# Patient Record
Sex: MALE | Race: WHITE | Employment: OTHER | URBAN - METROPOLITAN AREA
[De-identification: names, ages, dates, MRNs, and addresses within clinical notes are randomized per-mention and may not be internally consistent; named-entity substitution may affect disease eponyms.]

---

## 2020-07-10 RX ORDER — GABAPENTIN 100 MG/1
400 CAPSULE ORAL 3 TIMES DAILY
Status: ON HOLD | COMMUNITY
Start: 2020-09-10 | End: 2020-10-01

## 2020-07-10 RX ORDER — NITROGLYCERIN 0.4 MG/1
0.4 TABLET SUBLINGUAL EVERY 5 MIN PRN
COMMUNITY

## 2020-07-10 RX ORDER — LOSARTAN POTASSIUM 100 MG/1
100 TABLET ORAL DAILY
Status: ON HOLD | COMMUNITY
End: 2020-10-01 | Stop reason: HOSPADM

## 2020-07-10 RX ORDER — METOPROLOL SUCCINATE 100 MG/1
100 TABLET, EXTENDED RELEASE ORAL DAILY
Status: ON HOLD | COMMUNITY
End: 2020-10-01 | Stop reason: HOSPADM

## 2020-07-10 RX ORDER — ASPIRIN 81 MG/1
81 TABLET ORAL DAILY
Status: ON HOLD | COMMUNITY
End: 2020-09-14

## 2020-07-10 RX ORDER — ATORVASTATIN CALCIUM 40 MG/1
40 TABLET, FILM COATED ORAL DAILY
Status: ON HOLD | COMMUNITY
End: 2020-10-23 | Stop reason: SDUPTHER

## 2020-07-10 RX ORDER — AMIODARONE HYDROCHLORIDE 200 MG/1
200 TABLET ORAL DAILY
Status: ON HOLD | COMMUNITY
End: 2020-10-23 | Stop reason: SDUPTHER

## 2020-07-10 NOTE — PROGRESS NOTES
Preoperative Screening for Elective Surgery/Invasive Procedures While COVID-19 present in the community     Have you tested positive or have been told to self-isolate for COVID-19 like symptoms within the past 28 days? no   Do you currently have any of the following symptoms? no  o Fever >100.0 F or 99.9 F in immunocompromised patients? no  o New onset cough, shortness of breath or difficulty breathing? no  o New onset sore throat, myalgia (muscle aches and pains), headache, loss of taste/smell or diarrhea? no   Have you had a potential exposure to COVID-19 within the past 14 days by:  o Close contact with a confirmed case? no  o Close contact with a healthcare worker,  or essential infrastructure worker (grocery store, restaurant, gas station, public utilities or transportation)? no  o Do you reside in a congregate setting such as; skilled nursing facility, adult home, correctional facility, homeless shelter or other institutional setting? No  o Have you had recent travel to a known COVID-19 hotspot? no    Indicate if the patient has a positive screen by answering yes to one or more of the above questions. Patients who test positive or screen positive prior to surgery or on the day of surgery should be evaluated in conjunction with the surgeon/proceduralist/anesthesiologist to determine the urgency of the procedure.

## 2020-07-10 NOTE — PROGRESS NOTES
[address]  time___0820_________        Surgery time___0950_________    Take the following medications with a sip of water: Losartan, Metoprolol pt states will take it at night can't take on an empty stomach drops his BP    Do not eat or drink anything after 12:00 midnight prior to your surgery. This includes water chewing gum, mints and ice chips. You may brush your teeth and gargle the morning of your surgery, but do not swallow the water     Please see your family doctor/pediatrician for a history and physical and/or concerning medications. H&P 7/20/20 Dr. Eva Spence office Tahir Daigle NP)   Bring any test results/reports from your physicians office. If you are under the care of a heart doctor or specialist doctor, please be aware that you may be asked to them for clearance    You may be asked to stop blood thinners such as Coumadin, Plavix, Fragmin, Lovenox, etc., or any anti-inflammatories such as:  Aspirin, Ibuprofen, Advil, Naproxen prior to your surgery. We also ask that you stop any OTC medications such as fish oil, vitamin E, glucosamine, garlic, Multivitamins, COQ 10, etc.    We ask that you do not smoke 24 hours prior to surgery  We ask that you do not  drink any alcoholic beverages 24 hours prior to surgery     You must make arrangements for a responsible adult to take you home after your surgery. For your safety you will not be allowed to leave alone or drive yourself home. Your surgery will be cancelled if you do not have a ride home. Also for your safety, it is strongly suggested that someone stay with you the first 24 hours after your surgery. A parent or legal guardian must accompany a child scheduled for surgery and plan to stay at the hospital until the child is discharged. Please do not bring other children with you.     For your comfort, please wear simple loose fitting clothing to the hospital.  Please do not bring valuables. Wear short sleeve button down shirt or loose fitting shirt. Bring eye drops or ointment day of surgery. Do not wear any make-up or nail polish on your fingers or toes      For your safety, please do not wear any jewelry or body piercing's on the day of surgery. All jewelry must be removed. If you have dentures, they will be removed before going to operating room. For your convenience, we will provide you with a container. If you wear contact lenses or glasses, they will be removed, please bring a case for them. If you have a living will and a durable power of  for healthcare, please bring in a copy. As part of our patient safety program to minimize surgical site infections, we ask you to do the following:    · Please notify your surgeon if you develop any illness between         now and the  day of your surgery. · This includes a cough, cold, fever, sore throat, nausea,         or vomiting, and diarrhea, etc.  ·  Please notify your surgeon if you experience dizziness, shortness         of breath or blurred vision between now and the time of your surgery. Do not shave your operative site 96 hours prior to surgery. For face and neck surgery, men may use an electric razor 48 hours   prior to surgery. You may shower the night before surgery or the morning of   your surgery with an antibacterial soap. You will need to bring a photo ID and insurance card    [facility] has an onsite pharmacy, would you like to utilize our pharmacy     If you will be staying overnight and use a C-pap machine, please bring   your C-pap to hospital     Our goal is to provide you with excellent care, therefore, visitors will be limited to two(2) in the room at a time so that we may focus on providing this care for you. Please contact pre-admission testing if you have any further questions.                  [facility] phone number:  [phone]  Please note these are generalized instructions for all surgical cases, you may be provided with more specific instructions according to your surgery.

## 2020-07-22 ENCOUNTER — ANESTHESIA EVENT (OUTPATIENT)
Dept: SURGERY | Age: 75
End: 2020-07-22
Payer: MEDICARE

## 2020-07-22 NOTE — PROGRESS NOTES
[facility]  [phone]        Pre-Op Phone Call:     Patient Name: Ulisses Cutler     Telephone Information:   Mobile [phone]     Home phone:  [phone]    Surgery Time:    9:30 AM     Arrival Time:  0800      Left extended Message:  NA     Message left with:     Recent change in health status:  No     Advised of transportation/ policy:  Yes     NPO policy reviewed:  Yes pt     Advised to take morning heart/blood pressure medications with sips of water morning of surgery? Yes     Instructed to bring eye drops, photo identification, and insurance card day of surgery? Yes     Advised to wear short sleeved button down shirt (no T-shirt underneath):  Yes     Advised not to wear jewelry, hairpins, or pantyhose day of surgery? Yes     Advised not to wear make-up and to wash face day of surgery?   Yes    Remarks:        Electronically signed by:  Lu Levy RN at 7/22/2020 1:19 PM

## 2020-07-23 ENCOUNTER — HOSPITAL ENCOUNTER (OUTPATIENT)
Age: 75
Setting detail: OUTPATIENT SURGERY
Discharge: HOME | End: 2020-07-23
Attending: OPHTHALMOLOGY | Admitting: OPHTHALMOLOGY
Payer: MEDICARE

## 2020-07-23 ENCOUNTER — ANESTHESIA (OUTPATIENT)
Dept: SURGERY | Age: 75
End: 2020-07-23
Payer: MEDICARE

## 2020-07-23 VITALS
HEIGHT: 69 IN | HEART RATE: 54 BPM | OXYGEN SATURATION: 94 % | DIASTOLIC BLOOD PRESSURE: 87 MMHG | TEMPERATURE: 96.7 F | RESPIRATION RATE: 13 BRPM | BODY MASS INDEX: 25.18 KG/M2 | WEIGHT: 170 LBS | SYSTOLIC BLOOD PRESSURE: 179 MMHG

## 2020-07-23 VITALS — DIASTOLIC BLOOD PRESSURE: 86 MMHG | SYSTOLIC BLOOD PRESSURE: 189 MMHG | OXYGEN SATURATION: 99 %

## 2020-07-23 PROCEDURE — 2709999900 HC NON-CHARGEABLE SUPPLY: Performed by: OPHTHALMOLOGY

## 2020-07-23 PROCEDURE — 6360000002 HC RX W HCPCS: Performed by: NURSE ANESTHETIST, CERTIFIED REGISTERED

## 2020-07-23 PROCEDURE — 2500000003 HC RX 250 WO HCPCS: Performed by: OPHTHALMOLOGY

## 2020-07-23 PROCEDURE — 3700000000 HC ANESTHESIA ATTENDED CARE: Performed by: OPHTHALMOLOGY

## 2020-07-23 PROCEDURE — V2632 POST CHMBR INTRAOCULAR LENS: HCPCS | Performed by: OPHTHALMOLOGY

## 2020-07-23 PROCEDURE — 7100000010 HC PHASE II RECOVERY - FIRST 15 MIN: Performed by: OPHTHALMOLOGY

## 2020-07-23 PROCEDURE — 3700000001 HC ADD 15 MINUTES (ANESTHESIA): Performed by: OPHTHALMOLOGY

## 2020-07-23 PROCEDURE — 2500000003 HC RX 250 WO HCPCS

## 2020-07-23 PROCEDURE — 6370000000 HC RX 637 (ALT 250 FOR IP): Performed by: OPHTHALMOLOGY

## 2020-07-23 PROCEDURE — 3600000014 HC SURGERY LEVEL 4 ADDTL 15MIN: Performed by: OPHTHALMOLOGY

## 2020-07-23 PROCEDURE — 3600000004 HC SURGERY LEVEL 4 BASE: Performed by: OPHTHALMOLOGY

## 2020-07-23 PROCEDURE — 2580000003 HC RX 258: Performed by: ANESTHESIOLOGY

## 2020-07-23 DEVICE — LENS INTOCU +20.0 DIOPT L13MM DIA6MM 0DEG HAPTIC ANG A: Type: IMPLANTABLE DEVICE | Site: EYE | Status: FUNCTIONAL

## 2020-07-23 RX ORDER — TETRACAINE HYDROCHLORIDE 5 MG/ML
1 SOLUTION OPHTHALMIC ONCE
Status: COMPLETED | OUTPATIENT
Start: 2020-07-23 | End: 2020-07-23

## 2020-07-23 RX ORDER — ONDANSETRON 2 MG/ML
4 INJECTION INTRAMUSCULAR; INTRAVENOUS
Status: DISCONTINUED | OUTPATIENT
Start: 2020-07-23 | End: 2020-07-23 | Stop reason: HOSPADM

## 2020-07-23 RX ORDER — MIDAZOLAM HYDROCHLORIDE 1 MG/ML
INJECTION INTRAMUSCULAR; INTRAVENOUS PRN
Status: DISCONTINUED | OUTPATIENT
Start: 2020-07-23 | End: 2020-07-23 | Stop reason: SDUPTHER

## 2020-07-23 RX ORDER — SODIUM CHLORIDE 9 MG/ML
INJECTION, SOLUTION INTRAVENOUS CONTINUOUS
Status: DISCONTINUED | OUTPATIENT
Start: 2020-07-23 | End: 2020-07-23 | Stop reason: HOSPADM

## 2020-07-23 RX ORDER — CIPROFLOXACIN HYDROCHLORIDE 3.5 MG/ML
1 SOLUTION/ DROPS TOPICAL SEE ADMIN INSTRUCTIONS
Status: DISCONTINUED | OUTPATIENT
Start: 2020-07-23 | End: 2020-07-23 | Stop reason: HOSPADM

## 2020-07-23 RX ORDER — OFLOXACIN 3 MG/ML
SOLUTION/ DROPS OPHTHALMIC
Status: COMPLETED | OUTPATIENT
Start: 2020-07-23 | End: 2020-07-23

## 2020-07-23 RX ORDER — BALANCED SALT SOLUTION ENRICHED WITH BICARBONATE, DEXTROSE, AND GLUTATHIONE
KIT INTRAOCULAR
Status: COMPLETED | OUTPATIENT
Start: 2020-07-23 | End: 2020-07-23

## 2020-07-23 RX ORDER — FENTANYL CITRATE 50 UG/ML
INJECTION, SOLUTION INTRAMUSCULAR; INTRAVENOUS PRN
Status: DISCONTINUED | OUTPATIENT
Start: 2020-07-23 | End: 2020-07-23 | Stop reason: SDUPTHER

## 2020-07-23 RX ORDER — TETRACAINE HYDROCHLORIDE 5 MG/ML
SOLUTION OPHTHALMIC
Status: COMPLETED | OUTPATIENT
Start: 2020-07-23 | End: 2020-07-23

## 2020-07-23 RX ORDER — SODIUM CHLORIDE 0.9 % (FLUSH) 0.9 %
10 SYRINGE (ML) INJECTION EVERY 12 HOURS SCHEDULED
Status: DISCONTINUED | OUTPATIENT
Start: 2020-07-23 | End: 2020-07-23 | Stop reason: HOSPADM

## 2020-07-23 RX ORDER — HYDRALAZINE HYDROCHLORIDE 50 MG/1
75 TABLET, FILM COATED ORAL 2 TIMES DAILY
Status: ON HOLD | COMMUNITY
End: 2020-10-01 | Stop reason: HOSPADM

## 2020-07-23 RX ORDER — PREDNISOLONE ACETATE 10 MG/ML
SUSPENSION/ DROPS OPHTHALMIC
Status: COMPLETED | OUTPATIENT
Start: 2020-07-23 | End: 2020-07-23

## 2020-07-23 RX ORDER — PILOCARPINE HYDROCHLORIDE 20 MG/ML
SOLUTION/ DROPS OPHTHALMIC
Status: COMPLETED | OUTPATIENT
Start: 2020-07-23 | End: 2020-07-23

## 2020-07-23 RX ORDER — BALANCED SALT SOLUTION 6.4; .75; .48; .3; 3.9; 1.7 MG/ML; MG/ML; MG/ML; MG/ML; MG/ML; MG/ML
SOLUTION OPHTHALMIC
Status: COMPLETED | OUTPATIENT
Start: 2020-07-23 | End: 2020-07-23

## 2020-07-23 RX ORDER — SODIUM CHLORIDE 0.9 % (FLUSH) 0.9 %
10 SYRINGE (ML) INJECTION PRN
Status: DISCONTINUED | OUTPATIENT
Start: 2020-07-23 | End: 2020-07-23 | Stop reason: HOSPADM

## 2020-07-23 RX ADMIN — SODIUM CHLORIDE: 9 INJECTION, SOLUTION INTRAVENOUS at 08:18

## 2020-07-23 RX ADMIN — TETRACAINE HYDROCHLORIDE 1 DROP: 5 SOLUTION OPHTHALMIC at 08:10

## 2020-07-23 RX ADMIN — POVIDONE-IODINE: 5 SOLUTION OPHTHALMIC at 08:10

## 2020-07-23 RX ADMIN — Medication 0.5 ML: at 08:15

## 2020-07-23 RX ADMIN — LIDOCAINE HYDROCHLORIDE: 35 GEL OPHTHALMIC at 08:10

## 2020-07-23 RX ADMIN — Medication 0.5 ML: at 08:10

## 2020-07-23 RX ADMIN — FENTANYL CITRATE 50 MCG: 50 INJECTION INTRAMUSCULAR; INTRAVENOUS at 09:33

## 2020-07-23 RX ADMIN — CIPROFLOXACIN 1 DROP: 3 SOLUTION OPHTHALMIC at 08:15

## 2020-07-23 RX ADMIN — MIDAZOLAM 1 MG: 1 INJECTION INTRAMUSCULAR; INTRAVENOUS at 09:33

## 2020-07-23 RX ADMIN — CIPROFLOXACIN 1 DROP: 3 SOLUTION OPHTHALMIC at 08:10

## 2020-07-23 ASSESSMENT — PAIN - FUNCTIONAL ASSESSMENT: PAIN_FUNCTIONAL_ASSESSMENT: 0-10

## 2020-07-23 ASSESSMENT — PAIN SCALES - GENERAL
PAINLEVEL_OUTOF10: 0
PAINLEVEL_OUTOF10: 0

## 2020-07-23 ASSESSMENT — ENCOUNTER SYMPTOMS: SHORTNESS OF BREATH: 0

## 2020-07-23 NOTE — ANESTHESIA PRE PROCEDURE
Readings from Last 3 Encounters:   07/23/20 170 lb (77.1 kg)     Body mass index is 25.1 kg/m². CBC: No results found for: WBC, RBC, HGB, HCT, MCV, RDW, PLT    CMP: No results found for: NA, K, CL, CO2, BUN, CREATININE, GFRAA, AGRATIO, LABGLOM, GLUCOSE, PROT, CALCIUM, BILITOT, ALKPHOS, AST, ALT    POC Tests: No results for input(s): POCGLU, POCNA, POCK, POCCL, POCBUN, POCHEMO, POCHCT in the last 72 hours. Coags: No results found for: PROTIME, INR, APTT    HCG (If Applicable): No results found for: PREGTESTUR, PREGSERUM, HCG, HCGQUANT     ABGs: No results found for: PHART, PO2ART, JRM8TOV, RVE7YHJ, BEART, Z3CMAGBX     Type & Screen (If Applicable):  No results found for: LABABO, LABRH    Drug/Infectious Status (If Applicable):  No results found for: HIV, HEPCAB    COVID-19 Screening (If Applicable): No results found for: COVID19      Anesthesia Evaluation  Patient summary reviewed no history of anesthetic complications:   Airway: Mallampati: III  TM distance: >3 FB   Neck ROM: full  Mouth opening: > = 3 FB Dental:      Comment: No loose teeth    Pulmonary: breath sounds clear to auscultation      (-) COPD, asthma, shortness of breath and recent URI                           Cardiovascular:    (+) hypertension:, valvular problems/murmurs: AI, past MI:, CABG/stent (stent 2013):, dysrhythmias: atrial fibrillation, hyperlipidemia    (-) murmur      Rhythm: regular  Rate: normal                    Neuro/Psych:      (-) seizures, neuromuscular disease, TIA, CVA, headaches and psychiatric history           GI/Hepatic/Renal:   (+) renal disease: CRI,      (-) GERD, PUD, hepatitis, liver disease and bowel prep       Endo/Other:    (+) blood dyscrasia: anticoagulation therapy:., .    (-) diabetes mellitus, hypothyroidism, hyperthyroidism               Abdominal:           Vascular:   + PVD, aortic or cerebral, .                                  Anesthesia Plan      MAC     ASA 3       Induction:

## 2020-07-23 NOTE — OP NOTE
Operative Note      [place] [address]  [phone]      Name:  Jean Paul Mackenzie  :  1945    Age:   66 y.o. Medical Record Number:  [id number]  Date of Procedure:  2020     Preoperative diagnosis:  1. Visually significant cataract right eye   2. Primary open angle glaucoma right eye     Postoperative diagnosis:   Same    Procedure:   1. Phacoemulsification with intraocular lens right eye   2. Goniotomy with [place] dual blade right eye. Surgeon: Chato Salmeron M.D. Anesthesia: MAC with Topical   Complications:None  Implant: [id number] +20.0  Estimated Blood Loss: < 3cc  Specimens removed: none    Indications for procedure: The patient has a history of glaucoma which is currently uncontrolled. In order to achieve better control of the intraocular pressure, glaucoma surgery was advised to try and prevent further loss of vision. The patient has a visually significant cataract in the right eye that interferes with activities of daily living. Following discussion of all risks, benefits, and alternatives, the patient agreed to have the procedure done. Informed consent was signed. Operative Procedure:    After the risks and benefits of, and alternatives to, the planned procedure were explained to the patient,informed consent was obtained. The patient was ushered into the operating room and placed in the supineposition on the table. After induction of IV sedation, was administered, the operative eye and the surrounding  area were prepped and draped in the usual sterile fashion. Under the operating microscope, a temporal paracentesis was created. A small amount of preservative-free 1% lidocaine was instilled into the anterior chamber. The anterior chamber was then deepened with Viscoat / Healon 5. A temporal biplanar clear corneal incision was created with a 2.4mm keratome.  The patient's head and microscope were then tilted to

## 2020-07-23 NOTE — ANESTHESIA POSTPROCEDURE EVALUATION
Department of Anesthesiology  Postprocedure Note    Patient: Sydney Govea MRN: [id number]  YOB: 1945  Date of evaluation: 7/23/2020  Time:  10:15 AM     Procedure Summary     Date:  07/23/20 Room / Location:  [address]    Anesthesia Start:  0930 Anesthesia Stop:  4106    Procedures:       GONIOTOMY (Right )      Phacoemulsification with intraocular lens implant (Right ) Diagnosis:       Age-related nuclear cataract of right eye      Primary open angle glaucoma of right eye, severe stage      (Age-related nuclear cataract of right eye; Primary open angle glaucoma of right eye, severe stage)    Surgeon:  Janet Sawant MD Responsible Provider:  Lasha Yates MD    Anesthesia Type:  MAC ASA Status:  3          Anesthesia Type: MAC    Lesli Phase I: Lesli Score: 10    Lesli Phase II: Lesli Score: 10    Last vitals: Reviewed and per EMR flowsheets.        Anesthesia Post Evaluation    Patient location during evaluation: PACU  Patient participation: complete - patient participated  Level of consciousness: awake and alert  Airway patency: patent  Nausea & Vomiting: no nausea and no vomiting  Complications: no  Cardiovascular status: hemodynamically stable  Respiratory status: acceptable  Hydration status: stable

## 2020-09-13 ENCOUNTER — APPOINTMENT (OUTPATIENT)
Dept: GENERAL RADIOLOGY | Age: 75
DRG: 871 | End: 2020-09-13
Payer: MEDICARE

## 2020-09-13 ENCOUNTER — HOSPITAL ENCOUNTER (INPATIENT)
Age: 75
LOS: 5 days | Discharge: NURSING, REHAB OR LONG-TERM CARE | DRG: 871 | End: 2020-09-18
Attending: EMERGENCY MEDICINE | Admitting: HOSPITALIST
Payer: MEDICARE

## 2020-09-13 ENCOUNTER — APPOINTMENT (OUTPATIENT)
Dept: CT IMAGING | Age: 75
DRG: 871 | End: 2020-09-13
Payer: MEDICARE

## 2020-09-13 PROBLEM — A41.9 SEPSIS (HCC): Status: ACTIVE | Noted: 2020-09-13

## 2020-09-13 LAB
A/G RATIO: 1.4 (ref 1.1–2.2)
ALBUMIN SERPL-MCNC: 3.7 G/DL (ref 3.4–5)
ALP BLD-CCNC: 82 U/L (ref 40–129)
ALT SERPL-CCNC: 62 U/L (ref 10–40)
AMMONIA: 18 UMOL/L (ref 16–60)
ANION GAP SERPL CALCULATED.3IONS-SCNC: 15 MMOL/L (ref 3–16)
AST SERPL-CCNC: 73 U/L (ref 15–37)
BACTERIA: ABNORMAL /HPF
BASE EXCESS ARTERIAL: -6.8 MMOL/L (ref -3–3)
BASE EXCESS VENOUS: -7.2 MMOL/L
BASOPHILS ABSOLUTE: 0 K/UL (ref 0–0.2)
BASOPHILS RELATIVE PERCENT: 0.1 %
BILIRUB SERPL-MCNC: 2.7 MG/DL (ref 0–1)
BILIRUBIN URINE: ABNORMAL
BLOOD, URINE: ABNORMAL
BUN BLDV-MCNC: 30 MG/DL (ref 7–20)
C-REACTIVE PROTEIN: 74.2 MG/L (ref 0–5.1)
CALCIUM SERPL-MCNC: 8.7 MG/DL (ref 8.3–10.6)
CARBOXYHEMOGLOBIN ARTERIAL: 1.7 % (ref 0–1.5)
CARBOXYHEMOGLOBIN: 1.3 %
CHLORIDE BLD-SCNC: 103 MMOL/L (ref 99–110)
CLARITY: ABNORMAL
CO2: 18 MMOL/L (ref 21–32)
COLOR: ABNORMAL
CREAT SERPL-MCNC: 1.9 MG/DL (ref 0.8–1.3)
EOSINOPHILS ABSOLUTE: 0 K/UL (ref 0–0.6)
EOSINOPHILS RELATIVE PERCENT: 0 %
EPITHELIAL CELLS, UA: ABNORMAL /HPF (ref 0–5)
FERRITIN: 1143 NG/ML (ref 30–400)
GFR AFRICAN AMERICAN: 42
GFR NON-AFRICAN AMERICAN: 35
GLOBULIN: 2.6 G/DL
GLUCOSE BLD-MCNC: 102 MG/DL (ref 70–99)
GLUCOSE BLD-MCNC: 90 MG/DL (ref 70–99)
GLUCOSE URINE: ABNORMAL MG/DL
HCO3 ARTERIAL: 17.2 MMOL/L (ref 21–29)
HCO3 VENOUS: 18 MMOL/L (ref 23–29)
HCT VFR BLD CALC: 25 % (ref 40.5–52.5)
HCT VFR BLD CALC: 29.1 % (ref 40.5–52.5)
HEMOGLOBIN, ART, EXTENDED: 8.4 G/DL (ref 13.5–17.5)
HEMOGLOBIN: 7.9 G/DL (ref 13.5–17.5)
HEMOGLOBIN: 9.3 G/DL (ref 13.5–17.5)
KETONES, URINE: ABNORMAL MG/DL
LACTATE DEHYDROGENASE: 454 U/L (ref 100–190)
LACTIC ACID: 1.7 MMOL/L (ref 0.4–2)
LACTIC ACID: 2.2 MMOL/L (ref 0.4–2)
LACTIC ACID: 3.4 MMOL/L (ref 0.4–2)
LEUKOCYTE ESTERASE, URINE: ABNORMAL
LIPASE: 46 U/L (ref 13–60)
LYMPHOCYTES ABSOLUTE: 0.2 K/UL (ref 1–5.1)
LYMPHOCYTES RELATIVE PERCENT: 0.9 %
MCH RBC QN AUTO: 30.7 PG (ref 26–34)
MCHC RBC AUTO-ENTMCNC: 31.9 G/DL (ref 31–36)
MCV RBC AUTO: 96.2 FL (ref 80–100)
METHEMOGLOBIN ARTERIAL: 0.8 %
METHEMOGLOBIN VENOUS: 0.7 %
MICROSCOPIC EXAMINATION: YES
MONOCYTES ABSOLUTE: 0.2 K/UL (ref 0–1.3)
MONOCYTES RELATIVE PERCENT: 0.7 %
NEUTROPHILS ABSOLUTE: 27.2 K/UL (ref 1.7–7.7)
NEUTROPHILS RELATIVE PERCENT: 98.3 %
NITRITE, URINE: ABNORMAL
O2 CONTENT ARTERIAL: 11 ML/DL
O2 CONTENT, VEN: 8 ML/DL
O2 SAT, ARTERIAL: 98 %
O2 SAT, VEN: 71 %
O2 THERAPY: ABNORMAL
O2 THERAPY: ABNORMAL
PCO2 ARTERIAL: 28.3 MMHG (ref 35–45)
PCO2, VEN: 36.7 MMHG (ref 40–50)
PDW BLD-RTO: 17.3 % (ref 12.4–15.4)
PERFORMED ON: ABNORMAL
PH ARTERIAL: 7.39 (ref 7.35–7.45)
PH UA: ABNORMAL (ref 5–8)
PH VENOUS: 7.31 (ref 7.35–7.45)
PLATELET # BLD: 141 K/UL (ref 135–450)
PMV BLD AUTO: 9.5 FL (ref 5–10.5)
PO2 ARTERIAL: 86.1 MMHG (ref 75–108)
PO2, VEN: 39 MMHG
POTASSIUM REFLEX MAGNESIUM: 4 MMOL/L (ref 3.5–5.1)
PRO-BNP: 5868 PG/ML (ref 0–449)
PROTEIN UA: ABNORMAL MG/DL
RBC # BLD: 3.03 M/UL (ref 4.2–5.9)
RBC UA: >100 /HPF (ref 0–4)
SARS-COV-2, NAAT: NOT DETECTED
SODIUM BLD-SCNC: 136 MMOL/L (ref 136–145)
SPECIFIC GRAVITY UA: 1.02 (ref 1–1.03)
TCO2 ARTERIAL: 18.1 MMOL/L
TCO2 CALC VENOUS: 20 MMOL/L
TOTAL PROTEIN: 6.3 G/DL (ref 6.4–8.2)
TROPONIN: 0.02 NG/ML
TROPONIN: 0.04 NG/ML
TROPONIN: 0.06 NG/ML
URINE REFLEX TO CULTURE: YES
URINE TYPE: ABNORMAL
UROBILINOGEN, URINE: ABNORMAL E.U./DL
WBC # BLD: 27.6 K/UL (ref 4–11)
WBC UA: ABNORMAL /HPF (ref 0–5)

## 2020-09-13 PROCEDURE — 76937 US GUIDE VASCULAR ACCESS: CPT

## 2020-09-13 PROCEDURE — 0T9B80Z DRAINAGE OF BLADDER WITH DRAINAGE DEVICE, VIA NATURAL OR ARTIFICIAL OPENING ENDOSCOPIC: ICD-10-PCS | Performed by: UROLOGY

## 2020-09-13 PROCEDURE — 99291 CRITICAL CARE FIRST HOUR: CPT | Performed by: INTERNAL MEDICINE

## 2020-09-13 PROCEDURE — 82140 ASSAY OF AMMONIA: CPT

## 2020-09-13 PROCEDURE — C1751 CATH, INF, PER/CENT/MIDLINE: HCPCS

## 2020-09-13 PROCEDURE — 99285 EMERGENCY DEPT VISIT HI MDM: CPT

## 2020-09-13 PROCEDURE — 83690 ASSAY OF LIPASE: CPT

## 2020-09-13 PROCEDURE — 85018 HEMOGLOBIN: CPT

## 2020-09-13 PROCEDURE — 36592 COLLECT BLOOD FROM PICC: CPT

## 2020-09-13 PROCEDURE — 82728 ASSAY OF FERRITIN: CPT

## 2020-09-13 PROCEDURE — 94660 CPAP INITIATION&MGMT: CPT

## 2020-09-13 PROCEDURE — 84484 ASSAY OF TROPONIN QUANT: CPT

## 2020-09-13 PROCEDURE — 83880 ASSAY OF NATRIURETIC PEPTIDE: CPT

## 2020-09-13 PROCEDURE — 2580000003 HC RX 258: Performed by: EMERGENCY MEDICINE

## 2020-09-13 PROCEDURE — 6370000000 HC RX 637 (ALT 250 FOR IP): Performed by: EMERGENCY MEDICINE

## 2020-09-13 PROCEDURE — 85014 HEMATOCRIT: CPT

## 2020-09-13 PROCEDURE — 86140 C-REACTIVE PROTEIN: CPT

## 2020-09-13 PROCEDURE — 36415 COLL VENOUS BLD VENIPUNCTURE: CPT

## 2020-09-13 PROCEDURE — 36600 WITHDRAWAL OF ARTERIAL BLOOD: CPT

## 2020-09-13 PROCEDURE — 94761 N-INVAS EAR/PLS OXIMETRY MLT: CPT

## 2020-09-13 PROCEDURE — 83605 ASSAY OF LACTIC ACID: CPT

## 2020-09-13 PROCEDURE — 2580000003 HC RX 258: Performed by: HOSPITALIST

## 2020-09-13 PROCEDURE — 80053 COMPREHEN METABOLIC PANEL: CPT

## 2020-09-13 PROCEDURE — 2580000003 HC RX 258: Performed by: NURSE PRACTITIONER

## 2020-09-13 PROCEDURE — 96365 THER/PROPH/DIAG IV INF INIT: CPT

## 2020-09-13 PROCEDURE — 51798 US URINE CAPACITY MEASURE: CPT

## 2020-09-13 PROCEDURE — 2700000000 HC OXYGEN THERAPY PER DAY

## 2020-09-13 PROCEDURE — 83615 LACTATE (LD) (LDH) ENZYME: CPT

## 2020-09-13 PROCEDURE — U0002 COVID-19 LAB TEST NON-CDC: HCPCS

## 2020-09-13 PROCEDURE — 6360000002 HC RX W HCPCS: Performed by: EMERGENCY MEDICINE

## 2020-09-13 PROCEDURE — 6360000002 HC RX W HCPCS: Performed by: HOSPITALIST

## 2020-09-13 PROCEDURE — 96366 THER/PROPH/DIAG IV INF ADDON: CPT

## 2020-09-13 PROCEDURE — 85025 COMPLETE CBC W/AUTO DIFF WBC: CPT

## 2020-09-13 PROCEDURE — 71045 X-RAY EXAM CHEST 1 VIEW: CPT

## 2020-09-13 PROCEDURE — 87086 URINE CULTURE/COLONY COUNT: CPT

## 2020-09-13 PROCEDURE — 74176 CT ABD & PELVIS W/O CONTRAST: CPT

## 2020-09-13 PROCEDURE — U0003 INFECTIOUS AGENT DETECTION BY NUCLEIC ACID (DNA OR RNA); SEVERE ACUTE RESPIRATORY SYNDROME CORONAVIRUS 2 (SARS-COV-2) (CORONAVIRUS DISEASE [COVID-19]), AMPLIFIED PROBE TECHNIQUE, MAKING USE OF HIGH THROUGHPUT TECHNOLOGIES AS DESCRIBED BY CMS-2020-01-R: HCPCS

## 2020-09-13 PROCEDURE — 96361 HYDRATE IV INFUSION ADD-ON: CPT

## 2020-09-13 PROCEDURE — 81001 URINALYSIS AUTO W/SCOPE: CPT

## 2020-09-13 PROCEDURE — 93005 ELECTROCARDIOGRAM TRACING: CPT | Performed by: EMERGENCY MEDICINE

## 2020-09-13 PROCEDURE — 70450 CT HEAD/BRAIN W/O DYE: CPT

## 2020-09-13 PROCEDURE — 82803 BLOOD GASES ANY COMBINATION: CPT

## 2020-09-13 PROCEDURE — 2000000000 HC ICU R&B

## 2020-09-13 PROCEDURE — 96367 TX/PROPH/DG ADDL SEQ IV INF: CPT

## 2020-09-13 PROCEDURE — 87040 BLOOD CULTURE FOR BACTERIA: CPT

## 2020-09-13 PROCEDURE — 36569 INSJ PICC 5 YR+ W/O IMAGING: CPT

## 2020-09-13 RX ORDER — ACETAMINOPHEN 650 MG/1
650 SUPPOSITORY RECTAL EVERY 6 HOURS PRN
Status: DISCONTINUED | OUTPATIENT
Start: 2020-09-13 | End: 2020-09-18 | Stop reason: HOSPADM

## 2020-09-13 RX ORDER — ACETAMINOPHEN 650 MG/1
650 SUPPOSITORY RECTAL ONCE
Status: COMPLETED | OUTPATIENT
Start: 2020-09-13 | End: 2020-09-13

## 2020-09-13 RX ORDER — SODIUM CHLORIDE 0.9 % (FLUSH) 0.9 %
10 SYRINGE (ML) INJECTION EVERY 12 HOURS SCHEDULED
Status: DISCONTINUED | OUTPATIENT
Start: 2020-09-13 | End: 2020-09-14

## 2020-09-13 RX ORDER — 0.9 % SODIUM CHLORIDE 0.9 %
30 INTRAVENOUS SOLUTION INTRAVENOUS ONCE
Status: COMPLETED | OUTPATIENT
Start: 2020-09-13 | End: 2020-09-13

## 2020-09-13 RX ORDER — LACTOBACILLUS RHAMNOSUS GG 10B CELL
2 CAPSULE ORAL 2 TIMES DAILY WITH MEALS
Status: DISCONTINUED | OUTPATIENT
Start: 2020-09-13 | End: 2020-09-18 | Stop reason: HOSPADM

## 2020-09-13 RX ORDER — ACETAMINOPHEN 325 MG/1
650 TABLET ORAL EVERY 6 HOURS PRN
Status: DISCONTINUED | OUTPATIENT
Start: 2020-09-13 | End: 2020-09-18 | Stop reason: HOSPADM

## 2020-09-13 RX ORDER — AMIODARONE HYDROCHLORIDE 200 MG/1
200 TABLET ORAL DAILY
Status: DISCONTINUED | OUTPATIENT
Start: 2020-09-13 | End: 2020-09-18 | Stop reason: HOSPADM

## 2020-09-13 RX ORDER — ATORVASTATIN CALCIUM 40 MG/1
40 TABLET, FILM COATED ORAL NIGHTLY
Status: DISCONTINUED | OUTPATIENT
Start: 2020-09-13 | End: 2020-09-18 | Stop reason: HOSPADM

## 2020-09-13 RX ORDER — NITROGLYCERIN 0.4 MG/1
0.4 TABLET SUBLINGUAL EVERY 5 MIN PRN
Status: DISCONTINUED | OUTPATIENT
Start: 2020-09-13 | End: 2020-09-18 | Stop reason: HOSPADM

## 2020-09-13 RX ORDER — SODIUM CHLORIDE 9 MG/ML
INJECTION, SOLUTION INTRAVENOUS CONTINUOUS
Status: DISCONTINUED | OUTPATIENT
Start: 2020-09-13 | End: 2020-09-15

## 2020-09-13 RX ORDER — SODIUM CHLORIDE 0.9 % (FLUSH) 0.9 %
10 SYRINGE (ML) INJECTION PRN
Status: DISCONTINUED | OUTPATIENT
Start: 2020-09-13 | End: 2020-09-18 | Stop reason: HOSPADM

## 2020-09-13 RX ORDER — PROMETHAZINE HYDROCHLORIDE 25 MG/1
12.5 TABLET ORAL EVERY 6 HOURS PRN
Status: DISCONTINUED | OUTPATIENT
Start: 2020-09-13 | End: 2020-09-18 | Stop reason: HOSPADM

## 2020-09-13 RX ORDER — GABAPENTIN 100 MG/1
100 CAPSULE ORAL 3 TIMES DAILY
Status: DISCONTINUED | OUTPATIENT
Start: 2020-09-13 | End: 2020-09-18 | Stop reason: HOSPADM

## 2020-09-13 RX ORDER — LIDOCAINE HYDROCHLORIDE 10 MG/ML
5 INJECTION, SOLUTION EPIDURAL; INFILTRATION; INTRACAUDAL; PERINEURAL ONCE
Status: DISCONTINUED | OUTPATIENT
Start: 2020-09-13 | End: 2020-09-14

## 2020-09-13 RX ORDER — ONDANSETRON 2 MG/ML
4 INJECTION INTRAMUSCULAR; INTRAVENOUS EVERY 6 HOURS PRN
Status: DISCONTINUED | OUTPATIENT
Start: 2020-09-13 | End: 2020-09-18 | Stop reason: HOSPADM

## 2020-09-13 RX ORDER — POLYETHYLENE GLYCOL 3350 17 G/17G
17 POWDER, FOR SOLUTION ORAL DAILY PRN
Status: DISCONTINUED | OUTPATIENT
Start: 2020-09-13 | End: 2020-09-18 | Stop reason: HOSPADM

## 2020-09-13 RX ORDER — SODIUM CHLORIDE 0.9 % (FLUSH) 0.9 %
10 SYRINGE (ML) INJECTION EVERY 12 HOURS SCHEDULED
Status: DISCONTINUED | OUTPATIENT
Start: 2020-09-13 | End: 2020-09-18 | Stop reason: HOSPADM

## 2020-09-13 RX ORDER — 0.9 % SODIUM CHLORIDE 0.9 %
1000 INTRAVENOUS SOLUTION INTRAVENOUS ONCE
Status: COMPLETED | OUTPATIENT
Start: 2020-09-13 | End: 2020-09-13

## 2020-09-13 RX ADMIN — MEROPENEM 500 MG: 500 INJECTION, POWDER, FOR SOLUTION INTRAVENOUS at 18:18

## 2020-09-13 RX ADMIN — SODIUM CHLORIDE 1000 ML: 9 INJECTION, SOLUTION INTRAVENOUS at 09:50

## 2020-09-13 RX ADMIN — MEROPENEM 1 G: 1 INJECTION, POWDER, FOR SOLUTION INTRAVENOUS at 10:11

## 2020-09-13 RX ADMIN — SODIUM CHLORIDE, PRESERVATIVE FREE 10 ML: 5 INJECTION INTRAVENOUS at 20:58

## 2020-09-13 RX ADMIN — ACETAMINOPHEN 650 MG: 650 SUPPOSITORY RECTAL at 09:51

## 2020-09-13 RX ADMIN — SODIUM CHLORIDE, PRESERVATIVE FREE 10 ML: 5 INJECTION INTRAVENOUS at 20:57

## 2020-09-13 RX ADMIN — VANCOMYCIN HYDROCHLORIDE 1500 MG: 10 INJECTION, POWDER, LYOPHILIZED, FOR SOLUTION INTRAVENOUS at 11:24

## 2020-09-13 RX ADMIN — SODIUM CHLORIDE: 9 INJECTION, SOLUTION INTRAVENOUS at 14:53

## 2020-09-13 RX ADMIN — SODIUM CHLORIDE 1643 ML: 9 INJECTION, SOLUTION INTRAVENOUS at 10:02

## 2020-09-13 ASSESSMENT — PAIN SCALES - GENERAL
PAINLEVEL_OUTOF10: 0
PAINLEVEL_OUTOF10: 0

## 2020-09-13 NOTE — OP NOTE
[address] Indira Lacey                                 OPERATIVE REPORT    PATIENT NAME: Matthieu Evans                    :        1945  MED REC NO:   [id number]                          ROOM:       2123  ACCOUNT NO:   [de-identified]                           ADMIT DATE: 2020  PROVIDER:     Kenna Benito MD    DATE OF PROCEDURE:  2020    PREOPERATIVE DIAGNOSIS:  Urinary retention. POSTOPERATIVE DIAGNOSES:  Urinary retention, urethral false passage. OPERATION PERFORMED:  Flexible cystoscopy with Reeves catheter placement  complicated due to the patient's medical status including pending COVID  status and inability for nursing staff to place catheter. SURGEON:  Kenna Benito MD    BLOOD LOSS:  Minimal.    INDICATIONS:  A 20-year-old gentleman admitted from the ER with altered  mental status and high fevers. A CAT scan shows a distended bladder. Nursing staff was unable to place a catheter despite multiple attempts. I was called in to place the catheter. OPERATIVE PROCEDURE:  The patient had already been given IV antibiotics  due to concern for sepsis. He was awake, but disoriented. His  genitalia were prepped and draped; 10 mL of 2% lidocaine jelly were  instilled per urethra. I initially attempted to pass an 18-Vietnamese  Coude-tip catheter. Resistance was met near the prostatic urethra. Despite multiple attempts, I was unable to pass this. I then attempted  a 0.025 ZIPwire, which would not pass and then a 0.035 Sensor wire. This also would not pass. I then switched to a 0.035 ZIPwire and made  multiple attempts to pass the wire. These were unsuccessful. The wires  were removed. The flexible cystoscope was then obtained. The flexible cystoscope was  then advanced through the urethra. At the prostatic urethra, there was  quite a bit of blood clot.   There was a false passage posteriorly. The  normal passage was identified anteriorly. I passed the scope through  this easily into the bladder. Now, the Sensor wire was advanced through  the cystoscope into the bladder. The cystoscope was withdrawn. A 16-Cypriot Councill-tip catheter was advanced over the wire and then  the wire was withdrawn. The balloon was filled with 10 mL of saline and  there was return of over a 1 L of cloudy foul-smelling urine. The  patient tolerated the procedure well.         Brandt Dennis MD    D: 09/13/2020 13:23:06       T: 09/13/2020 13:42:20     [email or web address]  Job#: [id number]     Doc#: [id number]    CC:

## 2020-09-13 NOTE — PROGRESS NOTES
4 Eyes Skin Assessment     The patient is being assess for  Admission    I agree that 2 RN's have performed a thorough Head to Toe Skin Assessment on the patient. ALL assessment sites listed below have been assessed. Areas assessed by both nurses:   [x]   Head, Face, and Ears   [x]   Shoulders, Back, and Chest  [x]   Arms, Elbows, and Hands   [x]   Coccyx, Sacrum, and IschIum  [x]   Legs, Feet, and Heels        Does the Patient have Skin Breakdown?   No         Juan Prevention initiated:  Yes   Wound Care Orders initiated:  NA      [facility] nurse consulted for Pressure Injury (Stage 3,4, Unstageable, DTI, NWPT, and Complex wounds), New and Established Ostomies:  NA      Nurse 1 eSignature: Electronically signed by Luis Carlos Huerta RN on 9/13/20 at 6:55 PM EDT    **SHARE this note so that the co-signing nurse is able to place an eSignature**    Nurse 2 eSignature: Electronically signed by Boo Petit RN on 9/13/20 at 6:55 PM EDT

## 2020-09-13 NOTE — H&P
Hospital Medicine History & Physical      PCP: Rich Luong    Date of Admission: 9/13/2020    Date of Service: Pt seen/examined on 9/13/2020 and Admitted to Inpatient with expected LOS greater than two midnights due to medical therapy. Chief Complaint:  AMS      History Of Present Illness:       66 y.o. male  Admitted from SNF recovering from L4-5, L5-S1 anterior and posterior fusion on 8/31 with post op retroperitoneal hematoma. He was found to have AMS, tachypnea, fever and presents to the ER obtunded, T 105.1 F, tachycardic with . Patient is currently seen on bipap, awakens to name, answers no whe asked if in pain but readily falls back to sleep. Does not appear to be in distress, unable to contribute further to HPI. Past Medical History:          Diagnosis Date    Aneurysm, aortic (HCC)     Atrial fibrillation (HCC)     Glaucoma     Heart [facility] ([facility] [address]     Hyperlipidemia     Hypertension        Past Surgical History:          Procedure Laterality Date    COLONOSCOPY      CORONARY ANGIOPLASTY WITH STENT PLACEMENT      X 2    EYE SURGERY Right 7/23/2020    GONIOTOMY performed by Delfin Mendez MD at [place] [address] Right 7/23/2020    Phacoemulsification with intraocular lens implant performed by Delfin Mendez MD at [address] [place] [address]       Medications Prior to Admission:      Prior to Admission medications    Medication Sig Start Date End Date Taking?  Authorizing Provider   hydrALAZINE (APRESOLINE) 25 MG tablet Take 25 mg by mouth 2 times daily    Historical Provider, MD   aspirin 81 MG EC tablet Take 81 mg by mouth daily    Historical Provider, MD   metoprolol succinate (TOPROL XL) 50 MG extended release tablet Take 50 mg by mouth daily    Historical Provider, MD   losartan (COZAAR) 100 MG tablet Take 100 mg by mouth daily    Historical Provider, MD   atorvastatin (LIPITOR) 40 MG tablet Take 40 mg by mouth daily    Historical Provider, MD nitroGLYCERIN (NITROSTAT) 0.4 MG SL tablet Place 0.4 mg under the tongue every 5 minutes as needed for Chest pain up to max of 3 total doses. If no relief after 1 dose, call 911. Historical Provider, MD   gabapentin (NEURONTIN) 100 MG capsule Take 100 mg by mouth 3 times daily. Historical Provider, MD   amiodarone (CORDARONE) 200 MG tablet Take 200 mg by mouth daily Indications: takes in the evening     Historical Provider, MD   rivaroxaban (XARELTO) 2.5 MG TABS tablet Take 2.5 mg by mouth 2 times daily    Historical Provider, MD       Allergies:  Azithromycin; Brimonidine; Clindamycin/lincomycin; Penicillins; and Simvastatin    Social History:           TOBACCO:   reports that he has quit smoking. His smoking use included cigarettes. He has never used smokeless tobacco.  ETOH:   reports previous alcohol use. Family History:               Problem Relation Age of Onset    Heart Disease Mother        REVIEW OF SYSTEMS:   Pertinent positives as noted in the HPI. All other systems reviewed and negative. PHYSICAL EXAM PERFORMED:    /68   Pulse 78   Temp 99.8 °F (37.7 °C) (Axillary)   Resp 22   Wt 182 lb 8.7 oz (82.8 kg)   SpO2 98%   BMI 26.96 kg/m²     General appearance:  No apparent distress, appears stated age   HEENT:  Normal cephalic, atraumatic without obvious deformity. Extra ocular muscles intact. Conjunctivae/corneas clear. Neck: Supple, with full range of motion. No jugular venous distention. Trachea midline. Respiratory: equal breath sounds/chest excursion bilaterally Clear to auscultation, bilaterally without Rales/Wheezes/Rhonchi. Cardiovascular:  Regular rate and rhythm with normal S1/S2 without murmurs, rubs or gallops. Abdomen: Soft, hyporeactive BS, (+) distention, bruising, no guarding/grimace, well healed surgical site without induration/erythema/drainage  Musculoskeletal:  No clubbing, cyanosis or edema bilaterally.     Skin: Skin color, texture, turgor normal.  No rashes or lesions. Psychiatric:  obtunded  Capillary Refill: Brisk,< 3 seconds   Peripheral Pulses: +2 palpable, equal bilaterally       Labs:     Recent Labs     09/13/20  0935   WBC 27.6*   HGB 9.3*   HCT 29.1*        Recent Labs     09/13/20  0935      K 4.0      CO2 18*   BUN 30*   CREATININE 1.9*   CALCIUM 8.7     Recent Labs     09/13/20  0935   AST 73*   ALT 62*   BILITOT 2.7*   ALKPHOS 82     No results for input(s): INR in the last 72 hours. Recent Labs     09/13/20  0935   TROPONINI 0.02*       Urinalysis:    No results found for: Angeline Fatoumata, BACTERIA, RBCUA, BLOODU, SPECGRAV, GLUCOSEU    Radiology:          CT HEAD WO CONTRAST   Final Result   No acute intracranial abnormality. CT ABDOMEN PELVIS WO CONTRAST Additional Contrast? None   Final Result   Left pelvic hematoma, ventral to the iliac vessels. It is associated with a   small amount of gas, related to recent fusion of L4-5 and L5-S1. The hematoma communicates with additional components along the superficial   and deep aspects of the abdominal wall. XR CHEST PORTABLE   Final Result   1. Cardiomegaly with vascular congestion suggestive underlying CHF. No focal   consolidation is seen to suggest pneumonia. 2. Prominent right heart border/hilar shadow. Finding is nonspecific and   could represent aortic aneurysm, right atrial dilatation, mass or adenopathy. Complete evaluation with CT chest with IV contrast is recommended.          XR CHEST 1 VIEW    (Results Pending)       ASSESSMENT:    Active Hospital Problems    Diagnosis Date Noted    Sepsis ([facility] [address] [A41.9] 09/13/2020    Lactic acidosis [E87.2]     Acute metabolic encephalopathy [X94.43]     Pelvic hematoma in male [N50.1]     Severe sepsis (HCC) [A41.9, R65.20]     Acute renal failure superimposed on chronic kidney disease ([facility] [address] [N17.9, N18.9]     Elevated LFTs [R94.5]     Abnormal chest x-ray [R93.89]          PLAN:    1) Sepsis  - HCAP vs post op wound vs  - lactic acid 3.4, continue to trend  - follow up cultures  - abx for hcap  - r/o covid, check inflammatory markers  - bipap    2) Anemia  - hgb 8.8 on 9/10  - hematoma     3) TONO  - creatinine 1.37 on 9/10, no hydronephrosis on ct ab/pelv  - IVFs  - urinalysis    4) CAD  - elevated trop, continue to trend    5) Atrial fib  - holding eliquis, following h/h    6) COPD  - no wheeze, on bipap  - pCO2 28.3    DVT Prophylaxis: scd  Diet: Diet NPO Effective Now  Code Status: Full Code       Dispo - icu  Tot crit care time > 35 min       Ruslan Knox MD    Thank you Alex Power for the opportunity to be involved in this patient's care. If you have any questions or concerns please feel free to contact me at [phone].

## 2020-09-13 NOTE — ED NOTES
Unsuccessful attempts to place mendoza by this RN and Glennon Najjar, ED charge    Blood and clots noted from mendoza attempts    Dr. Destinee Carrillo made aware     Audrey May RN  09/13/20 3039

## 2020-09-13 NOTE — CONSULTS
Clinical Pharmacy Note  Vancomycin Consult    Jimmie Rebollar is a 66 y.o. male ordered Vancomycin; consult received from Dr. Mateo Orona to manage therapy. Also receiving Meropenem    Patient Active Problem List   Diagnosis    Sepsis ([facility] [address]       Allergies:  Azithromycin; Brimonidine; Clindamycin/lincomycin; Penicillins; and Simvastatin     Temp max:  Temp (24hrs), Av.4 °F (39.1 °C), Min:99.8 °F (37.7 °C), Max:105.1 °F (40.6 °C)      Recent Labs     20  0935   WBC 27.6*       Recent Labs     20  0935   BUN 30*   CREATININE 1.9*         Intake/Output Summary (Last 24 hours) at 2020 1241  Last data filed at 2020 1114  Gross per 24 hour   Intake 1100 ml   Output --   Net 1100 ml       Culture Results:      Ht Readings from Last 1 Encounters:   20 5' 9\" (1.753 m)        Wt Readings from Last 1 Encounters:   20 182 lb 8.7 oz (82.8 kg)         CrCl cannot be calculated (Unknown ideal weight. ). Assessment/Plan:  Patient received Vancomycin 1500mg (17mg/kg) in ED at 1100 9-13. Level ordered for 0900 9-14 due to renal status prior to re dosing. Thank you for the consult.    Anthony Reyes RPh 2020 12:42 PM

## 2020-09-13 NOTE — PROGRESS NOTES
4 Eyes Skin Assessment     The patient is being assess for  Shift Handoff    I agree that 2 RN's have performed a thorough Head to Toe Skin Assessment on the patient. ALL assessment sites listed below have been assessed. Areas assessed by both nurses:     [x]   Head, Face, and Ears   [x]   Shoulders, Back, and Chest  [x]   Arms, Elbows, and Hands   [x]   Coccyx, Sacrum, and IschIum  [x]   Legs, Feet, and Heels        Does the Patient have Skin Breakdown?   No         Juan Prevention initiated:  Yes   Wound Care Orders initiated:  NA      [facility] nurse consulted for Pressure Injury (Stage 3,4, Unstageable, DTI, NWPT, and Complex wounds), New and Established Ostomies:  NA      Nurse 1 eSignature: Electronically signed by Tasia Cochran RN on 9/13/20 at 7:38 PM EDT    **SHARE this note so that the co-signing nurse is able to place an eSignature**    Nurse 2 eSignature: Electronically signed by Nikki Rubio RN on 9/13/20 at 8:46 PM EDT

## 2020-09-13 NOTE — CONSULTS
Clinical Pharmacy Note  Vancomycin Consult    Pharmacy consult received for one-time dose of vancomycin in the Emergency Department per Dr. Destinee Carrillo. Ht Readings from Last 1 Encounters:   07/23/20 5' 9\" (1.753 m)        Wt Readings from Last 1 Encounters:   09/13/20 194 lb 3.6 oz (88.1 kg)         Assessment/Plan:   Vancomycin 1500mg (17mg/kg) x 1 in ED.  If Vancomycin is to continue on admission and pharmacy is to manage dosing, please re-consult with admission orders.         Rose Carnes RPh 9/13/2020 10:31 AM

## 2020-09-13 NOTE — CONSULTS
Consulting Physician: [facility]    Reason for Consult: mendoza placement    History of Present Illness: Arthur Lin is a 66 y.o. who has had a complicated post op course after spinal surgery which was about 2 weeks ago by report. He has a know pelvic hematoma. He has been in rehab but his daughter noted worsening mental status recently. He is unable to provide any history so it is obtained from the chart and the nursing staff. He arrived in the ED today with mental status change and high fever. He had sepsis clinically. Multiple attempts by nursing staff and ED physician to place mendoza were unsuccessful so I was asked to place mendoza    Past Medical History:   Past Medical History:   Diagnosis Date    Aneurysm, aortic ([address]     Atrial fibrillation ([address]     Glaucoma     Heart attack ([address]     Hyperlipidemia     Hypertension        Past Surgical History:  Past Surgical History:   Procedure Laterality Date    COLONOSCOPY      CORONARY ANGIOPLASTY WITH STENT PLACEMENT      X 2    EYE SURGERY Right 7/23/2020    GONIOTOMY performed by Hilario Avina MD at [place] [address] Right 7/23/2020    Phacoemulsification with intraocular lens implant performed by Hilario Avina MD at [address] [place] [address]       Social History:  Social History     Socioeconomic History    Marital status:       Spouse name: Not on file    Number of children: Not on file    Years of education: Not on file    Highest education level: Not on file   Occupational History    Not on file   Social Needs    Financial resource strain: Not on file    Food insecurity     Worry: Not on file     Inability: Not on file    Transportation needs     Medical: Not on file     Non-medical: Not on file   Tobacco Use    Smoking status: Former Smoker     Types: Cigarettes    Smokeless tobacco: Never Used    Tobacco comment: was only social smoker    Substance and Sexual Activity    Alcohol use: Not Currently    Drug use: Never    Sexual activity: Not on file   Lifestyle    Physical activity     Days per week: Not on file     Minutes per session: Not on file    Stress: Not on file   Relationships    Social connections     Talks on phone: Not on file     Gets together: Not on file     Attends Buddhist service: Not on file     Active member of club or organization: Not on file     Attends meetings of clubs or organizations: Not on file     Relationship status: Not on file    Intimate partner violence     Fear of current or ex partner: Not on file     Emotionally abused: Not on file     Physically abused: Not on file     Forced sexual activity: Not on file   Other Topics Concern    Not on file   Social History Narrative    Not on file       Family History:  Family History   Problem Relation Age of Onset    Heart Disease Mother        Meds:   Current Facility-Administered Medications: amiodarone (CORDARONE) tablet 200 mg, 200 mg, Oral, Daily  atorvastatin (LIPITOR) tablet 40 mg, 40 mg, Oral, Nightly  gabapentin (NEURONTIN) capsule 100 mg, 100 mg, Oral, TID  nitroGLYCERIN (NITROSTAT) SL tablet 0.4 mg, 0.4 mg, Sublingual, Q5 Min PRN  sodium chloride flush 0.9 % injection 10 mL, 10 mL, Intravenous, 2 times per day  sodium chloride flush 0.9 % injection 10 mL, 10 mL, Intravenous, PRN  acetaminophen (TYLENOL) tablet 650 mg, 650 mg, Oral, Q6H PRN **OR** acetaminophen (TYLENOL) suppository 650 mg, 650 mg, Rectal, Q6H PRN  polyethylene glycol (GLYCOLAX) packet 17 g, 17 g, Oral, Daily PRN  promethazine (PHENERGAN) tablet 12.5 mg, 12.5 mg, Oral, Q6H PRN **OR** ondansetron (ZOFRAN) injection 4 mg, 4 mg, Intravenous, Q6H PRN  0.9 % sodium chloride infusion, , Intravenous, Continuous  lactobacillus (CULTURELLE) capsule 2 capsule, 2 capsule, Oral, BID WC  lidocaine PF 1 % injection 5 mL, 5 mL, Intradermal, Once  sodium chloride flush 0.9 % injection 10 mL, 10 mL, Intravenous, 2 times per day  sodium chloride flush hematoma    Impression/Plan: retention, likely UTI; urethral false passage    Mendoza placed with aid of cystoscope/ wire. Large false passage noted in posterior urethra.   Return of > 1000 ml cloudy, malodorous urine    Plan:  Leave mendoza X 7 days then void trial if / when is he medically stable      Lillian Song MD 3/84/78197:58 PM

## 2020-09-13 NOTE — ED NOTES
Fall risk screening completed. Fall risk bracelet applied to patient. Non-skid socks provided and placed on patient. The fall risk is indicated using  dome light . Based on score, a bed alarm was indicated and applied. The call light is within the patient's reach, and instructions for use were provided. The bed is in the lowest position with wheels locked. The patient has been advised to notify staff, using the call light, if there is a need to get up or use restroom. The patient verbalized understanding of safety precautions and how to contact staff for assistance.       Audrey May RN  09/13/20 7800

## 2020-09-13 NOTE — ED NOTES
Bed: A-15  Expected date: 9/13/20  Expected time: 8:36 AM  Means of arrival: [facility]  Comments:     Nathaniel Kaur RN  09/13/20 8476

## 2020-09-13 NOTE — ED PROVIDER NOTES
implant performed by Na Weber MD at [address]       Previous Medications    AMIODARONE (CORDARONE) 200 MG TABLET    Take 200 mg by mouth daily Indications: takes in the evening     ASPIRIN 81 MG EC TABLET    Take 81 mg by mouth daily    ATORVASTATIN (LIPITOR) 40 MG TABLET    Take 40 mg by mouth daily    GABAPENTIN (NEURONTIN) 100 MG CAPSULE    Take 100 mg by mouth 3 times daily. HYDRALAZINE (APRESOLINE) 25 MG TABLET    Take 25 mg by mouth 2 times daily    LOSARTAN (COZAAR) 100 MG TABLET    Take 100 mg by mouth daily    METOPROLOL SUCCINATE (TOPROL XL) 50 MG EXTENDED RELEASE TABLET    Take 50 mg by mouth daily    NITROGLYCERIN (NITROSTAT) 0.4 MG SL TABLET    Place 0.4 mg under the tongue every 5 minutes as needed for Chest pain up to max of 3 total doses. If no relief after 1 dose, call 911. RIVAROXABAN (XARELTO) 2.5 MG TABS TABLET    Take 2.5 mg by mouth 2 times daily       ALLERGIES     Azithromycin; Brimonidine; Clindamycin/lincomycin; Penicillins; and Simvastatin    FAMILY HISTORY       Family History   Problem Relation Age of Onset    Heart Disease Mother           SOCIAL HISTORY       Social History     Socioeconomic History    Marital status:       Spouse name: None    Number of children: None    Years of education: None    Highest education level: None   Occupational History    None   Social Needs    Financial resource strain: None    Food insecurity     Worry: None     Inability: None    Transportation needs     Medical: None     Non-medical: None   Tobacco Use    Smoking status: Former Smoker     Types: Cigarettes    Smokeless tobacco: Never Used    Tobacco comment: was only social smoker    Substance and Sexual Activity    Alcohol use: Not Currently    Drug use: Never    Sexual activity: None   Lifestyle    Physical activity     Days per week: None     Minutes per session: None    Stress: None   Relationships    Social connections Talks on phone: None     Gets together: None     Attends Lutheran service: None     Active member of club or organization: None     Attends meetings of clubs or organizations: None     Relationship status: None    Intimate partner violence     Fear of current or ex partner: None     Emotionally abused: None     Physically abused: None     Forced sexual activity: None   Other Topics Concern    None   Social History Narrative    None       SCREENINGS             PHYSICAL EXAM    (up to 7 for level 4, 8 or more for level 5)     ED Triage Vitals   BP Temp Temp Source Pulse Resp SpO2 Height Weight   -- 09/13/20 0931 09/13/20 0931 09/13/20 0931 09/13/20 0854 09/13/20 0858 -- 09/13/20 0931    (!) 105.1 °F (40.6 °C) Rectal 111 (!) 32 97 %  194 lb 3.6 oz (88.1 kg)       Physical Exam    DIAGNOSTIC RESULTS     EKG: All EKG's are interpreted by the Emergency Department Physician who either signs or Co-signsthis chart in the absence of a cardiologist.    The Ekg interpreted by me shows  sinus tachycardia, qpry=517   Axis is   Left axis deviation  QTc is  451 msec  Intervals and Durations are unremarkable. Inferior Q waves are present  ST Segments: nonspecific changes  No prior EKGs available for comparison          RADIOLOGY:   Non-plain filmimages such as CT, Ultrasound and MRI are read by the radiologist. Plain radiographic images are visualized and preliminarily interpreted by the emergency physician with the below findings:        Interpretation per the Radiologist below, if available at the time ofthis note:    CT HEAD WO CONTRAST   Final Result   No acute intracranial abnormality. CT ABDOMEN PELVIS WO CONTRAST Additional Contrast? None   Final Result   Left pelvic hematoma, ventral to the iliac vessels. It is associated with a   small amount of gas, related to recent fusion of L4-5 and L5-S1.       The hematoma communicates with additional components along the superficial   and deep aspects of the abdominal wall.         XR CHEST PORTABLE   Final Result   1. Cardiomegaly with vascular congestion suggestive underlying CHF. No focal   consolidation is seen to suggest pneumonia. 2. Prominent right heart border/hilar shadow. Finding is nonspecific and   could represent aortic aneurysm, right atrial dilatation, mass or adenopathy. Complete evaluation with CT chest with IV contrast is recommended.                ED BEDSIDE ULTRASOUND:   Performed by ED Physician - none    LABS:  Labs Reviewed   CBC WITH AUTO DIFFERENTIAL - Abnormal; Notable for the following components:       Result Value    WBC 27.6 (*)     RBC 3.03 (*)     Hemoglobin 9.3 (*)     Hematocrit 29.1 (*)     RDW 17.3 (*)     Neutrophils Absolute 27.2 (*)     Lymphocytes Absolute 0.2 (*)     All other components within normal limits    Narrative:     Performed at:  [facility]  [facility] [employer] [address]   Phone [phone]   COMPREHENSIVE METABOLIC PANEL W/ REFLEX TO MG FOR LOW K - Abnormal; Notable for the following components:    CO2 18 (*)     BUN 30 (*)     CREATININE 1.9 (*)     GFR Non- 35 (*)     GFR  42 (*)     Total Protein 6.3 (*)     Total Bilirubin 2.7 (*)     ALT 62 (*)     AST 73 (*)     All other components within normal limits    Narrative:     Performed at:  [facility]  [address] [facility] [employer] [address]   Phone [phone]   TROPONIN - Abnormal; Notable for the following components:    Troponin 0.02 (*)     All other components within normal limits    Narrative:     Performed at:  [facility]  [address] [facility] [employer] [address]   Phone [phone]   BRAIN NATRIURETIC PEPTIDE - Abnormal; Notable for the following components:    Pro-BNP 5,868 (*)     All other components within normal limits    Narrative:     Performed at:  [facility]  [facility] [address]   Phone [phone]   LACTIC ACID, PLASMA - Abnormal; Notable for the following components:    Lactic Acid 3.4 (*)     All other components within normal limits    Narrative:     Performed at:  [facility]  [address] [place], [place] [address]   Phone [phone]   BLOOD GAS, ARTERIAL - Abnormal; Notable for the following components:    pCO2, Arterial 28.3 (*)     HCO3, Arterial 17.2 (*)     Base Excess, Arterial -6.8 (*)     Hemoglobin, Art, Extended 8.4 (*)     Carboxyhgb, Arterial 1.7 (*)     All other components within normal limits    Narrative:     Performed at:  [facility]  [address] [facility] [address]   Phone [phone]   CULTURE, BLOOD 2   CULTURE, BLOOD 1   LIPASE    Narrative:     Performed at:  [address] [address]   Phone [phone]   LACTIC ACID, PLASMA   URINE RT REFLEX TO CULTURE       All other labs were within normal range or not returned as of this dictation. EMERGENCY DEPARTMENT COURSE and DIFFERENTIAL DIAGNOSIS/MDM:   Vitals:    Vitals:    09/13/20 0941 09/13/20 1011 09/13/20 1026 09/13/20 1111   BP: 134/82 (!) 100/51 (!) 97/52 (!) 116/57   Pulse: 111 76 75 71   Resp: 26 19 17 18   Temp:       TempSrc:       SpO2: 97% 96% 97% 96%   Weight:               MDM  Number of Diagnoses or Management Options  Altered mental status, unspecified altered mental status type:   Lactic acidosis:   Leukocytosis, unspecified type:   Renal insufficiency:   Severe sepsis [facility]):   Diagnosis management comments: DDX: Sepsis from pneumonia, urine, the abscess in his abdomen, COPD, pneumonia, COVID, other    Work-up of the patient is significant for a troponin of 0.02, proBNP of 5008 and 68, BUN of 30, creatinine 1.9, lactic acid 3.4, white blood cell count of 27.6. H&H of 9.3 and 29.1.   His pH arterial is 7.393 with a PCO2 of 20.3. Bicarb is 17.2 with a base excess of -6.8. At this time it appears the patient is septic. It does not appear that he has pneumonia by chest x-ray. His head CT is unremarkable so I suspect his altered mental status may be from a urinary tract infection that we have yet to diagnose versus COVID versus an infection in his abdomen. His CT of his abdomen shows the hematoma but no other acute pathology. I spoke with Dr. Trevor lucero at 10:15 AM.  He is on-call for the patient surgeon at [facility]. I told him I feel the patient septic and we have started the patient on IV antibiotics. We plan to keep the patient in our institution despite his care previously being a trial with until the patient is more stable. I spoke to our critical care/intensivist team.  We are going to add vancomycin to the meropenem for more broad coverage. Pharmacy has been asked to dose the vancomycin. The nurses have been unable to place a Reeves catheter in the patient. I went to the room and tried a normal Reeves as well as a coudé catheter and was unsuccessful in placing a Reeves catheter. Urology has been consulted and they will see the patient in the ED or up in the ICU. I have updated the patient's family member and taken a history from her to better understand what is been going on with the patient of late. CRITICAL CARE TIME   Total Critical Care time was 30 minutes, excluding separately reportable procedures. There was a high probability of clinically significant/life threatening deterioration in the patient's condition which required my urgent intervention. CONSULTS:  IP CONSULT TO ORTHOPEDIC SURGERY  IP CONSULT TO PULMONOLOGY  PHARMACY TO DOSE VANCOMYCIN  IP CONSULT TO UROLOGY  IP CONSULT TO PHARMACY    PROCEDURES:  Unless otherwise noted below, none     Procedures    FINAL IMPRESSION      1. Severe sepsis (HCC)    2. Leukocytosis, unspecified type    3. Renal insufficiency    4.  Altered mental status, unspecified altered mental status type    5. Lactic acidosis    6.  Anemia, unspecified type          DISPOSITION/PLAN   DISPOSITION Admitted 09/13/2020 10:59:11 AM      PATIENT REFERRED TO:  Joann Sawyer  [address] [facility] [address]  [place] [address]  [phone]            DISCHARGE MEDICATIONS:  New Prescriptions    No medications on file          (Please note that portions of this note were completed with a voice recognition program.Efforts were made to edit the dictations but occasionally words are mis-transcribed.)    Stefano Storm MD (electronically signed)  Attending Emergency Physician          Stefano Storm MD  09/13/20 [phone]

## 2020-09-13 NOTE — CONSULTS
Patient is seen at the request of Dr. Jose Alvarado for sepsis    PCP: Jeny Sanz    Please note that history is obtained from chart. Patient is unable to provide a history due to altered mental status. HISTORY OF PRESENT ILLNESS: 66y.o. year old male who was admitted on 9/13/20 for sepsis. He was sent from his nursing home for fever, lethargy and tachypnea. On 8/31/20 he underwent spinal fusion at an outside hospital. This was complicated by a retroperitoneal hematoma. He was discharged to the nursing home on 9/10/20. In the ER, he was febrile to 105.1F, lethargic and with increased work of breathing. He was placed on BiPAP and given IV fluid and antibiotics.        PAST MEDICAL HISTORY:  Past Medical History:   Diagnosis Date    Aneurysm, aortic ([address]     Atrial fibrillation ([address]     Glaucoma     Heart attack ([address]     Hyperlipidemia     Hypertension        PAST SURGICAL HISTORY:  Past Surgical History:   Procedure Laterality Date    COLONOSCOPY      CORONARY ANGIOPLASTY WITH STENT PLACEMENT      X 2    EYE SURGERY Right 7/23/2020    GONIOTOMY performed by Charles Torre MD at [place] [address] Right 7/23/2020    Phacoemulsification with intraocular lens implant performed by Charles Torre MD at [address] [place] [address] Road:  Current Facility-Administered Medications: amiodarone (CORDARONE) tablet 200 mg, 200 mg, Oral, Daily  atorvastatin (LIPITOR) tablet 40 mg, 40 mg, Oral, Nightly  gabapentin (NEURONTIN) capsule 100 mg, 100 mg, Oral, TID  nitroGLYCERIN (NITROSTAT) SL tablet 0.4 mg, 0.4 mg, Sublingual, Q5 Min PRN  sodium chloride flush 0.9 % injection 10 mL, 10 mL, Intravenous, 2 times per day  sodium chloride flush 0.9 % injection 10 mL, 10 mL, Intravenous, PRN  acetaminophen (TYLENOL) tablet 650 mg, 650 mg, Oral, Q6H PRN **OR** acetaminophen (TYLENOL) suppository 650 mg, 650 mg, Rectal, Q6H PRN  polyethylene glycol (GLYCOLAX) packet 17 g, 17 g, Oral, Daily PRN  promethazine (PHENERGAN) tablet 12.5 mg, 12.5 mg, Oral, Q6H PRN **OR** ondansetron (ZOFRAN) injection 4 mg, 4 mg, Intravenous, Q6H PRN  0.9 % sodium chloride infusion, , Intravenous, Continuous  lactobacillus (CULTURELLE) capsule 2 capsule, 2 capsule, Oral, BID WC  lidocaine PF 1 % injection 5 mL, 5 mL, Intradermal, Once  sodium chloride flush 0.9 % injection 10 mL, 10 mL, Intravenous, 2 times per day  sodium chloride flush 0.9 % injection 10 mL, 10 mL, Intravenous, PRN  meropenem (MERREM) 500 mg IVPB (mini-bag), 500 mg, Intravenous, Q8H  vancomycin (VANCOCIN) intermittent dosing (placeholder), , Other, RX Placeholder      ALLERGIES:  Patient is allergic to azithromycin; brimonidine; clindamycin/lincomycin; penicillins; and simvastatin. FAMILY HISTORY:  family history includes Heart Disease in his mother. SOCIAL HISTORY:  Social History     Socioeconomic History    Marital status:       Spouse name: Not on file    Number of children: Not on file    Years of education: Not on file    Highest education level: Not on file   Occupational History    Not on file   Social Needs    Financial resource strain: Not on file    Food insecurity     Worry: Not on file     Inability: Not on file    Transportation needs     Medical: Not on file     Non-medical: Not on file   Tobacco Use    Smoking status: Former Smoker     Types: Cigarettes    Smokeless tobacco: Never Used    Tobacco comment: was only social smoker    Substance and Sexual Activity    Alcohol use: Not Currently    Drug use: Never    Sexual activity: Not on file   Lifestyle    Physical activity     Days per week: Not on file     Minutes per session: Not on file    Stress: Not on file   Relationships    Social connections     Talks on phone: Not on file     Gets together: Not on file     Attends Religion service: Not on file     Active member of club or organization: Not on file     Attends meetings of clubs or organizations: Not on file     Relationship status: Not on file    Intimate partner violence     Fear of current or ex partner: Not on file     Emotionally abused: Not on file     Physically abused: Not on file     Forced sexual activity: Not on file   Other Topics Concern    Not on file   Social History Narrative    Not on file      reports that he has quit smoking. His smoking use included cigarettes. He has never used smokeless tobacco.    REVIEW OF SYSTEMS:  Constitutional: + for fever    Otherwise unable to obtain due to altered mental status    Objective:   PHYSICAL EXAM:  Blood pressure 121/68, pulse 78, temperature 99.8 °F (37.7 °C), temperature source Axillary, resp. rate 22, weight 182 lb 8.7 oz (82.8 kg), SpO2 98 %. on BiPAP 12/4, 35%    Gen: Well developed; well nourished  Eyes: No scleral icterus. No conjunctival injection. ENT:  Oropharynx clear. External appearance of ears and nose normal.  Neck: Trachea midline. No obvious mass. No visible thyroid enlargement    Respiratory: Clear to auscultation bilaterally on anterior exam, no accessory muscle use  Cardiovascular: Regular rate and rhythm, no appreciable murmurs. No edema. Gastrointestinal: Soft, non-tender. No hernia  Skin: Warm and dry. No rashes or ulcers on visible areas. Normal texture and turgor. Bruising to bilateral flanks and lower abdomen   Lymphatic: No cervical LAD. No supraclavicular LAD. Musculoskeletal: No cyanosis, clubbing or joint deformity.     Psychiatric: Lethargic; opens eyes to voice, but does not otherwise interact with examiner; unable to adequate assess insight and judgement       Data Reviewed:   LABS:  CBC:   Recent Labs     09/13/20  0935   WBC 27.6*   HGB 9.3*   HCT 29.1*   MCV 96.2        BMP:   Recent Labs     09/13/20  0935      K 4.0      CO2 18*   BUN 30*   CREATININE 1.9*     LIVER PROFILE:   Recent Labs     09/13/20  0935   AST 73*   ALT 62*   LIPASE 46.0   BILITOT 2.7*   ALKPHOS 82 PT/INR: No results for input(s): PROTIME, INR in the last 72 hours. APTT: No results for input(s): APTT in the last 72 hours. Images and reports from chest imaging were reviewed by me. My interpretation is:  CXR (9/13/20): Low lung volumes; prominent right heart border; uncoiling of the aorta      ECHO (11/4/19- [facility] records)  Summary: Moderate aortic regurgitation. Severe aortic root dilatation. Overall left ventricular ejection fraction is estimated to be 50-55%. Inferoposterior wall is mildly hypokinetic. The left atrium is mildly dilated. Right ventricular systolic pressure is normal at <35 mmHg. The mitral inflow pattern is consistent with Diastolic Dysfunction. Assessment:     Severe sepsis  Lactic acidosis  Acute metabolic encephalopathy  Pelvic hematoma  Acute on chronic renal failure  Elevated LFTs  Abnormal chest x-ray    Plan:      Severe sepsis  - IV fluids  - Meropenem and vancomycin  - Follow culture data    Lactic acidosis  - IV fluids  - Cycle every 6 hours    Acute metabolic encephalopathy  - May be due to infection. Antibiotics as above  - Check VBG and ammonia level    Pelvic hematoma  - Hemoglobin is stable compared to outside records  - Check labs in a.m    Acute on chronic renal failure  - Likely pre-renal  - IV fluids  - Strict I/Os    Elevated LFTs  - Will rule out COVID-19  - Check labs in a.m. Abnormal chest x-ray  - He has prominence of the right heart border and haziness bilaterally of unclear etiology  - Rule out COVID-19  - Repeat CXR in a.m. May need chest CT in future       Prophylaxis  DVT- SCDs, no chemoprophylaxis given pelvic hematoma  C.difficile- lactobacillus     I spent 45 minutes of critical care time with this patient excluding procedures.     Marylene Blander, MD  [facility] Pulmonology, Critical Care and Sleep

## 2020-09-13 NOTE — ED NOTES
0840 bladder scan patient 600cc    Report from EMS patient was brought from nursing home with blood in his depends from multiple straight cath attempts. Patient admitted yesterday from nursing for rehab after spinal surgery. Anterior abdominal incision noted. Patient alert and orientated x3. Bipap placed at 0845 due to increase respiratory rate. Patient to CT with this RN at 1010.      Denis Kaur RN  09/13/20 1255

## 2020-09-13 NOTE — ED NOTES
Unsuccessful attempt x 2 by Dr. Denny Me    Urology to be consulted      Kiera Lynn, RN  09/13/20 9202

## 2020-09-13 NOTE — PROGRESS NOTES
Patient seen for bedside PICC placement. Screening and consent verified. Procedure explained to patient, unable to verify understanding d/t AMS. Timeout performed at bedside with DONATO Gonzalez. PICC placed per protocol successfully and uneventfully. Patient left with call light and safety measures in place. Copies of ultrasound and ECG waveform images printed and placed in chart. Handoff complete to night shift DONTAO Alva.

## 2020-09-14 ENCOUNTER — APPOINTMENT (OUTPATIENT)
Dept: GENERAL RADIOLOGY | Age: 75
DRG: 871 | End: 2020-09-14
Payer: MEDICARE

## 2020-09-14 LAB
ALBUMIN SERPL-MCNC: 3 G/DL (ref 3.4–5)
ALP BLD-CCNC: 64 U/L (ref 40–129)
ALT SERPL-CCNC: 54 U/L (ref 10–40)
ANION GAP SERPL CALCULATED.3IONS-SCNC: 11 MMOL/L (ref 3–16)
AST SERPL-CCNC: 64 U/L (ref 15–37)
BASOPHILS ABSOLUTE: 0 K/UL (ref 0–0.2)
BASOPHILS ABSOLUTE: 0 K/UL (ref 0–0.2)
BASOPHILS RELATIVE PERCENT: 0.1 %
BASOPHILS RELATIVE PERCENT: 0.1 %
BILIRUB SERPL-MCNC: 2.1 MG/DL (ref 0–1)
BILIRUBIN DIRECT: 1 MG/DL (ref 0–0.3)
BILIRUBIN, INDIRECT: 1.1 MG/DL (ref 0–1)
BUN BLDV-MCNC: 31 MG/DL (ref 7–20)
CALCIUM SERPL-MCNC: 7.6 MG/DL (ref 8.3–10.6)
CHLORIDE BLD-SCNC: 109 MMOL/L (ref 99–110)
CO2: 17 MMOL/L (ref 21–32)
CREAT SERPL-MCNC: 1.8 MG/DL (ref 0.8–1.3)
EKG ATRIAL RATE: 117 BPM
EKG DIAGNOSIS: NORMAL
EKG P-R INTERVAL: 188 MS
EKG Q-T INTERVAL: 324 MS
EKG QRS DURATION: 114 MS
EKG QTC CALCULATION (BAZETT): 451 MS
EKG R AXIS: -22 DEGREES
EKG T AXIS: -40 DEGREES
EKG VENTRICULAR RATE: 117 BPM
EOSINOPHILS ABSOLUTE: 0 K/UL (ref 0–0.6)
EOSINOPHILS ABSOLUTE: 0 K/UL (ref 0–0.6)
EOSINOPHILS RELATIVE PERCENT: 0 %
EOSINOPHILS RELATIVE PERCENT: 0 %
GFR AFRICAN AMERICAN: 44
GFR NON-AFRICAN AMERICAN: 37
GLUCOSE BLD-MCNC: 104 MG/DL (ref 70–99)
GLUCOSE BLD-MCNC: 97 MG/DL (ref 70–99)
HCT VFR BLD CALC: 23.2 % (ref 40.5–52.5)
HCT VFR BLD CALC: 23.8 % (ref 40.5–52.5)
HEMOGLOBIN: 7.2 G/DL (ref 13.5–17.5)
HEMOGLOBIN: 7.4 G/DL (ref 13.5–17.5)
LYMPHOCYTES ABSOLUTE: 0.5 K/UL (ref 1–5.1)
LYMPHOCYTES ABSOLUTE: 0.6 K/UL (ref 1–5.1)
LYMPHOCYTES RELATIVE PERCENT: 1.1 %
LYMPHOCYTES RELATIVE PERCENT: 1.3 %
MAGNESIUM: 1.6 MG/DL (ref 1.8–2.4)
MCH RBC QN AUTO: 29.7 PG (ref 26–34)
MCH RBC QN AUTO: 29.9 PG (ref 26–34)
MCHC RBC AUTO-ENTMCNC: 30.9 G/DL (ref 31–36)
MCHC RBC AUTO-ENTMCNC: 31.2 G/DL (ref 31–36)
MCV RBC AUTO: 95.8 FL (ref 80–100)
MCV RBC AUTO: 96.1 FL (ref 80–100)
MONOCYTES ABSOLUTE: 2.3 K/UL (ref 0–1.3)
MONOCYTES ABSOLUTE: 2.6 K/UL (ref 0–1.3)
MONOCYTES RELATIVE PERCENT: 5.2 %
MONOCYTES RELATIVE PERCENT: 6.2 %
NEUTROPHILS ABSOLUTE: 39.6 K/UL (ref 1.7–7.7)
NEUTROPHILS ABSOLUTE: 41.3 K/UL (ref 1.7–7.7)
NEUTROPHILS RELATIVE PERCENT: 92.6 %
NEUTROPHILS RELATIVE PERCENT: 93.4 %
ORGANISM: ABNORMAL
PDW BLD-RTO: 17.3 % (ref 12.4–15.4)
PDW BLD-RTO: 18.2 % (ref 12.4–15.4)
PERFORMED ON: ABNORMAL
PHOSPHORUS: 3.4 MG/DL (ref 2.5–4.9)
PLATELET # BLD: 112 K/UL (ref 135–450)
PLATELET # BLD: 113 K/UL (ref 135–450)
PMV BLD AUTO: 9.6 FL (ref 5–10.5)
PMV BLD AUTO: 9.7 FL (ref 5–10.5)
POTASSIUM SERPL-SCNC: 4 MMOL/L (ref 3.5–5.1)
RBC # BLD: 2.43 M/UL (ref 4.2–5.9)
RBC # BLD: 2.47 M/UL (ref 4.2–5.9)
SODIUM BLD-SCNC: 137 MMOL/L (ref 136–145)
TOTAL PROTEIN: 5.4 G/DL (ref 6.4–8.2)
TROPONIN: 0.07 NG/ML
URINE CULTURE, ROUTINE: ABNORMAL
VANCOMYCIN RANDOM: 10.1 UG/ML
WBC # BLD: 42.8 K/UL (ref 4–11)
WBC # BLD: 44.2 K/UL (ref 4–11)

## 2020-09-14 PROCEDURE — 6360000002 HC RX W HCPCS: Performed by: STUDENT IN AN ORGANIZED HEALTH CARE EDUCATION/TRAINING PROGRAM

## 2020-09-14 PROCEDURE — 36592 COLLECT BLOOD FROM PICC: CPT

## 2020-09-14 PROCEDURE — 2580000003 HC RX 258: Performed by: STUDENT IN AN ORGANIZED HEALTH CARE EDUCATION/TRAINING PROGRAM

## 2020-09-14 PROCEDURE — APPNB15 APP NON BILLABLE TIME 0-15 MINS: Performed by: NURSE PRACTITIONER

## 2020-09-14 PROCEDURE — 6370000000 HC RX 637 (ALT 250 FOR IP): Performed by: HOSPITALIST

## 2020-09-14 PROCEDURE — 6370000000 HC RX 637 (ALT 250 FOR IP): Performed by: NURSE PRACTITIONER

## 2020-09-14 PROCEDURE — 84100 ASSAY OF PHOSPHORUS: CPT

## 2020-09-14 PROCEDURE — 2580000003 HC RX 258: Performed by: NURSE PRACTITIONER

## 2020-09-14 PROCEDURE — 71045 X-RAY EXAM CHEST 1 VIEW: CPT

## 2020-09-14 PROCEDURE — 2580000003 HC RX 258: Performed by: HOSPITALIST

## 2020-09-14 PROCEDURE — 83735 ASSAY OF MAGNESIUM: CPT

## 2020-09-14 PROCEDURE — 80076 HEPATIC FUNCTION PANEL: CPT

## 2020-09-14 PROCEDURE — 85025 COMPLETE CBC W/AUTO DIFF WBC: CPT

## 2020-09-14 PROCEDURE — 99233 SBSQ HOSP IP/OBS HIGH 50: CPT | Performed by: INTERNAL MEDICINE

## 2020-09-14 PROCEDURE — 80048 BASIC METABOLIC PNL TOTAL CA: CPT

## 2020-09-14 PROCEDURE — 84484 ASSAY OF TROPONIN QUANT: CPT

## 2020-09-14 PROCEDURE — 36415 COLL VENOUS BLD VENIPUNCTURE: CPT

## 2020-09-14 PROCEDURE — 2060000000 HC ICU INTERMEDIATE R&B

## 2020-09-14 PROCEDURE — 93010 ELECTROCARDIOGRAM REPORT: CPT | Performed by: INTERNAL MEDICINE

## 2020-09-14 PROCEDURE — 2500000003 HC RX 250 WO HCPCS: Performed by: STUDENT IN AN ORGANIZED HEALTH CARE EDUCATION/TRAINING PROGRAM

## 2020-09-14 PROCEDURE — 6360000002 HC RX W HCPCS: Performed by: HOSPITALIST

## 2020-09-14 PROCEDURE — 80202 ASSAY OF VANCOMYCIN: CPT

## 2020-09-14 RX ORDER — PANTOPRAZOLE SODIUM 40 MG/1
40 TABLET, DELAYED RELEASE ORAL DAILY
Status: ON HOLD | COMMUNITY
End: 2020-10-23 | Stop reason: SDUPTHER

## 2020-09-14 RX ORDER — OXYCODONE HYDROCHLORIDE 5 MG/1
5 TABLET ORAL EVERY 6 HOURS PRN
Status: ON HOLD | COMMUNITY
End: 2020-10-13 | Stop reason: HOSPADM

## 2020-09-14 RX ORDER — METHOCARBAMOL 750 MG/1
750 TABLET, FILM COATED ORAL 4 TIMES DAILY
Status: ON HOLD | COMMUNITY
Start: 2020-09-10 | End: 2020-09-18 | Stop reason: HOSPADM

## 2020-09-14 RX ORDER — DULOXETIN HYDROCHLORIDE 30 MG/1
30 CAPSULE, DELAYED RELEASE ORAL 2 TIMES DAILY
Status: ON HOLD | COMMUNITY
Start: 2020-09-10 | End: 2020-10-13 | Stop reason: HOSPADM

## 2020-09-14 RX ORDER — DIAZEPAM 5 MG/1
5 TABLET ORAL EVERY 12 HOURS PRN
Status: ON HOLD | COMMUNITY
Start: 2020-09-10 | End: 2020-09-18 | Stop reason: HOSPADM

## 2020-09-14 RX ORDER — LATANOPROST 50 UG/ML
1 SOLUTION/ DROPS OPHTHALMIC NIGHTLY
COMMUNITY

## 2020-09-14 RX ORDER — VIT A/VIT C/VIT E/ZINC/COPPER 7160-113
2 TABLET, DELAYED RELEASE (ENTERIC COATED) ORAL DAILY
COMMUNITY
End: 2021-10-29 | Stop reason: ALTCHOICE

## 2020-09-14 RX ORDER — MAGNESIUM SULFATE IN WATER 40 MG/ML
4 INJECTION, SOLUTION INTRAVENOUS ONCE
Status: COMPLETED | OUTPATIENT
Start: 2020-09-14 | End: 2020-09-14

## 2020-09-14 RX ORDER — LEVOTHYROXINE SODIUM 0.05 MG/1
50 TABLET ORAL DAILY
Status: ON HOLD | COMMUNITY
End: 2020-10-23 | Stop reason: SDUPTHER

## 2020-09-14 RX ORDER — FERROUS SULFATE 325(65) MG
325 TABLET ORAL
Status: ON HOLD | COMMUNITY
End: 2020-10-01 | Stop reason: SDUPTHER

## 2020-09-14 RX ORDER — DORZOLAMIDE HYDROCHLORIDE AND TIMOLOL MALEATE 20; 5 MG/ML; MG/ML
1 SOLUTION/ DROPS OPHTHALMIC 2 TIMES DAILY
COMMUNITY

## 2020-09-14 RX ORDER — SENNA PLUS 8.6 MG/1
2 TABLET ORAL 2 TIMES DAILY
Status: ON HOLD | COMMUNITY
End: 2020-10-23 | Stop reason: HOSPADM

## 2020-09-14 RX ADMIN — Medication 2 CAPSULE: at 08:42

## 2020-09-14 RX ADMIN — SODIUM CHLORIDE, PRESERVATIVE FREE 10 ML: 5 INJECTION INTRAVENOUS at 21:12

## 2020-09-14 RX ADMIN — CALCIUM CHLORIDE 1 G: 100 INJECTION INTRAVENOUS; INTRAVENTRICULAR at 05:03

## 2020-09-14 RX ADMIN — VANCOMYCIN HYDROCHLORIDE 1250 MG: 10 INJECTION, POWDER, LYOPHILIZED, FOR SOLUTION INTRAVENOUS at 12:31

## 2020-09-14 RX ADMIN — AMIODARONE HYDROCHLORIDE 200 MG: 200 TABLET ORAL at 08:43

## 2020-09-14 RX ADMIN — Medication 2 CAPSULE: at 16:52

## 2020-09-14 RX ADMIN — SODIUM CHLORIDE, PRESERVATIVE FREE 10 ML: 5 INJECTION INTRAVENOUS at 08:43

## 2020-09-14 RX ADMIN — ATORVASTATIN CALCIUM 40 MG: 40 TABLET, FILM COATED ORAL at 21:12

## 2020-09-14 RX ADMIN — MEROPENEM 500 MG: 500 INJECTION, POWDER, FOR SOLUTION INTRAVENOUS at 01:24

## 2020-09-14 RX ADMIN — GABAPENTIN 100 MG: 100 CAPSULE ORAL at 21:12

## 2020-09-14 RX ADMIN — MEROPENEM 500 MG: 500 INJECTION, POWDER, FOR SOLUTION INTRAVENOUS at 18:58

## 2020-09-14 RX ADMIN — MAGNESIUM SULFATE HEPTAHYDRATE 4 G: 40 INJECTION, SOLUTION INTRAVENOUS at 04:15

## 2020-09-14 RX ADMIN — MEROPENEM 500 MG: 500 INJECTION, POWDER, FOR SOLUTION INTRAVENOUS at 11:43

## 2020-09-14 RX ADMIN — GABAPENTIN 100 MG: 100 CAPSULE ORAL at 16:52

## 2020-09-14 RX ADMIN — GABAPENTIN 100 MG: 100 CAPSULE ORAL at 08:43

## 2020-09-14 ASSESSMENT — PAIN SCALES - GENERAL
PAINLEVEL_OUTOF10: 0

## 2020-09-14 NOTE — PROGRESS NOTES
Medication Reconciliation    List of medications patient is currently taking is complete.     Source(s) of information:   [place] Report  EPIC records     Isauro Benavides, [employer]

## 2020-09-14 NOTE — CARE COORDINATION
Chart reviewed and patient admitted with AMS and in covid r/o. From [facility]. Call to Faiza([phone]) at [facility] who reports patient was there skilled and would be able to accept back if desired. Call to [facility] Magdalena Ode ([phone]). She does not want patient to return to [facility]. She would like him to go to [address] on [address]. Educated her on ARU vs SNF level of care but she continues to want him to go to [address] [place] [address]. She then stated she was going into a meeting and needed to end the conversation. Call to [facility] to inform that family does not want patient to return there. Will need PT/OT evals when appropriate.  Will need Mahesh andujar    Electronically signed by MERYL Vieira on 9/14/2020 at 2:43 PM

## 2020-09-14 NOTE — PROGRESS NOTES
Pulmonary Progress Note    CC:  Follow up sepsis    Subjective:  Afebrile  Some SOB and dry coughing  No pain  Feet feel numb like prior to surgery. Just does not feel good nor at baseline    ROS  Dry cough  Does not feel well     Intake/Output Summary (Last 24 hours) at 9/14/2020 0804  Last data filed at 9/14/2020 0503  Gross per 24 hour   Intake 5203 ml   Output 1095 ml   Net 4108 ml         PHYSICAL EXAM:  Blood pressure 137/71, pulse 70, temperature 98.4 °F (36.9 °C), temperature source Axillary, resp. rate 19, weight 182 lb 15.7 oz (83 kg), SpO2 96 %.'  Gen: Slightly confused   Eyes: PERRL. No sclera icterus. No conjunctival injection. ENT: No discharge. Pharynx clear. External appearance of ears and nose normal.  Neck: Trachea midline. No obvious mass. Resp: Clear bilaterally. Coughing during exam  CV: Regular rate. Regular rhythm. No murmur or rub. GI: Low incision intact without signs of infection. Slight distention laterally with likely hernia   Skin: Warm, dry, normal texture and turgor. No nodule on exposed extremities. Lymph: No cervical LAD. No supraclavicular LAD. M/S: No cyanosis. No clubbing. No joint deformity. Neuro: Moves all four extremities. CN 2-12 tested, no defect noted.   Ext:   trace edema    Medications:    Scheduled Meds:   magnesium sulfate  4 g Intravenous Once    amiodarone  200 mg Oral Daily    atorvastatin  40 mg Oral Nightly    gabapentin  100 mg Oral TID    sodium chloride flush  10 mL Intravenous 2 times per day    lactobacillus  2 capsule Oral BID WC    sodium chloride flush  10 mL Intravenous 2 times per day    meropenem  500 mg Intravenous Q8H    vancomycin (VANCOCIN) intermittent dosing (placeholder)   Other RX Placeholder       Continuous Infusions:   sodium chloride 75 mL/hr at 09/13/20 1453       PRN Meds:  nitroGLYCERIN, sodium chloride flush, acetaminophen **OR** acetaminophen, polyethylene glycol, promethazine **OR** ondansetron, sodium chloride flush    Labs:  CBC:   Recent Labs     20  0935 20  1433 20  0305 20  0438   WBC 27.6*  --  44.2* 42.8*   HGB 9.3* 7.9* 7.4* 7.2*   HCT 29.1* 25.0* 23.8* 23.2*   MCV 96.2  --  96.1 95.8     --  112* 113*     BMP:   Recent Labs     20  0935 20  0305    137   K 4.0 4.0    109   CO2 18* 17*   PHOS  --  3.4   BUN 30* 31*   CREATININE 1.9* 1.8*     LIVER PROFILE:   Recent Labs     20  0935 20  0305   AST 73* 64*   ALT 62* 54*   LIPASE 46.0  --    BILIDIR  --  1.0*   BILITOT 2.7* 2.1*   ALKPHOS 82 64     PT/INR: No results for input(s): PROTIME, INR in the last 72 hours. APTT: No results for input(s): APTT in the last 72 hours. UA:  Recent Labs     20  1415   COLORU RED*   PHUR Color Interfer*   WBCUA *   RBCUA >100*   BACTERIA 4+*   CLARITYU TURBID*   SPECGRAV 1.021   LEUKOCYTESUR Color Interfer*   UROBILINOGEN Color Interfer*   BILIRUBINUR Color Interfer*   BLOODU Color Interfer*   GLUCOSEU Color Interfer*     No results for input(s): PH, PCO2, PO2 in the last 72 hours. Films:  Chest imaging reports were reviewed and imaging was reviewed by me and showed low lung volumes.   PICC    AB    Cultures:  Pending    I reviewed the labs and images listed above    Assessment:   · Sepsis, without a clear source just yet  · Metabolic Encephalopathy  · Acute Cystitis with urinary retention  · Pelvic hematoma   · TONO      Plan:  · DC Bpap  · IV fluids at 75 ml/hr  · Merrem and Vanco  · Cultures in process  · Check procal today   · Add lactobacillus   · Start clears  · DVT prophylaxis with SCDS  · Glycemic control  · Replace abidel Siu DO  [facility]

## 2020-09-14 NOTE — PROGRESS NOTES
Hospitalist Progress Note  9/14/2020 9:50 AM    PCP: Festus Santiago    [id number]     Date of Admission: 9/13/2020                                                                                                                     HOSPITAL COURSE    Patient demographics:  The patient  Arthur Lin is a 66 y.o. male     Significant past medical history:   Patient Active Problem List   Diagnosis    [facility] [facility]    Lactic acidosis    Acute metabolic encephalopathy    Pelvic hematoma in male    Severe sepsis ([address]    Acute renal failure superimposed on chronic kidney disease ([address]    Elevated LFTs    Abnormal chest x-ray         Presenting symptoms:  AMS    Diagnostic workup:      CONSULTS DURING ADMISSION :   IP CONSULT TO ORTHOPEDIC SURGERY  IP CONSULT TO PULMONOLOGY  PHARMACY TO DOSE VANCOMYCIN  IP CONSULT TO UROLOGY  IP CONSULT TO PHARMACY      Patient was diagnosed with:  Sepsis  Urinary retention with urethral false passage    Treatment while inpatient:  66years old male with a recent history of L4-5 L5-S1 fusion on 8/31 with a postoperative retroperitoneal hematoma. Patient presented to this hospital with altered mental status from skilled nursing facility. Patient was noted to have temperature of 105 degree in the emergency room with tachycardia and tachypnea. Patient was started on BiPAP     Patient was diagnosed with sepsis likely due to urinary tract infection. .  Patient's altered mental status was considered to be acute metabolic encephalopathy. Patient was also diagnosed with acute cystitis with urinary retention.   Urology was consulted and patient underwent cystoscopy and was diagnosed with urinary retention with false urethral passage                                                             ----------------------------------------------------------      SUBJECTIVE COMPLAINTS-  Follow up for AMS    Diet: DIET CLEAR LIQUID;      OBJECTIVE:   Patient Active Problem List   Diagnosis    [facility] [address] [facility]    Lactic acidosis    Acute metabolic encephalopathy    Pelvic hematoma in male    Severe sepsis (HCC)    Acute renal failure superimposed on chronic kidney disease (HCC)    Elevated LFTs    Abnormal chest x-ray       Allergies  Azithromycin; Brimonidine; Clindamycin/lincomycin; Penicillins; and Simvastatin    Medications    Scheduled Meds:   amiodarone  200 mg Oral Daily    atorvastatin  40 mg Oral Nightly    gabapentin  100 mg Oral TID    lactobacillus  2 capsule Oral BID WC    sodium chloride flush  10 mL Intravenous 2 times per day    meropenem  500 mg Intravenous Q8H    vancomycin (VANCOCIN) intermittent dosing (placeholder)   Other RX Placeholder     Continuous Infusions:   sodium chloride 75 mL/hr at 09/13/20 1453     PRN Meds:  nitroGLYCERIN, sodium chloride flush, acetaminophen **OR** acetaminophen, polyethylene glycol, promethazine **OR** ondansetron, sodium chloride flush    Vitals   Vitals /wt   Patient Vitals for the past 8 hrs:   BP Temp Temp src Pulse Resp SpO2 Height Weight   09/14/20 0900 (!) 144/67 -- -- 94 20 95 % -- --   09/14/20 0830 -- -- -- -- -- -- 5' 9\" (1.753 m) --   09/14/20 0815 123/71 98.4 °F (36.9 °C) Oral 85 18 95 % -- --   09/14/20 0700 137/71 -- -- 70 19 96 % -- --   09/14/20 0600 134/70 -- -- 67 17 95 % -- --   09/14/20 0500 (!) 133/59 -- -- 69 18 96 % -- --   09/14/20 0405 -- -- -- -- 16 93 % -- --   09/14/20 0400 (!) 121/57 98.4 °F (36.9 °C) Axillary 69 18 95 % -- 182 lb 15.7 oz (83 kg)   09/14/20 0300 115/70 -- -- 73 21 94 % -- --   09/14/20 0200 116/76 -- -- 68 18 95 % -- --        72HR INTAKE/OUTPUT:      Intake/Output Summary (Last 24 hours) at 9/14/2020 0950  Last data filed at 9/14/2020 0931  Gross per 24 hour   Intake 5303 ml   Output 1220 ml   Net 4083 ml       Exam:    Gen:   Alert and oriented ×3   Eyes: PERRL. No sclera icterus. No conjunctival injection. ENT: No discharge. Pharynx clear.  External appearance of ears and nose normal.  Neck: Trachea midline. No obvious mass. Resp: No accessory muscle use. No crackles. No wheezes. No rhonchi. CV: Regular rate. Regular rhythm. No murmur or rub. No edema. GI: Non-tender. Non-distended. No hernia. Skin: Warm, dry, normal texture and turgor. Lymph: No cervical LAD. No supraclavicular LAD. M/S: / Ext. No cyanosis. No clubbing. No joint deformity. Neuro: CN 2-12 are intact,  no neurologic deficits noted. PT/INR: No results for input(s): PROTIME, INR in the last 72 hours. APTT: No results for input(s): APTT in the last 72 hours. CBC:   Recent Labs     09/13/20 0935 09/13/20 1433 09/14/20 0305 09/14/20  0438   WBC 27.6*  --  44.2* 42.8*   HGB 9.3* 7.9* 7.4* 7.2*   HCT 29.1* 25.0* 23.8* 23.2*   MCV 96.2  --  96.1 95.8     --  112* 113*       BMP:   Recent Labs     09/13/20 0935 09/14/20 0305    137   K 4.0 4.0    109   CO2 18* 17*   PHOS  --  3.4   BUN 30* 31*   CREATININE 1.9* 1.8*       LIVER PROFILE:   Recent Labs     09/13/20 0935 09/14/20 0305   ALKPHOS 82 64   AST 73* 64*   ALT 62* 54*   BILIDIR  --  1.0*   BILITOT 2.7* 2.1*     No results for input(s): AMYLASE in the last 72 hours. Recent Labs     09/13/20 0935   LIPASE 46.0       UA:  Recent Labs     09/13/20 1415   WBCUA *   RBCUA >100*       TROPONIN:   Recent Labs     09/13/20 1433 09/13/20 1955 09/14/20  0305   TROPONINI 0.04* 0.06* 0.07*       Lab Results   Component Value Date/Time    URRFLXCULT Yes 09/13/2020 02:15 PM       No results for input(s): TSHREFLEX in the last 72 hours. No components found for: [id number]  POC GLUCOSE:    Recent Labs     09/13/20 1956   POCGLU 102*     No results for input(s): LABA1C in the last 72 hours.  No results found for: LABA1C      ASSESSMENT AND PLAN   Sepsis  - lactic acid 3.4, continue to trend  Patient is afebrile  Cultures no growth to date  Continue with meropenem and vancomycin  - r/o covid, check inflammatory markers  Discontinued BiPAP      Anemia  - hgb 8.8 on 9/10  - hematoma      Acute on chronic renal failure  -Baseline creatinine 1.37 on 9/10,  No hydronephrosis on ct ab/pelv  - IVFs  - urinalysis      CAD  - elevated trop, continue to trend      Atrial fib  holding eliquis, following h/h      COPD  - no wheeze, on bipap  - pCO2 28.3                Code Status: Full Code        Dispo -patient can be downgraded to stepdown unit        The patient and / or the family were informed of the results of any tests, a time was given to answer questions, a plan was proposed and they agreed with plan. Junior Lyons MD    This note was transcribed using [id number]. Please disregard any translational errors.

## 2020-09-14 NOTE — PROGRESS NOTES
Physician Progress Note      PATIENT:               Addis Chávez  CSN #:                  [id number]  :                       1945  ADMIT DATE:       2020 8:39 AM  DISCH DATE:  RESPONDING  PROVIDER #:        Josh Fitzgerald MD          QUERY TEXT:    Patient admitted with sepsis, noted to have acute on chronic kidney disease If   possible, please document in progress notes and discharge summary if you are   evaluating and/or treating any of the following: The medical record reflects the following:  Risk Factors: Current admission for TONO on CKD  Clinical Indicators:  bun 30, crt 1.9, gfr 35  Treatment: IVF, strict I&O  Options provided:  -- CKD Stage 1 GFR>90  -- CKD Stage 2 GFR 60-90  -- CKD Stage 3 GFR 30-59  -- CKD Stage 4 GFR 15-29  -- CKD Stage 5 GFR<15  -- ESRD  -- Other - I will add my own diagnosis  -- Disagree - Not applicable / Not valid  -- Disagree - Clinically unable to determine / Unknown  -- Refer to Clinical Documentation Reviewer    PROVIDER RESPONSE TEXT:    This patient has CKD Stage 3.     Query created by: Ben Leyden on 2020 1:24 PM      Electronically signed by:  Josh Fitzgerald MD 2020 3:35 PM

## 2020-09-14 NOTE — PROGRESS NOTES
Clinical Pharmacy Note  Vancomycin Consult    David Negrete is a 66 y.o. male ordered Vancomycin for empiric coverage; consult received from Dr. Kamaljit Sigala to manage therapy. Also receiving meropenem. Patient Active Problem List   Diagnosis    [facility] ([facility] [address]    Lactic acidosis    Acute metabolic encephalopathy    Pelvic hematoma in male    Severe sepsis (HCC)    Acute renal failure superimposed on chronic kidney disease (HCC)    Elevated LFTs    Abnormal chest x-ray    Acute cystitis without hematuria       Allergies:  Azithromycin; Brimonidine; Clindamycin/lincomycin; Penicillins; and Simvastatin     Temp max:  Temp (24hrs), Av.3 °F (36.8 °C), Min:98 °F (36.7 °C), Max:98.6 °F (37 °C)      Recent Labs     20  0920  0305 20  0438   WBC 27.6* 44.2* 42.8*       Recent Labs     20  0305   BUN 30* 31*   CREATININE 1.9* 1.8*         Intake/Output Summary (Last 24 hours) at 2020 1305  Last data filed at 2020 1200  Gross per 24 hour   Intake 4203 ml   Output 1445 ml   Net 2758 ml       Culture Results:  pending    Ht Readings from Last 1 Encounters:   20 5' 9\" (1.753 m)        Wt Readings from Last 1 Encounters:   20 182 lb 15.7 oz (83 kg)         Estimated Creatinine Clearance: 34 mL/min (A) (based on SCr of 1.8 mg/dL (H)). Assessment:  Day # 2 of vancomycin. Receiving intermittent dosing based on levels due to TONO. Random level: 10.1 mg/L. Plan:  Vancomycin 1250 mg IV x 1 today  Vancomycin random level in AM    Thank you for the consult.      Gurpreet Min, PharmD, [employer] [facility]

## 2020-09-14 NOTE — PROGRESS NOTES
1915: Bedside report received from [facility]. Pt on bipap, alert and oriented, following commands. 2044: Troponin increased from 0.02 to 0.04 to 0.06, notified Dr. Gilda Higginbotham. Pt did not desaturate when bipap removed for oral care. Scans did not show pneumonia and co2 28, requested to take pt off bipap    2123: Call received from daughter, updates given and all questions answered at this time    2135: Last few BP readings low 90s/50s with maps of 60 and 61. Requested levo from Dr. Gilda Higginbotham    2140: Dr. Gilda Higginbotham at bedside to assess pt, wants to hold off on levo for now, but permission to start levo if maps dip around 55 consistently.  Levo will be titrated for a Map above 65    2200: Pt satting % on room air after bipap removed    0400: Lab results reviewed and reported to Dr. Gilda Higginbotham, see new orders    0700: Bedside report given to lorena Parker

## 2020-09-15 LAB
ANION GAP SERPL CALCULATED.3IONS-SCNC: 7 MMOL/L (ref 3–16)
BASOPHILS ABSOLUTE: 0 K/UL (ref 0–0.2)
BASOPHILS RELATIVE PERCENT: 0.2 %
BUN BLDV-MCNC: 31 MG/DL (ref 7–20)
CALCIUM SERPL-MCNC: 7.9 MG/DL (ref 8.3–10.6)
CHLORIDE BLD-SCNC: 117 MMOL/L (ref 99–110)
CO2: 17 MMOL/L (ref 21–32)
CREAT SERPL-MCNC: 1.2 MG/DL (ref 0.8–1.3)
EOSINOPHILS ABSOLUTE: 0.1 K/UL (ref 0–0.6)
EOSINOPHILS RELATIVE PERCENT: 0.2 %
GFR AFRICAN AMERICAN: >60
GFR NON-AFRICAN AMERICAN: 59
GLUCOSE BLD-MCNC: 78 MG/DL (ref 70–99)
HCT VFR BLD CALC: 22 % (ref 40.5–52.5)
HEMOGLOBIN: 7 G/DL (ref 13.5–17.5)
LYMPHOCYTES ABSOLUTE: 0.5 K/UL (ref 1–5.1)
LYMPHOCYTES RELATIVE PERCENT: 2 %
MAGNESIUM: 2.5 MG/DL (ref 1.8–2.4)
MCH RBC QN AUTO: 30.5 PG (ref 26–34)
MCHC RBC AUTO-ENTMCNC: 31.8 G/DL (ref 31–36)
MCV RBC AUTO: 95.9 FL (ref 80–100)
MONOCYTES ABSOLUTE: 1.1 K/UL (ref 0–1.3)
MONOCYTES RELATIVE PERCENT: 4.5 %
NEUTROPHILS ABSOLUTE: 22.9 K/UL (ref 1.7–7.7)
NEUTROPHILS RELATIVE PERCENT: 93.1 %
PDW BLD-RTO: 17.9 % (ref 12.4–15.4)
PHOSPHORUS: 2.2 MG/DL (ref 2.5–4.9)
PLATELET # BLD: 100 K/UL (ref 135–450)
PMV BLD AUTO: 9.5 FL (ref 5–10.5)
POTASSIUM SERPL-SCNC: 3.7 MMOL/L (ref 3.5–5.1)
PROCALCITONIN: 97.74 NG/ML (ref 0–0.15)
RBC # BLD: 2.29 M/UL (ref 4.2–5.9)
SARS-COV-2, NAA: NOT DETECTED
SODIUM BLD-SCNC: 141 MMOL/L (ref 136–145)
VANCOMYCIN RANDOM: 12 UG/ML
WBC # BLD: 24.6 K/UL (ref 4–11)

## 2020-09-15 PROCEDURE — 6370000000 HC RX 637 (ALT 250 FOR IP): Performed by: HOSPITALIST

## 2020-09-15 PROCEDURE — 6370000000 HC RX 637 (ALT 250 FOR IP): Performed by: NURSE PRACTITIONER

## 2020-09-15 PROCEDURE — 6360000002 HC RX W HCPCS: Performed by: HOSPITALIST

## 2020-09-15 PROCEDURE — 80048 BASIC METABOLIC PNL TOTAL CA: CPT

## 2020-09-15 PROCEDURE — 84145 PROCALCITONIN (PCT): CPT

## 2020-09-15 PROCEDURE — 99233 SBSQ HOSP IP/OBS HIGH 50: CPT | Performed by: INTERNAL MEDICINE

## 2020-09-15 PROCEDURE — 85025 COMPLETE CBC W/AUTO DIFF WBC: CPT

## 2020-09-15 PROCEDURE — 2580000003 HC RX 258: Performed by: HOSPITALIST

## 2020-09-15 PROCEDURE — 2580000003 HC RX 258: Performed by: NURSE PRACTITIONER

## 2020-09-15 PROCEDURE — 36415 COLL VENOUS BLD VENIPUNCTURE: CPT

## 2020-09-15 PROCEDURE — 80202 ASSAY OF VANCOMYCIN: CPT

## 2020-09-15 PROCEDURE — APPNB15 APP NON BILLABLE TIME 0-15 MINS: Performed by: NURSE PRACTITIONER

## 2020-09-15 PROCEDURE — 94760 N-INVAS EAR/PLS OXIMETRY 1: CPT

## 2020-09-15 PROCEDURE — 84100 ASSAY OF PHOSPHORUS: CPT

## 2020-09-15 PROCEDURE — 83735 ASSAY OF MAGNESIUM: CPT

## 2020-09-15 PROCEDURE — 94761 N-INVAS EAR/PLS OXIMETRY MLT: CPT

## 2020-09-15 PROCEDURE — 2500000003 HC RX 250 WO HCPCS: Performed by: NURSE PRACTITIONER

## 2020-09-15 PROCEDURE — 2060000000 HC ICU INTERMEDIATE R&B

## 2020-09-15 RX ORDER — LOSARTAN POTASSIUM 100 MG/1
100 TABLET ORAL DAILY
Status: DISCONTINUED | OUTPATIENT
Start: 2020-09-15 | End: 2020-09-18 | Stop reason: HOSPADM

## 2020-09-15 RX ORDER — LOPERAMIDE HYDROCHLORIDE 2 MG/1
2 CAPSULE ORAL ONCE
Status: COMPLETED | OUTPATIENT
Start: 2020-09-15 | End: 2020-09-15

## 2020-09-15 RX ORDER — METOPROLOL TARTRATE 50 MG/1
50 TABLET, FILM COATED ORAL 2 TIMES DAILY
Status: DISCONTINUED | OUTPATIENT
Start: 2020-09-15 | End: 2020-09-18 | Stop reason: HOSPADM

## 2020-09-15 RX ADMIN — POTASSIUM PHOSPHATE, MONOBASIC AND POTASSIUM PHOSPHATE, DIBASIC 20 MMOL: 224; 236 INJECTION, SOLUTION, CONCENTRATE INTRAVENOUS at 09:20

## 2020-09-15 RX ADMIN — SODIUM CHLORIDE, PRESERVATIVE FREE 10 ML: 5 INJECTION INTRAVENOUS at 20:25

## 2020-09-15 RX ADMIN — Medication 2 CAPSULE: at 18:55

## 2020-09-15 RX ADMIN — SODIUM CHLORIDE, PRESERVATIVE FREE 10 ML: 5 INJECTION INTRAVENOUS at 08:52

## 2020-09-15 RX ADMIN — MEROPENEM 500 MG: 500 INJECTION, POWDER, FOR SOLUTION INTRAVENOUS at 16:02

## 2020-09-15 RX ADMIN — METOPROLOL TARTRATE 50 MG: 50 TABLET, FILM COATED ORAL at 20:25

## 2020-09-15 RX ADMIN — MEROPENEM 500 MG: 500 INJECTION, POWDER, FOR SOLUTION INTRAVENOUS at 09:30

## 2020-09-15 RX ADMIN — Medication 2 CAPSULE: at 08:52

## 2020-09-15 RX ADMIN — ACETAMINOPHEN 650 MG: 325 TABLET ORAL at 08:51

## 2020-09-15 RX ADMIN — MEROPENEM 500 MG: 500 INJECTION, POWDER, FOR SOLUTION INTRAVENOUS at 20:24

## 2020-09-15 RX ADMIN — VANCOMYCIN HYDROCHLORIDE 1250 MG: 10 INJECTION, POWDER, LYOPHILIZED, FOR SOLUTION INTRAVENOUS at 08:00

## 2020-09-15 RX ADMIN — MEROPENEM 500 MG: 500 INJECTION, POWDER, FOR SOLUTION INTRAVENOUS at 02:00

## 2020-09-15 RX ADMIN — LOSARTAN POTASSIUM 100 MG: 100 TABLET, FILM COATED ORAL at 13:11

## 2020-09-15 RX ADMIN — ATORVASTATIN CALCIUM 40 MG: 40 TABLET, FILM COATED ORAL at 20:24

## 2020-09-15 RX ADMIN — GABAPENTIN 100 MG: 100 CAPSULE ORAL at 20:25

## 2020-09-15 RX ADMIN — LOPERAMIDE HYDROCHLORIDE 2 MG: 2 CAPSULE ORAL at 23:35

## 2020-09-15 RX ADMIN — AMIODARONE HYDROCHLORIDE 200 MG: 200 TABLET ORAL at 08:51

## 2020-09-15 RX ADMIN — GABAPENTIN 100 MG: 100 CAPSULE ORAL at 13:11

## 2020-09-15 RX ADMIN — METOPROLOL TARTRATE 50 MG: 50 TABLET, FILM COATED ORAL at 13:11

## 2020-09-15 RX ADMIN — GABAPENTIN 100 MG: 100 CAPSULE ORAL at 08:51

## 2020-09-15 ASSESSMENT — PAIN DESCRIPTION - PROGRESSION
CLINICAL_PROGRESSION: NOT CHANGED

## 2020-09-15 ASSESSMENT — PAIN DESCRIPTION - DESCRIPTORS
DESCRIPTORS: ACHING
DESCRIPTORS: ACHING

## 2020-09-15 ASSESSMENT — PAIN DESCRIPTION - ONSET
ONSET: ON-GOING
ONSET: ON-GOING

## 2020-09-15 ASSESSMENT — PAIN DESCRIPTION - LOCATION
LOCATION: LEG
LOCATION: LEG

## 2020-09-15 ASSESSMENT — PAIN SCALES - GENERAL
PAINLEVEL_OUTOF10: 6
PAINLEVEL_OUTOF10: 9
PAINLEVEL_OUTOF10: 3
PAINLEVEL_OUTOF10: 0

## 2020-09-15 ASSESSMENT — PAIN DESCRIPTION - FREQUENCY
FREQUENCY: INTERMITTENT
FREQUENCY: INTERMITTENT

## 2020-09-15 ASSESSMENT — PAIN - FUNCTIONAL ASSESSMENT
PAIN_FUNCTIONAL_ASSESSMENT: PREVENTS OR INTERFERES SOME ACTIVE ACTIVITIES AND ADLS
PAIN_FUNCTIONAL_ASSESSMENT: PREVENTS OR INTERFERES SOME ACTIVE ACTIVITIES AND ADLS

## 2020-09-15 NOTE — PROGRESS NOTES
Hospitalist Progress Note  9/15/2020 8:50 AM    PCP: Jordin Kramer    [id number]     Date of Admission: 9/13/2020                                                                                                                     HOSPITAL COURSE    Patient demographics:  The patient  Calderon Mccoy is a 66 y.o. male     Significant past medical history:   Patient Active Problem List   Diagnosis    [facility] [address] [facility]    Lactic acidosis    Acute metabolic encephalopathy    Pelvic hematoma in male    Severe sepsis ([facility] [address] [facility]    Acute renal failure superimposed on chronic kidney disease ([facility] [address] [facility]    Elevated LFTs    Abnormal chest x-ray    Acute cystitis without hematuria         Presenting symptoms:  AMS    Diagnostic workup:      CONSULTS DURING ADMISSION :   IP CONSULT TO PULMONOLOGY  PHARMACY TO DOSE VANCOMYCIN  IP CONSULT TO UROLOGY  IP CONSULT TO PHARMACY      Patient was diagnosed with:  Sepsis  Urinary retention with urethral false passage    Treatment while inpatient:  66years old male with a recent history of L4-5 L5-S1 fusion on 8/31 with a postoperative retroperitoneal hematoma. Patient presented to this hospital with altered mental status from skilled nursing facility. Patient was noted to have temperature of 105 degree in the emergency room with tachycardia and tachypnea. Patient was started on BiPAP     Patient was diagnosed with sepsis likely due to urinary tract infection. .  Patient's altered mental status was considered to be acute metabolic encephalopathy. Patient was also diagnosed with acute cystitis with urinary retention.   Urology was consulted and patient underwent cystoscopy and was diagnosed with urinary retention with false urethral passage                                                             ----------------------------------------------------------      SUBJECTIVE COMPLAINTS-  Follow up for AMS    Diet: DIET GENERAL;      OBJECTIVE:   Patient Active Problem Ext. No cyanosis. No clubbing. No joint deformity. Neuro: CN 2-12 are intact,  no neurologic deficits noted. PT/INR: No results for input(s): PROTIME, INR in the last 72 hours. APTT: No results for input(s): APTT in the last 72 hours. CBC:   Recent Labs     09/13/20  0935 09/13/20  1433 09/14/20 0305 09/14/20  0438 09/15/20  0615   WBC 27.6*  --  44.2* 42.8* 24.6*   HGB 9.3* 7.9* 7.4* 7.2* 7.0*   HCT 29.1* 25.0* 23.8* 23.2* 22.0*   MCV 96.2  --  96.1 95.8 95.9     --  112* 113* 100*       BMP:   Recent Labs     09/13/20  0935 09/14/20  0305 09/15/20  0615    137 141   K 4.0 4.0 3.7    109 117*   CO2 18* 17* 17*   PHOS  --  3.4 2.2*   BUN 30* 31* 31*   CREATININE 1.9* 1.8* 1.2       LIVER PROFILE:   Recent Labs     09/13/20  0935 09/14/20  0305   ALKPHOS 82 64   AST 73* 64*   ALT 62* 54*   BILIDIR  --  1.0*   BILITOT 2.7* 2.1*     No results for input(s): AMYLASE in the last 72 hours. Recent Labs     09/13/20  0935   LIPASE 46.0       UA:  Recent Labs     09/13/20  1415   WBCUA *   RBCUA >100*       TROPONIN:   Recent Labs     09/13/20  1433 09/13/20 1955 09/14/20  0305   TROPONINI 0.04* 0.06* 0.07*       Lab Results   Component Value Date/Time    URRFLXCULT Yes 09/13/2020 02:15 PM       No results for input(s): TSHREFLEX in the last 72 hours. No components found for: [id number]  POC GLUCOSE:    Recent Labs     09/13/20 1956 09/14/20  1233   POCGLU 102* 104*     No results for input(s): LABA1C in the last 72 hours.  No results found for: LABA1C      ASSESSMENT AND PLAN   Sepsis  - LA improved, afebrile  Cultures no growth to date  Continue with meropenem and vancomycin  Coumadin 19 is negative  Titrate oxygen for saturations greater than or equal to 90%  Consult to infectious disease  Source of sepsis is not clear      Anemia  - hgb 8.8 on 9/10  - hematoma      Acute on chronic renal failure  Improved      CAD  - elevated trop, continue to trend      Atrial fib  holding eliquis, following h/h      COPD  - no wheeze, on bipap  - pCO2 28.3    Urinary retention  Reeves's catheter in place  IV fluids and antibiotics  Likely source of sepsis      Code Status: Full Code        Dispo -patient can be downgraded to stepdown unit        The patient and / or the family were informed of the results of any tests, a time was given to answer questions, a plan was proposed and they agreed with plan. Naveed Villavicencio MD    This note was transcribed using [id number] [employer]. Please disregard any translational errors.

## 2020-09-15 NOTE — PROGRESS NOTES
1900: Daughter Kinza Mao calling for an update, will call back after report received from lorena Mccurdy    1934: Call placed to [facility] with updates. Daughter requesting pt transfer to [facility], but pt states \"I don't want to leave, I'm happy where I'm at. \" Daughter informed that pt cannot be transferred against his will. Daughter inquired about pending covid test, informed results can take up to 5 days. All other questions answered at this time, daughter provided with pt's room phone number    2000: While completing assessment, pt informed that his daughter would be calling. Pt states he does not want to talk to her because he does not have the energy.

## 2020-09-15 NOTE — PROGRESS NOTES
Pulmonary Progress Note    CC:  Follow up sepsis    Subjective:  Afebrile  Still feels not a baseline  No SOB    ROS  Less coughing   Legs feel numb      Intake/Output Summary (Last 24 hours) at 9/15/2020 0657  Last data filed at 9/15/2020 0552  Gross per 24 hour   Intake 2445 ml   Output 1700 ml   Net 745 ml         PHYSICAL EXAM:  Blood pressure (!) 169/94, pulse 70, temperature 98.4 °F (36.9 °C), temperature source Axillary, resp. rate 17, height 5' 9\" (1.753 m), weight 182 lb 1.6 oz (82.6 kg), SpO2 95 %.'  Gen: Less confusion   Eyes: PERRL. No sclera icterus. No conjunctival injection. ENT: No discharge. Pharynx clear. External appearance of ears and nose normal.  Neck: Trachea midline. No obvious mass. Resp: Clear bilaterally. Diminished   CV: Regular rate. Regular rhythm. No murmur or rub. GI: Low incision intact without signs of infection. Slight distention laterally with likely hernia   Skin: Warm, dry, normal texture and turgor. No nodule on exposed extremities. Lymph: No cervical LAD. No supraclavicular LAD. M/S: No cyanosis. No clubbing. No joint deformity. Neuro: Moves all four extremities. CN 2-12 tested, no defect noted.   Ext:   trace edema    Medications:    Scheduled Meds:   amiodarone  200 mg Oral Daily    atorvastatin  40 mg Oral Nightly    gabapentin  100 mg Oral TID    lactobacillus  2 capsule Oral BID WC    sodium chloride flush  10 mL Intravenous 2 times per day    meropenem  500 mg Intravenous Q8H    vancomycin (VANCOCIN) intermittent dosing (placeholder)   Other RX Placeholder       Continuous Infusions:   sodium chloride 75 mL/hr at 09/13/20 1453       PRN Meds:  nitroGLYCERIN, sodium chloride flush, acetaminophen **OR** acetaminophen, polyethylene glycol, promethazine **OR** ondansetron, sodium chloride flush    Labs:  CBC:   Recent Labs     09/14/20  0305 09/14/20  0438 09/15/20  0615   WBC 44.2* 42.8* 24.6*   HGB 7.4* 7.2* 7.0*   HCT 23.8* 23.2* 22.0*   MCV 96.1 95.8 95.9   * 113* 100*     BMP:   Recent Labs     20  0935 20  0305    137   K 4.0 4.0    109   CO2 18* 17*   PHOS  --  3.4   BUN 30* 31*   CREATININE 1.9* 1.8*     LIVER PROFILE:   Recent Labs     20  0935 20  0305   AST 73* 64*   ALT 62* 54*   LIPASE 46.0  --    BILIDIR  --  1.0*   BILITOT 2.7* 2.1*   ALKPHOS 82 64     PT/INR: No results for input(s): PROTIME, INR in the last 72 hours. APTT: No results for input(s): APTT in the last 72 hours. UA:  Recent Labs     20  1415   COLORU RED*   PHUR Color Interfer*   WBCUA *   RBCUA >100*   BACTERIA 4+*   CLARITYU TURBID*   SPECGRAV 1.021   LEUKOCYTESUR Color Interfer*   UROBILINOGEN Color Interfer*   BILIRUBINUR Color Interfer*   BLOODU Color Interfer*   GLUCOSEU Color Interfer*     No results for input(s): PH, PCO2, PO2 in the last 72 hours. Films:  Chest imaging reports were reviewed and imaging was reviewed by me and showed low lung volumes.   PICC    AB    Cultures:  Pending    I reviewed the labs and images listed above    Assessment:   · Sepsis, without a clear source just yet  · Metabolic Encephalopathy  · Acute Cystitis with urinary retention  · Pelvic hematoma   · TONO  · Hyperchloremic Metabolic Acidosis due to 0.9 NS      Plan:  · DC IV fluids due to tolerating diet and hyperchloremia  · Merrem and Vanco  · Cultures in process  · Check procal today (it was never sent yesterday) and then trend thereafter  · Lactobacillus   · Advance Diet   · DVT prophylaxis with SCDS  · Glycemic control          Mariel Villegas DO  [facility]

## 2020-09-16 LAB
ANION GAP SERPL CALCULATED.3IONS-SCNC: 9 MMOL/L (ref 3–16)
BASOPHILS ABSOLUTE: 0.1 K/UL (ref 0–0.2)
BASOPHILS RELATIVE PERCENT: 0.5 %
BUN BLDV-MCNC: 26 MG/DL (ref 7–20)
CALCIUM SERPL-MCNC: 8.3 MG/DL (ref 8.3–10.6)
CHLORIDE BLD-SCNC: 111 MMOL/L (ref 99–110)
CO2: 18 MMOL/L (ref 21–32)
CREAT SERPL-MCNC: 1 MG/DL (ref 0.8–1.3)
EOSINOPHILS ABSOLUTE: 0.1 K/UL (ref 0–0.6)
EOSINOPHILS RELATIVE PERCENT: 0.6 %
GFR AFRICAN AMERICAN: >60
GFR NON-AFRICAN AMERICAN: >60
GLUCOSE BLD-MCNC: 74 MG/DL (ref 70–99)
HCT VFR BLD CALC: 23.4 % (ref 40.5–52.5)
HEMOGLOBIN: 7.4 G/DL (ref 13.5–17.5)
LYMPHOCYTES ABSOLUTE: 0.6 K/UL (ref 1–5.1)
LYMPHOCYTES RELATIVE PERCENT: 4 %
MAGNESIUM: 2.3 MG/DL (ref 1.8–2.4)
MCH RBC QN AUTO: 30.4 PG (ref 26–34)
MCHC RBC AUTO-ENTMCNC: 31.7 G/DL (ref 31–36)
MCV RBC AUTO: 95.7 FL (ref 80–100)
MONOCYTES ABSOLUTE: 0.6 K/UL (ref 0–1.3)
MONOCYTES RELATIVE PERCENT: 4 %
NEUTROPHILS ABSOLUTE: 14.2 K/UL (ref 1.7–7.7)
NEUTROPHILS RELATIVE PERCENT: 90.9 %
PDW BLD-RTO: 17.7 % (ref 12.4–15.4)
PHOSPHORUS: 2.5 MG/DL (ref 2.5–4.9)
PLATELET # BLD: 116 K/UL (ref 135–450)
PMV BLD AUTO: 9.5 FL (ref 5–10.5)
POTASSIUM SERPL-SCNC: 4.1 MMOL/L (ref 3.5–5.1)
RBC # BLD: 2.45 M/UL (ref 4.2–5.9)
SODIUM BLD-SCNC: 138 MMOL/L (ref 136–145)
VANCOMYCIN RANDOM: 13.5 UG/ML
WBC # BLD: 15.6 K/UL (ref 4–11)

## 2020-09-16 PROCEDURE — 97535 SELF CARE MNGMENT TRAINING: CPT

## 2020-09-16 PROCEDURE — 6360000002 HC RX W HCPCS: Performed by: HOSPITALIST

## 2020-09-16 PROCEDURE — 97110 THERAPEUTIC EXERCISES: CPT

## 2020-09-16 PROCEDURE — 6370000000 HC RX 637 (ALT 250 FOR IP): Performed by: HOSPITALIST

## 2020-09-16 PROCEDURE — 97166 OT EVAL MOD COMPLEX 45 MIN: CPT

## 2020-09-16 PROCEDURE — 84100 ASSAY OF PHOSPHORUS: CPT

## 2020-09-16 PROCEDURE — 2580000003 HC RX 258: Performed by: HOSPITALIST

## 2020-09-16 PROCEDURE — 85025 COMPLETE CBC W/AUTO DIFF WBC: CPT

## 2020-09-16 PROCEDURE — 94761 N-INVAS EAR/PLS OXIMETRY MLT: CPT

## 2020-09-16 PROCEDURE — 2580000003 HC RX 258: Performed by: NURSE PRACTITIONER

## 2020-09-16 PROCEDURE — 2060000000 HC ICU INTERMEDIATE R&B

## 2020-09-16 PROCEDURE — 83735 ASSAY OF MAGNESIUM: CPT

## 2020-09-16 PROCEDURE — 6370000000 HC RX 637 (ALT 250 FOR IP): Performed by: NURSE PRACTITIONER

## 2020-09-16 PROCEDURE — 97162 PT EVAL MOD COMPLEX 30 MIN: CPT

## 2020-09-16 PROCEDURE — 80202 ASSAY OF VANCOMYCIN: CPT

## 2020-09-16 PROCEDURE — 99223 1ST HOSP IP/OBS HIGH 75: CPT | Performed by: INTERNAL MEDICINE

## 2020-09-16 PROCEDURE — 94150 VITAL CAPACITY TEST: CPT

## 2020-09-16 PROCEDURE — 2700000000 HC OXYGEN THERAPY PER DAY

## 2020-09-16 PROCEDURE — 94664 DEMO&/EVAL PT USE INHALER: CPT

## 2020-09-16 PROCEDURE — 80048 BASIC METABOLIC PNL TOTAL CA: CPT

## 2020-09-16 PROCEDURE — 97530 THERAPEUTIC ACTIVITIES: CPT

## 2020-09-16 RX ORDER — FERROUS SULFATE TAB EC 324 MG (65 MG FE EQUIVALENT) 324 (65 FE) MG
324 TABLET DELAYED RESPONSE ORAL
Status: DISCONTINUED | OUTPATIENT
Start: 2020-09-17 | End: 2020-09-18 | Stop reason: HOSPADM

## 2020-09-16 RX ORDER — OXYCODONE HYDROCHLORIDE AND ACETAMINOPHEN 5; 325 MG/1; MG/1
1 TABLET ORAL EVERY 4 HOURS PRN
Status: DISCONTINUED | OUTPATIENT
Start: 2020-09-16 | End: 2020-09-18 | Stop reason: HOSPADM

## 2020-09-16 RX ORDER — DULOXETIN HYDROCHLORIDE 30 MG/1
30 CAPSULE, DELAYED RELEASE ORAL 2 TIMES DAILY
Status: DISCONTINUED | OUTPATIENT
Start: 2020-09-16 | End: 2020-09-18 | Stop reason: HOSPADM

## 2020-09-16 RX ORDER — LEVOTHYROXINE SODIUM 0.05 MG/1
50 TABLET ORAL DAILY
Status: DISCONTINUED | OUTPATIENT
Start: 2020-09-16 | End: 2020-09-18 | Stop reason: HOSPADM

## 2020-09-16 RX ADMIN — AMIODARONE HYDROCHLORIDE 200 MG: 200 TABLET ORAL at 09:50

## 2020-09-16 RX ADMIN — Medication 1250 MG: at 11:22

## 2020-09-16 RX ADMIN — OXYCODONE HYDROCHLORIDE AND ACETAMINOPHEN 1 TABLET: 5; 325 TABLET ORAL at 13:09

## 2020-09-16 RX ADMIN — GABAPENTIN 100 MG: 100 CAPSULE ORAL at 21:44

## 2020-09-16 RX ADMIN — DULOXETINE HYDROCHLORIDE 30 MG: 30 CAPSULE, DELAYED RELEASE ORAL at 21:44

## 2020-09-16 RX ADMIN — MEROPENEM 500 MG: 500 INJECTION, POWDER, FOR SOLUTION INTRAVENOUS at 15:08

## 2020-09-16 RX ADMIN — OXYCODONE HYDROCHLORIDE AND ACETAMINOPHEN 1 TABLET: 5; 325 TABLET ORAL at 17:45

## 2020-09-16 RX ADMIN — MEROPENEM 500 MG: 500 INJECTION, POWDER, FOR SOLUTION INTRAVENOUS at 03:01

## 2020-09-16 RX ADMIN — LEVOTHYROXINE SODIUM 50 MCG: 0.05 TABLET ORAL at 13:08

## 2020-09-16 RX ADMIN — GABAPENTIN 100 MG: 100 CAPSULE ORAL at 13:08

## 2020-09-16 RX ADMIN — OXYCODONE HYDROCHLORIDE AND ACETAMINOPHEN 1 TABLET: 5; 325 TABLET ORAL at 21:44

## 2020-09-16 RX ADMIN — ATORVASTATIN CALCIUM 40 MG: 40 TABLET, FILM COATED ORAL at 21:44

## 2020-09-16 RX ADMIN — MEROPENEM 500 MG: 500 INJECTION, POWDER, FOR SOLUTION INTRAVENOUS at 09:50

## 2020-09-16 RX ADMIN — METOPROLOL TARTRATE 50 MG: 50 TABLET, FILM COATED ORAL at 09:50

## 2020-09-16 RX ADMIN — Medication 2 CAPSULE: at 17:41

## 2020-09-16 RX ADMIN — Medication 2 CAPSULE: at 09:50

## 2020-09-16 RX ADMIN — DULOXETINE HYDROCHLORIDE 30 MG: 30 CAPSULE, DELAYED RELEASE ORAL at 13:08

## 2020-09-16 RX ADMIN — MEROPENEM 500 MG: 500 INJECTION, POWDER, FOR SOLUTION INTRAVENOUS at 21:44

## 2020-09-16 RX ADMIN — GABAPENTIN 100 MG: 100 CAPSULE ORAL at 09:50

## 2020-09-16 RX ADMIN — LOSARTAN POTASSIUM 100 MG: 100 TABLET, FILM COATED ORAL at 09:50

## 2020-09-16 RX ADMIN — SODIUM CHLORIDE, PRESERVATIVE FREE 10 ML: 5 INJECTION INTRAVENOUS at 21:44

## 2020-09-16 RX ADMIN — METOPROLOL TARTRATE 50 MG: 50 TABLET, FILM COATED ORAL at 21:44

## 2020-09-16 RX ADMIN — ACETAMINOPHEN 650 MG: 325 TABLET ORAL at 09:56

## 2020-09-16 ASSESSMENT — PAIN DESCRIPTION - PAIN TYPE
TYPE: ACUTE PAIN

## 2020-09-16 ASSESSMENT — PAIN SCALES - GENERAL
PAINLEVEL_OUTOF10: 7
PAINLEVEL_OUTOF10: 4
PAINLEVEL_OUTOF10: 7
PAINLEVEL_OUTOF10: 8
PAINLEVEL_OUTOF10: 6

## 2020-09-16 ASSESSMENT — PAIN - FUNCTIONAL ASSESSMENT
PAIN_FUNCTIONAL_ASSESSMENT: PREVENTS OR INTERFERES SOME ACTIVE ACTIVITIES AND ADLS

## 2020-09-16 ASSESSMENT — PAIN DESCRIPTION - ONSET
ONSET: ON-GOING
ONSET: ON-GOING
ONSET: GRADUAL

## 2020-09-16 ASSESSMENT — PAIN DESCRIPTION - LOCATION
LOCATION: LEG
LOCATION: LEG;HIP
LOCATION: HIP;LEG

## 2020-09-16 ASSESSMENT — PAIN DESCRIPTION - FREQUENCY
FREQUENCY: INTERMITTENT
FREQUENCY: CONTINUOUS
FREQUENCY: CONTINUOUS

## 2020-09-16 ASSESSMENT — PAIN DESCRIPTION - ORIENTATION
ORIENTATION: RIGHT;LEFT
ORIENTATION: RIGHT;LEFT

## 2020-09-16 ASSESSMENT — PAIN DESCRIPTION - DESCRIPTORS
DESCRIPTORS: ACHING

## 2020-09-16 ASSESSMENT — PAIN DESCRIPTION - PROGRESSION
CLINICAL_PROGRESSION: GRADUALLY WORSENING
CLINICAL_PROGRESSION: GRADUALLY WORSENING
CLINICAL_PROGRESSION: NOT CHANGED

## 2020-09-16 NOTE — PROGRESS NOTES
Hospitalist Progress Note  9/16/2020 9:23 AM    PCP: Marta Smith    [id number]     Date of Admission: 9/13/2020                                                                                                                     HOSPITAL COURSE    Patient demographics:  The patient  Bhumi Camejo is a 66 y.o. male     Significant past medical history:   Patient Active Problem List   Diagnosis    [facility]    Lactic acidosis    Acute metabolic encephalopathy    Pelvic hematoma in male    Severe sepsis (HCC)    Acute renal failure superimposed on chronic kidney disease ([facility]    Elevated LFTs    Abnormal chest x-ray    Acute cystitis without hematuria         Presenting symptoms:  AMS    Diagnostic workup:      CONSULTS DURING ADMISSION :   IP CONSULT TO PULMONOLOGY  PHARMACY TO DOSE VANCOMYCIN  IP CONSULT TO UROLOGY  IP CONSULT TO PHARMACY  IP CONSULT TO INFECTIOUS DISEASES      Patient was diagnosed with:  Sepsis  Urinary retention with urethral false passage    Treatment while inpatient:  66years old male with a recent history of L4-5 L5-S1 fusion on 8/31 with a postoperative retroperitoneal hematoma. Patient presented to this hospital with altered mental status from skilled nursing facility. Patient was noted to have temperature of 105 degree in the emergency room with tachycardia and tachypnea. Patient was started on BiPAP     Patient was diagnosed with sepsis likely due to urinary tract infection. .  Patient's altered mental status was considered to be acute metabolic encephalopathy. Patient was also diagnosed with acute cystitis with urinary retention.   Urology was consulted and patient underwent cystoscopy and was diagnosed with urinary retention with false urethral passage                                                             ----------------------------------------------------------      SUBJECTIVE COMPLAINTS-  Follow up for AMS    Diet: DIET GENERAL;      OBJECTIVE:   Patient Active Problem List   Diagnosis    Sepsis ([facility]    Lactic acidosis    Acute metabolic encephalopathy    Pelvic hematoma in male    Severe sepsis (HCC)    Acute renal failure superimposed on chronic kidney disease (HCC)    Elevated LFTs    Abnormal chest x-ray    Acute cystitis without hematuria       Allergies  Azithromycin; Brimonidine; Clindamycin/lincomycin; Penicillins; and Simvastatin    Medications    Scheduled Meds:   meropenem  500 mg Intravenous Q6H    metoprolol tartrate  50 mg Oral BID    losartan  100 mg Oral Daily    amiodarone  200 mg Oral Daily    atorvastatin  40 mg Oral Nightly    gabapentin  100 mg Oral TID    lactobacillus  2 capsule Oral BID WC    sodium chloride flush  10 mL Intravenous 2 times per day    vancomycin (VANCOCIN) intermittent dosing (placeholder)   Other RX Placeholder     Continuous Infusions:    PRN Meds:  nitroGLYCERIN, sodium chloride flush, acetaminophen **OR** acetaminophen, polyethylene glycol, promethazine **OR** ondansetron, sodium chloride flush    Vitals   Vitals /wt   Patient Vitals for the past 8 hrs:   BP Temp Temp src Pulse Resp SpO2 Weight   09/16/20 0800 (!) 190/84 -- Oral 68 24 95 % --   09/16/20 0419 (!) 189/91 97.4 °F (36.3 °C) Oral 64 18 91 % --   09/16/20 0401 -- -- -- -- -- -- 185 lb 3 oz (84 kg)        72HR INTAKE/OUTPUT:      Intake/Output Summary (Last 24 hours) at 9/16/2020 0923  Last data filed at 9/16/2020 0604  Gross per 24 hour   Intake 1687 ml   Output 1750 ml   Net -63 ml       Exam:    Gen:   Alert and oriented ×3   Eyes: PERRL. No sclera icterus. No conjunctival injection. ENT: No discharge. Pharynx clear. External appearance of ears and nose normal.  Neck: Trachea midline. No obvious mass. Resp: No accessory muscle use. No crackles. No wheezes. No rhonchi. CV: Regular rate. Regular rhythm. No murmur or rub. No edema. GI: Non-tender. Non-distended. No hernia.    Skin: Warm, dry, normal texture and turgor. Lymph: No cervical LAD. No supraclavicular LAD. M/S: / Ext. No cyanosis. No clubbing. No joint deformity. Neuro: CN 2-12 are intact,  no neurologic deficits noted. PT/INR: No results for input(s): PROTIME, INR in the last 72 hours. APTT: No results for input(s): APTT in the last 72 hours. CBC:   Recent Labs     09/14/20  0305 09/14/20  0438 09/15/20  0615 09/16/20  0600   WBC 44.2* 42.8* 24.6* 15.6*   HGB 7.4* 7.2* 7.0* 7.4*   HCT 23.8* 23.2* 22.0* 23.4*   MCV 96.1 95.8 95.9 95.7   * 113* 100* 116*       BMP:   Recent Labs     09/13/20  0935 09/14/20  0305 09/15/20  0615 09/16/20  0600    137 141 138   K 4.0 4.0 3.7 4.1    109 117* 111*   CO2 18* 17* 17* 18*   PHOS  --  3.4 2.2* 2.5   BUN 30* 31* 31* 26*   CREATININE 1.9* 1.8* 1.2 1.0       LIVER PROFILE:   Recent Labs     09/13/20 0935 09/14/20  0305   ALKPHOS 82 64   AST 73* 64*   ALT 62* 54*   BILIDIR  --  1.0*   BILITOT 2.7* 2.1*     No results for input(s): AMYLASE in the last 72 hours. Recent Labs     09/13/20  0935   LIPASE 46.0       UA:  Recent Labs     09/13/20  1415   WBCUA *   RBCUA >100*       TROPONIN:   Recent Labs     09/13/20  1433 09/13/20 1955 09/14/20  0305   TROPONINI 0.04* 0.06* 0.07*       Lab Results   Component Value Date/Time    URRFLXCULT Yes 09/13/2020 02:15 PM       No results for input(s): TSHREFLEX in the last 72 hours. No components found for: [id number]  POC GLUCOSE:    Recent Labs     09/13/20 1956 09/14/20  1233   POCGLU 102* 104*     No results for input(s): LABA1C in the last 72 hours.  No results found for: LABA1C      ASSESSMENT AND PLAN   Sepsis  - LA improved, afebrile  Cultures no growth to date  Continue with meropenem and vancomycin  Titrate oxygen for saturations greater than or equal to 90%  Consult to infectious disease  Source of sepsis is not clear    Acute respiratory failure with hypoxia  Check chest x-ray  Titrate oxygen for saturations greater than or equal to 90%        Acute metabolic encephalopathy  Likely due to sepsis  Mental status is improving      Anemia  - hgb 8.8 on 9/10  - hematoma      Acute on chronic renal failure  Improved      CAD  - elevated trop, continue to trend      Atrial fib  holding eliquis, following h/h      COPD  - no wheeze, on bipap  - pCO2 28.3    Urinary retention  Reeves's catheter in place  IV fluids and antibiotics  Likely source of sepsis      Code Status: Full Code        Dispo -patient can be downgraded to stepdown unit        The patient and / or the family were informed of the results of any tests, a time was given to answer questions, a plan was proposed and they agreed with plan. Jamel Glaser MD    This note was transcribed using [id number] [employer]. Please disregard any translational errors.

## 2020-09-16 NOTE — CARE COORDINATION
Sw received VM from pt dtg [facility] ([phone]) wanting to discuss d/c planning and pt care. Sw call to dtg. Discussed possible referral to [address] will continue to follow for PT/ OT recs. Discussed concern for level of care at [facility], SW encouraged dtg to call [facility] to discuss concerns. Dtg reports [facility] may be family's second choice is unable to get in to ARU. Dtg requesting SNF list for review. Sw emailed SNF list to [email or web address] Electronically signed by MERYL Reagan LSW on 9/16/2020 at 9:59 AM      Sw reviewed PT notes recommending ARU. Pt requesting information on [address] [place] [address] referral.     Call to pt dtg to discuss ARU referrals and facility choice. Dtg reports she would like to discuss with family and pt at this time. Sw awaiting return call from family regarding discharge planning. Electronically signed by MERYL Reagan LSW on 9/16/2020 at 10:11 AM    Sw received call from pt [facility] who reports family and pt would like referral made to [address] [place] [address].    Call to Gee Fay with ARU to inform of referral.     Electronically signed by MERYL Reagan LSW on 9/16/2020 at 11:43 AM

## 2020-09-16 NOTE — PROGRESS NOTES
Clinical Pharmacy Note  Vancomycin Consult    Katie Solis is a 66 y.o. male ordered Vancomycin for empiric coverage; consult received from Dr. Agapito Lopez to manage therapy. Also receiving meropenem. Patient Active Problem List   Diagnosis    Sepsis ([address]    Lactic acidosis    Acute metabolic encephalopathy    Pelvic hematoma in male    Severe sepsis (HCC)    Acute renal failure superimposed on chronic kidney disease (HCC)    Elevated LFTs    Abnormal chest x-ray    Acute cystitis without hematuria       Allergies:  Azithromycin; Brimonidine; Clindamycin/lincomycin; Penicillins; and Simvastatin     Temp max:  Temp (24hrs), Av °F (36.7 °C), Min:97.4 °F (36.3 °C), Max:98.6 °F (37 °C)      Recent Labs     20  0438 09/15/20  0615 20  0600   WBC 42.8* 24.6* 15.6*       Recent Labs     20  0305 09/15/20  0615 20  0600   BUN 31* 31* 26*   CREATININE 1.8* 1.2 1.0         Intake/Output Summary (Last 24 hours) at 2020 0953  Last data filed at 2020 0604  Gross per 24 hour   Intake 1687 ml   Output 1750 ml   Net -63 ml       Culture Results:  20 Blood: no growth to date  20 Urine: < 10K GNR    Ht Readings from Last 1 Encounters:   20 5' 9\" (1.753 m)        Wt Readings from Last 1 Encounters:   20 185 lb 3 oz (84 kg)         Estimated Creatinine Clearance: 62 mL/min (based on SCr of 1 mg/dL). Assessment:  Day # 4 of vancomycin. Receiving intermittent dosing based on levels due to TONO. Random level: 13.5 mg/L. Plan:  Vancomycin 1250 mg IV q24h started  Will hold off levels  Thank you for the consult.      Vineet Win, PharmD, BCCCP

## 2020-09-16 NOTE — PROGRESS NOTES
Occupational Therapy   Occupational Therapy Initial Assessment  Should patient be discharged prior to another treatment session, this note shall serve as the discharge summary. Date: 2020   Patient Name: Hailey Amador MRN: [id number]     : 1945    Date of Service: 2020    Discharge Recommendations:  Continue to assess pending progress, 5-7 sessions per week, Patient would benefit from continued therapy after discharge  OT Equipment Recommendations  Other: TBD    Assessment   Performance deficits / Impairments: Decreased functional mobility ; Decreased endurance;Decreased ADL status; Decreased balance;Decreased strength;Decreased safe awareness;Decreased high-level IADLs  Assessment: Pt presents from SNF setting with high fever, found with sepsis from UTI, met. encephalopathy. Pt was home alone prior to spine surgery. Pt aware of and recalls back prec without prompts. Pt requiring CGA with functional transfers and moblity, anticipate mod A with bathing and dressing to maintain precautions. Pt would benefit from high intensity therapy to return to level of independence for eventual d/c home alone. Prognosis: Good  Decision Making: Medium Complexity  History: see above  Exam: mobility, ROM/MMT  Assistance / Modification: assist of 1  OT Education: OT Role;Plan of Care;Equipment;Precautions; ADL Adaptive Strategies;Transfer Training  Barriers to Learning: none  REQUIRES OT FOLLOW UP: Yes  Activity Tolerance  Activity Tolerance: Patient limited by pain  Safety Devices  Safety Devices in place: Yes  Type of devices: Left in chair;Call light within reach; Chair alarm in place;Nurse notified           Patient Diagnosis(es): The primary encounter diagnosis was Severe sepsis ([address] Diagnoses of Leukocytosis, unspecified type, Renal insufficiency, Altered mental status, unspecified altered mental status type, Lactic acidosis, and Anemia, unspecified type were also pertinent to this visit.      has a past medical history of Aneurysm, aortic ([address] Atrial fibrillation ([address] Glaucoma, Heart attack ([address] Hyperlipidemia, and Hypertension. has a past surgical history that includes Coronary angioplasty with stent; Colonoscopy; Eye surgery (Right, 7/23/2020); and Intracapsular cataract extraction (Right, 7/23/2020). Restrictions  Restrictions/Precautions  Restrictions/Precautions: Fall Risk  Position Activity Restriction  Spinal Precautions: No Bending, No Lifting, No Twisting(pt states 10# lift restriction)  Other position/activity restrictions: O2 2L via NC. 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 male  Admitted from SNF recovering from L4-5, L5-S1 anterior and posterior fusion on 8/31 with post op retroperitoneal hematoma. He was found to have AMS, tachypnea, fever and presents to the ER obtunded, T 105.1 F, tachycardic with . Patient is currently seen on bipap, awakens to name, answers no whe asked if in pain but readily falls back to sleep. Does not appear to be in distress, unable to contribute further to HPI. \"  Family / Caregiver Present: No  Referring Practitioner: Dr. Tony Pineda  Diagnosis: sepsis, met. encephalopathy, UTI  Subjective  Subjective: Agreed to OT. Complained of pain in back \"8/10\"    Social/Functional History  Social/Functional History  Lives With: Alone  Type of Home: House  Home Layout: Multi-level, Bed/Bath upstairs, Able to Live on Main level with bedroom/bathroom, Laundry in basement(Family have obtained Hospital Bed for main level.  Full bath on main level.)  Home Access: Stairs to enter with rails(Pt reports 10-11 steps to enter.)  Bathroom Shower/Tub: Tub/Shower unit, Walk-in shower(Walk-In Shower Main level.)  Bathroom Toilet: Standard  Bathroom Equipment: (No DME.)  Bathroom Accessibility: Accessible  Home Equipment: Sock aid, [address] bed(Dtr had hosp bed from previous episode and placed in pt's family room on first floor)  ADL Assistance: Independent  Ambulation Assistance: Independent(Without assist device prior recent back surg.)  Transfer Assistance: Independent  Active : Yes  Occupation: Retired  Type of occupation: Retired : . Leisure & Hobbies: out to eat with lady friend, travelled to [place], [address]  Additional Comments: Information pertains to prior recent back surg (8/31/2020), following d/c to SNF setting [facility]). Objective   Vision: Within Functional Limits  Hearing: Within functional limits    Orientation  Overall Orientation Status: Within Functional Limits  Observation/Palpation  Observation: Healing  Surg Incisions : Abdominal Midline (x1); L/S (x2). Hematoma B Lat Abdom/Flank region. Functional Mobility  Functional - Mobility Device: Rolling Walker  Activity: Other(short distance with CGA, no LOB but pt with pain so limited amb.)  Assist Level: Contact guard assistance  ADL  LE Dressing: Maximum assistance(mod/max A assumed due to inability to bring LEs up to knees for back precautions)  Additional Comments: Pt with back surgery recent history, pt aware of precautions for no bending, lifting, twisting. Anticipate increased assist and LB dressing equipment           Transfers  Sit to stand: Contact guard assistance  Stand to sit: Contact guard assistance     Cognition  Overall Cognitive Status: WFL        Sensation  Overall Sensation Status: Impaired(Diminished B Feet (L>R). )        LUE AROM (degrees)  LUE AROM : WFL  RUE AROM (degrees)  RUE AROM : WFL  LUE Strength  Gross LUE Strength: WFL  RUE Strength  Gross RUE Strength: WFL                   Plan   Plan  Times per week: 3-5  Times per day: Daily  Plan weeks: 1  Current Treatment Recommendations: Strengthening, Patient/Caregiver Education & Training, Home Management Training, Equipment Evaluation, Education, & procurement, Balance Training, Functional Mobility Training, Endurance Training, Safety Education & Training, Self-Care / ADL         OutComes Score    Bhumi Xavier scored a 17/24 on the [facility] ADL Inpatient form. Current research shows that an AM-PAC score of 17 or less is typically not associated with a discharge to the patient's home setting. Based on the patient's AM-PAC score and their current ADL deficits, it is recommended that the patient have 5-7 sessions per week of Occupational Therapy at d/c to increase the patient's independence. At this time, this patient demonstrates the endurance, and/or tolerance for 3 hours of therapy each day, with a treatment frequency of 5-7x/wk. Please see assessment section for further patient specific details. If patient discharges prior to next session this note will serve as a discharge summary. Please see below for the latest assessment towards goals. AM-PAC Score        [facility] Inpatient Daily Activity Raw Score: 17 (09/16/20 1209)  AM-PAC Inpatient ADL T-Scale Score : 37.26 (09/16/20 1209)  ADL Inpatient CMS 0-100% Score: 50.11 (09/16/20 1209)  ADL Inpatient CMS G-Code Modifier : CK (09/16/20 1209)    Goals  Short term goals  Time Frame for Short term goals: 1 week  Short term goal 1: Pt will be SBA with functional transfers  Short term goal 2: pt will be SBA wtih functional mobility  Short term goal 3: Pt will be SBA with toileting  Short term goal 4: Pt will be SBA wtih bed mobility  Short term goal 5: Pt will be min A with LB bathing and dressing  Long term goals  Time Frame for Long term goals : TBD at next level of care  Patient Goals   Patient goals : pt wants to be able to go home after high intensity OT/PT.        Therapy Time   Individual Concurrent Group Co-treatment   Time In 1120         Time Out 1200         Minutes 40         Timed Code Treatment Minutes: [address], OT

## 2020-09-16 NOTE — CONSULTS
Infectious Diseases Inpatient Consult Note      Reason for Consult:  Fevers and change in Mentation      Requesting Physician:  Tone Delgado     Primary Care Physician:  Rich Luong    History Obtained From:  Epic and patient      CHIEF COMPLAINT:     Chief Complaint   Patient presents with    Shortness of Breath    Urinary Retention       High fevers and change in mentation     HISTORY OF PRESENT ILLNESS:  66 y.o. Man with recent surgery of Lumbar spine anterior and post approach by Wong Negrete and  on 8/31 at [facility] and post op noted to have drop in Hb and noted to have large Retroperitoneal Hematoma and pelvic hematoma he was given 1 unit of PRBC and he was d/c to SNF on 9/10 and now admitted with acute fevers T max 105 WITH change in Mentation and severe sepsis with WBC elevation and lactic acidosis, at  3.4 and he was unable to void and ED attempted mendoza placement was unsuccessful and Urology place mendoza with 1000 cc of malodorous urine drained per report and UA very abnormal on admit but urine cx low colony of GNR and CT abd/pelvis with on going Retroperitoneal and pelvic hematoma noted. He was placed on IV abx and Blood cx in process and currently mendoza working well and we are consulted for IV abx recommendations. He has on going abd pain and back pain no radiation better with pain meds.        Past Medical History:    Past Medical History:   Diagnosis Date    Aneurysm, aortic (HCC)     Atrial fibrillation (HCC)     Glaucoma     Heart attack ([facility] [address]     Hyperlipidemia     Hypertension        Past Surgical History:    Past Surgical History:   Procedure Laterality Date    COLONOSCOPY      CORONARY ANGIOPLASTY WITH STENT PLACEMENT      X 2    EYE SURGERY Right 7/23/2020    GONIOTOMY performed by Delfin Mendez MD at [place] [id number] Right 7/23/2020    Phacoemulsification with intraocular lens implant performed by Delfin Mendez MD at [facility]       Current Medications:    Outpatient Medications Marked as Taking for the 9/13/20 encounter [facility] Encounter)   Medication Sig Dispense Refill    dorzolamide-timolol (COSOPT) 22.3-6.8 MG/ML ophthalmic solution Place 1 drop into both eyes 2 times daily      DULoxetine (CYMBALTA) 30 MG extended release capsule Take 30 mg by mouth 2 times daily      ferrous sulfate (IRON 325) 325 (65 Fe) MG tablet Take 325 mg by mouth daily (with breakfast)      Multiple Vitamins-Minerals (ICAPS) TABS Take 2 tablets by mouth daily      latanoprost (XALATAN) 0.005 % ophthalmic solution Place 1 drop into the left eye nightly      levothyroxine (SYNTHROID) 50 MCG tablet Take 50 mcg by mouth Daily      pantoprazole (PROTONIX) 40 MG tablet Take 40 mg by mouth daily      hydrALAZINE (APRESOLINE) 50 MG tablet Take 75 mg by mouth 2 times daily       metoprolol succinate (TOPROL XL) 100 MG extended release tablet Take 100 mg by mouth daily       losartan (COZAAR) 100 MG tablet Take 100 mg by mouth daily      atorvastatin (LIPITOR) 40 MG tablet Take 40 mg by mouth daily      gabapentin (NEURONTIN) 100 MG capsule Take 400 mg by mouth 3 times daily.  amiodarone (CORDARONE) 200 MG tablet Take 200 mg by mouth daily Indications: takes in the evening       rivaroxaban (XARELTO) 20 MG TABS tablet Take 20 mg by mouth daily (with breakfast)          Allergies:  Azithromycin; Brimonidine; Clindamycin/lincomycin; Penicillins; and Simvastatin    Immunizations : There is no immunization history on file for this patient.       Social History:    Social History     Tobacco Use    Smoking status: Former Smoker     Types: Cigarettes    Smokeless tobacco: Never Used    Tobacco comment: was only social smoker    Substance Use Topics    Alcohol use: Not Currently    Drug use: Never     Social History     Tobacco Use   Smoking Status Former Smoker    Types: Cigarettes   Smokeless Tobacco Never Used   Tobacco Comment was only social smoker       Family History   Problem Relation Age of Onset    Heart Disease Mother         REVIEW OF SYSTEMS:    Constitutional:  +  fevers, +chills, night sweats  Eyes:  negative for blurred vision, eye discharge, visual disturbance   HEENT:  negative for hearing loss, ear drainage,nasal congestion  Respiratory:  negative for cough, shortness of breath or hemoptysis   Cardiovascular:  negative for chest pain, palpitations, syncope  Gastrointestinal:  negative for nausea, vomiting, diarrhea, constipation, +abdominal pain  Genitourinary:  negative for frequency, + dysuria, urinary incontinence, hematuria  Hematologic/Lymphatic:  negative for easy bruising, bleeding and lymphadenopathy  Allergic/Immunologic:  negative for recurrent infections, angioedema, anaphylaxis   Endocrine:  negative for weight changes, polyuria, polydipsia and polyphagia  Musculoskeletal:  negative for joint  pain, swelling, decreased range of motion + back pain   Neurological:  negative for headaches, slurred speech, unilateral weakness  Psychiatric: negative for hallucinations,confusion,agitation.      PHYSICAL EXAM:      Vitals:  T max  105.1  On admit   BP (!) 167/76   Pulse 59   Temp 97.5 °F (36.4 °C) (Oral)   Resp 16   Ht 5' 9\" (1.753 m)   Wt 185 lb 3 oz (84 kg)   SpO2 100%   BMI 27.35 kg/m²     General Appearance: alert,in some acute distress,+  pallor, no icterus using nasal cannula   Skin: warm and dry, no rash or erythema  Head: normocephalic and atraumatic  Eyes: pupils equal, round, and reactive to light, conjunctivae normal  ENT: tympanic membrane, external ear and ear canal normal bilaterally, nose without deformity, nasal mucosa and turbinates normal without polyps  Neck: supple and non-tender without mass, no thyromegaly  no cervical lymphadenopathy  Pulmonary/Chest: clear to auscultation bilaterally- no wheezes, rales or rhonchi, normal air movement, no respiratory distress  Cardiovascular:  S1 and S2, no murmurs, rubs, clicks, or gallops, no carotid bruits  Abdomen: soft, +-tender, non-distended, normal bowel sounds, no masses or organomegaly  Bruising+ Flank area and pelvic area and surgical incision healing   Extremities: no cyanosis, clubbing or edema  Musculoskeletal: normal range of motion, no joint swelling, deformity or tenderness  Neurologic: reflexes normal and symmetric, no cranial nerve deficit  Psych:  Orientation, sensorium, mood normal  Lines:   Reeves+         DATA:    Lab Results   Component Value Date    WBC 15.6 (H) 09/16/2020    HGB 7.4 (L) 09/16/2020    HCT 23.4 (L) 09/16/2020    MCV 95.7 09/16/2020     (L) 09/16/2020     Lab Results   Component Value Date    CREATININE 1.0 09/16/2020    BUN 26 (H) 09/16/2020     09/16/2020    K 4.1 09/16/2020     (H) 09/16/2020    CO2 18 (L) 09/16/2020       Hepatic Function Panel:   Lab Results   Component Value Date    ALKPHOS 64 09/14/2020    ALT 54 09/14/2020    AST 64 09/14/2020    PROT 5.4 09/14/2020    BILITOT 2.1 09/14/2020    BILIDIR 1.0 09/14/2020    IBILI 1.1 09/14/2020    LABALBU 3.0 09/14/2020     UA:  Lab Results   Component Value Date    COLORU RED 09/13/2020    CLARITYU TURBID 09/13/2020    GLUCOSEU Color Interfer 09/13/2020    BILIRUBINUR Color Interfer 09/13/2020    KETUA Color Interfer 09/13/2020    SPECGRAV 1.021 09/13/2020    BLOODU Color Interfer 09/13/2020    PHUR Color Interfer 09/13/2020    PROTEINU Color Interfer 09/13/2020    UROBILINOGEN Color Interfer 09/13/2020    NITRU Color Interfer 09/13/2020    LEUKOCYTESUR Color Interfer 09/13/2020    LABMICR YES 09/13/2020    URINETYPE NotGiven 09/13/2020      Urine Microscopic:   Lab Results   Component Value Date    BACTERIA 4+ 09/13/2020    WBCUA  09/13/2020    RBCUA >100 09/13/2020    EPIU 6-10 09/13/2020         Scheduled Meds:   vancomycin  1,250 mg Intravenous Q24H    DULoxetine  30 mg Oral BID    [START ON 9/17/2020] ferrous sulfate  324 mg Oral Daily with breakfast    levothyroxine  50 mcg Oral Daily    meropenem  500 mg Intravenous Q6H    metoprolol tartrate  50 mg Oral BID    losartan  100 mg Oral Daily    amiodarone  200 mg Oral Daily    atorvastatin  40 mg Oral Nightly    gabapentin  100 mg Oral TID    lactobacillus  2 capsule Oral BID WC    sodium chloride flush  10 mL Intravenous 2 times per day    vancomycin (VANCOCIN) intermittent dosing (placeholder)   Other RX Placeholder       Continuous Infusions:      PRN Meds:  oxyCODONE-acetaminophen, nitroGLYCERIN, sodium chloride flush, acetaminophen **OR** acetaminophen, polyethylene glycol, promethazine **OR** ondansetron, sodium chloride flush    Creat  1.9  Down to 1     Lactic acid 2.2  Down 1.7    Procal  97.74     CRP 74.2     Wbc  42.8     Vanco  13.5       MICRO: cultures reviewed and updated by me          Clostridium Difficile Toxin/Antigen [[id number]]      Order Status: No result  Specimen: Stool     MRSA DNA Probe, Nasal [[id number]]      Order Status: Canceled  Specimen: Nares     Culture, Blood 2 [[id number]]   Collected: 09/13/20 1004    Order Status: Completed  Specimen: Blood  Updated: 09/14/20 1216     Culture, Blood 2  No Growth to date.  Any change in status will be called. Narrative:      ORDER#: [id number]                          ORDERED BY: Sunni Pang   SOURCE: Blood                              COLLECTED:  09/13/20 10:04   ANTIBIOTICS AT [facility]. :                      RECEIVED :  09/13/20 10:17   If child <=2 yrs old please draw pediatric bottle. ~Blood Culture #2   Performed at:   [address] [address]   Phone [phone]    Culture, Blood 1 [[id number]]   Collected: 09/13/20 1004    Order Status: Completed  Specimen: Blood  Updated: 09/14/20 1216     Blood Culture, Routine  No Growth to date.  Any change in status will be called.     Narrative:      ORDER#: [id number]                          ORDERED BY: Zainab Richter   SOURCE: Blood                              COLLECTED:  09/13/20 10:04   ANTIBIOTICS AT [facility]. :                      RECEIVED :  09/13/20 10:18   If child <=2 yrs old please draw pediatric bottle. ~Blood Culture #1   Performed at:   [facility]   [address] [place] [employer] [address]   Phone [phone]    Culture, Urine [[id number]]  (Abnormal)  Collected: 09/13/20 1415    Order Status: Completed  Specimen: Urine, clean catch  Updated: 09/14/20 1106     Organism  Gram negative rodAbnormal       Urine Culture, Routine  --     <10,000 CFU/ml   No further workup    Narrative:      ORDER#: [id number]                          ORDERED BY: Zainab Richter   SOURCE: Urine Clean Catch                  COLLECTED:  09/13/20 14:15   ANTIBIOTICS AT [facility]. :                      RECEIVED :  09/13/20 15:08   Performed at:   [facility]   [address] [address]   Phone [phone]          Urine Culture  No results for input(s): Nicklas Senate in the last 72 hours. Imaging:   XR CHEST 1 VIEW   Final Result   Shallow inspiratory effort. No new focal airspace disease or pneumothorax. Appropriate right PICC positioning. CT HEAD WO CONTRAST   Final Result   No acute intracranial abnormality. CT ABDOMEN PELVIS WO CONTRAST Additional Contrast? None   Final Result   Left pelvic hematoma, ventral to the iliac vessels. It is associated with a   small amount of gas, related to recent fusion of L4-5 and L5-S1. The hematoma communicates with additional components along the superficial   and deep aspects of the abdominal wall. XR CHEST PORTABLE   Final Result   1. Cardiomegaly with vascular congestion suggestive underlying CHF. No focal   consolidation is seen to suggest pneumonia. 2. Prominent right heart border/hilar shadow.   Finding is nonspecific and   could represent aortic aneurysm, right abnormal UA and urinary retention  S/P Lumbar surgery anterior and post spinal fusion course complicated by large Retroperitoneal and Pelvic hematoma  Urinary Retention likely from above  Lactic acidosis  CT Head -ve  CT abd/pelvis with large hematoma ventral to the iliac vessel with communication to the deep and ventral aspect of the abd wall         Labs, Microbiology, Radiology and pertinent results from current hospitalization and care every where were reviewed by me as a part of the consultation. PLAN :  1. Cont IV Meropenem x 500 mg x q 6 hrs  2. Cont IV Vancomycin x for now will be able to d/c this soon  3. Suspect  tract as the primary source given the urinary retention and malodorous urine  4. Given the hematoma concern for infection exist but at this time its not clear if the surgical site is involved or not   5. Trend wbc AND procal   6. Leave the mendoza per Urology     Discussed with patient/Family and Nursing   Risk of Complications/Morbidity: High      · Illness(es)/ Infection present that pose threat to bodily function. · There is potential for severe exacerbation of infection/side effects of treatment. · Therapy requires intensive monitoring for antimicrobial agent toxicity. Thanks for allowing me to participate in your patient's care please call me with any questions or concerns.     Dr. Vidhya Aguilar MD  [facility] Physician  Phone: [phone]   Fax : [phone]

## 2020-09-16 NOTE — PROGRESS NOTES
Physical Therapy    Facility/Department: [address] PROGRESSIVE CARE  Initial Assessment    NAME: Maynor Ruiz Sr.  : 1945  MRN: [id number]    Date of Service: 2020    Discharge Recommendations:  Continue to assess pending progress   PT Equipment Recommendations  Other: Defer to next level of care. Assessment   Body structures, Functions, Activity limitations: Decreased functional mobility ; Decreased strength;Decreased endurance;Decreased balance  Assessment: 65 y/o male admit from SNF setting 2020 with Altered Mental Status, Severe Sepsis, Acute Renal Failure, and Pelvic Hematoma. CT Head negative. CT Abd/Pelvis + L Pelvic Hematoma. Urology consult for Reeves Placement. PMH as noted including Recent Back Surg (L4/L5 and L5/S1 Ant/Post Fusion, 2020), CAD, Angio with Stent, A-Fib, Aortic Aneurysm. Pt admit from SNF setting where admit following recent back surg. Prior pt living alone in own multi level home. Family has obtained hospital bed and has full bath on main level. Pt/family apparently do not want pt to return to [facility] SNF setting, desire In-Pt Rehab setting. Pt very cooperative and would benefit/rand High Intensity setting with cont PT upon d/c. Prognosis: Good  Decision Making: Medium Complexity  History: 65 y/o male admit from SNF setting 2020 with Altered Mental Status, Severe Sepsis, Acute Renal Failure, and Pelvic Hematoma. CT Head negative. CT Abd/Pelvis + L Pelvic Hematoma. Urology consult for Reeves Placement. PMH as noted including Recent Back Surg (L4/L5 and L5/S1 Ant/Post Fusion, 2020), CAD, Angio with Stent, A-Fib, Aortic Aneurysm. Exam: See above. Clinical Presentation: See above. Patient Education: Role of PT, POC, Need to call for assist, EOB via Sidelying, OOB via Steady, LE Exs. Barriers to Learning: None.   REQUIRES PT FOLLOW UP: Yes  Activity Tolerance  Activity Tolerance: Patient limited by endurance       Patient Diagnosis(es): The primary encounter diagnosis was Severe sepsis ([address] Diagnoses of Leukocytosis, unspecified type, Renal insufficiency, Altered mental status, unspecified altered mental status type, Lactic acidosis, and Anemia, unspecified type were also pertinent to this visit. has a past medical history of Aneurysm, aortic ([address] Atrial fibrillation ([address] Glaucoma, Heart attack ([address] Hyperlipidemia, and Hypertension. has a past surgical history that includes Coronary angioplasty with stent; Colonoscopy; Eye surgery (Right, 7/23/2020); and Intracapsular cataract extraction (Right, 7/23/2020). Restrictions  Restrictions/Precautions  Restrictions/Precautions: Fall Risk  Position Activity Restriction  Other position/activity restrictions: O2 2L via NC. Vision/Hearing  Vision: Within Functional Limits  Hearing: Within functional limits     Subjective  General  Chart Reviewed: Yes  Patient assessed for rehabilitation services?: Yes  Additional Pertinent Hx: 65 y/o male admit from SNF setting 9/13/2020 with Altered Mental Status, Severe Sepsis, Acute Renal Failure, and Pelvic Hematoma. CT Head negative. CT Abd/Pelvis + L Pelvic Hematoma. Urology consult for Reeves Placement. PMH as noted including Recent Back Surg (L4/L5 and L5/S1 Ant/Post Fusion, 8/31/2020), CAD, Angio with Stent, A-Fib, Aortic Aneurysm. Family / Caregiver Present: No  Referring Practitioner: Dr. Franco Diaz  Diagnosis: Altered Mental Status, Severe Sepsis, Acute Renal Failure and Pelvic Hematoma; Recent L/S Fusion. Follows Commands: Within Functional Limits  Subjective  Subjective: Pt agreeable to PT Eval/Rx.   Pain Screening  Patient Currently in Pain: Denies          Orientation  Orientation  Overall Orientation Status: Within Functional Limits  Social/Functional History  Social/Functional History  Lives With: Alone  Type of Home: House  Home Layout: Multi-level, Bed/Bath upstairs, Able to Live on Main level with bedroom/bathroom, Laundry in Transfers with Rolling Walker Mod assist, cues for safe hand placement. Amb : Pt able to Amb 4 steps forward/back with Walker Mod assist.  Diminshed step length/clearance. No buckling/giving way although pt report LEs aching/somewhat painful. Pt did acknowledge some lighthead following upright. /84 (118), resolving. Balance  Comments: EOB CGA. Static Sitting via [employer] CGA. Static Stand via [employer] assist.  Exercises  Quad Sets: 10x  Gluteal Sets: 10x  Hip Flexion: 5x B LEs. Knee Long Arc Quad: 5x B LEs. Ankle Pumps: 10x     Plan   Plan  Times per week: 3-5x week while in acute care setting. Current Treatment Recommendations: Strengthening, Functional Mobility Training, Transfer Training, Gait Training, Safety Education & Training, Patient/Caregiver Education & Training  Safety Devices  Type of devices: Call light within reach, Chair alarm in place, Left in chair, Nurse notified      AM-PAC Score  AM-PAC Inpatient Mobility Raw Score : 10 (09/16/20 0916)  AM-PAC Inpatient T-Scale Score : 32.29 (09/16/20 0916)  Mobility Inpatient CMS 0-100% Score: 76.75 (09/16/20 0916)  Mobility Inpatient CMS G-Code Modifier : CL (09/16/20 7901)          Goals  Short term goals  Time Frame for Short term goals: Upon d/c acute care setting. Short term goal 1: Bed Mob SBA  Short term goal 2: Transfers with assist device CGA. Short term goal 3: Amb with assist device 48' CGA. Patient Goals   Patient goals : Get better and be able to return to own home.        Therapy Time   Individual Concurrent Group Co-treatment   Time In 0740         Time Out 0840         Minutes [address] [place] [address], PT

## 2020-09-17 ENCOUNTER — APPOINTMENT (OUTPATIENT)
Dept: GENERAL RADIOLOGY | Age: 75
DRG: 871 | End: 2020-09-17
Payer: MEDICARE

## 2020-09-17 LAB
ANION GAP SERPL CALCULATED.3IONS-SCNC: 7 MMOL/L (ref 3–16)
BASOPHILS ABSOLUTE: 0.1 K/UL (ref 0–0.2)
BASOPHILS RELATIVE PERCENT: 1.1 %
BLOOD CULTURE, ROUTINE: NORMAL
BUN BLDV-MCNC: 29 MG/DL (ref 7–20)
CALCIUM SERPL-MCNC: 8.3 MG/DL (ref 8.3–10.6)
CHLORIDE BLD-SCNC: 111 MMOL/L (ref 99–110)
CO2: 20 MMOL/L (ref 21–32)
CREAT SERPL-MCNC: 1.2 MG/DL (ref 0.8–1.3)
CULTURE, BLOOD 2: NORMAL
EOSINOPHILS ABSOLUTE: 0.1 K/UL (ref 0–0.6)
EOSINOPHILS RELATIVE PERCENT: 2.5 %
GFR AFRICAN AMERICAN: >60
GFR NON-AFRICAN AMERICAN: 59
GLUCOSE BLD-MCNC: 79 MG/DL (ref 70–99)
HCT VFR BLD CALC: 24 % (ref 40.5–52.5)
HEMOGLOBIN: 7.8 G/DL (ref 13.5–17.5)
LYMPHOCYTES ABSOLUTE: 0.7 K/UL (ref 1–5.1)
LYMPHOCYTES RELATIVE PERCENT: 11 %
MAGNESIUM: 2.3 MG/DL (ref 1.8–2.4)
MCH RBC QN AUTO: 31 PG (ref 26–34)
MCHC RBC AUTO-ENTMCNC: 32.3 G/DL (ref 31–36)
MCV RBC AUTO: 95.8 FL (ref 80–100)
MONOCYTES ABSOLUTE: 0.5 K/UL (ref 0–1.3)
MONOCYTES RELATIVE PERCENT: 8.2 %
NEUTROPHILS ABSOLUTE: 4.6 K/UL (ref 1.7–7.7)
NEUTROPHILS RELATIVE PERCENT: 77.2 %
PDW BLD-RTO: 17.3 % (ref 12.4–15.4)
PHOSPHORUS: 2.7 MG/DL (ref 2.5–4.9)
PLATELET # BLD: 117 K/UL (ref 135–450)
PMV BLD AUTO: 9.4 FL (ref 5–10.5)
POTASSIUM SERPL-SCNC: 3.9 MMOL/L (ref 3.5–5.1)
PROCALCITONIN: 21.01 NG/ML (ref 0–0.15)
RBC # BLD: 2.5 M/UL (ref 4.2–5.9)
SODIUM BLD-SCNC: 138 MMOL/L (ref 136–145)
WBC # BLD: 6 K/UL (ref 4–11)

## 2020-09-17 PROCEDURE — 83735 ASSAY OF MAGNESIUM: CPT

## 2020-09-17 PROCEDURE — 6360000002 HC RX W HCPCS: Performed by: HOSPITALIST

## 2020-09-17 PROCEDURE — 6370000000 HC RX 637 (ALT 250 FOR IP): Performed by: HOSPITALIST

## 2020-09-17 PROCEDURE — 84145 PROCALCITONIN (PCT): CPT

## 2020-09-17 PROCEDURE — 97116 GAIT TRAINING THERAPY: CPT

## 2020-09-17 PROCEDURE — 84100 ASSAY OF PHOSPHORUS: CPT

## 2020-09-17 PROCEDURE — 99233 SBSQ HOSP IP/OBS HIGH 50: CPT | Performed by: INTERNAL MEDICINE

## 2020-09-17 PROCEDURE — 97530 THERAPEUTIC ACTIVITIES: CPT

## 2020-09-17 PROCEDURE — 2580000003 HC RX 258: Performed by: NURSE PRACTITIONER

## 2020-09-17 PROCEDURE — 2700000000 HC OXYGEN THERAPY PER DAY

## 2020-09-17 PROCEDURE — 94761 N-INVAS EAR/PLS OXIMETRY MLT: CPT

## 2020-09-17 PROCEDURE — 6370000000 HC RX 637 (ALT 250 FOR IP): Performed by: NURSE PRACTITIONER

## 2020-09-17 PROCEDURE — 71045 X-RAY EXAM CHEST 1 VIEW: CPT

## 2020-09-17 PROCEDURE — 85025 COMPLETE CBC W/AUTO DIFF WBC: CPT

## 2020-09-17 PROCEDURE — 97110 THERAPEUTIC EXERCISES: CPT

## 2020-09-17 PROCEDURE — 2580000003 HC RX 258: Performed by: HOSPITALIST

## 2020-09-17 PROCEDURE — 80048 BASIC METABOLIC PNL TOTAL CA: CPT

## 2020-09-17 PROCEDURE — 2060000000 HC ICU INTERMEDIATE R&B

## 2020-09-17 RX ORDER — AMLODIPINE BESYLATE 10 MG/1
10 TABLET ORAL DAILY
Status: DISCONTINUED | OUTPATIENT
Start: 2020-09-17 | End: 2020-09-18 | Stop reason: HOSPADM

## 2020-09-17 RX ORDER — GUAIFENESIN 100 MG/5ML
200 SOLUTION ORAL EVERY 4 HOURS PRN
Status: DISCONTINUED | OUTPATIENT
Start: 2020-09-17 | End: 2020-09-18 | Stop reason: HOSPADM

## 2020-09-17 RX ORDER — FUROSEMIDE 10 MG/ML
40 INJECTION INTRAMUSCULAR; INTRAVENOUS ONCE
Status: COMPLETED | OUTPATIENT
Start: 2020-09-17 | End: 2020-09-17

## 2020-09-17 RX ORDER — HYDRALAZINE HYDROCHLORIDE 50 MG/1
50 TABLET, FILM COATED ORAL EVERY 8 HOURS SCHEDULED
Status: DISCONTINUED | OUTPATIENT
Start: 2020-09-17 | End: 2020-09-18 | Stop reason: HOSPADM

## 2020-09-17 RX ADMIN — GABAPENTIN 100 MG: 100 CAPSULE ORAL at 08:44

## 2020-09-17 RX ADMIN — MEROPENEM 500 MG: 500 INJECTION, POWDER, FOR SOLUTION INTRAVENOUS at 02:52

## 2020-09-17 RX ADMIN — FUROSEMIDE 40 MG: 10 INJECTION, SOLUTION INTRAMUSCULAR; INTRAVENOUS at 12:29

## 2020-09-17 RX ADMIN — DULOXETINE HYDROCHLORIDE 30 MG: 30 CAPSULE, DELAYED RELEASE ORAL at 08:44

## 2020-09-17 RX ADMIN — HYDRALAZINE HYDROCHLORIDE 50 MG: 50 TABLET, FILM COATED ORAL at 20:53

## 2020-09-17 RX ADMIN — LEVOTHYROXINE SODIUM 50 MCG: 0.05 TABLET ORAL at 06:30

## 2020-09-17 RX ADMIN — MEROPENEM 500 MG: 500 INJECTION, POWDER, FOR SOLUTION INTRAVENOUS at 13:54

## 2020-09-17 RX ADMIN — ATORVASTATIN CALCIUM 40 MG: 40 TABLET, FILM COATED ORAL at 20:53

## 2020-09-17 RX ADMIN — GABAPENTIN 100 MG: 100 CAPSULE ORAL at 13:54

## 2020-09-17 RX ADMIN — FERROUS SULFATE TAB EC 324 MG (65 MG FE EQUIVALENT) 324 MG: 324 (65 FE) TABLET DELAYED RESPONSE at 08:44

## 2020-09-17 RX ADMIN — HYDRALAZINE HYDROCHLORIDE 50 MG: 50 TABLET, FILM COATED ORAL at 13:54

## 2020-09-17 RX ADMIN — Medication 2 CAPSULE: at 16:49

## 2020-09-17 RX ADMIN — AMIODARONE HYDROCHLORIDE 200 MG: 200 TABLET ORAL at 08:44

## 2020-09-17 RX ADMIN — MEROPENEM 500 MG: 500 INJECTION, POWDER, FOR SOLUTION INTRAVENOUS at 08:44

## 2020-09-17 RX ADMIN — Medication 2 CAPSULE: at 08:44

## 2020-09-17 RX ADMIN — OXYCODONE HYDROCHLORIDE AND ACETAMINOPHEN 1 TABLET: 5; 325 TABLET ORAL at 08:44

## 2020-09-17 RX ADMIN — GABAPENTIN 100 MG: 100 CAPSULE ORAL at 20:53

## 2020-09-17 RX ADMIN — LOSARTAN POTASSIUM 100 MG: 100 TABLET, FILM COATED ORAL at 08:44

## 2020-09-17 RX ADMIN — AMLODIPINE BESYLATE 10 MG: 10 TABLET ORAL at 16:49

## 2020-09-17 RX ADMIN — SODIUM CHLORIDE, PRESERVATIVE FREE 10 ML: 5 INJECTION INTRAVENOUS at 20:56

## 2020-09-17 RX ADMIN — SODIUM CHLORIDE, PRESERVATIVE FREE 10 ML: 5 INJECTION INTRAVENOUS at 08:44

## 2020-09-17 RX ADMIN — OXYCODONE HYDROCHLORIDE AND ACETAMINOPHEN 1 TABLET: 5; 325 TABLET ORAL at 20:56

## 2020-09-17 RX ADMIN — MEROPENEM 500 MG: 500 INJECTION, POWDER, FOR SOLUTION INTRAVENOUS at 20:54

## 2020-09-17 RX ADMIN — METOPROLOL TARTRATE 50 MG: 50 TABLET, FILM COATED ORAL at 20:53

## 2020-09-17 RX ADMIN — DULOXETINE HYDROCHLORIDE 30 MG: 30 CAPSULE, DELAYED RELEASE ORAL at 20:53

## 2020-09-17 RX ADMIN — METOPROLOL TARTRATE 50 MG: 50 TABLET, FILM COATED ORAL at 08:44

## 2020-09-17 ASSESSMENT — PAIN DESCRIPTION - DESCRIPTORS
DESCRIPTORS: ACHING
DESCRIPTORS: ACHING

## 2020-09-17 ASSESSMENT — PAIN SCALES - WONG BAKER
WONGBAKER_NUMERICALRESPONSE: 0

## 2020-09-17 ASSESSMENT — PAIN DESCRIPTION - PROGRESSION
CLINICAL_PROGRESSION: GRADUALLY WORSENING
CLINICAL_PROGRESSION: GRADUALLY WORSENING

## 2020-09-17 ASSESSMENT — PAIN SCALES - GENERAL
PAINLEVEL_OUTOF10: 9
PAINLEVEL_OUTOF10: 8
PAINLEVEL_OUTOF10: 0
PAINLEVEL_OUTOF10: 0

## 2020-09-17 ASSESSMENT — PAIN DESCRIPTION - ONSET: ONSET: GRADUAL

## 2020-09-17 ASSESSMENT — PAIN DESCRIPTION - ORIENTATION
ORIENTATION: LEFT
ORIENTATION: LEFT

## 2020-09-17 ASSESSMENT — PAIN DESCRIPTION - PAIN TYPE
TYPE: ACUTE PAIN
TYPE: ACUTE PAIN

## 2020-09-17 ASSESSMENT — PAIN DESCRIPTION - FREQUENCY
FREQUENCY: INTERMITTENT
FREQUENCY: INTERMITTENT

## 2020-09-17 ASSESSMENT — PAIN - FUNCTIONAL ASSESSMENT
PAIN_FUNCTIONAL_ASSESSMENT: PREVENTS OR INTERFERES SOME ACTIVE ACTIVITIES AND ADLS
PAIN_FUNCTIONAL_ASSESSMENT: ACTIVITIES ARE NOT PREVENTED

## 2020-09-17 ASSESSMENT — PAIN DESCRIPTION - LOCATION
LOCATION: LEG
LOCATION: LEG

## 2020-09-17 ASSESSMENT — PAIN DESCRIPTION - DIRECTION: RADIATING_TOWARDS: HIP

## 2020-09-17 NOTE — PROGRESS NOTES
Hospitalist Progress Note  9/17/2020 8:52 AM    PCP: Ahsan Ghosh    [id number]     Date of Admission: 9/13/2020                                                                                                                     HOSPITAL COURSE    Patient demographics:  The patient  Alyce Hopkins is a 66 y.o. male     Significant past medical history:   Patient Active Problem List   Diagnosis    [facility] [address]    Lactic acidosis    Acute metabolic encephalopathy    Pelvic hematoma in male    Severe sepsis (HCC)    Acute renal failure superimposed on chronic kidney disease ([facility] [address]    Elevated LFTs    Abnormal chest x-ray    Acute cystitis without hematuria         Presenting symptoms:  AMS    Diagnostic workup:      CONSULTS DURING ADMISSION :   IP CONSULT TO PULMONOLOGY  PHARMACY TO DOSE VANCOMYCIN  IP CONSULT TO UROLOGY  IP CONSULT TO PHARMACY  IP CONSULT TO INFECTIOUS DISEASES      Patient was diagnosed with:  Sepsis  Urinary retention with urethral false passage    Treatment while inpatient:  66years old male with a recent history of L4-5 L5-S1 fusion on 8/31 with a postoperative retroperitoneal hematoma. Patient presented to this hospital with altered mental status from skilled nursing facility. Patient was noted to have temperature of 105 degree in the emergency room with tachycardia and tachypnea. Patient was started on BiPAP     Patient was diagnosed with sepsis likely due to urinary tract infection. .  Patient's altered mental status was considered to be acute metabolic encephalopathy. Patient was also diagnosed with acute cystitis with urinary retention.   Urology was consulted and patient underwent cystoscopy and was diagnosed with urinary retention with false urethral passage                                                             ----------------------------------------------------------      SUBJECTIVE COMPLAINTS-  Follow up for AMS    Diet: DIET GENERAL;      OBJECTIVE:   Patient Active Problem List   Diagnosis    Sepsis ([facility] [address]    Lactic acidosis    Acute metabolic encephalopathy    Pelvic hematoma in male    Severe sepsis (HCC)    Acute renal failure superimposed on chronic kidney disease (HCC)    Elevated LFTs    Abnormal chest x-ray    Acute cystitis without hematuria       Allergies  Azithromycin; Brimonidine; Clindamycin/lincomycin; Penicillins; and Simvastatin    Medications    Scheduled Meds:   vancomycin  1,250 mg Intravenous Q24H    DULoxetine  30 mg Oral BID    ferrous sulfate  324 mg Oral Daily with breakfast    levothyroxine  50 mcg Oral Daily    meropenem  500 mg Intravenous Q6H    metoprolol tartrate  50 mg Oral BID    losartan  100 mg Oral Daily    amiodarone  200 mg Oral Daily    atorvastatin  40 mg Oral Nightly    gabapentin  100 mg Oral TID    lactobacillus  2 capsule Oral BID WC    sodium chloride flush  10 mL Intravenous 2 times per day    vancomycin (VANCOCIN) intermittent dosing (placeholder)   Other RX Placeholder     Continuous Infusions:    PRN Meds:  oxyCODONE-acetaminophen, nitroGLYCERIN, sodium chloride flush, acetaminophen **OR** acetaminophen, polyethylene glycol, promethazine **OR** ondansetron, sodium chloride flush    Vitals   Vitals /wt   Patient Vitals for the past 8 hrs:   BP Temp Temp src Pulse Resp SpO2 Weight   09/17/20 0847 (!) 202/85 -- -- -- -- -- --   09/17/20 0837 (!) 209/95 97.6 °F (36.4 °C) -- 67 -- 93 % --   09/17/20 0820 -- -- -- -- -- 98 % --   09/17/20 0630 -- -- -- -- -- -- 192 lb 14.4 oz (87.5 kg)   09/17/20 0430 (!) 179/91 97.6 °F (36.4 °C) Oral 54 16 96 % --        72HR INTAKE/OUTPUT:      Intake/Output Summary (Last 24 hours) at 9/17/2020 0852  Last data filed at 9/17/2020 0630  Gross per 24 hour   Intake 1580 ml   Output 650 ml   Net 930 ml       Exam:    Gen:   Alert and oriented ×3   Eyes: PERRL. No sclera icterus. No conjunctival injection. ENT: No discharge. Pharynx clear. following      Acute respiratory failure with hypoxia  Chest x-ray shows no acute cardiopulmonary abnormality  Titrate oxygen for saturations greater than or equal to 90%  Try 1 dose of Lasix    L4-5 and L5-S1 fusion  Consult neurosurgery  Family request        Acute metabolic encephalopathy  Likely due to sepsis  Mental status is improving      Anemia  - hgb 8.8 on 9/10  - hematoma      Acute on chronic renal failure  Improved      CAD  - elevated trop, continue to trend      Atrial fib  holding eliquis, following h/h      COPD  - no wheeze, on bipap  - pCO2 28.3    Urinary retention  Reeves's catheter in place  IV fluids and antibiotics  Likely source of sepsis      Code Status: Full Code        Dispo -patient can be downgraded to stepdown unit        The patient and / or the family were informed of the results of any tests, a time was given to answer questions, a plan was proposed and they agreed with plan. Cuate Mckeon MD    This note was transcribed using [id number] [employer]. Please disregard any translational errors.

## 2020-09-17 NOTE — PROGRESS NOTES
Physical Therapy  Facility/Department: [address] PROGRESSIVE CARE  Daily Treatment Note  NAME: Maynor Ruiz Sr.  : 1945  MRN: [id number]    Date of Service: 2020    Discharge Recommendations:  Continue to assess pending progress   PT Equipment Recommendations  Other: Defer to next level of care. Dayanna Riggs scored a  on the AM-PAC short mobility form. Current research shows that an AM-PAC score of 17 or less is typically not associated with a discharge to the patient's home setting. Based on the patient's AM-PAC score and their current functional mobility deficits, it is recommended that the patient have 5-7 sessions per week of Physical Therapy at d/c to increase the patient's independence. At this time, this patient demonstrates the endurance, and/or tolerance for 3 hours of therapy each day, with a treatment frequency of 5-7x/wk. Please see assessment section for further patient specific details. If patient discharges prior to next session this note will serve as a discharge summary. Please see below for the latest assessment towards goals. Assessment   Body structures, Functions, Activity limitations: Decreased functional mobility ; Decreased strength;Decreased endurance;Decreased balance  Assessment: 65 y/o male admit from SNF setting 2020 with Altered Mental Status, Severe Sepsis, Acute Renal Failure, and Pelvic Hematoma. CT Head negative. CT Abd/Pelvis + L Pelvic Hematoma. Urology consult for Reeves Placement. PMH as noted including Recent Back Surg (L4/L5 and L5/S1 Ant/Post Fusion, 2020), CAD, Angio with Stent, A-Fib, Aortic Aneurysm. Pt admit from SNF setting where admit following recent back surg. Prior pt living alone in own multi level home. Family has obtained hospital bed and has full bath on main level. Pt/family apparently do not want pt to return to [facility] SNF setting, desire In-Pt Rehab setting.   Prognosis: Good  Decision Making: Medium Complexity  History: 67 y/o male admit from SNF setting 9/13/2020 with Altered Mental Status, Severe Sepsis, Acute Renal Failure, and Pelvic Hematoma. CT Head negative. CT Abd/Pelvis + L Pelvic Hematoma. Urology consult for Reeves Placement. PMH as noted including Recent Back Surg (L4/L5 and L5/S1 Ant/Post Fusion, 8/31/2020), CAD, Angio with Stent, A-Fib, Aortic Aneurysm. Exam: See above. Clinical Presentation: See above. Patient Education: Role of PT, POC, Need to call for assist, EOB via Sidelying, Safe use of Walker, LE Exs. Barriers to Learning: None. REQUIRES PT FOLLOW UP: Yes  Activity Tolerance  Activity Tolerance: Patient limited by endurance     Patient Diagnosis(es): The primary encounter diagnosis was Severe sepsis ([address] Diagnoses of Leukocytosis, unspecified type, Renal insufficiency, Altered mental status, unspecified altered mental status type, Lactic acidosis, and Anemia, unspecified type were also pertinent to this visit. has a past medical history of Aneurysm, aortic ([address] Atrial fibrillation ([address] Glaucoma, Heart attack ([address] Hyperlipidemia, and Hypertension. has a past surgical history that includes Coronary angioplasty with stent; Colonoscopy; Eye surgery (Right, 7/23/2020); and Intracapsular cataract extraction (Right, 7/23/2020). Restrictions  Restrictions/Precautions  Restrictions/Precautions: Fall Risk  Position Activity Restriction  Spinal Precautions: No Bending, No Lifting, No Twisting(10# lift restriction per pt report.)  Other position/activity restrictions: O2 2L via NC. Subjective   General  Chart Reviewed: Yes  Additional Pertinent Hx: 67 y/o male admit from SNF setting 9/13/2020 with Altered Mental Status, Severe Sepsis, Acute Renal Failure, and Pelvic Hematoma. CT Head negative. CT Abd/Pelvis + L Pelvic Hematoma. Urology consult for Reeves Placement.    PMH as noted including Recent Back Surg (L4/L5 and L5/S1 Ant/Post Fusion, 8/31/2020), CAD, Angio with

## 2020-09-17 NOTE — CONSULTS
Consult Note  Physical Medicine and Rehabilitation    Patient: Otilio Sweet  [id number]  Date: 9/17/2020      Chief Complaint: weakness and fatigue    History of Present Illness/Hospital Course:  Patient is a 67 yo M with pmh Afib, CAD, HTN, HLD, and recent lumbar spine surgery who initially presented from SNF on 9/13/2020 with fever 105 and altered mental status. Found to have sepsis due to UTI, urinary retention, metabolic encephalopathy, and acute hypoxic respiratory failure. Reeves placed by Urology due to false urethral passage. Treating with IV antibiotics per ID. Course further complicated by TONO, anemia, and uncontrolled HTN. Of note, patient recently underwent L4-5 and L5-S1 fusion with anterior and posterior approach (8/31/2020 with Dr. Chase Presley and Dr. Cristobal Strange). Post-op course during that admission complicated by left retroperitoneal/pelvic hematoma dn acute blood loss anemia. He was discharged to [facility] 9/10. He reports chronic low back pain with radiation into left thigh. Also with relative weakness of the left leg. He denies tingling/numbness. He states these symptoms have been overall stable since surgery. Also reports severe fatigue and generalized weakness since readmission. He would like to come to ARU to improve his function prior to returning home.         Prior Level of Function:  Independent for mobility, ADLs, and IADLs    Current Level of Function:  Min-max assist    Pertinent Social History:  Support: Lives alone  Home set-up: Multi-level home with FF available, 10 steps to enter    Past Medical History:   Diagnosis Date    Aneurysm, aortic ([address]     Atrial fibrillation ([address]     Glaucoma     [facility]     Hyperlipidemia     Hypertension        Past Surgical History:   Procedure Laterality Date    COLONOSCOPY      CORONARY ANGIOPLASTY WITH STENT PLACEMENT      X 2    EYE SURGERY Right 7/23/2020    GONIOTOMY performed by Sophie Powers MD at [place] [address] INTRACAPSULAR CATARACT EXTRACTION Right 7/23/2020    Phacoemulsification with intraocular lens implant performed by Mathew Rueda MD at [address] [place] [address] [place]       Family History   Problem Relation Age of Onset    Heart Disease Mother        Social History     Socioeconomic History    Marital status:       Spouse name: None    Number of children: None    Years of education: None    Highest education level: None   Occupational History    None   Social Needs    Financial resource strain: None    Food insecurity     Worry: None     Inability: None    Transportation needs     Medical: None     Non-medical: None   Tobacco Use    Smoking status: Former Smoker     Types: Cigarettes    Smokeless tobacco: Never Used    Tobacco comment: was only social smoker    Substance and Sexual Activity    Alcohol use: Not Currently    Drug use: Never    Sexual activity: None   Lifestyle    Physical activity     Days per week: None     Minutes per session: None    Stress: None   Relationships    Social connections     Talks on phone: None     Gets together: None     Attends Shinto service: None     Active member of club or organization: None     Attends meetings of clubs or organizations: None     Relationship status: None    Intimate partner violence     Fear of current or ex partner: None     Emotionally abused: None     Physically abused: None     Forced sexual activity: None   Other Topics Concern    None   Social History Narrative    None           REVIEW OF SYSTEMS:   CONSTITUTIONAL: negative for fevers, chills, diaphoresis, appetite change, night sweats, unexpected weight change, +fatigue  EYES: negative for blurred vision, eye discharge, visual disturbance and icterus  HEENT: negative for hearing loss, tinnitus, ear drainage, sinus pressure, nasal congestion, epistaxis and snoring  RESPIRATORY: Negative for hemoptysis, cough, sputum production; + TAPIA  CARDIOVASCULAR: negative for chest pain, palpitations, exertional chest pressure/discomfort, syncope, edema  GASTROINTESTINAL: negative for nausea, vomiting, blood in stool, abdominal pain, constipation; diarrhea  GENITOURINARY: negative for frequency, dysuria, urinary incontinence, decreased urine volume, and hematuria; + retention  HEMATOLOGIC/LYMPHATIC: negative for easy bruising, bleeding and lymphadenopathy  ALLERGIC/IMMUNOLOGIC: negative for recurrent infections, angioedema, anaphylaxis and drug reactions  ENDOCRINE: negative for weight changes and diabetic symptoms including polyuria, polydipsia and polyphagia  MUSCULOSKELETAL: refer to HPI  NEUROLOGICAL: negative for headaches, slurred speech, unilateral weakness  PSYCHIATRIC/BEHAVIORAL: negative for hallucinations, behavioral problems, confusion and agitation. Physical Examination:  Vitals:   Patient Vitals for the past 24 hrs:   BP Temp Temp src Pulse Resp SpO2 Weight   09/17/20 1117 (!) 196/79 -- -- 69 -- 96 % --   09/17/20 1017 (!) 195/94 -- -- 61 -- -- --   09/17/20 0847 (!) 202/85 -- -- -- -- -- --   09/17/20 0837 (!) 209/95 97.6 °F (36.4 °C) -- 67 -- 93 % --   09/17/20 0820 -- -- -- -- -- 98 % --   09/17/20 0630 -- -- -- -- -- -- 192 lb 14.4 oz (87.5 kg)   09/17/20 0430 (!) 179/91 97.6 °F (36.4 °C) Oral 54 16 96 % --   09/17/20 0034 (!) 194/90 97.5 °F (36.4 °C) Oral 54 16 96 % --   09/17/20 0014 -- -- -- -- 18 100 % --   09/16/20 1910 (!) 172/81 97.8 °F (36.6 °C) Oral 60 16 98 % --   09/16/20 1651 -- -- -- -- 16 96 % --   09/16/20 1524 (!) 165/84 97.8 °F (36.6 °C) Oral 58 16 99 % --     Const: Alert. WDWN. No distress  Eyes: Conjunctiva noninjected, no icterus noted; pupils equal, round, and reactive to light. HENT: Atraumatic, normocephalic; Oral mucosa moist  Neck: Trachea midline, neck supple. No thyromegaly noted. CV: Regular rate and rhythm, no murmur rub or gallop noted  Resp: Lung sounds distant/diminished, no rales wheezes or rhonchi, no retractions. Respirations unlabored. GI: Soft, nontender, nondistended. Normal bowel sounds. No palpable masses. Skin: Normal temperature and turgor. No rashes or breakdown noted. Ext: trace edema. No varicosities. MSK: decreased spine ROM. No joint tenderness, erythema, warmth noted. Neuro: Alert, oriented, appropriate. No cranial nerve deficits appreciated. Sensation intact to light touch. Motor examination reveals strength 5-/5 in BUE and BLE except 4/5 right hip flexion, 3/5 left hip flexion, 4/5 left knee extension, 4/5 left ankle DF/PF. No abnormalities with finger/nose noted. Reflexes diminished, symmetric. Psych: Stable mood, normal judgement, normal affect     Lab Results   Component Value Date    WBC 6.0 09/17/2020    HGB 7.8 (L) 09/17/2020    HCT 24.0 (L) 09/17/2020    MCV 95.8 09/17/2020     (L) 09/17/2020     No results found for: INR, PROTIME  Lab Results   Component Value Date    CREATININE 1.2 09/17/2020    BUN 29 (H) 09/17/2020     09/17/2020    K 3.9 09/17/2020     (H) 09/17/2020    CO2 20 (L) 09/17/2020     Lab Results   Component Value Date    ALT 54 (H) 09/14/2020    AST 64 (H) 09/14/2020    ALKPHOS 64 09/14/2020    BILITOT 2.1 (H) 09/14/2020       Most recent EKG revealed:  Sinus tachycardiaNon-specific intra-ventricular conduction delayInferolateral infarct , age undeterminedCannot rule out Anterior infarct , age undeterminedNonspecific ST and T wave abnormalityAbnormal ECGNo previous ECGs availableConfirmed by Catherine ORTIZ MD ([id number]) on 9/14/2020 10:36:11 AM     Most recent imaging studies revealed:    CT abdomen/pelvis  Left pelvic hematoma, ventral to the iliac vessels.  It is associated with a    small amount of gas, related to recent fusion of L4-5 and L5-S1.         The hematoma communicates with additional components along the superficial    and deep aspects of the abdominal wall. CT head  BRAIN/VENTRICLES: There is no acute intracranial hemorrhage, mass effect or    midline shift.  No abnormal extra-axial fluid collection.  The gray-white    differentiation is maintained without evidence of an acute infarct.  There is    no evidence of hydrocephalus.         There is a mild-to-moderate degree of generalized atrophy.  Scattered    low-attenuation is in the periventricular and subcortical white matter,    nonspecific, consistent with small vessel disease.         ORBITS: The visualized portion of the orbits demonstrate no acute abnormality.         SINUSES: The visualized paranasal sinuses and mastoid air cells demonstrate    no acute abnormality.         SOFT TISSUES/SKULL:  No acute abnormality of the visualized skull or soft    tissues.               On my review, CXR displays mild cardiomegaly, no consolidations or effusions. Assessment:  Sepsis  UTI  Urinary retention  Acute hypoxic respiratory failure  TONO on CKD  Metabolic encephalopathy. Left pelvic hematoma  Acute blood loss anemia  Afib  CAD  HTN, uncontrolled  COPD  Diarrhea  Lumbar spinal stenosis s/p L4-S1 ALIF/PSF (8/31 with Dr. Christine Webster and Dr. Bayron Wisdom)    Impairments: generalized weakness + relative LLE weakness, decreased endurance, balance, cognition    Recommendations:    Patient with new functional deficits and ongoing medical complexity. Demonstrates ability to tolerate 3 hours therapy/day. He is a good candidate for acute inpatient rehab when medically appropriate. Insurance pre-cert obtained. Thank you for this consult. Please contact me with any questions or concerns. Krystal Draper.  Romoe Esparza MD 9/17/2020, 12:19 PM

## 2020-09-17 NOTE — PROGRESS NOTES
with intraocular lens implant performed by Ila Her MD at [address] [place] [address]       Current Medications:    Outpatient Medications Marked as Taking for the 9/13/20 encounter [facility] Encounter)   Medication Sig Dispense Refill    dorzolamide-timolol (COSOPT) 22.3-6.8 MG/ML ophthalmic solution Place 1 drop into both eyes 2 times daily      DULoxetine (CYMBALTA) 30 MG extended release capsule Take 30 mg by mouth 2 times daily      ferrous sulfate (IRON 325) 325 (65 Fe) MG tablet Take 325 mg by mouth daily (with breakfast)      Multiple Vitamins-Minerals (ICAPS) TABS Take 2 tablets by mouth daily      latanoprost (XALATAN) 0.005 % ophthalmic solution Place 1 drop into the left eye nightly      levothyroxine (SYNTHROID) 50 MCG tablet Take 50 mcg by mouth Daily      pantoprazole (PROTONIX) 40 MG tablet Take 40 mg by mouth daily      hydrALAZINE (APRESOLINE) 50 MG tablet Take 75 mg by mouth 2 times daily       metoprolol succinate (TOPROL XL) 100 MG extended release tablet Take 100 mg by mouth daily       losartan (COZAAR) 100 MG tablet Take 100 mg by mouth daily      atorvastatin (LIPITOR) 40 MG tablet Take 40 mg by mouth daily      gabapentin (NEURONTIN) 100 MG capsule Take 400 mg by mouth 3 times daily.  amiodarone (CORDARONE) 200 MG tablet Take 200 mg by mouth daily Indications: takes in the evening       rivaroxaban (XARELTO) 20 MG TABS tablet Take 20 mg by mouth daily (with breakfast)          Allergies:  Azithromycin; Brimonidine; Clindamycin/lincomycin; Penicillins; and Simvastatin    Immunizations : There is no immunization history on file for this patient.     Social History:    Social History     Tobacco Use    Smoking status: Former Smoker     Types: Cigarettes    Smokeless tobacco: Never Used    Tobacco comment: was only social smoker    Substance Use Topics    Alcohol use: Not Currently    Drug use: Never     Social History     Tobacco Use   Smoking Status Former Smoker    Procal  97.74  Down  21        CRP 74.2      Wbc  42.8  Down to  6      Vanco  13.5      MICRO: cultures reviewed and updated by me     Procedure  Component  Value  Units  Date/Time    Clostridium Difficile Toxin/Antigen [[id number]]      Order Status: No result  Specimen: Stool     MRSA DNA Probe, Nasal [[id number]]      Order Status: Canceled  Specimen: Nares     Culture, Blood 2 [[id number]]   Collected: 09/13/20 1004    Order Status: Completed  Specimen: Blood  Updated: 09/14/20 1216     Culture, Blood 2  No Growth to date.  Any change in status will be called. Narrative:      ORDER#: [id number]                          ORDERED BY: Jaya Sharma   SOURCE: Blood                              COLLECTED:  09/13/20 10:04   ANTIBIOTICS AT [facility]. :                      RECEIVED :  09/13/20 10:17   If child <=2 yrs old please draw pediatric bottle. ~Blood Culture #2   Performed at:   [facility]   [address]   Phone [phone]    Culture, Blood 1 [[id number]]   Collected: 09/13/20 1004    Order Status: Completed  Specimen: Blood  Updated: 09/14/20 1216     Blood Culture, Routine  No Growth to date.  Any change in status will be called. Narrative:      ORDER#: [id number]                          ORDERED BY: Jaya Sharma   SOURCE: Blood                              COLLECTED:  09/13/20 10:04   ANTIBIOTICS AT [facility]. :                      RECEIVED :  09/13/20 10:18   If child <=2 yrs old please draw pediatric bottle. ~Blood Culture #1   Performed at:   [facility]   [address]   Phone [phone]    Culture, Urine [[id number]]  (Abnormal)  Collected: 09/13/20 1415    Order Status: Completed  Specimen: Urine, clean catch  Updated: 09/14/20 1106     Organism  Gram negative rodAbnormal       Urine Culture, Routine  --     <10,000 CFU/ml   No further workup    Narrative:      ORDER#: 100 mg Oral TID    lactobacillus  2 capsule Oral BID WC    sodium chloride flush  10 mL Intravenous 2 times per day    vancomycin (VANCOCIN) intermittent dosing (placeholder)   Other RX Placeholder       Continuous Infusions:      PRN Meds:  oxyCODONE-acetaminophen, nitroGLYCERIN, sodium chloride flush, acetaminophen **OR** acetaminophen, polyethylene glycol, promethazine **OR** ondansetron, sodium chloride flush      Assessment:     Patient Active Problem List   Diagnosis    Sepsis ([facility]    Lactic acidosis    Acute metabolic encephalopathy    Pelvic hematoma in male    Severe sepsis (HCC)    Acute renal failure superimposed on chronic kidney disease (HCC)    Elevated LFTs    Abnormal chest x-ray    Acute cystitis without hematuria     Severe sepsis on admit resolving   High fevers T max 105  Encephalopathy on admit likely from sepsis now improving    WBC elevation now improved   TONO on admit  UTI with abnormal UA and urinary retention  S/P Lumbar surgery anterior and post spinal fusion course complicated by large Retroperitoneal and Pelvic hematoma  Urinary Retention likely from above  Lactic acidosis  CT Head -ve  CT abd/pelvis with large hematoma ventral to the iliac vessel with communication to the deep and ventral aspect of the abd wall      Given the presentation suspect  as the primary source with UTI and urinary retention secondary to large pelvic hematoma and UA very abnormal with  4+ bacteremia and urine cx GNR low colony count wonder it was not collected properly as he had false passage and Urology has to place Reeves over the cystoscope/ wire      Reeves urine clearing and Blood cx negative Procal still elevated but trend down        Labs, Microbiology, Radiology and all the pertinent results from current hospitalization and  care every where were reviewed  by me as a part of the evaluation   Plan:   1. Cont IV Meropenem x 500 mg x q 6 hrs  2. D/C  IV Vancomycin   3.  Suspect  tract as the primary source given the urinary retention and malodorous urine  4. Given the hematoma concern for infection exist but at this time its not clear if the surgical site is involved or not   5. Trend wbc AND procal   6. Leave the mendoza per Urology   7. [id number] [address] for ARU placement      Discussed with patient/Family and Nursing   Risk of Complications/Morbidity: High      · Illness(es)/ Infection present that pose threat to bodily function. · There is potential for severe exacerbation of infection/side effects of treatment. · Therapy requires intensive monitoring for antimicrobial agent toxicity      Discussed with patient/Family and Nursing staff     Thanks for allowing me to participate in your patient's care and please call me with any questions or concerns.     Juan R Epstein MD  Infectious Disease  [facility] ([facility]) Physician  Phone: [phone]   Fax : [phone]

## 2020-09-17 NOTE — CARE COORDINATION
Patient was APPROVED by insurance for rehab admission. Patient can be accepted when medically discharged.

## 2020-09-18 ENCOUNTER — HOSPITAL ENCOUNTER (INPATIENT)
Age: 75
LOS: 13 days | Discharge: PSYCHIATRIC OR SUBSTANCE USE FACILITY | DRG: 947 | End: 2020-10-01
Attending: PHYSICAL MEDICINE & REHABILITATION | Admitting: PHYSICAL MEDICINE & REHABILITATION
Payer: MEDICARE

## 2020-09-18 VITALS
BODY MASS INDEX: 28.57 KG/M2 | WEIGHT: 192.9 LBS | TEMPERATURE: 97.4 F | SYSTOLIC BLOOD PRESSURE: 170 MMHG | DIASTOLIC BLOOD PRESSURE: 81 MMHG | HEIGHT: 69 IN | HEART RATE: 55 BPM | OXYGEN SATURATION: 97 % | RESPIRATION RATE: 18 BRPM

## 2020-09-18 PROBLEM — R53.81 DEBILITY: Status: ACTIVE | Noted: 2020-09-18

## 2020-09-18 PROBLEM — A41.9 SEPSIS (HCC): Status: RESOLVED | Noted: 2020-09-13 | Resolved: 2020-09-18

## 2020-09-18 LAB
ANION GAP SERPL CALCULATED.3IONS-SCNC: 7 MMOL/L (ref 3–16)
BASOPHILS ABSOLUTE: 0 K/UL (ref 0–0.2)
BASOPHILS RELATIVE PERCENT: 0.8 %
BUN BLDV-MCNC: 26 MG/DL (ref 7–20)
CALCIUM SERPL-MCNC: 8.4 MG/DL (ref 8.3–10.6)
CHLORIDE BLD-SCNC: 111 MMOL/L (ref 99–110)
CO2: 23 MMOL/L (ref 21–32)
CREAT SERPL-MCNC: 1.3 MG/DL (ref 0.8–1.3)
EOSINOPHILS ABSOLUTE: 0.2 K/UL (ref 0–0.6)
EOSINOPHILS RELATIVE PERCENT: 2.6 %
GFR AFRICAN AMERICAN: >60
GFR NON-AFRICAN AMERICAN: 54
GLUCOSE BLD-MCNC: 83 MG/DL (ref 70–99)
HCT VFR BLD CALC: 23.8 % (ref 40.5–52.5)
HEMOGLOBIN: 7.8 G/DL (ref 13.5–17.5)
LYMPHOCYTES ABSOLUTE: 0.7 K/UL (ref 1–5.1)
LYMPHOCYTES RELATIVE PERCENT: 12.3 %
MAGNESIUM: 1.9 MG/DL (ref 1.8–2.4)
MCH RBC QN AUTO: 30.9 PG (ref 26–34)
MCHC RBC AUTO-ENTMCNC: 32.8 G/DL (ref 31–36)
MCV RBC AUTO: 94 FL (ref 80–100)
MONOCYTES ABSOLUTE: 0.7 K/UL (ref 0–1.3)
MONOCYTES RELATIVE PERCENT: 10.9 %
NEUTROPHILS ABSOLUTE: 4.4 K/UL (ref 1.7–7.7)
NEUTROPHILS RELATIVE PERCENT: 73.4 %
PDW BLD-RTO: 16.9 % (ref 12.4–15.4)
PHOSPHORUS: 2.8 MG/DL (ref 2.5–4.9)
PLATELET # BLD: 121 K/UL (ref 135–450)
PMV BLD AUTO: 9 FL (ref 5–10.5)
POTASSIUM SERPL-SCNC: 3.6 MMOL/L (ref 3.5–5.1)
RBC # BLD: 2.54 M/UL (ref 4.2–5.9)
SODIUM BLD-SCNC: 141 MMOL/L (ref 136–145)
WBC # BLD: 6 K/UL (ref 4–11)

## 2020-09-18 PROCEDURE — 1280000000 HC REHAB R&B

## 2020-09-18 PROCEDURE — 6370000000 HC RX 637 (ALT 250 FOR IP): Performed by: NURSE PRACTITIONER

## 2020-09-18 PROCEDURE — 97110 THERAPEUTIC EXERCISES: CPT

## 2020-09-18 PROCEDURE — 6370000000 HC RX 637 (ALT 250 FOR IP): Performed by: HOSPITALIST

## 2020-09-18 PROCEDURE — 83735 ASSAY OF MAGNESIUM: CPT

## 2020-09-18 PROCEDURE — 84100 ASSAY OF PHOSPHORUS: CPT

## 2020-09-18 PROCEDURE — 6360000002 HC RX W HCPCS: Performed by: PHYSICAL MEDICINE & REHABILITATION

## 2020-09-18 PROCEDURE — 2580000003 HC RX 258: Performed by: PHYSICAL MEDICINE & REHABILITATION

## 2020-09-18 PROCEDURE — 97530 THERAPEUTIC ACTIVITIES: CPT

## 2020-09-18 PROCEDURE — 97116 GAIT TRAINING THERAPY: CPT

## 2020-09-18 PROCEDURE — 80048 BASIC METABOLIC PNL TOTAL CA: CPT

## 2020-09-18 PROCEDURE — 6370000000 HC RX 637 (ALT 250 FOR IP): Performed by: PHYSICAL MEDICINE & REHABILITATION

## 2020-09-18 PROCEDURE — 6360000002 HC RX W HCPCS: Performed by: HOSPITALIST

## 2020-09-18 PROCEDURE — 2580000003 HC RX 258: Performed by: NURSE PRACTITIONER

## 2020-09-18 PROCEDURE — 85025 COMPLETE CBC W/AUTO DIFF WBC: CPT

## 2020-09-18 PROCEDURE — 2580000003 HC RX 258: Performed by: HOSPITALIST

## 2020-09-18 RX ORDER — LACTOBACILLUS RHAMNOSUS GG 10B CELL
2 CAPSULE ORAL 2 TIMES DAILY WITH MEALS
Status: DISCONTINUED | OUTPATIENT
Start: 2020-09-18 | End: 2020-10-01 | Stop reason: HOSPADM

## 2020-09-18 RX ORDER — BISACODYL 10 MG
10 SUPPOSITORY, RECTAL RECTAL DAILY PRN
Status: DISCONTINUED | OUTPATIENT
Start: 2020-09-18 | End: 2020-10-01 | Stop reason: HOSPADM

## 2020-09-18 RX ORDER — LEVOTHYROXINE SODIUM 0.05 MG/1
50 TABLET ORAL DAILY
Status: CANCELLED | OUTPATIENT
Start: 2020-09-19

## 2020-09-18 RX ORDER — SODIUM CHLORIDE 0.9 % (FLUSH) 0.9 %
10 SYRINGE (ML) INJECTION PRN
Status: CANCELLED | OUTPATIENT
Start: 2020-09-18

## 2020-09-18 RX ORDER — HYDRALAZINE HYDROCHLORIDE 50 MG/1
50 TABLET, FILM COATED ORAL EVERY 8 HOURS SCHEDULED
Status: DISCONTINUED | OUTPATIENT
Start: 2020-09-18 | End: 2020-10-01 | Stop reason: HOSPADM

## 2020-09-18 RX ORDER — NITROGLYCERIN 0.4 MG/1
0.4 TABLET SUBLINGUAL EVERY 5 MIN PRN
Status: CANCELLED | OUTPATIENT
Start: 2020-09-18

## 2020-09-18 RX ORDER — LANOLIN ALCOHOL/MO/W.PET/CERES
3 CREAM (GRAM) TOPICAL NIGHTLY PRN
Status: DISCONTINUED | OUTPATIENT
Start: 2020-09-18 | End: 2020-10-01 | Stop reason: HOSPADM

## 2020-09-18 RX ORDER — GABAPENTIN 100 MG/1
100 CAPSULE ORAL 3 TIMES DAILY
Status: DISCONTINUED | OUTPATIENT
Start: 2020-09-18 | End: 2020-10-01 | Stop reason: HOSPADM

## 2020-09-18 RX ORDER — ONDANSETRON 2 MG/ML
4 INJECTION INTRAMUSCULAR; INTRAVENOUS EVERY 6 HOURS PRN
Status: DISCONTINUED | OUTPATIENT
Start: 2020-09-18 | End: 2020-10-01 | Stop reason: HOSPADM

## 2020-09-18 RX ORDER — GUAIFENESIN 100 MG/5ML
200 SOLUTION ORAL EVERY 4 HOURS PRN
Status: CANCELLED | OUTPATIENT
Start: 2020-09-18

## 2020-09-18 RX ORDER — PROMETHAZINE HYDROCHLORIDE 25 MG/1
12.5 TABLET ORAL EVERY 6 HOURS PRN
Status: CANCELLED | OUTPATIENT
Start: 2020-09-18

## 2020-09-18 RX ORDER — HYDRALAZINE HYDROCHLORIDE 25 MG/1
25 TABLET, FILM COATED ORAL EVERY 6 HOURS PRN
Status: CANCELLED | OUTPATIENT
Start: 2020-09-18

## 2020-09-18 RX ORDER — METOPROLOL TARTRATE 50 MG/1
50 TABLET, FILM COATED ORAL 2 TIMES DAILY
Status: DISCONTINUED | OUTPATIENT
Start: 2020-09-18 | End: 2020-10-01 | Stop reason: HOSPADM

## 2020-09-18 RX ORDER — FERROUS SULFATE TAB EC 324 MG (65 MG FE EQUIVALENT) 324 (65 FE) MG
324 TABLET DELAYED RESPONSE ORAL
Status: DISCONTINUED | OUTPATIENT
Start: 2020-09-19 | End: 2020-09-30

## 2020-09-18 RX ORDER — LACTOBACILLUS RHAMNOSUS GG 10B CELL
2 CAPSULE ORAL 2 TIMES DAILY WITH MEALS
Status: CANCELLED | OUTPATIENT
Start: 2020-09-18

## 2020-09-18 RX ORDER — LEVOTHYROXINE SODIUM 0.05 MG/1
50 TABLET ORAL DAILY
Status: DISCONTINUED | OUTPATIENT
Start: 2020-09-19 | End: 2020-10-01 | Stop reason: HOSPADM

## 2020-09-18 RX ORDER — FERROUS SULFATE TAB EC 324 MG (65 MG FE EQUIVALENT) 324 (65 FE) MG
324 TABLET DELAYED RESPONSE ORAL
Status: CANCELLED | OUTPATIENT
Start: 2020-09-19

## 2020-09-18 RX ORDER — PROMETHAZINE HYDROCHLORIDE 25 MG/1
12.5 TABLET ORAL EVERY 6 HOURS PRN
Status: DISCONTINUED | OUTPATIENT
Start: 2020-09-18 | End: 2020-10-01 | Stop reason: HOSPADM

## 2020-09-18 RX ORDER — HYDRALAZINE HYDROCHLORIDE 25 MG/1
25 TABLET, FILM COATED ORAL EVERY 6 HOURS PRN
Status: DISCONTINUED | OUTPATIENT
Start: 2020-09-18 | End: 2020-10-01 | Stop reason: HOSPADM

## 2020-09-18 RX ORDER — DORZOLAMIDE HYDROCHLORIDE AND TIMOLOL MALEATE 20; 5 MG/ML; MG/ML
1 SOLUTION/ DROPS OPHTHALMIC 2 TIMES DAILY
Status: DISCONTINUED | OUTPATIENT
Start: 2020-09-18 | End: 2020-10-01 | Stop reason: HOSPADM

## 2020-09-18 RX ORDER — POLYETHYLENE GLYCOL 3350 17 G/17G
17 POWDER, FOR SOLUTION ORAL DAILY PRN
Status: DISCONTINUED | OUTPATIENT
Start: 2020-09-18 | End: 2020-10-01 | Stop reason: HOSPADM

## 2020-09-18 RX ORDER — AMLODIPINE BESYLATE 10 MG/1
10 TABLET ORAL DAILY
Status: CANCELLED | OUTPATIENT
Start: 2020-09-19

## 2020-09-18 RX ORDER — AMLODIPINE BESYLATE 10 MG/1
10 TABLET ORAL DAILY
Status: DISCONTINUED | OUTPATIENT
Start: 2020-09-19 | End: 2020-09-28

## 2020-09-18 RX ORDER — OXYCODONE HYDROCHLORIDE AND ACETAMINOPHEN 5; 325 MG/1; MG/1
1 TABLET ORAL EVERY 4 HOURS PRN
Status: CANCELLED | OUTPATIENT
Start: 2020-09-18

## 2020-09-18 RX ORDER — DULOXETIN HYDROCHLORIDE 30 MG/1
30 CAPSULE, DELAYED RELEASE ORAL 2 TIMES DAILY
Status: CANCELLED | OUTPATIENT
Start: 2020-09-18

## 2020-09-18 RX ORDER — DORZOLAMIDE HYDROCHLORIDE AND TIMOLOL MALEATE 20; 5 MG/ML; MG/ML
1 SOLUTION/ DROPS OPHTHALMIC 2 TIMES DAILY
Status: CANCELLED | OUTPATIENT
Start: 2020-09-18

## 2020-09-18 RX ORDER — NITROGLYCERIN 0.4 MG/1
0.4 TABLET SUBLINGUAL EVERY 5 MIN PRN
Status: DISCONTINUED | OUTPATIENT
Start: 2020-09-18 | End: 2020-10-01 | Stop reason: HOSPADM

## 2020-09-18 RX ORDER — LATANOPROST 50 UG/ML
1 SOLUTION/ DROPS OPHTHALMIC NIGHTLY
Status: CANCELLED | OUTPATIENT
Start: 2020-09-18

## 2020-09-18 RX ORDER — BISACODYL 10 MG
10 SUPPOSITORY, RECTAL RECTAL DAILY PRN
Status: CANCELLED | OUTPATIENT
Start: 2020-09-18

## 2020-09-18 RX ORDER — ATORVASTATIN CALCIUM 40 MG/1
40 TABLET, FILM COATED ORAL NIGHTLY
Status: CANCELLED | OUTPATIENT
Start: 2020-09-18

## 2020-09-18 RX ORDER — GABAPENTIN 100 MG/1
100 CAPSULE ORAL 3 TIMES DAILY
Status: CANCELLED | OUTPATIENT
Start: 2020-09-18

## 2020-09-18 RX ORDER — OXYCODONE HYDROCHLORIDE AND ACETAMINOPHEN 5; 325 MG/1; MG/1
1 TABLET ORAL EVERY 4 HOURS PRN
Status: DISCONTINUED | OUTPATIENT
Start: 2020-09-18 | End: 2020-09-30

## 2020-09-18 RX ORDER — SODIUM CHLORIDE 0.9 % (FLUSH) 0.9 %
10 SYRINGE (ML) INJECTION PRN
Status: DISCONTINUED | OUTPATIENT
Start: 2020-09-18 | End: 2020-10-01 | Stop reason: HOSPADM

## 2020-09-18 RX ORDER — PANTOPRAZOLE SODIUM 40 MG/1
40 TABLET, DELAYED RELEASE ORAL
Status: CANCELLED | OUTPATIENT
Start: 2020-09-19

## 2020-09-18 RX ORDER — ATORVASTATIN CALCIUM 40 MG/1
40 TABLET, FILM COATED ORAL NIGHTLY
Status: DISCONTINUED | OUTPATIENT
Start: 2020-09-18 | End: 2020-10-01 | Stop reason: HOSPADM

## 2020-09-18 RX ORDER — LANOLIN ALCOHOL/MO/W.PET/CERES
3 CREAM (GRAM) TOPICAL NIGHTLY PRN
Status: CANCELLED | OUTPATIENT
Start: 2020-09-18

## 2020-09-18 RX ORDER — LOSARTAN POTASSIUM 100 MG/1
100 TABLET ORAL DAILY
Status: DISCONTINUED | OUTPATIENT
Start: 2020-09-19 | End: 2020-10-01 | Stop reason: HOSPADM

## 2020-09-18 RX ORDER — DULOXETIN HYDROCHLORIDE 30 MG/1
30 CAPSULE, DELAYED RELEASE ORAL 2 TIMES DAILY
Status: DISCONTINUED | OUTPATIENT
Start: 2020-09-18 | End: 2020-10-01 | Stop reason: HOSPADM

## 2020-09-18 RX ORDER — AMIODARONE HYDROCHLORIDE 200 MG/1
200 TABLET ORAL DAILY
Status: DISCONTINUED | OUTPATIENT
Start: 2020-09-19 | End: 2020-10-01 | Stop reason: HOSPADM

## 2020-09-18 RX ORDER — HYDRALAZINE HYDROCHLORIDE 50 MG/1
50 TABLET, FILM COATED ORAL EVERY 8 HOURS SCHEDULED
Status: CANCELLED | OUTPATIENT
Start: 2020-09-18

## 2020-09-18 RX ORDER — ONDANSETRON 2 MG/ML
4 INJECTION INTRAMUSCULAR; INTRAVENOUS EVERY 6 HOURS PRN
Status: CANCELLED | OUTPATIENT
Start: 2020-09-18

## 2020-09-18 RX ORDER — LOSARTAN POTASSIUM 100 MG/1
100 TABLET ORAL DAILY
Status: CANCELLED | OUTPATIENT
Start: 2020-09-19

## 2020-09-18 RX ORDER — PANTOPRAZOLE SODIUM 40 MG/1
40 TABLET, DELAYED RELEASE ORAL
Status: DISCONTINUED | OUTPATIENT
Start: 2020-09-19 | End: 2020-10-01 | Stop reason: HOSPADM

## 2020-09-18 RX ORDER — ACETAMINOPHEN 325 MG/1
650 TABLET ORAL EVERY 6 HOURS PRN
Status: CANCELLED | OUTPATIENT
Start: 2020-09-18

## 2020-09-18 RX ORDER — AMIODARONE HYDROCHLORIDE 200 MG/1
200 TABLET ORAL DAILY
Status: CANCELLED | OUTPATIENT
Start: 2020-09-19

## 2020-09-18 RX ORDER — SODIUM CHLORIDE 0.9 % (FLUSH) 0.9 %
10 SYRINGE (ML) INJECTION EVERY 12 HOURS SCHEDULED
Status: CANCELLED | OUTPATIENT
Start: 2020-09-18

## 2020-09-18 RX ORDER — SODIUM CHLORIDE 0.9 % (FLUSH) 0.9 %
10 SYRINGE (ML) INJECTION EVERY 12 HOURS SCHEDULED
Status: DISCONTINUED | OUTPATIENT
Start: 2020-09-18 | End: 2020-10-01 | Stop reason: HOSPADM

## 2020-09-18 RX ORDER — METOPROLOL TARTRATE 50 MG/1
50 TABLET, FILM COATED ORAL 2 TIMES DAILY
Status: CANCELLED | OUTPATIENT
Start: 2020-09-18

## 2020-09-18 RX ORDER — ACETAMINOPHEN 325 MG/1
650 TABLET ORAL EVERY 6 HOURS PRN
Status: DISCONTINUED | OUTPATIENT
Start: 2020-09-18 | End: 2020-10-01 | Stop reason: HOSPADM

## 2020-09-18 RX ORDER — LATANOPROST 50 UG/ML
1 SOLUTION/ DROPS OPHTHALMIC NIGHTLY
Status: DISCONTINUED | OUTPATIENT
Start: 2020-09-18 | End: 2020-10-01 | Stop reason: HOSPADM

## 2020-09-18 RX ORDER — POLYETHYLENE GLYCOL 3350 17 G/17G
17 POWDER, FOR SOLUTION ORAL DAILY PRN
Status: CANCELLED | OUTPATIENT
Start: 2020-09-18

## 2020-09-18 RX ORDER — GUAIFENESIN 100 MG/5ML
200 SOLUTION ORAL EVERY 4 HOURS PRN
Status: DISCONTINUED | OUTPATIENT
Start: 2020-09-18 | End: 2020-10-01 | Stop reason: HOSPADM

## 2020-09-18 RX ADMIN — GABAPENTIN 100 MG: 100 CAPSULE ORAL at 13:30

## 2020-09-18 RX ADMIN — METOPROLOL TARTRATE 50 MG: 50 TABLET, FILM COATED ORAL at 08:45

## 2020-09-18 RX ADMIN — Medication 2 CAPSULE: at 18:15

## 2020-09-18 RX ADMIN — ATORVASTATIN CALCIUM 40 MG: 40 TABLET, FILM COATED ORAL at 20:20

## 2020-09-18 RX ADMIN — MEROPENEM 500 MG: 500 INJECTION, POWDER, FOR SOLUTION INTRAVENOUS at 13:30

## 2020-09-18 RX ADMIN — AMIODARONE HYDROCHLORIDE 200 MG: 200 TABLET ORAL at 08:45

## 2020-09-18 RX ADMIN — AMLODIPINE BESYLATE 10 MG: 10 TABLET ORAL at 08:45

## 2020-09-18 RX ADMIN — DORZOLAMIDE HYDROCHLORIDE AND TIMOLOL MALEATE 1 DROP: 20; 5 SOLUTION/ DROPS OPHTHALMIC at 20:22

## 2020-09-18 RX ADMIN — MEROPENEM 500 MG: 500 INJECTION, POWDER, FOR SOLUTION INTRAVENOUS at 20:19

## 2020-09-18 RX ADMIN — GABAPENTIN 100 MG: 100 CAPSULE ORAL at 20:19

## 2020-09-18 RX ADMIN — LOSARTAN POTASSIUM 100 MG: 100 TABLET, FILM COATED ORAL at 08:45

## 2020-09-18 RX ADMIN — OXYCODONE HYDROCHLORIDE AND ACETAMINOPHEN 1 TABLET: 5; 325 TABLET ORAL at 06:23

## 2020-09-18 RX ADMIN — LATANOPROST 1 DROP: 50 SOLUTION OPHTHALMIC at 20:22

## 2020-09-18 RX ADMIN — SODIUM CHLORIDE, PRESERVATIVE FREE 10 ML: 5 INJECTION INTRAVENOUS at 08:46

## 2020-09-18 RX ADMIN — Medication 3 MG: at 20:19

## 2020-09-18 RX ADMIN — FERROUS SULFATE TAB EC 324 MG (65 MG FE EQUIVALENT) 324 MG: 324 (65 FE) TABLET DELAYED RESPONSE at 08:45

## 2020-09-18 RX ADMIN — MEROPENEM 500 MG: 500 INJECTION, POWDER, FOR SOLUTION INTRAVENOUS at 08:45

## 2020-09-18 RX ADMIN — HYDRALAZINE HYDROCHLORIDE 50 MG: 50 TABLET, FILM COATED ORAL at 06:23

## 2020-09-18 RX ADMIN — OXYCODONE HYDROCHLORIDE AND ACETAMINOPHEN 1 TABLET: 5; 325 TABLET ORAL at 19:58

## 2020-09-18 RX ADMIN — DULOXETINE HYDROCHLORIDE 30 MG: 30 CAPSULE, DELAYED RELEASE ORAL at 20:19

## 2020-09-18 RX ADMIN — SODIUM CHLORIDE, PRESERVATIVE FREE 10 ML: 5 INJECTION INTRAVENOUS at 20:18

## 2020-09-18 RX ADMIN — MEROPENEM 500 MG: 500 INJECTION, POWDER, FOR SOLUTION INTRAVENOUS at 03:00

## 2020-09-18 RX ADMIN — HYDRALAZINE HYDROCHLORIDE 50 MG: 50 TABLET, FILM COATED ORAL at 13:30

## 2020-09-18 RX ADMIN — METOPROLOL TARTRATE 50 MG: 50 TABLET, FILM COATED ORAL at 20:19

## 2020-09-18 RX ADMIN — Medication 2 CAPSULE: at 08:45

## 2020-09-18 RX ADMIN — GABAPENTIN 100 MG: 100 CAPSULE ORAL at 08:45

## 2020-09-18 RX ADMIN — LEVOTHYROXINE SODIUM 50 MCG: 0.05 TABLET ORAL at 06:23

## 2020-09-18 RX ADMIN — DULOXETINE HYDROCHLORIDE 30 MG: 30 CAPSULE, DELAYED RELEASE ORAL at 08:45

## 2020-09-18 ASSESSMENT — PAIN DESCRIPTION - DIRECTION
RADIATING_TOWARDS: HIP
RADIATING_TOWARDS: HIP

## 2020-09-18 ASSESSMENT — PAIN DESCRIPTION - FREQUENCY
FREQUENCY: CONTINUOUS
FREQUENCY: INTERMITTENT

## 2020-09-18 ASSESSMENT — PAIN SCALES - GENERAL
PAINLEVEL_OUTOF10: 0
PAINLEVEL_OUTOF10: 8
PAINLEVEL_OUTOF10: 8
PAINLEVEL_OUTOF10: 4

## 2020-09-18 ASSESSMENT — PAIN SCALES - WONG BAKER
WONGBAKER_NUMERICALRESPONSE: 0

## 2020-09-18 ASSESSMENT — PAIN - FUNCTIONAL ASSESSMENT
PAIN_FUNCTIONAL_ASSESSMENT: ACTIVITIES ARE NOT PREVENTED
PAIN_FUNCTIONAL_ASSESSMENT: ACTIVITIES ARE NOT PREVENTED

## 2020-09-18 ASSESSMENT — PAIN DESCRIPTION - PROGRESSION
CLINICAL_PROGRESSION: GRADUALLY IMPROVING
CLINICAL_PROGRESSION: GRADUALLY WORSENING

## 2020-09-18 ASSESSMENT — PAIN DESCRIPTION - DESCRIPTORS
DESCRIPTORS: ACHING

## 2020-09-18 ASSESSMENT — PAIN DESCRIPTION - PAIN TYPE
TYPE: ACUTE PAIN

## 2020-09-18 ASSESSMENT — PAIN DESCRIPTION - ORIENTATION
ORIENTATION: LEFT
ORIENTATION: RIGHT;LEFT
ORIENTATION: LEFT

## 2020-09-18 ASSESSMENT — PAIN DESCRIPTION - LOCATION
LOCATION: LEG

## 2020-09-18 ASSESSMENT — PAIN DESCRIPTION - ONSET: ONSET: GRADUAL

## 2020-09-18 NOTE — CONSULTS
Comprehensive Nutrition Assessment    Type and Reason for Visit:  Initial, Consult(per rehab protocol)    Nutrition Recommendations/Plan:   Add Ensure Enlive bid to start  Attempt to f/u another time for interview    Nutrition Assessment:  Pt adm to ARU for debility r/t recent spinal fusion with complications. Diet adv to general. Pt not available for interview, so information taken from medical record. Will add Ensure Enlive bid to start. Malnutrition Assessment:  Malnutrition Status:  Insufficient data    Context:  Acute Illness       Estimated Daily Nutrient Needs:  Energy (kcal):  3791-2908 (22-28 x ABW (adj for healing); Weight Used for Energy Requirements:        Protein (g):   (1.2-2 x IBW 73 kg); Weight Used for Protein Requirements:           Fluid (ml/day):  1 ml per kcal; Weight Used for Fluid Requirements:         Nutrition Related Findings:  watery BM noted on 9/16. Noted trace edema to BLE. Wounds:  Surgical Wound(SI to abd & back (anterior & posterior approach during surgery). No isues noted)       Current Nutrition Therapies:    DIET GENERAL; Anthropometric Measures:  · Height: 5' 9\" (175.3 cm)  · Current Body Weight: 193 lb (87.5 kg)   · Ideal Body Weight: 160 lbs; % Ideal Body Weight 120.6 %   · BMI: 28.5  · Adjusted Body Weight:  ; No Adjustment   · BMI Categories: Overweight (BMI 25.0-29. 9)       Nutrition Diagnosis:   · Increased nutrient needs related to increase demand for energy/nutrients as evidenced by wounds      Nutrition Interventions:   Food and/or Nutrient Delivery:  Continue Current Diet, Start Oral Nutrition Supplement  Nutrition Education/Counseling:  No recommendation at this time   Coordination of Nutrition Care:  Continued Inpatient Monitoring    Goals:  consume >/= to 50 %       Nutrition Monitoring and Evaluation:   Food/Nutrient Intake Outcomes:  Food and Nutrient Intake, Supplement Intake  Physical Signs/Symptoms Outcomes:  Biochemical Data, Constipation, Diarrhea, Fluid Status or Edema, Weight, Skin, Nutrition Focused Physical Findings     Discharge Planning:     Too soon to determine     Electronically signed by Daly Pelaez RD, LD on 9/18/20 at 4:52 PM EDT    Contact: [phone]

## 2020-09-18 NOTE — PROGRESS NOTES
Called Daughter Donato with update, wants Dr. Yvonne Patterson to call her, left him a perfect serve. [phone].

## 2020-09-18 NOTE — PROGRESS NOTES
[address] RECORD NUMBER:  [id number]  AGE: 66 y.o. GENDER: male  : 1945  TODAYS DATE:  2020      Room Orientation     Patient admitted to room 3273 per [x] Wheel Chair  [] Bed  [] Recliner  [] Stretcher  [] Other:. Transferred to:  [] Bed  [x] Recliner  [] Other: .      Patient Required moderate assistance with stedy    Patient was oriented to:  [x] Call Light  [x] Room Phone  [x] TV  [x] Thermostat  [x] Bathroom  [x] Bed and Chair Alarms per Rehab Policy  [x] Closet  [x] White Board and it's Use on Rehab  [] Other:    Patient Verbalized Understanding: Yes  Family Present: Yes      Rehab Orientation     [x] 3 Hours of Therapy  through   [x] Focus and Specialty of Physical, Occupational, and Speech and Language Pathology  [x] Weekly Team Conference and Discharge Planning  [x] Roles of the Rehab Doctor, Consulting Doctors, Nursing, Dietary, and Social Work  [x] Everyday Clothing, including proper footwear, for Therapy  [x] Visiting Hours, Visitor Ages, and Visitors Sleeping Overnight in the Hospital  [x] Visitor Participation in Therapy  [x]  and Sundays on Rehab  [x] Introduction of Welcome Folder, including Rehab Expectations, Nurse Manager's Welcome Letter, Visitor's Guide, and Menu Service  [x] Planning Ahead and Ordering Meals  [x] Falls Contract [x] Signed, [x] Copied, and [x] Taped to [employer]  [] Other:    Patient Verbalized Understanding: Yes  Family Present: Yes    Electronically signed by Griselda Gamez RN on 2020 at 6:34 PM

## 2020-09-18 NOTE — PLAN OF CARE
Nutrition Problem #1: Increased nutrient needs  Intervention: Food and/or Nutrient Delivery: Continue Current Diet, Start Oral Nutrition Supplement  Nutritional Goals: consume >/= to 50 %

## 2020-09-18 NOTE — DISCHARGE INSTR - COC
Continuity of Care Form    Patient Name: Hailey Amador   :  1945  MRN:  [id number]    Admit date:  2020  Discharge date:  ***    Code Status Order: Full Code   Advance Directives:   Advance Care Flowsheet Documentation       Date/Time Healthcare Directive Type of Healthcare Directive Copy in [address] Agent's Name Healthcare Agent's Phone Number    20 1300  Yes, patient has an advance directive for healthcare treatment  Durable power of  for health care  No, copy requested from family  Adult Naomie Coffey.   --            Admitting Physician:  Amanda Joiner MD  PCP: Giuliano Richardson    Discharging Nurse: [facility] Unit/Room#: Z8X-2899/5568-83  Discharging Unit Phone Number: ***    Emergency Contact:   Extended Emergency Contact Information  Primary Emergency Contact: Gordon Merino Phone: [phone]  Mobile Phone: [phone]  Relation: Child  Secondary Emergency Contact: Minoo Valladares  Mobile Phone: [phone]  Relation: Child    Past Surgical History:  Past Surgical History:   Procedure Laterality Date    COLONOSCOPY      CORONARY ANGIOPLASTY WITH STENT PLACEMENT      X 2    EYE SURGERY Right 2020    GONIOTOMY performed by Na Weber MD at [address] Right 2020    Phacoemulsification with intraocular lens implant performed by Na Weber MD at [address] [place]       Immunization History: There is no immunization history on file for this patient.     Active Problems:  Patient Active Problem List   Diagnosis Code    Sepsis ([facility] [address] A41.9    Lactic acidosis N79.0    Acute metabolic encephalopathy D42.19    Pelvic hematoma in male N50.1    Severe sepsis (HCC) A41.9, R65.20    Acute renal failure superimposed on chronic kidney disease (HCC) N17.9, N18.9    Elevated LFTs R94.5    Abnormal chest x-ray R93.89    Acute cystitis without hematuria N30.00 Isolation/Infection:   Isolation            No Isolation          Patient Infection Status       Infection Onset Added Last Indicated Last Indicated By Review Planned Expiration Resolved Resolved By    None active    Resolved    C-diff Rule Out 09/15/20 09/15/20 09/15/20 Clostridium Difficile Toxin/Antigen (Ordered)   09/18/20 Watson Cabrera RN    COVID-19 Rule Out 09/13/20 09/13/20 09/13/20 COVID-19 (Ordered)   09/15/20 Rule-Out Test Resulted    COVID-19 Rule Out 09/13/20 09/13/20 09/13/20 COVID-19 (Ordered)   09/13/20 Rule-Out Test Resulted            Nurse Assessment:  Last Vital Signs: BP (!) 170/81   Pulse 55   Temp 97.4 °F (36.3 °C)   Resp 18   Ht 5' 9\" (1.753 m)   Wt 192 lb 14.4 oz (87.5 kg)   SpO2 97%   BMI 28.49 kg/m²     Last documented pain score (0-10 scale): Pain Level: 4  Last Weight:   Wt Readings from Last 1 Encounters:   09/18/20 192 lb 14.4 oz (87.5 kg)     Mental Status:  {IP PT MENTAL STATUS:20030:::0}    IV Access:  { BAYLEE IV ACCESS:624206663:::0}    Nursing Mobility/ADLs:  Walking   {CHP DME ADLs:266007195:::0}  Transfer  {CHP DME ADLs:385965508:::0}  Bathing  {CHP DME ADLs:927428501:::0}  Dressing  {CHP DME ADLs:562374270:::0}  Toileting  {CHP DME ADLs:597648059:::0}  Feeding  {CHP DME ADLs:396487351:::0}  Med Admin  {P DME ADLs:957724582:::0}  Med Delivery   { BAYLEE MED Delivery:997515550:::0}    Wound Care Documentation and Therapy:  Wound 09/13/20 Back Mid;Lower Surgical Incision X3 (Active)   Number of days: 4        Elimination:  Continence: Bowel: {YES / MD:65106}  Bladder: {YES / CP:08539}  Urinary Catheter: {Urinary Catheter:559959339:::0}   Colostomy/Ileostomy/Ileal Conduit: {YES / ZS:96502}       Date of Last BM: ***    Intake/Output Summary (Last 24 hours) at 9/18/2020 1158  Last data filed at 9/18/2020 0600  Gross per 24 hour   Intake 420 ml   Output 3525 ml   Net -3105 ml     I/O last 3 completed shifts:   In: 26 [P.O.:120; IV Piggyback:400]  Out: 6164 {Admit to Appropriate Level of Care:13480:::0} for {GREATER/LESS:496232562} 30 days.      Update Admission H&P: {CHP DME Changes in HandP:937061323:::0}    PHYSICIAN SIGNATURE:  {Esignature:090884289:::0}

## 2020-09-18 NOTE — PROGRESS NOTES
A complete drug regimen review was completed for this patient.      [x]  No clinically significant medication issue was identified    []   Yes, a clinically significant medication issue was identified     []  Adverse Drug Event:      []  Allergy:      []  Side Effect:      []  Ineffective Therapy:      []  Drug Interaction:     []  Duplicated Therapy:     []  Untreated Indication:      []  Non-adherence:     []  Other:      Nursing/Pharmacy contacted the physician:   Date:    Time:      Actions recommended by physician were completed:  Date :  Time:     Electronically signed by Wilmar Gibson RN on 9/18/20 at 6:36 PM EDT

## 2020-09-18 NOTE — H&P
Department of Physical Medicine & Rehabilitation  History & Physical      Patient Identification:  Sherrod Dandy  : 1945  Admit date: 2020   Attending provider: Trinidad Iraheta MD        Primary care provider: Vanessa Isidro     Chief Complaint: weakness and fatigue     History of Present Illness/Hospital Course:  Patient is a 65 yo M with pmh Afib, CAD, HTN, HLD, and recent lumbar spine surgery who initially presented from SNF on 2020 with fever 105 and altered mental status. Found to have sepsis due to UTI, urinary retention, metabolic encephalopathy, and acute hypoxic respiratory failure. Reeves placed by Urology due to false urethral passage. Treating with IV antibiotics per ID. Course further complicated by TONO, anemia, and uncontrolled HTN.      Of note, patient recently underwent L4-5 and L5-S1 fusion with anterior and posterior approach (2020 with Dr. Armando Melgar and Dr. Daniella Hart). Post-op course during that admission complicated by left retroperitoneal/pelvic hematoma and acute blood loss anemia. He was discharged to [facility] 9/10. He reports chronic low back pain with radiation into left thigh. Also with relative weakness of the left leg. He denies tingling/numbness. He states these symptoms have been overall stable since surgery. Also reports severe fatigue and generalized weakness since readmission.  He is motivated to start inpatient rehab program.          Prior Level of Function:  Independent for mobility, ADLs, and IADLs     Current Level of Function:  Min-max assist     Pertinent Social History:  Support: Lives alone  Home set-up: Multi-level home with [facility] available, 10 steps to enter    Past Medical History:   Diagnosis Date    Aneurysm, aortic ([address]     Atrial fibrillation ([facility] [address]     Glaucoma     Heart attack ([facility] [address]     Hyperlipidemia     Hypertension        Past Surgical History:   Procedure Laterality Date    COLONOSCOPY      CORONARY ANGIOPLASTY WITH STENT PLACEMENT      X 2    EYE SURGERY Right 7/23/2020    GONIOTOMY performed by Julio C Lange MD at [place] [address] Right 7/23/2020    Phacoemulsification with intraocular lens implant performed by Julio C Lange MD at [address] [place] [address]       Family History   Problem Relation Age of Onset    Heart Disease Mother        Social History     Socioeconomic History    Marital status:       Spouse name: Not on file    Number of children: Not on file    Years of education: Not on file    Highest education level: Not on file   Occupational History    Not on file   Social Needs    Financial resource strain: Not on file    Food insecurity     Worry: Not on file     Inability: Not on file    Transportation needs     Medical: Not on file     Non-medical: Not on file   Tobacco Use    Smoking status: Former Smoker     Types: Cigarettes    Smokeless tobacco: Never Used    Tobacco comment: was only social smoker    Substance and Sexual Activity    Alcohol use: Not Currently    Drug use: Never    Sexual activity: Not on file   Lifestyle    Physical activity     Days per week: Not on file     Minutes per session: Not on file    Stress: Not on file   Relationships    Social connections     Talks on phone: Not on file     Gets together: Not on file     Attends Advent service: Not on file     Active member of club or organization: Not on file     Attends meetings of clubs or organizations: Not on file     Relationship status: Not on file    Intimate partner violence     Fear of current or ex partner: Not on file     Emotionally abused: Not on file     Physically abused: Not on file     Forced sexual activity: Not on file   Other Topics Concern    Not on file   Social History Narrative    Not on file       Allergies   Allergen Reactions    Azithromycin     Brimonidine Itching    Clindamycin/Lincomycin Diarrhea    Penicillins Hives    Simvastatin          Current Results   Component Value Date    CREATININE 1.3 09/18/2020    BUN 26 (H) 09/18/2020     09/18/2020    K 3.6 09/18/2020     (H) 09/18/2020    CO2 23 09/18/2020     Lab Results   Component Value Date    ALT 54 (H) 09/14/2020    AST 64 (H) 09/14/2020    ALKPHOS 64 09/14/2020    BILITOT 2.1 (H) 09/14/2020       Most recent EKG revealed:  Sinus tachycardiaNon-specific intra-ventricular conduction delayInferolateral infarct , age undeterminedCannot rule out Anterior infarct , age undeterminedNonspecific ST and T wave abnormalityAbnormal ECGNo previous ECGs availableConfirmed by Ramsey ORTIZ MD ([id number]) on 9/14/2020 10:36:11 AM      Most recent imaging studies revealed:     CT abdomen/pelvis  Left pelvic hematoma, ventral to the iliac vessels.  It is associated with a    small amount of gas, related to recent fusion of L4-5 and L5-S1.         The hematoma communicates with additional components along the superficial    and deep aspects of the abdominal wall.          CT head  BRAIN/VENTRICLES: There is no acute intracranial hemorrhage, mass effect or    midline shift.  No abnormal extra-axial fluid collection.  The gray-white    differentiation is maintained without evidence of an acute infarct.  There is    no evidence of hydrocephalus.         There is a mild-to-moderate degree of generalized atrophy.  Scattered    low-attenuation is in the periventricular and subcortical white matter,    nonspecific, consistent with small vessel disease.         ORBITS: The visualized portion of the orbits demonstrate no acute abnormality.         SINUSES: The visualized paranasal sinuses and mastoid air cells demonstrate    no acute abnormality.         SOFT TISSUES/SKULL:  No acute abnormality of the visualized skull or soft    tissues.                  On my review, CXR displays mild cardiomegaly, no consolidations or effusions.      The above laboratory data have been reviewed.    The above imaging data have been reviewed. The above medical testing have been reviewed. There is no height or weight on file to calculate BMI. POST ADMISSION PHYSICIAN EVALUATION  The patient has agreed to being admitted to our comprehensive inpatient rehabilitation facility and can tolerate the intensity of service consisting of at least:  --180 minutes of therapy a day, 5 out of 7 days a week. OR  --15 hours of intensive therapy within a 7 consecutive day period. The patient/family has a good understanding of our discharge process and will benefit from an interdisciplinary inpatient rehabilitation program. The patient has potential to make improvement and is in need of at least two of the following multidisciplinary therapies including but not limited to physical, occupational, respiratory, and speech, nutritional services, wound care, and prosthetics and orthotics. Given the patients complex condition and risk of further medical complications, rehabilitation services cannot be safely provided at a lower level of care such as a skilled nursing facility. All of the goals listed below were reviewed with the patient and he/she is in agreement. I have compared the patients medical and functional status at the time of the preadmission screening and the same on this date, and there are no significant changes. By signing this document, I acknowledge that I have personally performed a full physical examination on this patient within 24 hours of admission to this inpatient rehabilitation facility and have determined the patient to be able to tolerate the above course of treatment at an intensive level for a reasonable period of time. I will be completing a detailed individualized  Plan of Care for this patient by day four of the patients stay based upon the Preadmission Screen, this Post-Admission Evaluation, and the therapy evaluations.      Barriers: generalized weakness + relative LLE weakness, decreased endurance, balance, cognition, medical comorbidities  Services Required: PT, OT  Goals: Bri for mobility, ADLs, self-care  Prognosis: Good  Anticipated Dispo: home alone  ELOS: 2-3 weeks    Rehabilitation Diagnosis:   16.0, Debility (non-cardiac, non-pulmonary      Assessment and Plan:    Impairments: generalized weakness + relative LLE weakness, decreased endurance, balance, cognition    Debility  -PT/OT    Sepsis presumed due to UTI  -continue merrem per ID    Urinary retention   -s/p cystoscopy for Reeves placement, +false urethral passage  -maintain Reeves x 7 days per Urology    Acute hypoxic respiratory failure in setting of baseline COPD  -Supplemental O2, wean as tolerated    TONO on CKD  -Avoid nephrotoxins, renally dose meds  -Monitor    Metabolic encephalopathy  -Improving with treatment of sepsis  -Regulate sleep/wake, emphasis on routine  -Consider SLP consult    Lumbar stenosis s/p recent L4-S1 ALIF/PSF (8/31 with Dr. Cristiane Rodriguez and Dr. Katerine Gregory)  -Wound care  -PT/OT    Acute blood loss anemia  -Due to left pelvic hematoma  -Xarelto on hold  -Monitor Hgb, transfuse prn <7  -Iron supplement    Afib  -Xarelto on hold, amiodarone    CAD  -statin, bb, ARB    HTN, uncontrolled  -amlodipine, hydralazine, losartan, metoprolol    Bladder   -Urinary retention as above  -Reeves, consider void trial next week    Bowel   -Loose stools  -PRN miralax, MoM, and bisacodyl supp. Pain control  -Duloxetine, gabapentin, prn Percocet    PPx  -DVT: Holding due to pelvic hematoma  -GI: pantoprazole, probiotics    FULL CODE    Diego Vyas MD 9/18/2020, 4:18 PM

## 2020-09-18 NOTE — PROGRESS NOTES
Physical Therapy    Facility/Department: [address] PROGRESSIVE CARE  Treatment Note. NAME: Mirta Steel Sr.  : 1945  MRN: [id number]    Date of Service: 2020    Discharge Recommendations:  Continue to assess pending progress   PT Equipment Recommendations  Other: Defer to next level of care. Fany Corral scored a 16/24 on the AM-PAC short mobility form. Current research shows that an AM-PAC score of 17 or less is typically not associated with a discharge to the patient's home setting. Based on the patient's AM-PAC score and their current functional mobility deficits, it is recommended that the patient have 3-5 sessions per week of Physical Therapy at d/c to increase the patient's independence. Please see assessment section for further patient specific details. If patient discharges prior to next session this note will serve as a discharge summary. Please see below for the latest assessment towards goals. Assessment   Body structures, Functions, Activity limitations: Decreased functional mobility ; Decreased strength;Decreased endurance;Decreased balance  Assessment: 67 y/o male admit from SNF setting 2020 with Altered Mental Status, Severe Sepsis, Acute Renal Failure, and Pelvic Hematoma. CT Head negative. CT Abd/Pelvis + L Pelvic Hematoma. Urology consult for Reeves Placement. PMH as noted including Recent Back Surg (L4/L5 and L5/S1 Ant/Post Fusion, 2020), CAD, Angio with Stent, A-Fib, Aortic Aneurysm. Pt admit from SNF setting where admit following recent back surg. Prior pt living alone in own multi level home. Family has obtained hospital bed and has full bath on main level. Pt/family apparently do not want pt to return to [facility] SNF setting, desire In-Pt Rehab setting. Prognosis: Good  Decision Making: Medium Complexity  History: 67 y/o male admit from SNF setting 2020 with Altered Mental Status, Severe Sepsis, Acute Renal Failure, and Pelvic Hematoma. CT Head negative. CT Abd/Pelvis + L Pelvic Hematoma. Urology consult for Reeves Placement. PMH as noted including Recent Back Surg (L4/L5 and L5/S1 Ant/Post Fusion, 8/31/2020), CAD, Angio with Stent, A-Fib, Aortic Aneurysm. Exam: See above. Clinical Presentation: See above. Patient Education: Role of PT, POC, Need to call for assist, EOB via Sidelying, Safe use of Walker, LE Exs. Barriers to Learning: None. REQUIRES PT FOLLOW UP: Yes  Activity Tolerance  Activity Tolerance: Patient Tolerated treatment well;Patient limited by endurance       Patient Diagnosis(es): The primary encounter diagnosis was Severe sepsis ([address] Diagnoses of Leukocytosis, unspecified type, Renal insufficiency, Altered mental status, unspecified altered mental status type, Lactic acidosis, and Anemia, unspecified type were also pertinent to this visit. has a past medical history of Aneurysm, aortic ([address] Atrial fibrillation ([address] Glaucoma, Heart attack ([address] Hyperlipidemia, and Hypertension. has a past surgical history that includes Coronary angioplasty with stent; Colonoscopy; Eye surgery (Right, 7/23/2020); and Intracapsular cataract extraction (Right, 7/23/2020). Restrictions  Restrictions/Precautions  Restrictions/Precautions: Fall Risk  Position Activity Restriction  Spinal Precautions: No Bending, No Lifting, No Twisting(10 lb lift limit per pt.)  Other position/activity restrictions: O2 2L via NC. Vision/Hearing  Vision: Within Functional Limits  Hearing: Within functional limits     Subjective  General  Response To Previous Treatment: Patient with no complaints from previous session. Family / Caregiver Present: No  Referring Practitioner: Dr. Lisha Mulligan  Diagnosis: Altered Mental Status, Severe Sepsis, Acute Renal Failure and Pelvic Hematoma; Recent L/S Fusion. Follows Commands: Within Functional Limits  Subjective  Subjective: Pt reports that he is tired although agreeable to PT Rx.   Pain Screening  Patient Currently in Pain: No          Orientation  Orientation  Overall Orientation Status: Within Functional Limits  Social/Functional History  Social/Functional History  Lives With: Alone  Type of Home: House  Home Layout: Multi-level, Bed/Bath upstairs, Able to Live on Main level with bedroom/bathroom, Laundry in basement(Family have obtained Hospital Bed for main level. Full bath on main level.)  Home Access: Stairs to enter with rails(Pt reports 10-11 steps to enter.)  Bathroom Shower/Tub: Tub/Shower unit, Walk-in shower(Walk-In Shower Main level.)  Bathroom Toilet: Standard  Bathroom Equipment: (No DME.)  Bathroom Accessibility: Accessible  Home Equipment: Sock aid, [address] bed(Dtr had hosp bed from previous episode and placed in pt's family room on first floor)  ADL Assistance: Independent  Ambulation Assistance: Independent(Without assist device prior recent back surg.)  Transfer Assistance: Independent  Active : Yes  Occupation: Retired  Type of occupation: Retired : . Leisure & Hobbies: out to eat with lady friend, travelled to [place], [address] [place] [address]  Additional Comments: Information pertains to prior recent back surg (8/31/2020), following d/c to SNF setting [facility]). Pt reports that he was only at SNF setting few days following surg, did have a fall while there (reports call light to go to bathroom was not answered and he tried to get OOB by self). Cognition   Cognition  Overall Cognitive Status: WFL    Objective     Observation/Palpation  Observation: Healing  Surg Incisions : Abdominal Midline (x1); L/S (x2). Hematoma B Lat Abdom/Flank region. Strength RLE  Comment: Hip Flex  4/5; Knee Ext 4/5, Knee Flex  4/5; Ankle 4/5. Strength LLE  Comment: Hip 4-4/5; Knee Ext 4/5, Knee Flex  4- 4/5; Ankle 3+/5. Mild weak hip/knee, more noticeable weak ankle. Sensation  Overall Sensation Status: Impaired(Impaired B Feet.   Pt does report recent onset discomfort/burning sensation L LE (following surg). )  Bed mobility  Supine to Sit: Minimal assistance(Bed flat via sidelying. Use of bedrail.)  Sit to Supine: Moderate assistance(Bed flat. Assist LEs. Use of Bedrail.)  Transfers  Sit to Stand: Contact guard assistance(Very Close CGA. Cues for safe hand placement.)  Stand to sit: Contact guard assistance(Very Close CGA. Cues for safe hand placement.)  Ambulation  Ambulation?: Yes  Ambulation 1  Surface: level tile  Device: Rolling Walker  Assistance: Minimal assistance  Distance: 5-6 steps chair to bed (pt in chair upon PT arrival), additional 8' and 10' following LE Exs supine. Min assist although very close guard. Very diminished step length/clearance and narrow martinez. LE cont mild shaking/quivering with onset fatigue. Cues given for slow/control turn as transition to chair. Exercises  Quad Sets: 20x  Heelslides: 10x B LEs with mild resist given. Gluteal Sets: 20x  Hip Flexion: 10x B LEs. Knee Long Arc Quad: 10x B LEs. Ankle Pumps: 20x     Plan   Plan  Times per week: 3-5x week while in acute care setting. Current Treatment Recommendations: Strengthening, Functional Mobility Training, Transfer Training, Gait Training, Safety Education & Training, Patient/Caregiver Education & Training  Safety Devices  Type of devices: Call light within reach, Chair alarm in place, Left in chair, Nurse notified         AM-PAC Score  AM-PAC Inpatient Mobility Raw Score : 16 (09/18/20 1233)  AM-PAC Inpatient T-Scale Score : 40.78 (09/18/20 1233)  Mobility Inpatient CMS 0-100% Score: 54.16 (09/18/20 1233)  Mobility Inpatient CMS G-Code Modifier : CK (09/18/20 1233)          Goals  Short term goals  Time Frame for Short term goals: Upon d/c acute care setting. Short term goal 1: Bed Mob SBA  Short term goal 2: Transfers with assist device CGA. Short term goal 3: Amb with assist device 48' CGA.   Patient Goals   Patient goals : Get better and be able to return to own home.       Therapy Time   Individual Concurrent Group Co-treatment   Time In 1100         Time Out 1145         Minutes [facility] [place]

## 2020-09-19 LAB
ANION GAP SERPL CALCULATED.3IONS-SCNC: 6 MMOL/L (ref 3–16)
BASOPHILS ABSOLUTE: 0 K/UL (ref 0–0.2)
BASOPHILS RELATIVE PERCENT: 0.3 %
BUN BLDV-MCNC: 27 MG/DL (ref 7–20)
CALCIUM SERPL-MCNC: 8.5 MG/DL (ref 8.3–10.6)
CHLORIDE BLD-SCNC: 109 MMOL/L (ref 99–110)
CO2: 24 MMOL/L (ref 21–32)
CREAT SERPL-MCNC: 1.2 MG/DL (ref 0.8–1.3)
EOSINOPHILS ABSOLUTE: 0.2 K/UL (ref 0–0.6)
EOSINOPHILS RELATIVE PERCENT: 4.2 %
GFR AFRICAN AMERICAN: >60
GFR NON-AFRICAN AMERICAN: 59
GLUCOSE BLD-MCNC: 86 MG/DL (ref 70–99)
HCT VFR BLD CALC: 23.1 % (ref 40.5–52.5)
HEMOGLOBIN: 7.6 G/DL (ref 13.5–17.5)
LV EF: 50 %
LVEF MODALITY: NORMAL
LYMPHOCYTES ABSOLUTE: 0.7 K/UL (ref 1–5.1)
LYMPHOCYTES RELATIVE PERCENT: 14.3 %
MCH RBC QN AUTO: 31.1 PG (ref 26–34)
MCHC RBC AUTO-ENTMCNC: 32.8 G/DL (ref 31–36)
MCV RBC AUTO: 94.8 FL (ref 80–100)
MONOCYTES ABSOLUTE: 0.6 K/UL (ref 0–1.3)
MONOCYTES RELATIVE PERCENT: 13.1 %
NEUTROPHILS ABSOLUTE: 3.3 K/UL (ref 1.7–7.7)
NEUTROPHILS RELATIVE PERCENT: 68.1 %
PDW BLD-RTO: 17.1 % (ref 12.4–15.4)
PLATELET # BLD: 121 K/UL (ref 135–450)
PMV BLD AUTO: 9 FL (ref 5–10.5)
POTASSIUM REFLEX MAGNESIUM: 3.7 MMOL/L (ref 3.5–5.1)
PREALBUMIN: 12 MG/DL (ref 20–40)
RBC # BLD: 2.44 M/UL (ref 4.2–5.9)
SODIUM BLD-SCNC: 139 MMOL/L (ref 136–145)
WBC # BLD: 4.9 K/UL (ref 4–11)

## 2020-09-19 PROCEDURE — 97530 THERAPEUTIC ACTIVITIES: CPT

## 2020-09-19 PROCEDURE — 36592 COLLECT BLOOD FROM PICC: CPT

## 2020-09-19 PROCEDURE — 36415 COLL VENOUS BLD VENIPUNCTURE: CPT

## 2020-09-19 PROCEDURE — 97162 PT EVAL MOD COMPLEX 30 MIN: CPT

## 2020-09-19 PROCEDURE — 97535 SELF CARE MNGMENT TRAINING: CPT

## 2020-09-19 PROCEDURE — 84134 ASSAY OF PREALBUMIN: CPT

## 2020-09-19 PROCEDURE — 1280000000 HC REHAB R&B

## 2020-09-19 PROCEDURE — 6360000002 HC RX W HCPCS: Performed by: PHYSICAL MEDICINE & REHABILITATION

## 2020-09-19 PROCEDURE — 6370000000 HC RX 637 (ALT 250 FOR IP): Performed by: PHYSICAL MEDICINE & REHABILITATION

## 2020-09-19 PROCEDURE — 97116 GAIT TRAINING THERAPY: CPT

## 2020-09-19 PROCEDURE — 93306 TTE W/DOPPLER COMPLETE: CPT

## 2020-09-19 PROCEDURE — 97110 THERAPEUTIC EXERCISES: CPT

## 2020-09-19 PROCEDURE — 2580000003 HC RX 258: Performed by: PHYSICAL MEDICINE & REHABILITATION

## 2020-09-19 PROCEDURE — 97166 OT EVAL MOD COMPLEX 45 MIN: CPT

## 2020-09-19 PROCEDURE — 85025 COMPLETE CBC W/AUTO DIFF WBC: CPT

## 2020-09-19 PROCEDURE — 80048 BASIC METABOLIC PNL TOTAL CA: CPT

## 2020-09-19 RX ADMIN — FERROUS SULFATE TAB EC 324 MG (65 MG FE EQUIVALENT) 324 MG: 324 (65 FE) TABLET DELAYED RESPONSE at 08:29

## 2020-09-19 RX ADMIN — DULOXETINE HYDROCHLORIDE 30 MG: 30 CAPSULE, DELAYED RELEASE ORAL at 08:29

## 2020-09-19 RX ADMIN — AMIODARONE HYDROCHLORIDE 200 MG: 200 TABLET ORAL at 08:29

## 2020-09-19 RX ADMIN — HYDRALAZINE HYDROCHLORIDE 50 MG: 50 TABLET, FILM COATED ORAL at 00:28

## 2020-09-19 RX ADMIN — AMLODIPINE BESYLATE 10 MG: 10 TABLET ORAL at 08:29

## 2020-09-19 RX ADMIN — GABAPENTIN 100 MG: 100 CAPSULE ORAL at 20:41

## 2020-09-19 RX ADMIN — HYDRALAZINE HYDROCHLORIDE 50 MG: 50 TABLET, FILM COATED ORAL at 20:41

## 2020-09-19 RX ADMIN — PANTOPRAZOLE SODIUM 40 MG: 40 TABLET, DELAYED RELEASE ORAL at 05:46

## 2020-09-19 RX ADMIN — MEROPENEM 500 MG: 500 INJECTION, POWDER, FOR SOLUTION INTRAVENOUS at 02:15

## 2020-09-19 RX ADMIN — MEROPENEM 500 MG: 500 INJECTION, POWDER, FOR SOLUTION INTRAVENOUS at 20:41

## 2020-09-19 RX ADMIN — LOSARTAN POTASSIUM 100 MG: 100 TABLET, FILM COATED ORAL at 08:29

## 2020-09-19 RX ADMIN — HYDRALAZINE HYDROCHLORIDE 50 MG: 50 TABLET, FILM COATED ORAL at 14:23

## 2020-09-19 RX ADMIN — DULOXETINE HYDROCHLORIDE 30 MG: 30 CAPSULE, DELAYED RELEASE ORAL at 20:41

## 2020-09-19 RX ADMIN — SODIUM CHLORIDE, PRESERVATIVE FREE 10 ML: 5 INJECTION INTRAVENOUS at 20:44

## 2020-09-19 RX ADMIN — Medication 2 CAPSULE: at 17:16

## 2020-09-19 RX ADMIN — MEROPENEM 500 MG: 500 INJECTION, POWDER, FOR SOLUTION INTRAVENOUS at 08:43

## 2020-09-19 RX ADMIN — OXYCODONE HYDROCHLORIDE AND ACETAMINOPHEN 1 TABLET: 5; 325 TABLET ORAL at 05:45

## 2020-09-19 RX ADMIN — OXYCODONE HYDROCHLORIDE AND ACETAMINOPHEN 1 TABLET: 5; 325 TABLET ORAL at 21:03

## 2020-09-19 RX ADMIN — GABAPENTIN 100 MG: 100 CAPSULE ORAL at 08:28

## 2020-09-19 RX ADMIN — ATORVASTATIN CALCIUM 40 MG: 40 TABLET, FILM COATED ORAL at 20:41

## 2020-09-19 RX ADMIN — METOPROLOL TARTRATE 50 MG: 50 TABLET, FILM COATED ORAL at 08:28

## 2020-09-19 RX ADMIN — MEROPENEM 500 MG: 500 INJECTION, POWDER, FOR SOLUTION INTRAVENOUS at 14:37

## 2020-09-19 RX ADMIN — GABAPENTIN 100 MG: 100 CAPSULE ORAL at 14:23

## 2020-09-19 RX ADMIN — Medication 2 CAPSULE: at 08:29

## 2020-09-19 RX ADMIN — HYDRALAZINE HYDROCHLORIDE 25 MG: 25 TABLET, FILM COATED ORAL at 17:36

## 2020-09-19 RX ADMIN — LATANOPROST 1 DROP: 50 SOLUTION OPHTHALMIC at 20:42

## 2020-09-19 RX ADMIN — HYDRALAZINE HYDROCHLORIDE 50 MG: 50 TABLET, FILM COATED ORAL at 05:45

## 2020-09-19 RX ADMIN — SODIUM CHLORIDE, PRESERVATIVE FREE 10 ML: 5 INJECTION INTRAVENOUS at 02:26

## 2020-09-19 RX ADMIN — METOPROLOL TARTRATE 50 MG: 50 TABLET, FILM COATED ORAL at 20:41

## 2020-09-19 RX ADMIN — LEVOTHYROXINE SODIUM 50 MCG: 0.05 TABLET ORAL at 05:46

## 2020-09-19 RX ADMIN — OXYCODONE HYDROCHLORIDE AND ACETAMINOPHEN 1 TABLET: 5; 325 TABLET ORAL at 00:30

## 2020-09-19 RX ADMIN — DORZOLAMIDE HYDROCHLORIDE AND TIMOLOL MALEATE 1 DROP: 20; 5 SOLUTION/ DROPS OPHTHALMIC at 09:39

## 2020-09-19 RX ADMIN — DORZOLAMIDE HYDROCHLORIDE AND TIMOLOL MALEATE 1 DROP: 20; 5 SOLUTION/ DROPS OPHTHALMIC at 20:42

## 2020-09-19 RX ADMIN — SODIUM CHLORIDE, PRESERVATIVE FREE 10 ML: 5 INJECTION INTRAVENOUS at 09:39

## 2020-09-19 RX ADMIN — OXYCODONE HYDROCHLORIDE AND ACETAMINOPHEN 1 TABLET: 5; 325 TABLET ORAL at 10:08

## 2020-09-19 ASSESSMENT — PAIN DESCRIPTION - ORIENTATION
ORIENTATION: RIGHT;LEFT

## 2020-09-19 ASSESSMENT — PAIN DESCRIPTION - PAIN TYPE
TYPE: ACUTE PAIN

## 2020-09-19 ASSESSMENT — PAIN DESCRIPTION - LOCATION
LOCATION: LEG

## 2020-09-19 ASSESSMENT — PAIN SCALES - GENERAL
PAINLEVEL_OUTOF10: 7
PAINLEVEL_OUTOF10: 0
PAINLEVEL_OUTOF10: 0
PAINLEVEL_OUTOF10: 9
PAINLEVEL_OUTOF10: 8
PAINLEVEL_OUTOF10: 0
PAINLEVEL_OUTOF10: 7
PAINLEVEL_OUTOF10: 8
PAINLEVEL_OUTOF10: 0

## 2020-09-19 ASSESSMENT — PAIN DESCRIPTION - FREQUENCY
FREQUENCY: INTERMITTENT
FREQUENCY: CONTINUOUS

## 2020-09-19 ASSESSMENT — PAIN DESCRIPTION - DESCRIPTORS
DESCRIPTORS: ACHING
DESCRIPTORS: ACHING;DISCOMFORT
DESCRIPTORS: ACHING
DESCRIPTORS: ACHING;DISCOMFORT

## 2020-09-19 ASSESSMENT — PAIN DESCRIPTION - PROGRESSION: CLINICAL_PROGRESSION: NOT CHANGED

## 2020-09-19 ASSESSMENT — PAIN - FUNCTIONAL ASSESSMENT: PAIN_FUNCTIONAL_ASSESSMENT: ACTIVITIES ARE NOT PREVENTED

## 2020-09-19 NOTE — PROGRESS NOTES
Occupational Therapy   Occupational Therapy Initial Assessment  Date: 2020   Patient Name: Rocael العراقي. MRN: [id number]     : 1945    Date of Service: 2020    Discharge Recommendations:  Continue to assess pending progress, S Level 1, Home with Home health OT, Home with assist PRN  OT Equipment Recommendations  Equipment Needed: Yes  Mobility Devices: ADL Assistive Devices  Other: Assess for shower chair, long handle sponge, reacher, shoe horn, sock aide, leg  grab bars, TSF    Assessment   Performance deficits / Impairments: Decreased functional mobility ; Decreased endurance;Decreased ADL status; Decreased balance;Decreased strength;Decreased safe awareness;Decreased high-level IADLs  Assessment: Patient is a 67 yo M with pmh Afib, CAD, HTN, HLD, and recent lumbar spine surgery who initially presented from SNF on 2020 with fever 105 and altered mental status. Today pt performed sponge bathe ADL session @ sink, requiring max A for LB bathing & dep for LB dressing D/T spinal precautions. Pt was SBA for UB bathing & dressing. Pt completed toilet transfer using grab bars, RW, CGA. Pt dep for toileting D/T spinal precautions, had LOB while SOT performed hygeine. Pt functional mobility significantly slow. Overall weakness, fatigue, & LLE pain limited pt to complete ADL tasks. Pt would benefit from inpatient OT rehabilitation services in order to return to prior independent function, resotre quality of life, & have a safe discharge home. Treatment Diagnosis: Impaired: ADL/IADL function, balance, functional mobility, endurance/strength, safety awareness. Prognosis: Good  Decision Making: Medium Complexity  History: \"Patient is a 67 yo M with pmh Afib, CAD, HTN, HLD, and recent lumbar spine surgery who initially presented from SNF on 2020 with fever 105 and altered mental status.  Found to have sepsis due to UTI, urinary retention, metabolic encephalopathy, and acute hypoxic respiratory failure. \" Copied per Dr. Charissa Rosado 9/18/2020 note. Pt lives alone in multilevel house, w/ 10-11 stairs to enter (railing on R side). Pt was independent w/ ADL/IADL tasks prior to admission. Exam: ADL/IADL function, balance, functional mobility, strength, endurance, cognition, UE assessment: ROM/strength/coord  Assistance / Modification: Pt sponge bathed @ sink vs shower. Pt dep for LB dressing & max A bathing. Pt SBA for UB dressing/bathing & grooming. Pt using RW, CGA for functional mob/transfers with cues. Much increased time required for tasks. OT Education: OT Role;Plan of Care;Equipment;Precautions; ADL Adaptive Strategies;Transfer Training;Energy Conservation  REQUIRES OT FOLLOW UP: Yes  Activity Tolerance  Activity Tolerance: Patient limited by pain; Patient limited by fatigue  Activity Tolerance: Pt rated pain 7/10 in LLE at beginning of session. Pt too fatigued to take shower, therefore pt sponge bathed. Pt tolerated ~3-4 mins of standing for toilet hygiene & to thread catheter through pants. Safety Devices  Safety Devices in place: Yes  Type of devices: Nurse notified;Call light within reach; Bed alarm in place; Left in bed;Patient at risk for falls;Gait belt           Patient Diagnosis(es): There were no encounter diagnoses. has a past medical history of Aneurysm, aortic ([address] Atrial fibrillation ([address] Glaucoma, Heart attack ([address] Hyperlipidemia, and Hypertension. has a past surgical history that includes Coronary angioplasty with stent; Colonoscopy; Eye surgery (Right, 7/23/2020); and Intracapsular cataract extraction (Right, 7/23/2020). Treatment Diagnosis: Impaired: ADL/IADL function, balance, functional mobility, endurance/strength, safety awareness.       Restrictions  Restrictions/Precautions  Restrictions/Precautions: Fall Risk  Position Activity Restriction  Spinal Precautions: No Bending, No Lifting, No Twisting(10 lb lifting limit per pt.)  Other position/activity restrictions: O2 2L via NC. Subjective   General  Chart Reviewed: Yes, Orders, History and Physical, Progress Notes  Patient assessed for rehabilitation services?: Yes  Additional Pertinent Hx: \"Patient is a 67 yo M with pmh Afib, CAD, HTN, HLD, and recent lumbar spine surgery who initially presented from SNF on 9/13/2020 with fever 105 and altered mental status. Found to have sepsis due to UTI, urinary retention, metabolic encephalopathy, and acute hypoxic respiratory failure. \" Per Dr. Richmond Postin 9/19/2020 note. Family / Caregiver Present: No  Referring Practitioner: Dr. Nadeem Ronquillo  Diagnosis: sepsis, metabolic encephalopathy, UTI  Subjective  Subjective: Pt met in room, seated in recliner. Pt rated pain 7/10 in LLE. Pt agreeable to do sponge bathe @ sink. Declined wanting to shower. Stated he felt fatigued.     Social/Functional History  Social/Functional History  Lives With: Alone  Type of Home: House  Home Layout: Multi-level, Bed/Bath upstairs, Able to Live on Main level with bedroom/bathroom, Laundry in basement  Home Access: Stairs to enter with rails  Entrance Stairs - Number of Steps: 10-11  Entrance Stairs - Rails: Right  Bathroom Shower/Tub: Tub/Shower unit, Walk-in shower(walk in shower on the main level)  Bathroom Toilet: Standard(sink counter by toilet, comfort ht toilet)  Bathroom Equipment: (No DME)  Bathroom Accessibility: [place] accessible(Pt says possibly a w/c or walker could fit through doorway)  Home Equipment: Sock aid, [address] bed(Hospital in family room on 1st floor)  ADL Assistance: Independent  Homemaking Assistance: Independent  Homemaking Responsibilities: Yes  Meal Prep Responsibility: Primary  Laundry Responsibility: Primary  Cleaning Responsibility: Primary  Bill Paying/Finance Responsibility: Primary  Health Care Management: Primary  Ambulation Assistance: Independent  Transfer Assistance: Independent  Active : Yes  Mode of Transportation: Car  Occupation: of LEs D/T spinal precautions. Pt stood w/ RW while SOT washed buttocks. S/OT used green wipes to clean mendoza catheter per protocol.)  UE Dressing: Stand by assistance;Setup(Pt donned tshirt seated in w/c)  LE Dressing: Dependent assistance(SOT donned/doffed pants, socks, & shoes D/T spinal precautions.)  Toileting: Other (Comment)(Pt has mendoza catheter, attempted BM, no results. SOT assist for don/doff pants & hygiene.)  Additional Comments: Pt reminded of spinal precautions, recommend AE for bathing & dressing. Pt complained of own pants too tight around the waist, therefore OT assisted to change into hospital pants. Pt left in bed per request D/T fatigue. Tone RUE  RUE Tone: Normotonic  Tone LUE  LUE Tone: Normotonic  Coordination  Movements Are Fluid And Coordinated: Yes     Bed mobility  Sit to Supine:  Moderate assistance(assist for BLEs cues for log roll technique, & did not fully adhere to this)  Scooting: Stand by assistance        Cognition  Overall Cognitive Status: WFL  Perception  Overall Perceptual Status: WFL     Sensation  Overall Sensation Status: WFL(UE WFL, pins & needles in LLE)        LUE AROM (degrees)  LUE AROM : WNL  Left Hand AROM (degrees)  Left Hand AROM: WNL  RUE AROM (degrees)  RUE AROM : WNL  Right Hand AROM (degrees)  Right Hand AROM: WNL  LUE Strength  Gross LUE Strength: WFL  L Hand General: 4+/5  LUE Strength Comment: Tested up 3+/5 D/T spinal precautions  RUE Strength  Gross RUE Strength: WFL  R Hand General: 4+/5  RUE Strength Comment: Tested up 3+/5 D/T spinal precautions     Hand Dominance  Hand Dominance: Right  Left Hand Strength -  (lbs)  Handle Setting 2: 55lbs (average), norm for males is 55lbs  Right Hand Strength -  (lbs)  Handle Setting 2: 65lbs (average), norm is 65.4lbs             Plan   Plan  Times per week: 5-6x  Times per day: Twice a day  Plan weeks: 2.5 weeks from 9/19/20  Current Treatment Recommendations: Strengthening, Patient/Caregiver Education & Training, Home Management Training, Equipment Evaluation, Education, & procurement, Balance Training, Functional Mobility Training, Endurance Training, Safety Education & Training, Self-Care / ADL, Wheelchair Mobility Training, Neuromuscular Re-education  Plan Comment: Wednesday Conference               Goals  Short term goals  Time Frame for Short term goals: 1-1.5 weeks from eval 9/19/2020  Short term goal 1: Pt will bathe min A to CGA using AE & shower chair. Short term goal 2: Pt will dress upper body set-up, lower body min A using AE(except melody hose if ordered). Short term goal 3: Pt will toilet CGA to void vs min-mod A for BM w/toilet aid if needed. Short term goal 4: Pt will complete functional transfers/mob using RW/DME with CGA to SBA. Short term goal 5: Pt will perform UE ther exercises/HEP to increase activity tolerance/strength/endurance to tolerate standing for 6-7 minutes for ADL/simple IADL task. Short term goal 6: Pt will recall/follow spinal precautions with only occassional reminders during ADL/mob tasks. .  Long term goals  Time Frame for Long term goals : 1.5-2.5 weeks from 9/19/20  Long term goal 1: Pt will bathe modified independent using AE & shower chair. Long term goal 2: Pt will dress modified independent using AE.(except assist for melody hose if ordered)  Long term goal 3: Pt will toilet modified independent w/ AE. Long term goal 4: Pt will complete functional transfers/mob using RW & grab bars, modified independent. Long term goal 5: Pt will perform UE ther exercises/HEP to increase activity tolerance/strength/safety to complete IADL tasks, such as cleaning & simple meal prep. Patient Goals   Patient goals : \"I want to get out of here and get walking. \" Above goals will work toward this goal.       Therapy Time   Individual Concurrent Group Co-treatment   Time In 1400         Time Out 1600         Minutes 120         Timed Code Treatment Minutes: 105 Minutes+ 15 aneta Lewis Saturnino Rodgers S/OT  Therapist was present, directed patients care, made skilled judgement, and was responsible for assessment and treatment of the patient.       Ed Pals, OTR/L #[id number]

## 2020-09-19 NOTE — PROGRESS NOTES
Physical Therapy    Facility/Department: [address] IP REHAB  Initial Assessment    NAME: Elizabeth Gonzalez Sr.  : 1945  MRN: [id number]    Date of Service: 2020    Discharge Recommendations:  Continue to assess pending progress, Patient would benefit from continued therapy after discharge, 24 hour supervision or assist   PT Equipment Recommendations  Equipment Needed: Yes  Mobility Devices: Julia Italia: Rolling  Other: will continue to assess for further equipment needs    Assessment   Body structures, Functions, Activity limitations: Decreased functional mobility ; Decreased strength;Decreased ROM; Decreased endurance;Decreased balance  Assessment: 67 y/o male admit from SNF setting 2020 with Altered Mental Status, Severe Sepsis, Acute Renal Failure, and Pelvic Hematoma. CT Head negative. CT Abd/Pelvis + L Pelvic Hematoma. Urology consult for Reeves Placement. PMH as noted including Recent Back Surg (L4/L5 and L5/S1 Ant/Post Fusion, 2020), CAD, Angio with Stent, A-Fib, Aortic Aneurysm. Pt admit from SNF setting where admit following recent back surg. Prior pt living alone in own multi level home. Family has obtained hospital bed and has full bath on main level. Pt/family apparently do not want pt to return to [facility] [place] SNF setting, desire In-Pt Rehab setting. 2020 Status:  Pt demonstrates poor activity tolerance and reduced strength. Pt is oriented x4, but dozes in and out of sleep during therapy and is easily awoken. Pt is currently on 2L of O2, but nurse gave permission to take off during therapy and SpO2 was frequently checked after activies. He is Winter for ambulation with RW, SBA-ModA requiring increased assistance secondary to fatigue, and is CGA for transfers. Pt would benefit from skilled therapy to increase safety awareness for precautions and transfers, improve endurance, and build LE strength for success with functional mobility.   Treatment Diagnosis: LE weakness, adherence to safety precautions, poor activity tolerance, poor balance  Prognosis: Good  Decision Making: Medium Complexity  History: 65 y/o male admit from SNF setting 9/13/2020 with Altered Mental Status, Severe Sepsis, Acute Renal Failure, and Pelvic Hematoma. CT Head negative. CT Abd/Pelvis + L Pelvic Hematoma. Urology consult for Reeves Placement. PMH as noted including Recent Back Surg (L4/L5 and L5/S1 Ant/Post Fusion, 8/31/2020), CAD, Angio with Stent, A-Fib, Aortic Aneurysm. Exam: See above. Clinical Presentation: evolving  PT Education: Goals;PT Role;Orientation;General Safety;Gait Training;Plan of Care;Equipment;Transfer Training;Weight-bearing Education; Adaptive Device Training  Barriers to Learning: Medications slightly cloud cognition  REQUIRES PT FOLLOW UP: Yes  Activity Tolerance  Activity Tolerance: Patient Tolerated treatment well;Patient limited by pain; Patient limited by endurance       Patient Diagnosis(es): There were no encounter diagnoses. has a past medical history of Aneurysm, aortic ([address] Atrial fibrillation ([address] Glaucoma, Heart attack ([address] Hyperlipidemia, and Hypertension. has a past surgical history that includes Coronary angioplasty with stent; Colonoscopy; Eye surgery (Right, 7/23/2020); and Intracapsular cataract extraction (Right, 7/23/2020). Restrictions  Restrictions/Precautions  Restrictions/Precautions: Fall Risk  Position Activity Restriction  Spinal Precautions: No Bending, No Lifting, No Twisting(10 lb lifting limit per pt.)  Other position/activity restrictions: O2 2L via NC. Vision/Hearing  Vision: Within Functional Limits(wears reading glasses for reading)  Hearing: Within functional limits       Subjective  General  Chart Reviewed: Yes  Patient assessed for rehabilitation services?: Yes  Additional Pertinent Hx: 65 y/o male admit from SNF setting 9/13/2020 with Altered Mental Status, Severe Sepsis, Acute Renal Failure, and Pelvic Hematoma.   CT Head Assistance: Independent  Homemaking Assistance: Independent  Ambulation Assistance: Independent(Without assist device prior recent back surg.)  Transfer Assistance: Independent  Active : Yes  Mode of Transportation: Car  Occupation: Retired  Type of occupation: Retired : . (has been retired 15 years)  Leisure & Hobbies: out to eat with lady friend, travelled to [address], was walking about a mile every 2 days before he got sick  Additional Comments: Information pertains to prior recent back surg (8/31/2020), following d/c to SNF setting [facility]). Pt reports that he was only at SNF setting few days following surg, did have a fall while there (reports call light to go to bathroom was not answered and he tried to get OOB by self). Pt reports that he has not been home since surgery at [facility].    Objective     Observation/Palpation  Observation: Healing  Surg Incisions : Abdominal Midline (x1); L/S (x2). Hematoma B Lat Abdom/Flank region.   Pt has mendoza catheter placed    AROM RLE (degrees)  RLE AROM: WFL  RLE General AROM: reduced ankle DF AROM  AROM LLE (degrees)  LLE AROM : WFL  AROM RUE (degrees)  RUE AROM : WFL  AROM LUE (degrees)  LUE AROM : WFL     Strength RLE  R Hip Flexion: 4/5  R Knee Flexion: 4+/5  R Knee Extension: 4+/5  R Ankle Dorsiflexion: 4/5  Strength LLE  L Hip Flexion: 4-/5  L Knee Flexion: 4/5  L Knee Extension: 4+/5  L Ankle Dorsiflexion: 3+/5  Strength RUE  Strength RUE: WFL  R Shoulder Flexion: 4+/5  R Elbow Flexion: 5/5  R Elbow Extension: 5/5  Strength LUE  Strength LUE: WFL  L Shoulder Flexion: 4+/5  L Elbow Flexion: 5/5  L Elbow Extension: 5/5        Sensation  Overall Sensation Status: (complains of pins and needles sensation that shoots down his L LE into feet)    Bed mobility  Rolling to Left: Stand by assistance(Instructed to use log roll technique so that legs and trunk move more synchronously without putting increased strain on back)  Rolling to Right: Stand by assistance  Supine to Sit: Stand by assistance;Contact guard assistance(Taught to allow momentum of legs hanging off EoB to assist in sitting upright)  Sit to Supine: Moderate assistance(Pt was fatigued after assessing ROM and MMTs, required assistance to lay flat in bed.)  Scooting: Stand by assistance(taught to bend legs and grab railings to scoot up in bed)  Comment: Instructed on log roll technique to put less strain on back when rolling on side. Pt with complaints of diziness when sitting up and in standing which was persistent throughout therapy session. Vitals assessed which were stable    Transfers  Sit to Stand: Contact guard assistance(cues for safe hand placement for push off)  Stand to sit: Contact guard assistance(Cuing for backing up to chair and safe hand placement)  Comment: Pt with complaints of diziness when sitting up and in standing which was persistent throughout therapy session. Vitals assessed which were stable    Ambulation  Ambulation?: Yes  Ambulation 1  Surface: level tile  Device: Rolling Walker  Assistance: Minimal assistance(steadying assistance)  Quality of Gait: Pt ambulates with forward flexed posture and cuing to stand up straight. Takes small shuffling steps with decreased kay, step height and step length. Ambulates with more of a step-to pattern.   Gait Deviations: Slow Kay;Decreased step length;Decreased step height;Shuffles  Distance: 12' in room from EoB to recliner  Comments: vitals assessed after ambulation: BP - 136/63, HR - 70 BPM, SpO2 - 91%          Exercises  Heelslides: 1x10 on BLEs  Hip Abduction: 1x10 hip abduction slides on BLEs, 1x10 hip adduction sets  Ankle Pumps: 1x20 on BLEs  Comments: Exercises performed in recliner with LEs up       Plan   Plan  Times per week: 5-6x/week  Times per day: Twice a day  Plan weeks: 2-3 weeks  Current Treatment Recommendations: Strengthening, Home Exercise Program, Safety Education & Training, Balance Training, Endurance Training, Patient/Caregiver Education & Training, Functional Mobility Training, Transfer Training, Gait Training, Stair training  Safety Devices  Type of devices: All fall risk precautions in place, Call light within reach, Chair alarm in place, Gait belt, Patient at risk for falls, Left in chair  Restraints  Initially in place: No    Goals  Short term goals  Time Frame for Short term goals: 1 week  Short term goal 1: Pt will perform bed mobility with supervision  Short term goal 2: Pt will be CGA-Winter for transfers with LRAD  Short term goal 3: Pt will ambulate 48' using LRAD with CGA  Short term goal 4: Pt will ascend/descend 4 stairs with CGA using railing  Short term goal 5: Pt will ascend/descend curb step using LRAD with CGA  Long term goals  Time Frame for Long term goals : 2-3 weeks  Long term goal 1: Pt will be Modif I for bed mobility  Long term goal 2: Pt will perform transfers with Modif I using LRAD  Long term goal 3: Pt will ambulate 150' using LRAD with Modif I  Long term goal 4: Pt will ascend/descend 12 stairs with Modif I using railing  Long term goal 5: Pt will ascend/descend curb using LRAD with Modif I  Patient Goals   Patient goals : Just want to get back to living on my own at home       Therapy Time   Individual Concurrent Group Co-treatment   Time In 0810         Time Out 0940         Minutes 90               Electronically signed by Marcelino Fitzgerald on 9/19/2020 at 1:18 PM   Therapist was present, directed the patient's care, made skilled judgement, and was responsible for assessment and treatment of the patient.       Electronically signed by Edward Aguilera, PT on 9/19/2020 at 2:02 PM

## 2020-09-19 NOTE — PROGRESS NOTES
Patient oxygen sat was 95% w/ O2 @ 2LPM/nc; stated he was SOB overnight. O2 sat 92 - 93 % on RA. Lungs clear but diminished t/o. Spoke w/ Dr. Wally Au (on call); received order for respiratory consult d/t hx of COPD w/ current issues.

## 2020-09-19 NOTE — PLAN OF CARE
mile every 2 days before he got sick     CARE PLAN     NURSING:  Anny Carbajal Sr. while on this unit will:     [x] Be continent of bowel and bladder     [x] Have an adequate number of bowel movements  [x] Urinate with no urinary retention >300ml in bladder  [x] Complete bladder protocol with mendoza removal  [x] Maintain O2 SATs at 92%  [x] Have pain managed while on ARU       [] Be pain free by discharge   [x] Have no skin breakdown while on ARU  [] Have improved skin integrity via wound measurements  [x] Have no signs/symptoms of infection at the incision site  [x] Be free from injury during hospitalization   [x] Complete education with patient/family with understanding demonstrated for:  [x] Adjustment   [x] Other Back safety/precautions, COPD, PICC line care. Nursing interventions may include bowel/bladder training, education for medical assistive devices, medication education, O2 saturation management, energy conservation, stress management techniques, fall prevention, alarms protocol, seating and positioning, skin/wound care, pressure relief instruction,dressing changes,  infection protection, DVT prophylaxis, and/or assistance with in room safety with transfers to bed, toilet, wheelchair, shower as well as bathroom activities and hygiene.      Patient/caregiver education for:   [x] Disease/sustained injury/management      [x] Medication Use   [x] Surgical intervention   [x] Safety   [x] Body mechanics and or joint protection   [x] Health maintenance         PHYSICAL THERAPY:  Goals:                  Short term goals  Time Frame for Short term goals: 1 week  Short term goal 1: Pt will perform bed mobility with supervision  Short term goal 2: Pt will be CGA-Winter for transfers with LRAD  Short term goal 3: Pt will ambulate 48' using LRAD with CGA  Short term goal 4: Pt will ascend/descend 4 stairs with CGA using railing  Short term goal 5: Pt will ascend/descend curb step using LRAD with CGA            Long independent w/ AE. Long term goal 4: Pt will complete functional transfers/mob using RW & grab bars, modified independent. Long term goal 5: Pt will perform UE ther exercises/HEP to increase activity tolerance/strength/safety to complete IADL tasks, such as cleaning & simple meal prep. :    These goals were reviewed with this patient at the time of assessment and [employer]. is in agreement    Plan of Care:  Pt to be seen 90  mins per day for 5 day/week 2.5 weeks. SPEECH THERAPY: Goals will be left blank if speech is not following this patient. Goals:                                                                 CASE MANAGEMENT:  Goals:   Assist patient/family with discharge planning, patient/family counseling,   and coordination with insurance during ARU stay. Admission Period/Goal QIM CODES   QIM  Admit/Goal Score    Eating  6/6   Oral Hygiene  4/6   [address]  1/6   Shower/Bathe Self  2/6   Upper Body Dressing  4/6   Lower Body Dressing  1/6   Putting on/Taking off Footwear  1/3   Roll Left and Right  4/6   Sit to Lying  3/6   Lying to Sitting on Side of Bed  4/6   Sit to Stand  4/6   Chair/Bed to Chair Transfer  4/6   Toilet Transfer  4/6   Car Transfer  88/6   Walk 10 feet  3/6   Walk 50 feet with 2 Turns  88/6   Walk 150 feet with 2 turns  88/6   Walk 10 feet on Uneven Surfaces  88/6   1 Step  88/6   4 Steps  88/6   12 Steps  88/6   Picking up Object  88/6   Wheel 50 feet with 2 Turns   Type?  na   Wheel 150 feet with 2 Turns   Type?  reyna Sigala Sr. will be seen a minimum of 3 hours of therapy per day, a minimum of 5 out of 7 days per week. [] In this rare instance due to the nature of this patient's medical involvement, this patient will be seen 15 hours per week (900 minutes within a 7 day period).     Treatments may include therapeutic exercises, gait training, neuromuscular re-ed, transfer training, community reintegration, bed mobility, w/c mobility and training, self care, home mgmt, cognitive training, energy conservation,dysphagia tx, speech/language/communication therapy, group therapy, and patient/family education. In addition, dietician/nutritionist may monitor calorie count as well as intake and collaboratively work with SLP on dietary upgrades. Neuropsychology/Psychology may evaluate and provide necessary support. Medical issues being managed closely and that require 24 hour availability of a physician:   [] Swallowing Precautions  [x] Bowel/Bladder Fx  [] Weight bearing precautions   [x] Wound Care    [x] Pain Mgmt   [x] Infection Protection   [x] DVT Prophylaxis   [x] Fall Precautions  [x] Fluid/Electrolyte/Nutrition Balance   [] Voice Protection   [] Respiratory  [] Other:    Medical Prognosis: [x] Good  [] Fair    [] Guarded   Total expected IRF days 14  Anticipated discharge destination:    [x] Home Independently  [] Home with supervision    []SNF     [] Other                                           Physician anticipated functional outcomes:  Improve gait, transfers, self care to independent with DME as needed to allow safe return home      IPOC brief synthesis: Patient is a 67 yo M with pmh Afib, CAD, HTN, HLD, and recent lumbar spine surgery who initially presented from SNF on 9/13/2020 with fever 105 and altered mental status. Found to have sepsis due to UTI, urinary retention, metabolic encephalopathy, and acute hypoxic respiratory failure. Reeves placed by Urology due to false urethral passage. Treating with IV antibiotics per ID. Course further complicated by TONO, anemia, and uncontrolled HTN. Of note, patient recently underwent L4-5 and L5-S1 fusion with anterior and posterior approach (8/31/2020 with Dr. Martinez Monday and Dr. Rolando Watson). Post-op course during that admission complicated by left retroperitoneal/pelvic hematoma and acute blood loss anemia. He was discharged to [facility] 9/10.  Now presents to ARU with impairments including: generalized weakness + relative LLE weakness, decreased endurance, balance, cognition. He requires comprehensive inpatient rehab program in order to return to community setting. I have reviewed this initial plan of care and agree with its contents:    Title   Name    Date    Time    Physician: Angelia Gomez.  Jackie Singh MD 9/21/2020, 11:46 AM    Case Mgmt:    Jeff Mayer, [employer]     Case Management   [phone]    9/21/2020  5:03 PM      OT: Juanito Meza S/OT, Izaiah Jesus #[id number]    PT:Electronically signed by Michelle Simmons, SPT on 9/19/20 at 12:49 PM EDT/Electronically signed by Tyson Skiff, PT on 9/19/20 at 1:15 PM EDT    RN: Mekhi Sena RN, CRRN 09/20/2020 9:11 am    ST:    :  Franklin Pacheco ,ROSALIND  9/21/2020  [id number]    Other:

## 2020-09-19 NOTE — PLAN OF CARE
Problem: Falls - Risk of:  Goal: Will remain free from falls  Description: Will remain free from falls  9/19/2020 1335 by Alexander Young RN  Outcome: Ongoing  Note: Fall precautions in place. Bed/chair alarms on and functioning. Call light within reach; non skid footwear at all times. Monitored frequently  9/19/2020 0331 by Malathi Fraser RN  Note: Patient free from falls this shift. Fall precautions in place. Bed in low position with brake and alarm on. Non skids socks on. Belongings and call light within reach. Will continue to monitor for safety. Goal: Absence of physical injury  Description: Absence of physical injury  Outcome: Ongoing     Problem: Skin Integrity:  Goal: Will show no infection signs and symptoms  Description: Will show no infection signs and symptoms  Outcome: Ongoing  Goal: Absence of new skin breakdown  Description: Absence of new skin breakdown  Outcome: Ongoing  Note: Assessment completed. No new skin breakdown noted. Will continue to monitor.      Problem: Nutrition  Goal: Optimal nutrition therapy  Outcome: Ongoing     Problem: Pain:  Goal: Pain level will decrease  Description: Pain level will decrease  Outcome: Ongoing

## 2020-09-19 NOTE — DISCHARGE SUMMARY
Hospital Medicine Discharge Summary      Patient ID: Fany Corral [id number]     Patient's PCP: Nile Flor    Admit Date: 9/13/2020     Discharge Date: 9/18/2020      Admitting Physician: Dash Brantley MD    Discharge Physician: Rachel Montoya MD     Discharge Diagnoses: Active Hospital Problems    Diagnosis Date Noted    Acute cystitis without hematuria [N30.00]     Acute metabolic encephalopathy [J02.39]     Pelvic hematoma in male [N50.1]     Acute renal failure superimposed on chronic kidney disease ([address] [N17.9, N18.9]     Elevated LFTs [R94.5]     Abnormal chest x-ray [R93.89]          The patient was seen and examined on the day of discharge and this discharge summary is in conjunction with any daily progress note from day of discharge. HOSPITAL COURSE    Patient demographics:  The patient  Fany Corral is a 66 y.o. male      Significant past medical history:       Patient Active Problem List   Diagnosis    Sepsis ([address]    Lactic acidosis    Acute metabolic encephalopathy    Pelvic hematoma in male    Severe sepsis (HCC)    Acute renal failure superimposed on chronic kidney disease ([address]    Elevated LFTs    Abnormal chest x-ray    Acute cystitis without hematuria           Presenting symptoms: AMS     Diagnostic workup:   CT ABDOMEN PELVIS WO CONTRAST  CT HEAD WO CONTRAST        CONSULTS DURING ADMISSION :   IP CONSULT TO PULMONOLOGY  PHARMACY TO DOSE VANCOMYCIN  IP CONSULT TO UROLOGY  IP CONSULT TO INFECTIOUS DISEASES  IP CONSULT TO PHYSICAL MEDICINE REHAB  IP CONSULT TO NEUROSURGERY        Patient was diagnosed with:  Sepsis  Urinary retention with urethral false passage     Treatment while inpatient:  66years old male with a recent history of L4-5 L5-S1 fusion on 8/31 with a postoperative retroperitoneal hematoma. Patient presented to this hospital with altered mental status from skilled nursing facility.                  Patient was noted to have temperature of 105 degree in the emergency room with tachycardia and tachypnea. Patient was started on BiPAP     Patient was diagnosed with sepsis likely due to urinary tract infection. .  Patient's altered mental status was considered to be acute metabolic encephalopathy. Patient was also diagnosed with acute cystitis with urinary retention. Urology was consulted and patient underwent cystoscopy and was diagnosed with urinary retention with false urethral passage  Urology was consulted and patient underwent Reeves's catheter placement that is recommended to stay in until reevaluated by urology. Infectious disease was consulted since patient has several sources of sepsis including hematoma recent surgery and urinary tract infection. Neurosurgery was also informed about patient's admission and treatment and progress. Discharge Condition:  stable:     Discharged to:  skilled nursing  facility     Activity:   as tolerated:     Follow Up:  Patient will follow up with her primary care physician once discharged from the rehabilitation unit      Labs: For convenience and continuity at follow-up the following most recent labs are provided:      CBC:   Lab Results   Component Value Date    WBC 6.0 09/18/2020    HGB 7.8 09/18/2020    HCT 23.8 09/18/2020     09/18/2020       RENAL:   Lab Results   Component Value Date     09/18/2020    K 3.6 09/18/2020    K 4.0 09/13/2020     09/18/2020    CO2 23 09/18/2020    BUN 26 09/18/2020    CREATININE 1.3 09/18/2020           Discharge Medications:    Reggie Jasmine.  \"CAROLINE\"   Home Medication Instructions [id number]:[id number]    Printed on:09/18/20 1993   Medication Information                      amiodarone (CORDARONE) 200 MG tablet  Take 200 mg by mouth daily Indications: takes in the evening              atorvastatin (LIPITOR) 40 MG tablet  Take 40 mg by mouth daily             dorzolamide-timolol (COSOPT) 22.3-6.8 MG/ML ophthalmic solution  Place 1 drop into both eyes 2 times daily             DULoxetine (CYMBALTA) 30 MG extended release capsule  Take 30 mg by mouth 2 times daily             ferrous sulfate (IRON 325) 325 (65 Fe) MG tablet  Take 325 mg by mouth daily (with breakfast)             gabapentin (NEURONTIN) 100 MG capsule  Take 400 mg by mouth 3 times daily. hydrALAZINE (APRESOLINE) 50 MG tablet  Take 75 mg by mouth 2 times daily              latanoprost (XALATAN) 0.005 % ophthalmic solution  Place 1 drop into the left eye nightly             levothyroxine (SYNTHROID) 50 MCG tablet  Take 50 mcg by mouth Daily             losartan (COZAAR) 100 MG tablet  Take 100 mg by mouth daily             meropenem (MERREM) infusion  Infuse 500 mg intravenously every 6 hours Compound per protocol. metoprolol succinate (TOPROL XL) 100 MG extended release tablet  Take 100 mg by mouth daily              Multiple Vitamins-Minerals (ICAPS) TABS  Take 2 tablets by mouth daily             nitroGLYCERIN (NITROSTAT) 0.4 MG SL tablet  Place 0.4 mg under the tongue every 5 minutes as needed for Chest pain up to max of 3 total doses. If no relief after 1 dose, call 911. oxyCODONE (ROXICODONE) 5 MG immediate release tablet  Take 5 mg by mouth every 6 hours as needed for Pain.             pantoprazole (PROTONIX) 40 MG tablet  Take 40 mg by mouth daily             rivaroxaban (XARELTO) 20 MG TABS tablet  Take 20 mg by mouth daily (with breakfast)              senna (SENOKOT) 8.6 MG tablet  Take 2 tablets by mouth 2 times daily                    Time Spent on discharge is more than 30 min in the examination, evaluation, counseling and review of medications and discharge plan. Signed:  Luciana Jeans, MD   9/18/2020      Thank you Desean Pace for the opportunity to be involved in this patient's care.  If you have any questions or concerns please feel free to contact me at ([phone])

## 2020-09-20 LAB
ANION GAP SERPL CALCULATED.3IONS-SCNC: 9 MMOL/L (ref 3–16)
BASOPHILS ABSOLUTE: 0.1 K/UL (ref 0–0.2)
BASOPHILS RELATIVE PERCENT: 1.4 %
BUN BLDV-MCNC: 19 MG/DL (ref 7–20)
CALCIUM SERPL-MCNC: 8.6 MG/DL (ref 8.3–10.6)
CHLORIDE BLD-SCNC: 105 MMOL/L (ref 99–110)
CO2: 23 MMOL/L (ref 21–32)
CREAT SERPL-MCNC: 1 MG/DL (ref 0.8–1.3)
EOSINOPHILS ABSOLUTE: 0.2 K/UL (ref 0–0.6)
EOSINOPHILS RELATIVE PERCENT: 4.7 %
GFR AFRICAN AMERICAN: >60
GFR NON-AFRICAN AMERICAN: >60
GLUCOSE BLD-MCNC: 77 MG/DL (ref 70–99)
HCT VFR BLD CALC: 24.8 % (ref 40.5–52.5)
HEMOGLOBIN: 8 G/DL (ref 13.5–17.5)
LYMPHOCYTES ABSOLUTE: 0.6 K/UL (ref 1–5.1)
LYMPHOCYTES RELATIVE PERCENT: 15.8 %
MAGNESIUM: 1.9 MG/DL (ref 1.8–2.4)
MCH RBC QN AUTO: 30.4 PG (ref 26–34)
MCHC RBC AUTO-ENTMCNC: 32 G/DL (ref 31–36)
MCV RBC AUTO: 94.9 FL (ref 80–100)
MONOCYTES ABSOLUTE: 0.6 K/UL (ref 0–1.3)
MONOCYTES RELATIVE PERCENT: 15.4 %
NEUTROPHILS ABSOLUTE: 2.4 K/UL (ref 1.7–7.7)
NEUTROPHILS RELATIVE PERCENT: 62.7 %
PDW BLD-RTO: 16.7 % (ref 12.4–15.4)
PLATELET # BLD: 129 K/UL (ref 135–450)
PMV BLD AUTO: 9.2 FL (ref 5–10.5)
POTASSIUM REFLEX MAGNESIUM: 3.5 MMOL/L (ref 3.5–5.1)
RBC # BLD: 2.62 M/UL (ref 4.2–5.9)
SODIUM BLD-SCNC: 137 MMOL/L (ref 136–145)
WBC # BLD: 3.8 K/UL (ref 4–11)

## 2020-09-20 PROCEDURE — 80048 BASIC METABOLIC PNL TOTAL CA: CPT

## 2020-09-20 PROCEDURE — 6360000002 HC RX W HCPCS: Performed by: PHYSICAL MEDICINE & REHABILITATION

## 2020-09-20 PROCEDURE — 2580000003 HC RX 258: Performed by: PHYSICAL MEDICINE & REHABILITATION

## 2020-09-20 PROCEDURE — 1280000000 HC REHAB R&B

## 2020-09-20 PROCEDURE — 94760 N-INVAS EAR/PLS OXIMETRY 1: CPT

## 2020-09-20 PROCEDURE — 83735 ASSAY OF MAGNESIUM: CPT

## 2020-09-20 PROCEDURE — 6370000000 HC RX 637 (ALT 250 FOR IP): Performed by: PHYSICAL MEDICINE & REHABILITATION

## 2020-09-20 PROCEDURE — 85025 COMPLETE CBC W/AUTO DIFF WBC: CPT

## 2020-09-20 RX ADMIN — MEROPENEM 500 MG: 500 INJECTION, POWDER, FOR SOLUTION INTRAVENOUS at 08:27

## 2020-09-20 RX ADMIN — LEVOTHYROXINE SODIUM 50 MCG: 0.05 TABLET ORAL at 05:45

## 2020-09-20 RX ADMIN — HYDRALAZINE HYDROCHLORIDE 50 MG: 50 TABLET, FILM COATED ORAL at 13:59

## 2020-09-20 RX ADMIN — GABAPENTIN 100 MG: 100 CAPSULE ORAL at 13:59

## 2020-09-20 RX ADMIN — HYDRALAZINE HYDROCHLORIDE 50 MG: 50 TABLET, FILM COATED ORAL at 21:22

## 2020-09-20 RX ADMIN — SODIUM CHLORIDE, PRESERVATIVE FREE 10 ML: 5 INJECTION INTRAVENOUS at 08:20

## 2020-09-20 RX ADMIN — PANTOPRAZOLE SODIUM 40 MG: 40 TABLET, DELAYED RELEASE ORAL at 05:45

## 2020-09-20 RX ADMIN — LATANOPROST 1 DROP: 50 SOLUTION OPHTHALMIC at 21:20

## 2020-09-20 RX ADMIN — DORZOLAMIDE HYDROCHLORIDE AND TIMOLOL MALEATE 1 DROP: 20; 5 SOLUTION/ DROPS OPHTHALMIC at 21:16

## 2020-09-20 RX ADMIN — OXYCODONE HYDROCHLORIDE AND ACETAMINOPHEN 1 TABLET: 5; 325 TABLET ORAL at 13:59

## 2020-09-20 RX ADMIN — MEROPENEM 500 MG: 500 INJECTION, POWDER, FOR SOLUTION INTRAVENOUS at 01:43

## 2020-09-20 RX ADMIN — SODIUM CHLORIDE, PRESERVATIVE FREE 10 ML: 5 INJECTION INTRAVENOUS at 21:23

## 2020-09-20 RX ADMIN — DULOXETINE HYDROCHLORIDE 30 MG: 30 CAPSULE, DELAYED RELEASE ORAL at 21:03

## 2020-09-20 RX ADMIN — METOPROLOL TARTRATE 50 MG: 50 TABLET, FILM COATED ORAL at 21:07

## 2020-09-20 RX ADMIN — MEROPENEM 500 MG: 500 INJECTION, POWDER, FOR SOLUTION INTRAVENOUS at 14:07

## 2020-09-20 RX ADMIN — GABAPENTIN 100 MG: 100 CAPSULE ORAL at 21:03

## 2020-09-20 RX ADMIN — OXYCODONE HYDROCHLORIDE AND ACETAMINOPHEN 1 TABLET: 5; 325 TABLET ORAL at 05:45

## 2020-09-20 RX ADMIN — BISACODYL 10 MG: 10 SUPPOSITORY RECTAL at 17:45

## 2020-09-20 RX ADMIN — ATORVASTATIN CALCIUM 40 MG: 40 TABLET, FILM COATED ORAL at 21:03

## 2020-09-20 RX ADMIN — Medication 2 CAPSULE: at 17:02

## 2020-09-20 RX ADMIN — MEROPENEM 500 MG: 500 INJECTION, POWDER, FOR SOLUTION INTRAVENOUS at 21:03

## 2020-09-20 RX ADMIN — HYDRALAZINE HYDROCHLORIDE 50 MG: 50 TABLET, FILM COATED ORAL at 05:45

## 2020-09-20 ASSESSMENT — PAIN DESCRIPTION - ORIENTATION
ORIENTATION: RIGHT;LEFT
ORIENTATION: RIGHT;LEFT

## 2020-09-20 ASSESSMENT — PAIN DESCRIPTION - LOCATION
LOCATION: LEG

## 2020-09-20 ASSESSMENT — PAIN SCALES - GENERAL
PAINLEVEL_OUTOF10: 0
PAINLEVEL_OUTOF10: 0
PAINLEVEL_OUTOF10: 7
PAINLEVEL_OUTOF10: 7
PAINLEVEL_OUTOF10: 0
PAINLEVEL_OUTOF10: 8
PAINLEVEL_OUTOF10: 0
PAINLEVEL_OUTOF10: 2

## 2020-09-20 ASSESSMENT — PAIN - FUNCTIONAL ASSESSMENT: PAIN_FUNCTIONAL_ASSESSMENT: ACTIVITIES ARE NOT PREVENTED

## 2020-09-20 ASSESSMENT — PAIN DESCRIPTION - DESCRIPTORS: DESCRIPTORS: ACHING

## 2020-09-20 ASSESSMENT — PAIN DESCRIPTION - PAIN TYPE
TYPE: ACUTE PAIN

## 2020-09-20 ASSESSMENT — PAIN DESCRIPTION - ONSET: ONSET: GRADUAL

## 2020-09-20 ASSESSMENT — PAIN DESCRIPTION - FREQUENCY: FREQUENCY: INTERMITTENT

## 2020-09-20 ASSESSMENT — PAIN DESCRIPTION - PROGRESSION: CLINICAL_PROGRESSION: NOT CHANGED

## 2020-09-20 NOTE — PLAN OF CARE
Problem: Falls - Risk of:  Goal: Will remain free from falls  Description: Will remain free from falls  Outcome: Ongoing  Goal: Absence of physical injury  Description: Absence of physical injury  Outcome: Ongoing     Problem: Skin Integrity:  Goal: Will show no infection signs and symptoms  Description: Will show no infection signs and symptoms  Outcome: Ongoing  Goal: Absence of new skin breakdown  Description: Absence of new skin breakdown  Outcome: Ongoing     Problem: Pain:  Goal: Pain level will decrease  Description: Pain level will decrease  Outcome: Ongoing     Problem: Musculor/Skeletal Functional Status  Goal: Absence of falls  Outcome: Ongoing

## 2020-09-20 NOTE — PROGRESS NOTES
Patient stated he has no change in his emotional status at this time. Refused lunch and oral liquids. Family here to visit with him at this time. Will continue to monitor.

## 2020-09-20 NOTE — PROGRESS NOTES
Patient refused PO meds and breakfast this AM. Stated he was tired and just wanted to die. MD notified and stated to monitor patient and allow him to rest today. Spoke with daughter and updated her also. Charge nurse, Jade Lewis, notified of situation. Will continue to monitor.

## 2020-09-21 LAB
ANION GAP SERPL CALCULATED.3IONS-SCNC: 9 MMOL/L (ref 3–16)
BASOPHILS ABSOLUTE: 0 K/UL (ref 0–0.2)
BASOPHILS RELATIVE PERCENT: 1.1 %
BUN BLDV-MCNC: 19 MG/DL (ref 7–20)
CALCIUM SERPL-MCNC: 8.6 MG/DL (ref 8.3–10.6)
CHLORIDE BLD-SCNC: 103 MMOL/L (ref 99–110)
CO2: 25 MMOL/L (ref 21–32)
CREAT SERPL-MCNC: 1 MG/DL (ref 0.8–1.3)
EOSINOPHILS ABSOLUTE: 0.1 K/UL (ref 0–0.6)
EOSINOPHILS RELATIVE PERCENT: 3.1 %
GFR AFRICAN AMERICAN: >60
GFR NON-AFRICAN AMERICAN: >60
GLUCOSE BLD-MCNC: 84 MG/DL (ref 70–99)
HCT VFR BLD CALC: 25.6 % (ref 40.5–52.5)
HEMOGLOBIN: 8.5 G/DL (ref 13.5–17.5)
LYMPHOCYTES ABSOLUTE: 0.6 K/UL (ref 1–5.1)
LYMPHOCYTES RELATIVE PERCENT: 14 %
MAGNESIUM: 1.8 MG/DL (ref 1.8–2.4)
MCH RBC QN AUTO: 30.8 PG (ref 26–34)
MCHC RBC AUTO-ENTMCNC: 33.3 G/DL (ref 31–36)
MCV RBC AUTO: 92.5 FL (ref 80–100)
MONOCYTES ABSOLUTE: 0.7 K/UL (ref 0–1.3)
MONOCYTES RELATIVE PERCENT: 16.5 %
NEUTROPHILS ABSOLUTE: 2.8 K/UL (ref 1.7–7.7)
NEUTROPHILS RELATIVE PERCENT: 65.3 %
PDW BLD-RTO: 16.5 % (ref 12.4–15.4)
PLATELET # BLD: 137 K/UL (ref 135–450)
PMV BLD AUTO: 8.7 FL (ref 5–10.5)
POTASSIUM REFLEX MAGNESIUM: 3.4 MMOL/L (ref 3.5–5.1)
PROCALCITONIN: 1.3 NG/ML (ref 0–0.15)
RBC # BLD: 2.77 M/UL (ref 4.2–5.9)
SODIUM BLD-SCNC: 137 MMOL/L (ref 136–145)
WBC # BLD: 4.4 K/UL (ref 4–11)

## 2020-09-21 PROCEDURE — 97110 THERAPEUTIC EXERCISES: CPT

## 2020-09-21 PROCEDURE — 85025 COMPLETE CBC W/AUTO DIFF WBC: CPT

## 2020-09-21 PROCEDURE — 97116 GAIT TRAINING THERAPY: CPT

## 2020-09-21 PROCEDURE — 84145 PROCALCITONIN (PCT): CPT

## 2020-09-21 PROCEDURE — 97535 SELF CARE MNGMENT TRAINING: CPT

## 2020-09-21 PROCEDURE — 6370000000 HC RX 637 (ALT 250 FOR IP): Performed by: PHYSICAL MEDICINE & REHABILITATION

## 2020-09-21 PROCEDURE — 80048 BASIC METABOLIC PNL TOTAL CA: CPT

## 2020-09-21 PROCEDURE — 83735 ASSAY OF MAGNESIUM: CPT

## 2020-09-21 PROCEDURE — 1280000000 HC REHAB R&B

## 2020-09-21 PROCEDURE — 97530 THERAPEUTIC ACTIVITIES: CPT

## 2020-09-21 PROCEDURE — 2580000003 HC RX 258: Performed by: PHYSICAL MEDICINE & REHABILITATION

## 2020-09-21 PROCEDURE — 6360000002 HC RX W HCPCS: Performed by: PHYSICAL MEDICINE & REHABILITATION

## 2020-09-21 RX ORDER — POTASSIUM CHLORIDE 20 MEQ/1
40 TABLET, EXTENDED RELEASE ORAL ONCE
Status: COMPLETED | OUTPATIENT
Start: 2020-09-21 | End: 2020-09-21

## 2020-09-21 RX ADMIN — ACETAMINOPHEN 650 MG: 325 TABLET ORAL at 12:44

## 2020-09-21 RX ADMIN — OXYCODONE HYDROCHLORIDE AND ACETAMINOPHEN 1 TABLET: 5; 325 TABLET ORAL at 04:47

## 2020-09-21 RX ADMIN — SODIUM CHLORIDE, PRESERVATIVE FREE 10 ML: 5 INJECTION INTRAVENOUS at 20:46

## 2020-09-21 RX ADMIN — POTASSIUM CHLORIDE 40 MEQ: 1500 TABLET, EXTENDED RELEASE ORAL at 11:37

## 2020-09-21 RX ADMIN — Medication 2 CAPSULE: at 17:14

## 2020-09-21 RX ADMIN — RIVAROXABAN 20 MG: 20 TABLET, FILM COATED ORAL at 08:25

## 2020-09-21 RX ADMIN — AMIODARONE HYDROCHLORIDE 200 MG: 200 TABLET ORAL at 08:26

## 2020-09-21 RX ADMIN — SODIUM CHLORIDE, PRESERVATIVE FREE 10 ML: 5 INJECTION INTRAVENOUS at 08:25

## 2020-09-21 RX ADMIN — GABAPENTIN 100 MG: 100 CAPSULE ORAL at 12:44

## 2020-09-21 RX ADMIN — DORZOLAMIDE HYDROCHLORIDE AND TIMOLOL MALEATE 1 DROP: 20; 5 SOLUTION/ DROPS OPHTHALMIC at 08:26

## 2020-09-21 RX ADMIN — MEROPENEM 500 MG: 500 INJECTION, POWDER, FOR SOLUTION INTRAVENOUS at 12:44

## 2020-09-21 RX ADMIN — AMLODIPINE BESYLATE 10 MG: 10 TABLET ORAL at 08:26

## 2020-09-21 RX ADMIN — DULOXETINE HYDROCHLORIDE 30 MG: 30 CAPSULE, DELAYED RELEASE ORAL at 08:25

## 2020-09-21 RX ADMIN — MEROPENEM 500 MG: 500 INJECTION, POWDER, FOR SOLUTION INTRAVENOUS at 08:25

## 2020-09-21 RX ADMIN — HYDRALAZINE HYDROCHLORIDE 50 MG: 50 TABLET, FILM COATED ORAL at 05:21

## 2020-09-21 RX ADMIN — FERROUS SULFATE TAB EC 324 MG (65 MG FE EQUIVALENT) 324 MG: 324 (65 FE) TABLET DELAYED RESPONSE at 08:26

## 2020-09-21 RX ADMIN — HYDRALAZINE HYDROCHLORIDE 50 MG: 50 TABLET, FILM COATED ORAL at 20:38

## 2020-09-21 RX ADMIN — DORZOLAMIDE HYDROCHLORIDE AND TIMOLOL MALEATE 1 DROP: 20; 5 SOLUTION/ DROPS OPHTHALMIC at 20:39

## 2020-09-21 RX ADMIN — Medication 2 CAPSULE: at 08:25

## 2020-09-21 RX ADMIN — MEROPENEM 500 MG: 500 INJECTION, POWDER, FOR SOLUTION INTRAVENOUS at 02:30

## 2020-09-21 RX ADMIN — ATORVASTATIN CALCIUM 40 MG: 40 TABLET, FILM COATED ORAL at 20:38

## 2020-09-21 RX ADMIN — LOSARTAN POTASSIUM 100 MG: 100 TABLET, FILM COATED ORAL at 08:25

## 2020-09-21 RX ADMIN — OXYCODONE HYDROCHLORIDE AND ACETAMINOPHEN 1 TABLET: 5; 325 TABLET ORAL at 08:41

## 2020-09-21 RX ADMIN — LEVOTHYROXINE SODIUM 50 MCG: 0.05 TABLET ORAL at 05:21

## 2020-09-21 RX ADMIN — GABAPENTIN 100 MG: 100 CAPSULE ORAL at 08:25

## 2020-09-21 RX ADMIN — MEROPENEM 500 MG: 500 INJECTION, POWDER, FOR SOLUTION INTRAVENOUS at 20:40

## 2020-09-21 RX ADMIN — METOPROLOL TARTRATE 50 MG: 50 TABLET, FILM COATED ORAL at 20:38

## 2020-09-21 RX ADMIN — LATANOPROST 1 DROP: 50 SOLUTION OPHTHALMIC at 20:40

## 2020-09-21 RX ADMIN — METOPROLOL TARTRATE 50 MG: 50 TABLET, FILM COATED ORAL at 08:26

## 2020-09-21 RX ADMIN — PANTOPRAZOLE SODIUM 40 MG: 40 TABLET, DELAYED RELEASE ORAL at 05:21

## 2020-09-21 RX ADMIN — DULOXETINE HYDROCHLORIDE 30 MG: 30 CAPSULE, DELAYED RELEASE ORAL at 20:38

## 2020-09-21 RX ADMIN — GABAPENTIN 100 MG: 100 CAPSULE ORAL at 20:38

## 2020-09-21 ASSESSMENT — PAIN DESCRIPTION - ORIENTATION
ORIENTATION: RIGHT;LEFT
ORIENTATION: RIGHT
ORIENTATION: RIGHT

## 2020-09-21 ASSESSMENT — PAIN DESCRIPTION - FREQUENCY
FREQUENCY: INTERMITTENT
FREQUENCY: INTERMITTENT
FREQUENCY: CONTINUOUS

## 2020-09-21 ASSESSMENT — PAIN DESCRIPTION - LOCATION
LOCATION: LEG

## 2020-09-21 ASSESSMENT — PAIN DESCRIPTION - ONSET
ONSET: ON-GOING

## 2020-09-21 ASSESSMENT — PAIN SCALES - GENERAL
PAINLEVEL_OUTOF10: 0
PAINLEVEL_OUTOF10: 9
PAINLEVEL_OUTOF10: 0
PAINLEVEL_OUTOF10: 8
PAINLEVEL_OUTOF10: 0
PAINLEVEL_OUTOF10: 6
PAINLEVEL_OUTOF10: 1

## 2020-09-21 ASSESSMENT — PAIN - FUNCTIONAL ASSESSMENT
PAIN_FUNCTIONAL_ASSESSMENT: PREVENTS OR INTERFERES SOME ACTIVE ACTIVITIES AND ADLS
PAIN_FUNCTIONAL_ASSESSMENT: ACTIVITIES ARE NOT PREVENTED
PAIN_FUNCTIONAL_ASSESSMENT: PREVENTS OR INTERFERES SOME ACTIVE ACTIVITIES AND ADLS

## 2020-09-21 ASSESSMENT — PAIN DESCRIPTION - PROGRESSION
CLINICAL_PROGRESSION: GRADUALLY IMPROVING
CLINICAL_PROGRESSION: NOT CHANGED
CLINICAL_PROGRESSION: NOT CHANGED

## 2020-09-21 ASSESSMENT — PAIN DESCRIPTION - DESCRIPTORS
DESCRIPTORS: ACHING

## 2020-09-21 ASSESSMENT — PAIN DESCRIPTION - PAIN TYPE
TYPE: ACUTE PAIN

## 2020-09-21 NOTE — PROGRESS NOTES
Department of Physical Medicine & Rehabilitation  Progress Note    Patient Identification:  Steven Aldana  [id number]  : 1945  Admit date: 2020    Chief Complaint: Debility    Subjective:   No acute events overnight. Patient seen this am sitting up in room. Reports fatigue. Otherwise no new concerns. Pain controlled. ROS: No f/c, n/v, cp     Objective:  Patient Vitals for the past 24 hrs:   BP Temp Temp src Pulse Resp SpO2 Weight   20 0826 137/68 -- -- 71 -- -- --   20 0501 (!) 170/82 97.3 °F (36.3 °C) Oral 72 16 94 % 174 lb 2.6 oz (79 kg)   20 0041 (!) 164/81 98.8 °F (37.1 °C) Oral 61 16 94 % --   20 2107 (!) 155/76 -- -- 77 -- -- --   20 1317 (!) 173/85 97.6 °F (36.4 °C) Oral 65 18 93 % --     Const: Alert. No distress, pleasant. HEENT: Normocephalic, atraumatic. Normal sclera/conjunctiva. MMM. CV: Regular rate and rhythm. Resp: No respiratory distress. Lungs decreased. Abd: Soft, nontender, nondistended, NABS+   Ext: trace edema. Neuro: Alert, oriented, appropriately interactive. Psych: Cooperative, appropriate mood and affect    Laboratory data: Available via EMR.    Last 24 hour lab  Recent Results (from the past 24 hour(s))   Basic Metabolic Panel w/ Reflex to MG    Collection Time: 20  5:37 AM   Result Value Ref Range    Sodium 137 136 - 145 mmol/L    Potassium reflex Magnesium 3.4 (L) 3.5 - 5.1 mmol/L    Chloride 103 99 - 110 mmol/L    CO2 25 21 - 32 mmol/L    Anion Gap 9 3 - 16    Glucose 84 70 - 99 mg/dL    BUN 19 7 - 20 mg/dL    CREATININE 1.0 0.8 - 1.3 mg/dL    GFR Non-African American >60 >60    GFR African American >60 >60    Calcium 8.6 8.3 - 10.6 mg/dL   CBC Auto Differential    Collection Time: 09/21/20  5:37 AM   Result Value Ref Range    WBC 4.4 4.0 - 11.0 K/uL    RBC 2.77 (L) 4.20 - 5.90 M/uL    Hemoglobin 8.5 (L) 13.5 - 17.5 g/dL    Hematocrit 25.6 (L) 40.5 - 52.5 %    MCV 92.5 80.0 - 100.0 fL    MCH 30.8 26.0 - 34.0 pg

## 2020-09-21 NOTE — PROGRESS NOTES
Physical Therapy  Facility/Department: [address] REHAB  Daily Treatment Note  NAME: Denae Martínez Sr.  : 1945  MRN: [id number]    Date of Service: 2020    Discharge Recommendations:  Continue to assess pending progress, Patient would benefit from continued therapy after discharge, 24 hour supervision or assist   PT Equipment Recommendations  Equipment Needed: Yes  Mobility Devices: Therisa Goel: Rolling  Other: will continue to assess for further equipment needs    Assessment   Body structures, Functions, Activity limitations: Decreased functional mobility ; Decreased strength;Decreased ROM; Decreased endurance;Decreased balance  Assessment: Pt with increased tolerance this date with all mobility tasks on room air with SPO2% > 94% throughout session. Pt ambulated household distances with RW CGA-min assist constant cues for proper sequencing. Improved transfers CGA to stand but poor safety awareness with sitting needing up to min assist for safety. Increased time to perform and recover with all functional mobility tasks this date. Pt would benefit from skilled therapy to increase safety awareness for precautions and transfers, improve endurance, and build LE strength for success with functional mobility. Treatment Diagnosis: LE weakness, adherence to safety precautions, poor activity tolerance, poor balance  Prognosis: Good  Decision Making: Medium Complexity  PT Education: Orientation;General Safety;Gait Training;Equipment;Transfer Training;Weight-bearing Education; Adaptive Device Training  Patient Education: posture within RW, safety with all mobility tasks including ambulation. Barriers to Learning: Medications slightly impaired cognition  REQUIRES PT FOLLOW UP: Yes  Activity Tolerance  Activity Tolerance: Patient Tolerated treatment well;Patient limited by pain; Patient limited by endurance     Patient Diagnosis(es): There were no encounter diagnoses.      has a past medical history of Aneurysm, aortic ([facility] Atrial fibrillation ([facility] Glaucoma, Heart attack ([facility] Hyperlipidemia, and Hypertension. has a past surgical history that includes Coronary angioplasty with stent; Colonoscopy; Eye surgery (Right, 7/23/2020); and Intracapsular cataract extraction (Right, 7/23/2020). Social/Functional History  Lives With: Alone  Type of Home: House  Home Layout: Multi-level, Bed/Bath upstairs, Able to Live on Main level with bedroom/bathroom, Laundry in basement  Home Access: Stairs to enter with rails  Entrance Stairs - Number of Steps: 10-11  Entrance Stairs - Rails: Right  Bathroom Shower/Tub: Tub/Shower unit, Walk-in shower(walk in shower on the main level)  Bathroom Toilet: Standard(sink counter by toilet, comfort ht toilet)  Bathroom Equipment: (No DME)  Bathroom Accessibility: Natalio Anne accessible(Pt says possibly a w/c or walker could fit through doorway)  Home Equipment: Sock aid, [address] bed(Hospital in family room on 1st floor)  ADL Assistance: Independent  Homemaking Assistance: Independent  Homemaking Responsibilities: Yes  Meal Prep Responsibility: Primary  Laundry Responsibility: Primary  Cleaning Responsibility: Primary  Bill Paying/Finance Responsibility: Primary  Health Care Management: Primary  Ambulation Assistance: Independent  Transfer Assistance: Independent  Active : Yes  Mode of Transportation: Car  Occupation: Retired  Type of occupation: Retired :  for 35 years. Leisure & Hobbies: out to eat with lady friend, travelled to [place], [address], was walking about a mile every 2 days before he got sick  Additional Comments: Information pertains to prior recent back surg (8/31/2020), following d/c to SNF setting [facility]). Pt reports that he was only at SNF setting few days following surg, did have a fall while there (reports call light to go to bathroom was not answered and he tried to get OOB by self).   Pt reports that he has not been home since surgery at [facility].    Restrictions  Restrictions/Precautions  Restrictions/Precautions: Fall Risk  Position Activity Restriction  Spinal Precautions: No Bending, No Lifting, No Twisting(10 lb lifting limit per pt.)  Other position/activity restrictions: room air, SPO2% level monitored throughout session  Subjective   General  Chart Reviewed: Yes  Additional Pertinent Hx: 67 y/o male admit from SNF setting 9/13/2020 with Altered Mental Status, Severe Sepsis, Acute Renal Failure, and Pelvic Hematoma. CT Head negative. CT Abd/Pelvis + L Pelvic Hematoma. Urology consult for Reeves Placement. PMH as noted including Recent Back Surg (L4/L5 and L5/S1 Ant/Post Fusion, 8/31/2020), CAD, Angio with Stent, A-Fib, Aortic Aneurysm. Response To Previous Treatment: Patient with no complaints from previous session. Family / Caregiver Present: No  Referring Practitioner: Dr. Benavides Freeze: Pt reports pain feeling \"hard any pain\", weakness/numbness in BLEs L > RLE at this time. Pt agreeable to PT treatment session. General Comment  Comments: SPO2% 95%, HR 59 bpm finger monitor at start of session          Orientation  Orientation  Overall Orientation Status: Within Functional Limits  Orientation Level: Oriented X4 (poor recall of spinal precautions needing VC for all at this time)   Objective      Transfers  Sit to Stand: Contact guard assistance(increased time with with cues for hand placement throughout)  Stand to sit: Contact guard assistance;Minimal Assistance(poor descent control with poor awareness of body positioning needing cues with slight physical assistance to perform safely)  Stand Pivot Transfers: Contact guard assistance(w/c to recliner upon returning to room)  Comment: Intermittent dizziness with fatigue but vital levels appears to be [facility].   Ambulation  Ambulation?: Yes  Ambulation 1  Surface: level tile  Device: Rolling Walker  Assistance: Contact guard assistance;Minimal assistance  Quality of Gait: Pt ambulates with forward flexed posture and cuing to stand up straight. Takes small shuffling steps with decreased kay, step height and step length. Ambulates with more of a step-to pattern leaning towards left throughout with narrow ASHLEY  Gait Deviations: Slow Kay;Decreased step length;Decreased step height;Shuffles  Distance: 64' with RW multiple turns, short distance from w/c <-> steps, around curb step back to chair  Comments: vitals assessed after ambulation: HR - 63 BPM, SpO2 - 96% room air; adjusted height of RW at this time  Stairs/Curb  Stairs?: Yes  Stairs  # Steps : 4  Stairs Height: 6\"  Rails: Bilateral  Curbs: 6\"(X 1 trial with RW CGA-min assist with increased time needing step by step instructions for proper sequencing.)  Device: No Device  Assistance: Contact guard assistance;Minimal assistance  Comment: Ascend/descend nonreciprocal pattern with cues for proper sequencing, increased time with use of BUE on railing with cues, slight difficulty with descent due to fatigue needing CGA_min assist with increased time to perform. SPO2% 95%, HR 64 bpm after completion of 4 steps. Slight TAPIA but increased tolerance this date                                  PM session:   S: Pt appears to be in good spirits this afternoon, reports feeling better overall. Pt denies pain at this time sitting in wheelchair following OT session. Pt agreeable to PT treatment session. O: Seated exercises BLEs X 20  Each supported in chair including heel/toe raises, LAQ, marching partial range due to fatigue,   Sitting to standing CGA with increased time. Standing to sitting min assist throughout session needing cues for lining up properly and hand placement for safety. Pt ambulated 80' with RW, NuStep to w/c with CGA-min assist with cues for proper body positioning within RW. Pt guarded due to complains of L hip fatigue/pain at this time needing increased time to perform.    NuStep 10 minutes, level 1 resistance, BLEs 9, BUE 9, average SPM: 32. Pt states easiest task and most comfortable that he has performed yet, without complaints but demonstrates moderate fatigue. Returned to room via w/c due to fatigue. Stand pivot from w/c to bed wit hRW CGA. Sitting EOB hospital bed (HOB flat, use of railing with BUE, two pillows to patient right) to supine with CGA-min assist with increased time to perform following log roll technique. Scooting in bed increased time with cues for proper positioning and comfort at completion of session. A:  Pt increase ambulation distances this afternoon with decreased complaints of pain. Overall increased activity tolerance and increased motivation to participate with Therapy. Overall progressing well towards goals this date.      Safety Device - Type of devices:  [x]  All fall risk precautions in place [x] Bed alarm in place  [x] Call light within reach [] Chair alarm in place [] Positioning belt [x] Gait belt [x] Patient at risk for falls [x] Left in bed [] Left in chair [] Telesitter in use [] Sitter present [] Nurse notified []  None     Goals  Short term goals  Time Frame for Short term goals: 1 week  Short term goal 1: Pt will perform bed mobility with supervision  Short term goal 2: Pt will be CGA-Winter for transfers with LRAD  Short term goal 3: Pt will ambulate 48' using LRAD with CGA  Short term goal 4: Pt will ascend/descend 4 stairs with CGA using railing  Short term goal 5: Pt will ascend/descend curb step using LRAD with CGA  Long term goals  Time Frame for Long term goals : 2-3 weeks  Long term goal 1: Pt will be Modif I for bed mobility  Long term goal 2: Pt will perform transfers with Modif I using LRAD  Long term goal 3: Pt will ambulate 150' using LRAD with Modif I  Long term goal 4: Pt will ascend/descend 12 stairs with Modif I using railing  Long term goal 5: Pt will ascend/descend curb using LRAD with Modif I  Patient Goals   Patient goals : Just want to get back to living on my own at home    Plan    Plan  Times per week: 5-6x/week  Times per day: Twice a day  Plan weeks: 2-3 weeks  Current Treatment Recommendations: Strengthening, Home Exercise Program, Safety Education & Training, Balance Training, Endurance Training, Patient/Caregiver Education & Training, Functional Mobility Training, Transfer Training, Gait Training, Stair training  Safety Devices  Type of devices:  All fall risk precautions in place, Call light within reach, Chair alarm in place, Gait belt, Patient at risk for falls, Left in chair(positioned in recliner for safety at completion of session with BLEs elevated)  Restraints  Initially in place: No     Therapy Time   Individual Concurrent Group Co-treatment   Time In 1045         Time Out 1130         Minutes 45         Timed Code Treatment Minutes: [facility]   Time In [address]     Minutes [address] [facility], PT     Electronically signed by Steffany Banerjee PT on 9/21/20 at 11:44 AM EDT

## 2020-09-21 NOTE — CARE COORDINATION
SAIDAW reviewed chart. LSW wakened pt to introduce  role, imitate discussion regarding DC planning needs and to inform of weekly Team Conference to review his progress. He gives permission to contact his dgtr, Rosa Gomez [phone]. SOCIAL WORK ASSESSMENT      GOAL:   Dgtr reports she is not sure if goal is home or to a SNF. She reports all family members have jobs and their own families and they will need to see if he can return home vs snf. HOME SITUATION:  Patient came from [facility] where he had been for one day. Had been dc'd from [facility] the day prior. Dgtr was not happy with [facility] and may not want to return there. She is hopeful he can return home but he will be alone as family cannot quit their jobs to be his caregiver and she reports she will take care of his shopping, laundry, cleaning and some meals for him. He is active with Dr Elyssa Lo for pcp needs. PRIOR LEVEL OF FUNCTIONING:       PERSONAL CARE:    ind                                                                       DRIVES:  yes                                                                     FINANCES:  ind                                                                   MEALS:    ind                             GROCERY SHOPS:  indept      DME CURRENTLY AT HOME:   Sock aid, reacher, hospital bed. CURRENT HOME CARE/SERVICES:  None currently. SAIDAW informed her of possible post acute services such as home care or outpatient services to continue his progress. PREFERRED HOME CARE:  To be determined if he goes home or to SNF. TEAM CONFERENCE DAY:   Wednesdays. SAIDAW informed her of weekly Team Conferences to review his progress, DME recommendations and dc date. THis worker will call her on Wednesdays to review and plan for dc needs. SAIDAW informed patient of preferred  time on date of discharge which is between 10 - 12 noon.     SAIDAW informed patient of recommendation for PCP visit within 7 days post discharge.     Osman Reyes     Case Management   [phone]    9/22/2020  11:13 AM

## 2020-09-21 NOTE — PROGRESS NOTES
Occupational Therapy  Facility/Department: [address] REHAB  Daily Treatment Note  NAME: Ashlee Oleary Sr.  : 1945  MRN: [id number]    Date of Service: 2020    Discharge Recommendations:  Continue to assess pending progress, S Level 1, Home with Home health OT, Home with assist PRN  OT Equipment Recommendations  Other: Assess for shower chair, long handle sponge, reacher, shoe horn, sock aide, leg  grab bars, TSF    Assessment   Performance deficits / Impairments: Decreased functional mobility ; Decreased endurance;Decreased ADL status; Decreased balance;Decreased strength;Decreased safe awareness;Decreased high-level IADLs  Assessment: Pt seen in room for ADLs. Pt is refusing to eat and expressing listlessness regarding getting better. Pt making comments regarding wanting to die. Pt required extra encouragement to complete sponge bath at sink. Pt washed UB with Supervision and LB with min/mod A. Dressed UB with S and LB with moderate assist.  Will cont OT POC. OT Education: OT Role;Plan of Care;Precautions; ADL Adaptive Strategies; Energy Conservation;Transfer Training  REQUIRES OT FOLLOW UP: Yes  Activity Tolerance  Activity Tolerance: Patient limited by pain; Patient limited by fatigue  Activity Tolerance: Pt with significant depression and lack of volition  Safety Devices  Safety Devices in place: Yes  Type of devices: Left in chair;Call light within reach;Nurse notified; Chair alarm in place         Patient Diagnosis(es): There were no encounter diagnoses. has a past medical history of Aneurysm, aortic ([address] Atrial fibrillation ([address] Glaucoma, Heart attack ([address] Hyperlipidemia, and Hypertension. has a past surgical history that includes Coronary angioplasty with stent; Colonoscopy; Eye surgery (Right, 2020); and Intracapsular cataract extraction (Right, 2020).     Restrictions  Restrictions/Precautions  Restrictions/Precautions: Fall Risk  Position Activity Restriction  Spinal Precautions: No Bending, No Lifting, No Twisting(10 lb lifting limit per pt.)  Other position/activity restrictions: room air, SPO2% level monitored throughout session     Subjective   General  Chart Reviewed: Yes, Orders, History and Physical, Progress Notes  Patient assessed for rehabilitation services?: Yes  Additional Pertinent Hx: \"Patient is a 67 yo M with pmh Afib, CAD, HTN, HLD, and recent lumbar spine surgery who initially presented from SNF on 9/13/2020 with fever 105 and altered mental status. Found to have sepsis due to UTI, urinary retention, metabolic encephalopathy, and acute hypoxic respiratory failure. \" Per Dr. Adilia Fraser 9/19/2020 note. Family / Caregiver Present: No  Referring Practitioner: Dr. Celia Lawler  Diagnosis: sepsis, metabolic encephalopathy, UTI  Subjective  Subjective: Pt seen in room for ADL. Pt stated he was in pain \"8/10\" in LLE. Pt stated he was lightheaded but also that he does not want to eat and that he \"just wants to die already\". Pt with flat affect. Pt also stated he has no sense of smell or taste (this was relayed quickly to RN)         Objective    ADL  Grooming: Supervision(brushed teeth, washed face, combed hair while seated at sink)  UE Bathing: Stand by assistance;Setup  LE Bathing: Minimal assistance(for thoroughness)  UE Dressing: Supervision  LE Dressing: Moderate assistance(OT started pants over feet and managed mendoza cath. Pt assisted to pull over hips)  Toileting: Minimal assistance(OT assisted post pericare for thoroughness)        Functional Mobility  Functional - Mobility Device: Rolling Walker  Activity: To/from bathroom  Assist Level: Contact guard assistance  Functional Mobility Comments: slow moving and cues needed for walker safety     Transfers  Sit to stand: Contact guard assistance  Stand to sit: Contact guard assistance                     Second Session:  Pt seen in dept. Complained of pain \"7/10\" in LLE.   More alert and engaged this PM.  Pt completed transfers with CGA, CG/min A with amb with RW outside on patio. Transferred to lower chair with CG/Min A. Had to come back inside due to Heart monitor alerts. Pt completed cornhole while standing with CGA, no LOB. Did loosen posture as fatigued (after 5 min) yielding discomfort in back, sat to rest with good recovery. Pt completed doffing/donning sock with reacher and sock aid with SBA, no cues needed to refresh memory of devices. Pt completed 2 UE ex with 2# free weights for UE x 10 reps. José Luis well. Sent to PT Session at end of session. Plan   Plan  Times per week: 5-6x  Times per day: Twice a day  Plan weeks: 2.5 weeks from 9/19/20  Current Treatment Recommendations: Strengthening, Patient/Caregiver Education & Training, Home Management Training, Equipment Evaluation, Education, & procurement, Balance Training, Functional Mobility Training, Endurance Training, Safety Education & Training, Self-Care / ADL, Wheelchair Mobility Training, Neuromuscular Re-education  Plan Comment: Wednesday Conference               Goals  Short term goals  Time Frame for Short term goals: 1-1.5 weeks from eval 9/19/2020  Short term goal 1: Pt will bathe min A to CGA using AE & shower chair. Short term goal 2: Pt will dress upper body set-up, lower body min A using AE(except melody hose if ordered). Short term goal 3: Pt will toilet CGA to void vs min-mod A for BM w/toilet aid if needed. Short term goal 4: Pt will complete functional transfers/mob using RW/DME with CGA to SBA. Short term goal 5: Pt will perform UE ther exercises/HEP to increase activity tolerance/strength/endurance to tolerate standing for 6-7 minutes for ADL/simple IADL task. Short term goal 6: Pt will recall/follow spinal precautions with only occassional reminders during ADL/mob tasks. .  Long term goals  Time Frame for Long term goals : 1.5-2.5 weeks from 9/19/20  Long term goal 1: Pt will bathe modified independent using AE & shower chair. Long term goal 2: Pt will dress modified independent using AE.(except assist for melody hose if ordered)  Long term goal 3: Pt will toilet modified independent w/ AE. Long term goal 4: Pt will complete functional transfers/mob using RW & grab bars, modified independent. Long term goal 5: Pt will perform UE ther exercises/HEP to increase activity tolerance/strength/safety to complete IADL tasks, such as cleaning & simple meal prep. Patient Goals   Patient goals : \"I want to get out of here and get walking. \" Above goals will work toward this goal.       Therapy Time   Individual Concurrent Group Co-treatment   Time In 0845         Time Out 0945         Minutes 60         Timed Code Treatment Minutes: 60 Minutes     Therapy Time     Individual Co-treatment   Time In 1345     Time Out 1425     Minutes [address] [place] [address]

## 2020-09-21 NOTE — PLAN OF CARE
Problem: Falls - Risk of:  Goal: Will remain free from falls  Description: Will remain free from falls  Outcome: Ongoing  Goal: Absence of physical injury  Description: Absence of physical injury  Outcome: Ongoing     Problem: Skin Integrity:  Goal: Will show no infection signs and symptoms  Description: Will show no infection signs and symptoms  Outcome: Ongoing     Problem: Nutrition  Goal: Optimal nutrition therapy  Outcome: Ongoing     Problem: Pain:  Goal: Pain level will decrease  Description: Pain level will decrease  Outcome: Ongoing     Problem: Musculor/Skeletal Functional Status  Goal: Highest potential functional level  Outcome: Ongoing  Goal: Absence of falls  Outcome: Ongoing

## 2020-09-21 NOTE — PROGRESS NOTES
Pt resting in bed. Calm and cooperative with staff. Daughter at bedside. Incontinent of bowels x 3. Pericare provided. Reeves cath. intact draining raphael clear urine. Took hs medications without difficulty. Remains on telemetry. Bed alarm on, call light in reach. Will continue to monitor and assess pt.

## 2020-09-21 NOTE — PLAN OF CARE
Problem: Falls - Risk of:  Goal: Will remain free from falls  Description: Will remain free from falls  9/21/2020 1049 by Regis Newman RN  Outcome: Ongoing  Note: Fall risk band on patient. Orange light on outside of room. Non skid footwear in place. Alarms used appropriately. Patient instructed to call and wait for staff before getting up. Rounding done to anticipate needs. Appropriate safety devices used for transfers. Problem: Skin Integrity:  Goal: Absence of new skin breakdown  Description: Absence of new skin breakdown  Outcome: Ongoing  Note: Skin assessment completed on admission and every shift. Barrier wipes used in the event of incontinence. Pressure relief techniques used as needed while in chair and in bed. Position changes encouraged at least every two hours while in bed. Problem: Pain:  Goal: Control of acute pain  Description: Control of acute pain  Outcome: Ongoing  Note: Patient able to express pain and rate pain using pain scale. Medicate as needed per orders. Reassess patient pain level within one hour after oral pain medication/intervention to assure patient has reduced pain sensation and document outcome. Non pharmaceutical interventions as appropriate.

## 2020-09-21 NOTE — PROGRESS NOTES
Patient admitted with sepsis d/t UTI. Alert and oriented x 4. On tele, SR with 1st degree block. Transfers with stedy x 1. General diet with thin liquids. Takes pills whole in water. Ensure BID. Continent of bowels. Reeves cath draining raphael colored urine, placed for retention. Surgical incisions to back and abdomen SUDEEP, recent spinal fusion. Patient on RA. PICC to [facility], IV antibiotics every 6 hours Fall precautions in place. Belongings and call light within reach.

## 2020-09-22 LAB
ANION GAP SERPL CALCULATED.3IONS-SCNC: 7 MMOL/L (ref 3–16)
BASOPHILS ABSOLUTE: 0.1 K/UL (ref 0–0.2)
BASOPHILS RELATIVE PERCENT: 1.4 %
BUN BLDV-MCNC: 18 MG/DL (ref 7–20)
CALCIUM SERPL-MCNC: 8.4 MG/DL (ref 8.3–10.6)
CHLORIDE BLD-SCNC: 104 MMOL/L (ref 99–110)
CO2: 24 MMOL/L (ref 21–32)
CREAT SERPL-MCNC: 1.1 MG/DL (ref 0.8–1.3)
EOSINOPHILS ABSOLUTE: 0.2 K/UL (ref 0–0.6)
EOSINOPHILS RELATIVE PERCENT: 4.4 %
GFR AFRICAN AMERICAN: >60
GFR NON-AFRICAN AMERICAN: >60
GLUCOSE BLD-MCNC: 93 MG/DL (ref 70–99)
HCT VFR BLD CALC: 26.1 % (ref 40.5–52.5)
HEMOGLOBIN: 8.5 G/DL (ref 13.5–17.5)
LYMPHOCYTES ABSOLUTE: 0.6 K/UL (ref 1–5.1)
LYMPHOCYTES RELATIVE PERCENT: 17.4 %
MCH RBC QN AUTO: 30.4 PG (ref 26–34)
MCHC RBC AUTO-ENTMCNC: 32.6 G/DL (ref 31–36)
MCV RBC AUTO: 93.3 FL (ref 80–100)
MONOCYTES ABSOLUTE: 0.7 K/UL (ref 0–1.3)
MONOCYTES RELATIVE PERCENT: 19.2 %
NEUTROPHILS ABSOLUTE: 2.1 K/UL (ref 1.7–7.7)
NEUTROPHILS RELATIVE PERCENT: 57.6 %
PDW BLD-RTO: 17 % (ref 12.4–15.4)
PLATELET # BLD: 158 K/UL (ref 135–450)
PMV BLD AUTO: 9.3 FL (ref 5–10.5)
POTASSIUM REFLEX MAGNESIUM: 3.8 MMOL/L (ref 3.5–5.1)
RBC # BLD: 2.79 M/UL (ref 4.2–5.9)
SODIUM BLD-SCNC: 135 MMOL/L (ref 136–145)
WBC # BLD: 3.7 K/UL (ref 4–11)

## 2020-09-22 PROCEDURE — 94760 N-INVAS EAR/PLS OXIMETRY 1: CPT

## 2020-09-22 PROCEDURE — 97116 GAIT TRAINING THERAPY: CPT

## 2020-09-22 PROCEDURE — 80048 BASIC METABOLIC PNL TOTAL CA: CPT

## 2020-09-22 PROCEDURE — 1280000000 HC REHAB R&B

## 2020-09-22 PROCEDURE — 99232 SBSQ HOSP IP/OBS MODERATE 35: CPT | Performed by: INTERNAL MEDICINE

## 2020-09-22 PROCEDURE — 6360000002 HC RX W HCPCS: Performed by: PHYSICAL MEDICINE & REHABILITATION

## 2020-09-22 PROCEDURE — 97530 THERAPEUTIC ACTIVITIES: CPT

## 2020-09-22 PROCEDURE — 36592 COLLECT BLOOD FROM PICC: CPT

## 2020-09-22 PROCEDURE — 6370000000 HC RX 637 (ALT 250 FOR IP): Performed by: PHYSICAL MEDICINE & REHABILITATION

## 2020-09-22 PROCEDURE — 85025 COMPLETE CBC W/AUTO DIFF WBC: CPT

## 2020-09-22 PROCEDURE — 2580000003 HC RX 258: Performed by: PHYSICAL MEDICINE & REHABILITATION

## 2020-09-22 RX ADMIN — SODIUM CHLORIDE, PRESERVATIVE FREE 10 ML: 5 INJECTION INTRAVENOUS at 08:27

## 2020-09-22 RX ADMIN — GABAPENTIN 100 MG: 100 CAPSULE ORAL at 08:31

## 2020-09-22 RX ADMIN — RIVAROXABAN 20 MG: 20 TABLET, FILM COATED ORAL at 08:31

## 2020-09-22 RX ADMIN — ATORVASTATIN CALCIUM 40 MG: 40 TABLET, FILM COATED ORAL at 20:32

## 2020-09-22 RX ADMIN — METOPROLOL TARTRATE 50 MG: 50 TABLET, FILM COATED ORAL at 08:31

## 2020-09-22 RX ADMIN — LOSARTAN POTASSIUM 100 MG: 100 TABLET, FILM COATED ORAL at 08:31

## 2020-09-22 RX ADMIN — HYDRALAZINE HYDROCHLORIDE 50 MG: 50 TABLET, FILM COATED ORAL at 05:31

## 2020-09-22 RX ADMIN — FERROUS SULFATE TAB EC 324 MG (65 MG FE EQUIVALENT) 324 MG: 324 (65 FE) TABLET DELAYED RESPONSE at 08:31

## 2020-09-22 RX ADMIN — GABAPENTIN 100 MG: 100 CAPSULE ORAL at 12:58

## 2020-09-22 RX ADMIN — OXYCODONE HYDROCHLORIDE AND ACETAMINOPHEN 1 TABLET: 5; 325 TABLET ORAL at 08:31

## 2020-09-22 RX ADMIN — Medication 2 CAPSULE: at 16:30

## 2020-09-22 RX ADMIN — ACETAMINOPHEN 650 MG: 325 TABLET ORAL at 05:31

## 2020-09-22 RX ADMIN — AMLODIPINE BESYLATE 10 MG: 10 TABLET ORAL at 08:31

## 2020-09-22 RX ADMIN — HYDRALAZINE HYDROCHLORIDE 50 MG: 50 TABLET, FILM COATED ORAL at 20:32

## 2020-09-22 RX ADMIN — LEVOTHYROXINE SODIUM 50 MCG: 0.05 TABLET ORAL at 05:31

## 2020-09-22 RX ADMIN — PANTOPRAZOLE SODIUM 40 MG: 40 TABLET, DELAYED RELEASE ORAL at 05:31

## 2020-09-22 RX ADMIN — MEROPENEM 500 MG: 500 INJECTION, POWDER, FOR SOLUTION INTRAVENOUS at 01:58

## 2020-09-22 RX ADMIN — LATANOPROST 1 DROP: 50 SOLUTION OPHTHALMIC at 20:34

## 2020-09-22 RX ADMIN — DORZOLAMIDE HYDROCHLORIDE AND TIMOLOL MALEATE 1 DROP: 20; 5 SOLUTION/ DROPS OPHTHALMIC at 08:35

## 2020-09-22 RX ADMIN — DORZOLAMIDE HYDROCHLORIDE AND TIMOLOL MALEATE 1 DROP: 20; 5 SOLUTION/ DROPS OPHTHALMIC at 20:34

## 2020-09-22 RX ADMIN — SODIUM CHLORIDE, PRESERVATIVE FREE 10 ML: 5 INJECTION INTRAVENOUS at 20:36

## 2020-09-22 RX ADMIN — MEROPENEM 500 MG: 500 INJECTION, POWDER, FOR SOLUTION INTRAVENOUS at 12:58

## 2020-09-22 RX ADMIN — Medication 2 CAPSULE: at 08:31

## 2020-09-22 RX ADMIN — GABAPENTIN 100 MG: 100 CAPSULE ORAL at 20:31

## 2020-09-22 RX ADMIN — MEROPENEM 500 MG: 500 INJECTION, POWDER, FOR SOLUTION INTRAVENOUS at 20:31

## 2020-09-22 RX ADMIN — METOPROLOL TARTRATE 50 MG: 50 TABLET, FILM COATED ORAL at 20:32

## 2020-09-22 RX ADMIN — OXYCODONE HYDROCHLORIDE AND ACETAMINOPHEN 1 TABLET: 5; 325 TABLET ORAL at 12:58

## 2020-09-22 RX ADMIN — DULOXETINE HYDROCHLORIDE 30 MG: 30 CAPSULE, DELAYED RELEASE ORAL at 20:31

## 2020-09-22 RX ADMIN — MEROPENEM 500 MG: 500 INJECTION, POWDER, FOR SOLUTION INTRAVENOUS at 08:26

## 2020-09-22 RX ADMIN — HYDRALAZINE HYDROCHLORIDE 50 MG: 50 TABLET, FILM COATED ORAL at 12:58

## 2020-09-22 RX ADMIN — AMIODARONE HYDROCHLORIDE 200 MG: 200 TABLET ORAL at 08:31

## 2020-09-22 RX ADMIN — DULOXETINE HYDROCHLORIDE 30 MG: 30 CAPSULE, DELAYED RELEASE ORAL at 08:31

## 2020-09-22 ASSESSMENT — PAIN DESCRIPTION - FREQUENCY
FREQUENCY: CONTINUOUS

## 2020-09-22 ASSESSMENT — PAIN DESCRIPTION - LOCATION
LOCATION: LEG

## 2020-09-22 ASSESSMENT — PAIN DESCRIPTION - ONSET
ONSET: ON-GOING

## 2020-09-22 ASSESSMENT — PAIN DESCRIPTION - DESCRIPTORS
DESCRIPTORS: ACHING

## 2020-09-22 ASSESSMENT — PAIN SCALES - GENERAL
PAINLEVEL_OUTOF10: 7
PAINLEVEL_OUTOF10: 0
PAINLEVEL_OUTOF10: 8
PAINLEVEL_OUTOF10: 3
PAINLEVEL_OUTOF10: 7
PAINLEVEL_OUTOF10: 0
PAINLEVEL_OUTOF10: 0

## 2020-09-22 ASSESSMENT — PAIN DESCRIPTION - PAIN TYPE
TYPE: ACUTE PAIN

## 2020-09-22 ASSESSMENT — PAIN DESCRIPTION - PROGRESSION
CLINICAL_PROGRESSION: NOT CHANGED
CLINICAL_PROGRESSION: NOT CHANGED

## 2020-09-22 ASSESSMENT — PAIN DESCRIPTION - ORIENTATION
ORIENTATION: LEFT

## 2020-09-22 NOTE — PROGRESS NOTES
Patient admitted with sepsis d/t UTI. Alert and oriented x 4. On tele, SR with 1st degree block. Transfers with walker x 1. General diet with thin liquids. Takes pills whole in water. Ensure BID. Continent of bowels. Reeves cath draining red urine, placed for retention. MD notified. Surgical incisions to back and abdomen SUDEEP, recent spinal fusion. Patient on RA. PICC to [facility], IV antibiotics every 6 hours. Fall precautions in place. Belongings and call light within reach.

## 2020-09-22 NOTE — CONSULTS
Got a psych consult order for this pt, but no message on the psych consult voicemail with the details of the consult. If this pt requires psych consultation, please feel free to reconsult entering in an order and call the psych consult voicemail leaving the details per the usual routine.

## 2020-09-22 NOTE — PROGRESS NOTES
Physical Therapy  Attempt Note    Name:Mac Jack  :1945  YWH:[id number]  Room: U6M-4728/3273-01    Date of Service: 2020    Pt in position of recliner that PT left patient at 9:25 this morning. Pt states he was into much pain this morning when attempting to walk in bathroom, does not want to participate with therapy because it will cause too much pain. Educated on importance of participating with functional mobility tasks to help decreasing pain, including stretching/exercises. With many attempt over 10 minutes with patient, continues to refuse at this time stating he is not willing to work because he is in too much pain today. Attempted further activity attempts at this time and strong refusal for participation.        Variance: 45  Reason: Refusal    Electronically signed by René Vera PT on 2020 at 2:41 PM

## 2020-09-22 NOTE — PROGRESS NOTES
Patient admitted with sepsis d/t UTI. A&O x4. Telemetry in place. Transfers with walker/gait belt x1. Incontinent of bowel w/LBM on 9/20. Reeves catheter in place draining cloudy raphael urine. Medication administered, pt. tolerated well. Denies pain this shift thus far. RUE PICC w/intermittent infusions overnight. Back and abdominal incisions SUDEEP, clean, dry, and intact. Fall precautions in place, call light and belongings within reach. Will monitor.   Electronically signed by Julio Joshi RN on 9/22/2020 at 3:38 AM

## 2020-09-22 NOTE — PLAN OF CARE
Problem: Nutrition  Goal: Optimal nutrition therapy  Outcome: Ongoing     Nutrition Problem #1: Increased nutrient needs  Intervention: Food and/or Nutrient Delivery: Continue Current Diet, Continue Oral Nutrition Supplement  Nutritional Goals: consume >/= to 50 %

## 2020-09-22 NOTE — PROGRESS NOTES
Infectious Disease Follow up Notes  Admit Date: 9/18/2020  Hospital Day: 5    Antibiotics :   IV Meropenem     CHIEF COMPLAINT:     Severe sepsis  High fevers  WBC elevation   TONO  UTI with urinary retention  Pelvic hematoma    Subjective interval History :  66 y.o. Man with recent surgery of Lumbar spine anterior and post approach by Dean Santiago and  on 8/31 at [facility] and post op noted to have drop in Hb and noted to have large Retroperitoneal Hematoma and pelvic hematoma he was given 1 unit of PRBC and he was d/c to SNF on 9/10 and now admitted with acute fevers T max 105 WITH change in Mentation and severe sepsis with WBC elevation and lactic acidosis, at  3.4 and he was unable to void and ED attempted mendoza placement was unsuccessful and Urology place mendoza with 1000 cc of malodorous urine drained per report and UA very abnormal on admit but urine cx low colony of GNR and CT abd/pelvis with on going Retroperitoneal and pelvic hematoma noted. He was placed on IV abx and Blood cx in process and currently mendoza working well and we are consulted for IV abx recommendations.  He has on going abd pain and back pain no radiation better with pain meds.      Interval History :  Seen in rehab and c/o abd pain and left leg pain thinks symptoms did not improve from surgery and Rt flank hematoma less  Prominent and WBC trend down Mendoza in place    Past Medical History:    Past Medical History:   Diagnosis Date    Aneurysm, aortic ([address]     Atrial fibrillation ([address]     Glaucoma     Heart attack ([address]     Hyperlipidemia     Hypertension        Past Surgical History:    Past Surgical History:   Procedure Laterality Date    COLONOSCOPY      CORONARY ANGIOPLASTY WITH STENT PLACEMENT      X 2    EYE SURGERY Right 7/23/2020    GONIOTOMY performed by Lisset Andrade MD at [place] [id number] Right 7/23/2020    Phacoemulsification with intraocular lens implant performed by Toñito Hall MD at [address] [place] [address]       Current Medications:    No outpatient medications have been marked as taking for the 9/18/20 encounter [facility] Encounter). Allergies:  Azithromycin; Brimonidine; Clindamycin/lincomycin; Penicillins; and Simvastatin    Immunizations : There is no immunization history on file for this patient.     Social History:    Social History     Tobacco Use    Smoking status: Former Smoker     Types: Cigarettes    Smokeless tobacco: Never Used    Tobacco comment: was only social smoker    Substance Use Topics    Alcohol use: Not Currently    Drug use: Never     Social History     Tobacco Use   Smoking Status Former Smoker    Types: Cigarettes   Smokeless Tobacco Never Used   Tobacco Comment    was only social smoker       Family History   Problem Relation Age of Onset    Heart Disease Mother          REVIEW OF SYSTEMS:    Constitutional: no  Fevers, chills,appetite change,night sweats  + Fatigue  Eyes:  negative for blurred vision, eye discharge, visual disturbance and icterus  HEENT:  negative for hearing loss, tinnitus, ear drainage, sinus pressure   Respiratory :  negative for cough,sob,  hemoptysis   Cardiovascular:  negative for chest pain, palpitations, syncope  Gastrointestinal:  negative for nausea, vomiting, diarrhea, constipation, + abdominal pain  Genitourinary:  negative for frequency, + dysuria, urinary incontinence, hematuria  Hematologic/Lymphatic:  negative for easy bruising, bleeding and lymphadenopathy  Allergic/Immunologic:  negative for recurrent infections, angioedema   Endocrine:  negative for weight changes, polyuria, polydipsia and polyphagia  Musculoskeletal:  negative for  pain, joint swelling, decreased range of motion and muscle weakness  Neurological:  negative for headaches, slurred speech, unilateral weakness  Psychiatric/Behavioral: negative for hallucinations, confusion, agitation               PHYSICAL EXAM:      Vitals:    BP (!) 142/66   Pulse 63   Temp 97.5 °F (36.4 °C) (Oral)   Resp 18   Ht 5' 9\" (1.753 m)   Wt 174 lb 13.2 oz (79.3 kg)   SpO2 95%   BMI 25.82 kg/m²     General Appearance: alert,in some acute distress,+  pallor, no icterus using nasal cannula   Skin: warm and dry, no rash or erythema  Head: normocephalic and atraumatic  Eyes: pupils equal, round, and reactive to light, conjunctivae normal  ENT: tympanic membrane, external ear and ear canal normal bilaterally, nose without deformity, nasal mucosa and turbinates normal without polyps  Neck: supple and non-tender without mass, no thyromegaly  no cervical lymphadenopathy  Pulmonary/Chest: clear to auscultation bilaterally- no wheezes, rales or rhonchi, normal air movement, no respiratory distress  Cardiovascular:  S1 and S2, no murmurs, rubs, clicks, or gallops, no carotid bruits  Abdomen: soft, +-tender, non-distended, normal bowel sounds, no masses or organomegaly  Bruising+ Flank area and pelvic area and surgical incision healing   Extremities: no cyanosis, clubbing or edema  Musculoskeletal: normal range of motion, no joint swelling, deformity or tenderness  Neurologic: reflexes normal and symmetric, no cranial nerve deficit  Psych:  Orientation, sensorium, mood normal  Lines:   Reeves+   PICC           Data Review:    Lab Results   Component Value Date    WBC 3.7 (L) 09/22/2020    HGB 8.5 (L) 09/22/2020    HCT 26.1 (L) 09/22/2020    MCV 93.3 09/22/2020     09/22/2020     Lab Results   Component Value Date    CREATININE 1.1 09/22/2020    BUN 18 09/22/2020     (L) 09/22/2020    K 3.8 09/22/2020     09/22/2020    CO2 24 09/22/2020       Hepatic Function Panel:   Lab Results   Component Value Date    ALKPHOS 64 09/14/2020    ALT 54 09/14/2020    AST 64 09/14/2020    PROT 5.4 09/14/2020    BILITOT 2.1 09/14/2020    BILIDIR 1.0 09/14/2020    IBILI 1.1 09/14/2020 Narrative:      ORDER#: [id number]                          ORDERED BY: Martin Ca   SOURCE: Blood                              COLLECTED:  09/13/20 10:04   ANTIBIOTICS AT [facility]. :                      RECEIVED :  09/13/20 10:18   If child <=2 yrs old please draw pediatric bottle. ~Blood Culture #1   Performed at:   [facility]   [address] [place], [place] [address]   Phone [phone]    Culture, Urine [[id number]]  (Abnormal)  Collected: 09/13/20 1415    Order Status: Completed  Specimen: Urine, clean catch  Updated: 09/14/20 1106     Organism  Gram negative rodAbnormal       Urine Culture, Routine  --     <10,000 CFU/ml   No further workup    Narrative:      ORDER#: [id number]                          ORDERED BY: Martin Ca   SOURCE: Urine Clean Catch                  COLLECTED:  09/13/20 14:15   ANTIBIOTICS AT [facility]. :                      RECEIVED :  09/13/20 15:08   Performed at:   [facility]   [address] [facility] [place]    Phone [phone]          IMAGING:    No orders to display         All the pertinent images and reports for the current Hospitalization were reviewed by me     Scheduled Meds:   alteplase  1 mg Intracatheter Once    amiodarone  200 mg Oral Daily    amLODIPine  10 mg Oral Daily    atorvastatin  40 mg Oral Nightly    dorzolamide-timolol  1 drop Both Eyes BID    DULoxetine  30 mg Oral BID    ferrous sulfate  324 mg Oral Daily with breakfast    gabapentin  100 mg Oral TID    hydrALAZINE  50 mg Oral 3 times per day    lactobacillus  2 capsule Oral BID WC    latanoprost  1 drop Left Eye Nightly    levothyroxine  50 mcg Oral Daily    losartan  100 mg Oral Daily    meropenem  500 mg Intravenous Q6H    metoprolol tartrate  50 mg Oral BID    pantoprazole  40 mg Oral QAM AC    sodium chloride flush  10 mL Intravenous 2 times per day    rivaroxaban  20 mg Oral Daily with breakfast Continuous Infusions:      PRN Meds:  promethazine **OR** ondansetron, acetaminophen, magnesium hydroxide, polyethylene glycol, benzocaine-menthol, bisacodyl, guaiFENesin, hydrALAZINE, melatonin, nitroGLYCERIN, oxyCODONE-acetaminophen, sodium chloride flush      Assessment:     Patient Active Problem List   Diagnosis    Acute metabolic encephalopathy    Pelvic hematoma in male    Acute renal failure superimposed on chronic kidney disease [address]    Elevated LFTs    Abnormal chest x-ray    Acute cystitis without hematuria    Debility     Severe sepsis on admit resolving   High fevers T max 105  Encephalopathy on admit likely from sepsis now improving    WBC elevation now improved   TONO on admit  UTI with abnormal UA and urinary retention  S/P Lumbar surgery anterior and post spinal fusion course complicated by large Retroperitoneal and Pelvic hematoma  Urinary Retention likely from above  Lactic acidosis  CT Head -ve  CT abd/pelvis with large hematoma ventral to the iliac vessel with communication to the deep and ventral aspect of the abd wall      Given the presentation suspect  as the primary source with UTI and urinary retention secondary to large pelvic hematoma and UA very abnormal with  4+ bacteria  and urine cx GNR low colony count wonder it was not collected properly as he had false passage and Urology has to place Reeves over the cystoscope/ wire      Reeves urine clearing and Blood cx negative Procal NOW improved     Able to do therapy but has on going back pain and incision looks clean and no new fevers       Labs, Microbiology, Radiology and all the pertinent results from current hospitalization and  care every where were reviewed  by me as a part of the evaluation   Plan:   1. D/C  IV Meropenem x 500 mg x q 6 hrs Tomorrow  2. Can d/c PICC line when ready   3.  Suspect  tract as the primary source given the urinary retention and malodorous urine now clearing has Reeves in place may need Urology follow up    4. Given the hematoma needs to watch the surgical site closely    5. Procal improved   6. Leave the mendoza per Urology   7. Remove PICC line when ready     Will sign off call with questions      Discussed with patient/Family and Nursing       Discussed with patient/Family and Nursing staff     Thanks for allowing me to participate in your patient's care and please call me with any questions or concerns.     Ayesha Osborn MD  Infectious Disease  [facility] Physician  Phone: [phone]   Fax : [phone]

## 2020-09-22 NOTE — PROGRESS NOTES
Department of Physical Medicine & Rehabilitation  Progress Note    Patient Identification:  Dilcia Crockett  [id number]  : 1945  Admit date: 2020    Chief Complaint: Debility    Subjective:   No acute events overnight. Patient seen this am sitting up in room. He has increase in pain today. States occurred when bending over sink to wash hands. Location is unchanged (low back and left hip). Limited in therapy today due to pain but reluctant to use pain medication. ROS: No f/c, n/v, cp     Objective:  Patient Vitals for the past 24 hrs:   BP Temp Temp src Pulse Resp SpO2 Weight   20 0830 (!) 142/66 97.5 °F (36.4 °C) Oral 63 18 95 % --   20 0730 -- -- -- -- -- 95 % --   20 0524 (!) 172/81 97.8 °F (36.6 °C) Oral 60 18 95 % 174 lb 13.2 oz (79.3 kg)   20 2353 (!) 147/75 98.1 °F (36.7 °C) Oral 58 16 96 % --   20 1931 (!) 169/54 97.6 °F (36.4 °C) Oral 70 16 96 % --   20 1531 127/71 97.2 °F (36.2 °C) Oral 58 16 95 % --   20 1243 (!) 107/44 97.2 °F (36.2 °C) Oral 57 16 96 % --     Const: Alert. No distress, pleasant. HEENT: Normocephalic, atraumatic. Normal sclera/conjunctiva. MMM. CV: Regular rate and rhythm. Resp: No respiratory distress. Lungs decreased. Abd: Soft, nontender, nondistended, NABS+   Ext: trace edema. Neuro: Alert, oriented, appropriately interactive. Psych: Cooperative, appropriate mood and affect    Laboratory data: Available via EMR.    Last 24 hour lab  Recent Results (from the past 24 hour(s))   CBC Auto Differential    Collection Time: 20  5:38 AM   Result Value Ref Range    WBC 3.7 (L) 4.0 - 11.0 K/uL    RBC 2.79 (L) 4.20 - 5.90 M/uL    Hemoglobin 8.5 (L) 13.5 - 17.5 g/dL    Hematocrit 26.1 (L) 40.5 - 52.5 %    MCV 93.3 80.0 - 100.0 fL    MCH 30.4 26.0 - 34.0 pg    MCHC 32.6 31.0 - 36.0 g/dL    RDW 17.0 (H) 12.4 - 15.4 %    Platelets 214 270 - 770 K/uL    MPV 9.3 5.0 - 10.5 fL    Neutrophils % 57.6 %    Lymphocytes % 17.4 % Monocytes % 19.2 %    Eosinophils % 4.4 %    Basophils % 1.4 %    Neutrophils Absolute 2.1 1.7 - 7.7 K/uL    Lymphocytes Absolute 0.6 (L) 1.0 - 5.1 K/uL    Monocytes Absolute 0.7 0.0 - 1.3 K/uL    Eosinophils Absolute 0.2 0.0 - 0.6 K/uL    Basophils Absolute 0.1 0.0 - 0.2 K/uL   Basic Metabolic Panel w/ Reflex to MG    Collection Time: 09/22/20  6:00 AM   Result Value Ref Range    Sodium 135 (L) 136 - 145 mmol/L    Potassium reflex Magnesium 3.8 3.5 - 5.1 mmol/L    Chloride 104 99 - 110 mmol/L    CO2 24 21 - 32 mmol/L    Anion Gap 7 3 - 16    Glucose 93 70 - 99 mg/dL    BUN 18 7 - 20 mg/dL    CREATININE 1.1 0.8 - 1.3 mg/dL    GFR Non-African American >60 >60    GFR African American >60 >60    Calcium 8.4 8.3 - 10.6 mg/dL       Therapy progress:  PT  Position Activity Restriction  Spinal Precautions: No Bending, No Lifting, No Twisting(10 lb lifting limit per pt.)  Other position/activity restrictions: room air, SPO2% level monitored throughout session  Objective     Sit to Stand: Contact guard assistance(cues for hand placement with each transfer due to attempting to pull up on RW multiple time--> from standard commode with right grab bar and wheelchair with use of arm rests increased time to perform due to back pain)  Stand to sit: Contact guard assistance(VC for hand placement for eccentric control and lining up properly due to poor safety awareness)  Device: Rolling Walker  Assistance: Contact guard assistance, Minimal assistance  Distance: 10' forward from commode to sink, ambulated backward 10'  OT  PT Equipment Recommendations  Equipment Needed: Yes  Mobility Devices: Sami Keen: Rolling  Other: will continue to assess for further equipment needs  Toilet - Technique: Ambulating(RW)  Equipment Used: Grab bars  Toilet Transfers Comments: SOT managed catheter, & IV line while patient used grab bars on R & L side cues for hand placement  Assessment        SLP          Body mass index is 25.82 kg/m². Assessment and Plan:    Impairments: generalized weakness + relative LLE weakness, decreased endurance, balance, cognition     Debility  -PT/OT     Sepsis presumed due to UTI  -continue merrem per ID     Urinary retention   -s/p cystoscopy for Reeves placement, +false urethral passage  -maintain Reeves x 7 days per Urology     Acute hypoxic respiratory failure in setting of baseline COPD  -Supplemental O2, wean as tolerated     TONO on CKD  -Avoid nephrotoxins, renally dose meds  -Monitor     Metabolic encephalopathy  -Improving with treatment of sepsis  -Regulate sleep/wake, emphasis on routine  -Consider SLP consult     Lumbar stenosis s/p recent L4-S1 ALIF/PSF (8/31 with Dr. Mojgan Aquino and Dr. Jack Castellanos)  -Wound care  -PT/OT     Acute blood loss anemia  -Due to left pelvic hematoma  -Monitor Hgb now resumed on Xarelto, transfuse prn <7  -Iron supplement     Afib  -Xarelto, amiodarone     CAD  -statin, bb, ARB     HTN, uncontrolled  -amlodipine, hydralazine, losartan, metoprolol    Hypokalemia  -Improved with replacement     Bladder   -Urinary retention as above  -Reeves, consider void trial next week     Bowel   -Loose stools  -PRN miralax, MoM, and bisacodyl supp.     Pain control  -Duloxetine, gabapentin, prn Percocet     PPx  -DVT: Xarelto  -GI: pantoprazole, probiotics    Rehab Progress: Tolerating therapy well thus far. Working on functional mobility, strength endurance, balance, compensatory strategies. Anticipated Dispo: home alone  Services/DME: TBD  ELOS: 2-3 weeks      Diego Her MD 9/22/2020, 10:51 AM

## 2020-09-22 NOTE — PATIENT CARE CONFERENCE
[facility]  Inpatient Rehabilitation  Weekly Team Conference Note      Date: 2020  Patient Name:  Millie Martinez. MRN: [id number]  : 1945  Gender: Male   Physician: Dr. Christie Padilla   Diagnosis: Debility [R53.81]    CASE MANAGEMENT  Assessment:    Goal is home. PHYSICAL THERAPY    Bed mobility  Rolling to Left: Stand by assistance(Instructed to use log roll technique so that legs and trunk move more synchronously without putting increased strain on back)  Rolling to Right: Stand by assistance  Supine to Sit: Stand by assistance, Contact guard assistance(Taught to allow momentum of legs hanging off EoB to assist in sitting upright)  Sit to Supine: Moderate assistance(assist for BLEs cues for log roll technique, & did not fully adhere to this)  Scooting: Stand by assistance  Comment: Instructed on log roll technique to put less strain on back when rolling on side. Pt with complaints of diziness when sitting up and in standing which was persistent throughout therapy session. Vitals assessed which were stable    Transfers:  Sit to Stand: Contact guard assistance(cues for hand placement with each transfer due to attempting to pull up on RW multiple time--> from standard commode with right grab bar and wheelchair with use of arm rests increased time to perform due to back pain)  Stand to sit: Contact guard assistance(VC for hand placement for eccentric control and lining up properly due to poor safety awareness)  Comment: Pt limited due to increased pain this morning (per RN patient has had pain medicine this morning)    Ambulation 1  Surface: level tile  Device: Rolling Walker  Assistance: Contact guard assistance, Minimal assistance  Quality of Gait: Pt ambulates with forward flexed posture and cuing to stand up straight. Takes small shuffling steps with decreased kay, step height and step length.   Ambulates with more of a step-to pattern leaning towards left throughout with narrow ASHLEY  Gait Deviations: Slow Sapna, Decreased step length, Decreased step height, Shuffles  Distance: 10' forward from commode to sink, ambulated backward 10'  Comments: pt leaning forward onto elbows due to pain educate on back precuations/safety, pauses and performs HS curls (knee extension) due to increased pain with LLE throughout, poor postural safety needing increased time to perform           Stairs  # Steps : 4  Stairs Height: 6\"  Rails: Bilateral  Curbs: 6\"(X 1 trial with RW CGA-min assist with increased time needing step by step instructions for proper sequencing.)  Device: No Device  Assistance: Contact guard assistance, Minimal assistance  Comment: Ascend/descend nonreciprocal pattern with cues for proper sequencing, increased time with use of BUE on railing with cues, slight difficulty with descent due to fatigue needing CGA_min assist with increased time to perform. SPO2% 95%, HR 64 bpm after completion of 4 steps. Slight TAPIA but increased tolerance this date           Assessment: Pt with very poor ability and willingness to participate with therapy this date despite max encouragement/education. Pt requires CGA for forward ambulation 10' wht CGA-min assist with backwards ambulation/turns at this time. Pt with very poor insight to deficits throughout session and safety concerns needing constant cues with all mobility tasks. Pt would benefit from skilled therapy to increase safety awareness for precautions and transfers, improve endurance, and build LE strength for success with functional mobility. SPEECH THERAPY (intentionally left blank if not actively being seen by this service):      OCCUPATIONAL THERAPY    ADL  Grooming: Supervision(brushed teeth, washed face, combed hair while seated at sink)  UE Bathing: Stand by assistance, Setup  LE Bathing: Minimal assistance(for thoroughness)  UE Dressing: Supervision  LE Dressing:  Moderate assistance(OT started pants over feet and managed mendoza cath.  Pt assisted to pull over hips)  Toileting: Minimal assistance(OT assisted post pericare for thoroughness)  Additional Comments: Pt reminded of spinal precautions, recommend AE for bathing & dressing. Pt complained of own pants too tight around the waist, therefore OT assisted to change into hospital pants. Pt left in bed per request D/T fatigue. Bed mobility  Rolling to Left: Stand by assistance(Instructed to use log roll technique so that legs and trunk move more synchronously without putting increased strain on back)  Rolling to Right: Stand by assistance  Supine to Sit: Stand by assistance, Contact guard assistance(Taught to allow momentum of legs hanging off EoB to assist in sitting upright)  Sit to Supine: Moderate assistance(assist for BLEs cues for log roll technique, & did not fully adhere to this)  Scooting: Stand by assistance  Comment: Instructed on log roll technique to put less strain on back when rolling on side. Pt with complaints of diziness when sitting up and in standing which was persistent throughout therapy session. Vitals assessed which were stable    Functional Transfers: Toilet Transfers  Toilet - Technique: Ambulating(RW)  Equipment Used: Grab bars  Toilet Transfer: Contact guard assistance  Toilet Transfers Comments: SOT managed catheter, & IV line while patient used grab bars on R & L side cues for hand placement          Perception  Overall Perceptual Status: WFL         UE Function:  WFL of spinal precautions    Assessment: Pt seen in room for ADLs. Pt is refusing to eat and expressing listlessness regarding getting better. Pt making comments regarding wanting to die. Pt required extra encouragement to complete sponge bath at sink. Pt washed UB with Supervision and LB with min/mod A. Dressed UB with S and LB with moderate assist.  Will cont OT POC.         NUTRITION  Most recent weightWeight: 174 lb 13.2 oz (79.3 kg)  BSA (Calculated - sq m): 1.99 sq meters  BMI (Calculated): 25.9   Diet Order: DIET GENERAL;  Dietary Nutrition Supplements: Standard High Calorie Oral Supplement  PO Meals Eaten (%): 0, pt with ongoing poor appetite. Occasionally with good intakes of supplement. Please see nutrition note for details. NURSING  Has mendoza, can be incontinent of bm. Monitor skin,abrasions and incision care, PICC site  Family Education:  Patient education: back safety, skin and incision care, medications, mendoza care/and or bladder program for discharge, safety and fall prevention, pain control. MEDICAL  Resumed anticoagulation, monitoring Hgb closely  Ongoing abx management    TEAM SUMMARY AND DISCHARGE PLAN  Estimated Length of Stay: tentative DC 10/3/20  Destination: home with home care- adult children with assist as much as they can, but cannot stay with patient   · Anticipated Services at Discharge:    [x] OT  [x] PT   [] SLP    [x] RN   [x] Home Health aide? [] SATHISH  [employer]: _______________________________  Equipment recommendations:  [] Hospital bed [] Tub bench  [x] Shower chair [] Hand held shower  [] Raised toilet seat [x] Toilet safety frame [] Bedside commode   [] W/C: _____  [] [employer] [] Standard walker [] Gait belt [] cane: _________  [] Sliding board [] Alternate seating/furniture [] O2 [x] Hip Kit (4 piece): _______  [] Life Line [] Other: _______ (continue to assess pending progression)   Factors facilitating achievement of predicted outcomes: Family support and Cooperative  Barriers to the achievement of predicted outcomes/Interventions:    decreased endurance, spinal precautions- strengthening, conditioning, adaptive equipment and compensatory strategies for safe self care and mobility, activity pacing, energy conservation techniques     Interdisciplinary Individualized Plan of Care Review:    · Continue Current Plan of Care:  Yes    · Modifications:_____________________________    Special Needs in the Upcoming Week :    [] Family/Caregiver Education  [] Home visit  []Therapeutic Pass   [] Consults:_______    [] Other;_______    Patient Rehab Team Goals for the Upcoming Week:  1. Improve participation with all functional mobility tasks to improve overall activity tolerance with decreased assistance. 2. Improve participation in ADL tasks in order to increase independence in self-care  3. Patient goals : Just want to get back to living on my own at home          Team Members Present at Conference:  Physician: Dr Tatiana Howe  : [facility]    Occupational Therapist: Mary Mack OTR/L 21   Physical Therapist: Bridget Mayorga [facility]  Speech Therapist:   Nurse: eSbastian Kiser RN, CRRN  Dietician: Jose Francisco Sidhu RD,LD   ROSALIND Kwok       I led this team conference and I approve the established interdisciplinary plan of care as documented within the medical record of Sully Nichols. .    MD: Shikha Guerra.  Tatiana Howe MD 9/23/2020, 5:06 PM

## 2020-09-22 NOTE — PLAN OF CARE
Problem: Falls - Risk of:  Goal: Will remain free from falls  Description: Will remain free from falls  9/21/2020 1049 by John Heart RN  Outcome: Ongoing  Note: Fall risk band on patient. Orange light on outside of room. Non skid footwear in place. Alarms used appropriately. Patient instructed to call and wait for staff before getting up. Rounding done to anticipate needs. Appropriate safety devices used for transfers. Problem: Skin Integrity:  Goal: Absence of new skin breakdown  Description: Absence of new skin breakdown  Outcome: Ongoing  Note: Skin assessment completed on admission and every shift. Barrier wipes used in the event of incontinence. Pressure relief techniques used as needed while in chair and in bed. Position changes encouraged at least every two hours while in bed. Problem: Pain:  Goal: Control of acute pain  Description: Control of acute pain  Outcome: Ongoing  Note: Patient able to express pain and rate pain using pain scale. Medicate as needed per orders. Reassess patient pain level within one hour after oral pain medication/intervention to assure patient has reduced pain sensation and document outcome. Non pharmaceutical interventions as appropriate.

## 2020-09-22 NOTE — PROGRESS NOTES
Physical Therapy  Facility/Department: [address] REHAB  Daily Treatment Note  NAME: Umair Cr Sr.  : 1945  MRN: [id number]    Date of Service: 2020    Discharge Recommendations:  Continue to assess pending progress, Patient would benefit from continued therapy after discharge, 24 hour supervision or assist   PT Equipment Recommendations  Equipment Needed: Yes  Mobility Devices: Alka Flatter: Rolling  Other: will continue to assess for further equipment needs    Assessment   Body structures, Functions, Activity limitations: Decreased functional mobility ; Decreased strength;Decreased ROM; Decreased endurance;Decreased balance  Assessment: Pt with very poor ability and willingness to participate with therapy this date despite max encouragement/education. Pt requires CGA for forward ambulation 10' wht CGA-min assist with backwards ambulation/turns at this time. Pt with very poor insight to deficits throughout session and safety concerns needing constant cues with all mobility tasks. Pt would benefit from skilled therapy to increase safety awareness for precautions and transfers, improve endurance, and build LE strength for success with functional mobility. Treatment Diagnosis: LE weakness, adherence to safety precautions, poor activity tolerance, poor balance  Prognosis: Good  Clinical Presentation: evolving  PT Education: Orientation;General Safety;Gait Training;Equipment;Transfer Training;Weight-bearing Education; Adaptive Device Training  Patient Education: posture within RW, safety with all mobility tasks including ambulation, participation with functional tasks to increased strength and decrease pain overall  Barriers to Learning: low motivation to participate due to increased pain with low back  REQUIRES PT FOLLOW UP: Yes  Activity Tolerance  Activity Tolerance: Patient limited by pain;Treatment limited secondary to agitation  Activity Tolerance: pt with poor motivation to participate with PT this morning despite max attempt     Patient Diagnosis(es): There were no encounter diagnoses. has a past medical history of Aneurysm, aortic ([address] Atrial fibrillation ([address] Glaucoma, Heart attack ([address] Hyperlipidemia, and Hypertension. has a past surgical history that includes Coronary angioplasty with stent; Colonoscopy; Eye surgery (Right, 7/23/2020); and Intracapsular cataract extraction (Right, 7/23/2020). Social/Functional History  Lives With: Alone  Type of Home: House  Home Layout: Multi-level, Bed/Bath upstairs, Able to Live on Main level with bedroom/bathroom, Laundry in basement  Home Access: Stairs to enter with rails  Entrance Stairs - Number of Steps: 10-11  Entrance Stairs - Rails: Right  Bathroom Shower/Tub: Tub/Shower unit, Walk-in shower(walk in shower on the main level)  Bathroom Toilet: Standard(sink counter by toilet, comfort ht toilet)  Bathroom Equipment: (No DME)  Bathroom Accessibility: [place] accessible(Pt says possibly a w/c or walker could fit through doorway)  Home Equipment: Sock aid, [address] bed(Hospital in family room on 1st floor)  ADL Assistance: Independent  Homemaking Assistance: Independent  Homemaking Responsibilities: Yes  Meal Prep Responsibility: Primary  Laundry Responsibility: Primary  Cleaning Responsibility: Primary  Bill Paying/Finance Responsibility: Primary  Health Care Management: Primary  Ambulation Assistance: Independent  Transfer Assistance: Independent  Active : Yes  Mode of Transportation: Car  Occupation: Retired  Type of occupation: Retired :  for 35 years. Leisure & Hobbies: out to eat with lady friend, travelled to [place], [address] [place] [address], was walking about a mile every 2 days before he got sick  Additional Comments: Information pertains to prior recent back surg (8/31/2020), following d/c to SNF setting [facility]).   Pt reports that he was only at SNF setting few days following surg, did have a fall while there (reports call light to go to bathroom was not answered and he tried to get OOB by self). Pt reports that he has not been home since surgery at [facility].    Restrictions  Restrictions/Precautions  Restrictions/Precautions: Fall Risk  Position Activity Restriction  Spinal Precautions: No Bending, No Lifting, No Twisting(10 lb lifting limit per pt.)  Other position/activity restrictions: room air, SPO2% level monitored throughout session  Subjective   General  Chart Reviewed: Yes  Additional Pertinent Hx: 65 y/o male admit from SNF setting 9/13/2020 with Altered Mental Status, Severe Sepsis, Acute Renal Failure, and Pelvic Hematoma. CT Head negative. CT Abd/Pelvis + L Pelvic Hematoma. Urology consult for Reeves Placement. PMH as noted including Recent Back Surg (L4/L5 and L5/S1 Ant/Post Fusion, 8/31/2020), CAD, Angio with Stent, A-Fib, Aortic Aneurysm. Response To Previous Treatment: Patient with no complaints from previous session. Family / Caregiver Present: No  Referring Practitioner: Dr. Tai Muhammad  Subjective  Subjective: Pt sitting on commode upon arrival to room, pt reports 8/10 pain this morning and states unable to participate with therapy at this time due to too much pain  General Comment  Comments: Provided patient with multiple education about pertaining to importance of movement to help improve back pain, admission to rehab unit with goals, and his own personal goals. Pt sternally refuses additional participate beyond toilet task at this time, poor following recommendations/cues for safety throughout.  Pt easily frustrated with education/recommendations wants to do it at his own pace stating \"just get me off this unit\"          Orientation  Orientation  Overall Orientation Status: Within Functional Limits  Orientation Level: Oriented X4    Objective      Transfers  Sit to Stand: Contact guard assistance(cues for hand placement with each transfer due to attempting to pull up on RW multiple time--> from standard commode with right grab bar and wheelchair with use of arm rests increased time to perform due to back pain)  Stand to sit: Contact guard assistance(VC for hand placement for eccentric control and lining up properly due to poor safety awareness)  Stand Pivot Transfers: Contact guard assistance(w/c to recliner in room with RW ~5' with cues for safety, increased time to perform due to flexed posture and stopping with each step to stretch LLE due to increased pain, cues to improve posture and safety throughout)  Comment: Pt limited due to increased pain this morning (per RN patient has had pain medicine this morning)  Ambulation  Ambulation?: Yes  Ambulation 1  Surface: level tile  Device: Rolling Walker  Assistance: Contact guard assistance;Minimal assistance  Quality of Gait: Pt ambulates with forward flexed posture and cuing to stand up straight. Takes small shuffling steps with decreased kay, step height and step length. Ambulates with more of a step-to pattern leaning towards left throughout with narrow ASHLEY  Gait Deviations: Slow Kay;Decreased step length;Decreased step height;Shuffles  Distance: 10' forward from commode to sink, ambulated backward 10'  Comments: pt leaning forward onto elbows due to pain educate on back precuations/safety, pauses and performs HS curls (knee extension) due to increased pain with LLE throughout, poor postural safety needing increased time to perform  Stairs/Curb  Stairs?: No(unable to leave room this morning due to patient refused)     Balance  Posture: Poor  Sitting - Static: Good  Sitting - Dynamic: Good  Standing - Static: Fair;-  Standing - Dynamic: Fair;-            Comment: Sitting on commode mod I without staff upon arrival to room, patient agreeable to PT assistance at this time. Pt complete shelbie car mod I for BM. Assisted in donning pants due to mendoza cath line. Standing SBA for balance.  Standing at sink to wash hands CGA wit hset up Individual Concurrent Group Co-treatment   Time In 0900         Time Out 0925         Minutes 25            Variance: 20  Reason: Refusal    Hima Bhagat PT       Electronically signed by Hima Bhagat PT on 9/22/20 at 11:44 AM EDT

## 2020-09-22 NOTE — PROGRESS NOTES
Occupational Therapy Attempt  Lorin 996 b/s for therapy as refused to come to gym with transporter. Pt seated in recliner, acknowledges this therapist briefly but then looks away. OT attempted to engage pt in therapy in multiple ways, including activity seated in recliner, but pt declined all. Pt does not say anything other than \"no\" and shaking his head. Will reattempt in PM.    Electronically signed by Laverne Roe OT on 9/22/20 at 12:15 PM EDT    Variance: 45 mins secondary to pt refusal    SECOND ATTEMPT:  Pt met b/s for PM OT session. Pt seated in recliner, continues to refuse all therapy. Pt states he received pain meds recently, but is still having too much pain to participate in therapy. Pt states he will try and work with therapy tomorrow.     Variance: 45 mins secondary to pt refusal    Electronically signed by Laverne Roe OT on 9/22/20 at 1:59 PM EDT

## 2020-09-22 NOTE — PROGRESS NOTES
Comprehensive Nutrition Assessment    Type and Reason for Visit:  Initial, Consult(per rehab protocol)    Nutrition Recommendations/Plan:   General diet   Ensure BID  Will monitor nutritional adequacy, nutrition-related labs, weights, BMs, and clinical progress     Nutrition Assessment:  Follow-up. Pt continues on General diet with Ensure BID. Meal intake still mostly inadequate, eating less than 50% of meals. Is accepting supplement at times. Will continue current interventions. Malnutrition Assessment:  Malnutrition Status:  Insufficient data    Context:  Acute Illness       Estimated Daily Nutrient Needs:  Energy (kcal):  3058-8383 (22-28 x ABW (adj for healing); Weight Used for Energy Requirements:        Protein (g):   (1.2-2 x IBW 73 kg); Weight Used for Protein Requirements:           Fluid (ml/day):  1 ml per kcal; Weight Used for Fluid Requirements:         Nutrition Related Findings:  Last BM 9/22      Wounds:  Surgical Wound(SI to abd & back (anterior & posterior approach during surgery). No isues noted)       Current Nutrition Therapies:    DIET GENERAL;  Dietary Nutrition Supplements: Standard High Calorie Oral Supplement    Anthropometric Measures:  · Height: 5' 9\" (175.3 cm)  · Current Body Weight: 174 lb (78.9 kg)   · Admission Body Weight: 179 lb (81.2 kg)    · Ideal Body Weight: 160 lbs; % Ideal Body Weight 120.6 %   · BMI: 25.7  · Adjusted Body Weight:  ; No Adjustment   · BMI Categories: Overweight (BMI 25.0-29. 9)       Nutrition Diagnosis:   · Increased nutrient needs related to increase demand for energy/nutrients as evidenced by wounds      Nutrition Interventions:   Food and/or Nutrient Delivery:  Continue Current Diet, Continue Oral Nutrition Supplement  Nutrition Education/Counseling:  No recommendation at this time   Coordination of Nutrition Care:  Continued Inpatient Monitoring    Goals:  consume >/= to 50 %       Nutrition Monitoring and Evaluation: Behavioral-Environmental Outcomes:  (na)   Food/Nutrient Intake Outcomes:  Food and Nutrient Intake, Supplement Intake  Physical Signs/Symptoms Outcomes:  Biochemical Data, Constipation, Diarrhea, Fluid Status or Edema, Weight, Skin, Nutrition Focused Physical Findings     Discharge Planning:     Too soon to determine     Electronically signed by Rosy Morales RD, LD on 9/22/20 at 12:55 PM EDT    Contact: [phone]

## 2020-09-22 NOTE — PLAN OF CARE
Problem: Falls - Risk of:  Goal: Will remain free from falls  Description: Will remain free from falls  9/22/2020 0039 by Tahir Costello RN  Outcome: Ongoing  Note: Fall precautions in place. Noriega score assessed. Pt transfers with walker/gaitbelt x1. Bed locked in lowest position, nonskid socks on, bed/chair alarm active. Call light and belongings within reach. Rounding hourly to assess needs. Problem: Skin Integrity:  Goal: Will show no infection signs and symptoms  Description: Will show no infection signs and symptoms  Outcome: Ongoing  Note: RUE PICC dressing clean, dry, and intact. Alcohol caps applied to ports. Chlorhexidine wipes per orders. Problem: Skin Integrity:  Goal: Absence of new skin breakdown  Description: Absence of new skin breakdown  9/22/2020 0039 by Tahir Costello RN  Outcome: Ongoing  Note: Skin assessment performed. Juan score assessed. Encouraging and assisted with repositioning as needed. Heels elevated while in chair/bed. Use of waffle cushion while up to chair. Mendoza catheter in place draining cloudy raphael urine, care provided with green mendoza wipes per protocol. Abdominal and back incisions SUDEEP, clean, dry, and intact. Problem: Pain:  Goal: Control of acute pain  Description: Control of acute pain  9/22/2020 0039 by Tahir Costello RN  Outcome: Ongoing  Note: Pt. able to communicate pain needs based on 0-10 pain scale. Non-pharmacological interventions utilized and PRN pain medications administered as needed. Continue to monitor and assess. Problem: Infection - Central Venous Catheter-Associated Bloodstream Infection:  Goal: Will show no infection signs and symptoms  Description: Will show no infection signs and symptoms  Outcome: Ongoing  Note: RUE PICC dressing clean, dry, and intact. Alcohol caps applied to ports. Chlorhexidine wipes per orders.

## 2020-09-23 LAB
ANION GAP SERPL CALCULATED.3IONS-SCNC: 8 MMOL/L (ref 3–16)
BASOPHILS ABSOLUTE: 0 K/UL (ref 0–0.2)
BASOPHILS RELATIVE PERCENT: 1.4 %
BUN BLDV-MCNC: 17 MG/DL (ref 7–20)
CALCIUM SERPL-MCNC: 8.6 MG/DL (ref 8.3–10.6)
CHLORIDE BLD-SCNC: 102 MMOL/L (ref 99–110)
CO2: 24 MMOL/L (ref 21–32)
CREAT SERPL-MCNC: 1 MG/DL (ref 0.8–1.3)
EOSINOPHILS ABSOLUTE: 0.2 K/UL (ref 0–0.6)
EOSINOPHILS RELATIVE PERCENT: 5.3 %
GFR AFRICAN AMERICAN: >60
GFR NON-AFRICAN AMERICAN: >60
GLUCOSE BLD-MCNC: 80 MG/DL (ref 70–99)
HCT VFR BLD CALC: 26.3 % (ref 40.5–52.5)
HEMOGLOBIN: 8.5 G/DL (ref 13.5–17.5)
LYMPHOCYTES ABSOLUTE: 0.8 K/UL (ref 1–5.1)
LYMPHOCYTES RELATIVE PERCENT: 22.1 %
MCH RBC QN AUTO: 30.3 PG (ref 26–34)
MCHC RBC AUTO-ENTMCNC: 32.2 G/DL (ref 31–36)
MCV RBC AUTO: 93.9 FL (ref 80–100)
MONOCYTES ABSOLUTE: 0.7 K/UL (ref 0–1.3)
MONOCYTES RELATIVE PERCENT: 19.7 %
NEUTROPHILS ABSOLUTE: 1.8 K/UL (ref 1.7–7.7)
NEUTROPHILS RELATIVE PERCENT: 51.5 %
PDW BLD-RTO: 17.3 % (ref 12.4–15.4)
PLATELET # BLD: 166 K/UL (ref 135–450)
PMV BLD AUTO: 9.3 FL (ref 5–10.5)
POTASSIUM REFLEX MAGNESIUM: 3.8 MMOL/L (ref 3.5–5.1)
RBC # BLD: 2.8 M/UL (ref 4.2–5.9)
SODIUM BLD-SCNC: 134 MMOL/L (ref 136–145)
WBC # BLD: 3.5 K/UL (ref 4–11)

## 2020-09-23 PROCEDURE — 80048 BASIC METABOLIC PNL TOTAL CA: CPT

## 2020-09-23 PROCEDURE — 85025 COMPLETE CBC W/AUTO DIFF WBC: CPT

## 2020-09-23 PROCEDURE — 94760 N-INVAS EAR/PLS OXIMETRY 1: CPT

## 2020-09-23 PROCEDURE — 97110 THERAPEUTIC EXERCISES: CPT

## 2020-09-23 PROCEDURE — 97535 SELF CARE MNGMENT TRAINING: CPT

## 2020-09-23 PROCEDURE — 97116 GAIT TRAINING THERAPY: CPT

## 2020-09-23 PROCEDURE — 97129 THER IVNTJ 1ST 15 MIN: CPT

## 2020-09-23 PROCEDURE — 6370000000 HC RX 637 (ALT 250 FOR IP): Performed by: PHYSICAL MEDICINE & REHABILITATION

## 2020-09-23 PROCEDURE — 97530 THERAPEUTIC ACTIVITIES: CPT

## 2020-09-23 PROCEDURE — 2580000003 HC RX 258: Performed by: PHYSICAL MEDICINE & REHABILITATION

## 2020-09-23 PROCEDURE — 1280000000 HC REHAB R&B

## 2020-09-23 RX ADMIN — DULOXETINE HYDROCHLORIDE 30 MG: 30 CAPSULE, DELAYED RELEASE ORAL at 20:18

## 2020-09-23 RX ADMIN — Medication 2 CAPSULE: at 09:11

## 2020-09-23 RX ADMIN — ATORVASTATIN CALCIUM 40 MG: 40 TABLET, FILM COATED ORAL at 20:22

## 2020-09-23 RX ADMIN — SODIUM CHLORIDE, PRESERVATIVE FREE 10 ML: 5 INJECTION INTRAVENOUS at 10:01

## 2020-09-23 RX ADMIN — GABAPENTIN 100 MG: 100 CAPSULE ORAL at 09:12

## 2020-09-23 RX ADMIN — HYDRALAZINE HYDROCHLORIDE 50 MG: 50 TABLET, FILM COATED ORAL at 14:01

## 2020-09-23 RX ADMIN — PANTOPRAZOLE SODIUM 40 MG: 40 TABLET, DELAYED RELEASE ORAL at 05:27

## 2020-09-23 RX ADMIN — GABAPENTIN 100 MG: 100 CAPSULE ORAL at 14:01

## 2020-09-23 RX ADMIN — FERROUS SULFATE TAB EC 324 MG (65 MG FE EQUIVALENT) 324 MG: 324 (65 FE) TABLET DELAYED RESPONSE at 09:12

## 2020-09-23 RX ADMIN — Medication 2 CAPSULE: at 17:58

## 2020-09-23 RX ADMIN — METOPROLOL TARTRATE 50 MG: 50 TABLET, FILM COATED ORAL at 09:12

## 2020-09-23 RX ADMIN — HYDRALAZINE HYDROCHLORIDE 50 MG: 50 TABLET, FILM COATED ORAL at 20:23

## 2020-09-23 RX ADMIN — LOSARTAN POTASSIUM 100 MG: 100 TABLET, FILM COATED ORAL at 09:12

## 2020-09-23 RX ADMIN — DORZOLAMIDE HYDROCHLORIDE AND TIMOLOL MALEATE 1 DROP: 20; 5 SOLUTION/ DROPS OPHTHALMIC at 20:23

## 2020-09-23 RX ADMIN — GABAPENTIN 100 MG: 100 CAPSULE ORAL at 20:22

## 2020-09-23 RX ADMIN — LEVOTHYROXINE SODIUM 50 MCG: 0.05 TABLET ORAL at 05:26

## 2020-09-23 RX ADMIN — SODIUM CHLORIDE, PRESERVATIVE FREE 10 ML: 5 INJECTION INTRAVENOUS at 20:23

## 2020-09-23 RX ADMIN — AMIODARONE HYDROCHLORIDE 200 MG: 200 TABLET ORAL at 09:12

## 2020-09-23 RX ADMIN — RIVAROXABAN 20 MG: 20 TABLET, FILM COATED ORAL at 09:12

## 2020-09-23 RX ADMIN — LATANOPROST 1 DROP: 50 SOLUTION OPHTHALMIC at 20:23

## 2020-09-23 RX ADMIN — METOPROLOL TARTRATE 50 MG: 50 TABLET, FILM COATED ORAL at 20:22

## 2020-09-23 RX ADMIN — DULOXETINE HYDROCHLORIDE 30 MG: 30 CAPSULE, DELAYED RELEASE ORAL at 09:12

## 2020-09-23 RX ADMIN — AMLODIPINE BESYLATE 10 MG: 10 TABLET ORAL at 09:12

## 2020-09-23 RX ADMIN — HYDRALAZINE HYDROCHLORIDE 50 MG: 50 TABLET, FILM COATED ORAL at 05:27

## 2020-09-23 RX ADMIN — OXYCODONE HYDROCHLORIDE AND ACETAMINOPHEN 1 TABLET: 5; 325 TABLET ORAL at 05:39

## 2020-09-23 RX ADMIN — DORZOLAMIDE HYDROCHLORIDE AND TIMOLOL MALEATE 1 DROP: 20; 5 SOLUTION/ DROPS OPHTHALMIC at 12:25

## 2020-09-23 ASSESSMENT — PAIN DESCRIPTION - PAIN TYPE: TYPE: ACUTE PAIN

## 2020-09-23 ASSESSMENT — PAIN DESCRIPTION - ORIENTATION: ORIENTATION: LEFT

## 2020-09-23 ASSESSMENT — PAIN DESCRIPTION - DESCRIPTORS: DESCRIPTORS: ACHING

## 2020-09-23 ASSESSMENT — PAIN - FUNCTIONAL ASSESSMENT: PAIN_FUNCTIONAL_ASSESSMENT: PREVENTS OR INTERFERES SOME ACTIVE ACTIVITIES AND ADLS

## 2020-09-23 ASSESSMENT — PAIN DESCRIPTION - FREQUENCY: FREQUENCY: INTERMITTENT

## 2020-09-23 ASSESSMENT — PAIN SCALES - GENERAL
PAINLEVEL_OUTOF10: 0
PAINLEVEL_OUTOF10: 0
PAINLEVEL_OUTOF10: 7
PAINLEVEL_OUTOF10: 0

## 2020-09-23 ASSESSMENT — PAIN DESCRIPTION - ONSET: ONSET: ON-GOING

## 2020-09-23 ASSESSMENT — PAIN DESCRIPTION - LOCATION: LOCATION: LEG

## 2020-09-23 ASSESSMENT — PAIN DESCRIPTION - PROGRESSION: CLINICAL_PROGRESSION: GRADUALLY IMPROVING

## 2020-09-23 NOTE — CARE COORDINATION
Team Conference held today. Team reviewed progress and goals. Tearm states he refused all therapies on Wednesday. HE fatigue levels fluctuate. Team recommends continued stay on rehab to further his progress in personal care and ambulation needs. Call placed to Dgtr, Miguel Ángel Taveras, to review. She is VERY concerned about his iron levels and cannot believe we would even think he is ready by next week. She wants to speak with MD directly, not a nurse about MD plan for iron. She can be reached on Thursday anytime except between 1 - 2pm.   She can be reached at [phone].   [place], [place]     Case Management   [phone]    9/23/2020  5:00 PM

## 2020-09-23 NOTE — PROGRESS NOTES
Occupational Therapy  Facility/Department: [address] IP REHAB  Daily Treatment Note  AM & PM Session  NAME: Carlos Jones  : 1945  MRN: [id number]    Date of Service: 2020    Discharge Recommendations:  Continue to assess pending progress, S Level 1, Home with Home health OT, Home with assist PRN  OT Equipment Recommendations  Other: Assess for shower chair, long handle sponge, reacher, shoe horn, sock aide, leg  grab bars, TSF    Assessment   Performance deficits / Impairments: Decreased functional mobility ; Decreased endurance;Decreased ADL status; Decreased balance;Decreased strength;Decreased safe awareness;Decreased high-level IADLs  Assessment: Pt amb around kitchen w/ RW CGA to retrieve items. Pt had slight LOB when side stepping left. Pt tood ~4.5 minutes before needing rest break. Pt rated pain in legs & back 8/10. Pt tolerated 2 more minutes of dynamic standing to put items back in cabinets. S/OT managed catheter during transfers. Pt tolerated theraband exercises in dept. Appeared in a better mood today than previous session. Treatment Diagnosis: Impaired: ADL/IADL function, balance, functional mobility, endurance/strength, safety awareness. Prognosis: Good  OT Education: OT Role;Plan of Care;Precautions; ADL Adaptive Strategies; Energy Conservation;Transfer Training;Home Exercise Program;IADL Safety  Patient Education: Pt issued yellow resistive band HEP for BUE. Ed to stop exercises if experiencing pain. Ed on walker bag to retrieve kitchen items. REQUIRES OT FOLLOW UP: Yes  Activity Tolerance  Activity Tolerance: Patient limited by pain; Patient limited by fatigue  Activity Tolerance: Pt limited by pain in legs, appeared tired & needed rest break after ~ 4 minutes of dynamic standing. Safety Devices  Safety Devices in place: Yes  Type of devices: Gait belt(Left seated in w/c w/ PT.)         Patient Diagnosis(es): There were no encounter diagnoses.       has a past medical history & sat in chair w/ arms CGA. S/OT carried bowl to table. Pt stood ~4.5 minutes. Pt stated pain in his legs & back was 8/10. Pt placed bowl & cheerios back in cabinets. Pt side stepped to the left w/out LOB using RW CGA. Pt sat in w/c w/ cues for safety. S/OT managed catheter. Functional Mobility  Functional - Mobility Device: Rolling Walker  Activity: Retrieve items  Assist Level: Contact guard assistance  Functional Mobility Comments: Pt am slowly, taking small steps. Foot placement appears unsteady when turning & side stepping. Slight LOB when side stepping ot the right. Wheelchair Bed Transfers  Wheelchair/Bed - Technique: Ambulating(RW)  Equipment Used: Wheelchair; Other(Chair w/ arm rest)  Level of Asssistance: Contact guard assistance  Wheelchair Transfers Comments: ><w/c><chair w/arms. Pt needed cues to step back until legs touch chair & to reach back for arm rests. S/OT managed cather palcement. Type of ROM/Therapeutic Exercise  Type of ROM/Therapeutic Exercise: Resistive Bands  Comment: Pt issued resistive band HEP w/ yellow theraband. Pt performed 6 BUE exercises for 10 repetitions each seated in w/c in order build UB strength for participation in ADL/IADL tasks. Pt educated to stop exercise if experiencing any pain. Second Session    Pt met in room, seated in recliner. Did not want to go to dept, pt stated pain in his legs 7/10, therefore session performed in room seated in recliner. Pt declined any standing or transfers D/T extreme fatigue. Therapeutic activity:  Pt hit balloon back & forth, alternating UEs to hit balloon for ~2 minutes for 2 trials to increase AAROM in UB in order to participate in ADL/IADL tasks. Pt participated in Zoomball to increase UB strength & coordination for ADL/IADL tasks.      Exercises:  Pt completed 1 set of BUE exercises (bicep curls, tricep extension, supination/pronation)  for 15-20 repetitions seated upright

## 2020-09-23 NOTE — PROGRESS NOTES
Physical Therapy  Facility/Department: [address] REHAB  Daily Treatment Note  NAME: Madhuri Lawler Sr.  : 1945  MRN: [id number]    Date of Service: 2020    Discharge Recommendations:  Continue to assess pending progress, Patient would benefit from continued therapy after discharge, 24 hour supervision or assist   PT Equipment Recommendations  Equipment Needed: Yes  Israel Tracie: Rolling  Other: will continue to assess for further equipment needs    Assessment   Body structures, Functions, Activity limitations: Decreased functional mobility ; Decreased strength;Decreased ROM; Decreased endurance;Decreased balance  Assessment: Improved participation this date with all mobility tasks but limited due to drops in blood pressure. Pt ambulated 42' this morning with RW CGA-min assist limited due to complaints of dizziness with standing, improved with seated exercises- MD aware of low BP with standing. Pt with very poor insight to deficits throughout session and safety concerns needing constant cues with all mobility tasks. Pt would benefit from skilled therapy to increase safety awareness for precautions and transfers, improve endurance, and build LE strength for success with functional mobility. Treatment Diagnosis: LE weakness, adherence to safety precautions, poor activity tolerance, poor balance  Prognosis: Good  Clinical Presentation: evolving  PT Education: Orientation;General Safety;Gait Training;Equipment;Transfer Training;Weight-bearing Education; Adaptive Device Training  Patient Education: posture within RW, safety with all mobility tasks including ambulation, participation with functional tasks to increased strength and decrease pain overall  Barriers to Learning: low motivation to participate due to increased pain with low back  REQUIRES PT FOLLOW UP: Yes  Activity Tolerance  Activity Tolerance: Patient limited by pain;Treatment limited secondary to agitation  Activity Tolerance: pt with poor motivation to participate with PT this morning despite max attempt     Patient Diagnosis(es): There were no encounter diagnoses. has a past medical history of Aneurysm, aortic ([address] Atrial fibrillation ([address] Glaucoma, Heart attack ([address] Hyperlipidemia, and Hypertension. has a past surgical history that includes Coronary angioplasty with stent; Colonoscopy; Eye surgery (Right, 7/23/2020); and Intracapsular cataract extraction (Right, 7/23/2020). Social/Functional History  Lives With: Alone  Type of Home: House  Home Layout: Multi-level, Bed/Bath upstairs, Able to Live on Main level with bedroom/bathroom, Laundry in basement  Home Access: Stairs to enter with rails  Entrance Stairs - Number of Steps: 10-11  Entrance Stairs - Rails: Right  Bathroom Shower/Tub: Tub/Shower unit, Walk-in shower(walk in shower on the main level)  Bathroom Toilet: Standard(sink counter by toilet, comfort ht toilet)  Bathroom Equipment: (No DME)  Bathroom Accessibility: Oralia Laverne accessible(Pt says possibly a w/c or walker could fit through doorway)  Home Equipment: Sock aid, [address] bed(Hospital in family room on 1st floor)  ADL Assistance: Independent  Homemaking Assistance: Independent  Homemaking Responsibilities: Yes  Meal Prep Responsibility: Primary  Laundry Responsibility: Primary  Cleaning Responsibility: Primary  Bill Paying/Finance Responsibility: Primary  Health Care Management: Primary  Ambulation Assistance: Independent  Transfer Assistance: Independent  Active : Yes  Mode of Transportation: Car  Occupation: Retired  Type of occupation: Retired :  for 35 years. Leisure & Hobbies: out to eat with lady friend, travelled to [place], [address] [place] [address], was walking about a mile every 2 days before he got sick  Additional Comments: Information pertains to prior recent back surg (8/31/2020), following d/c to SNF setting [facility]).   Pt reports that he was only at SNF setting few days following surg, did have a fall while there (reports call light to go to bathroom was not answered and he tried to get OOB by self). Pt reports that he has not been home since surgery at [facility].    Restrictions  Restrictions/Precautions  Restrictions/Precautions: Fall Risk  Position Activity Restriction  Spinal Precautions: No Bending, No Lifting, No Twisting(10 lb lifting limit per pt.)  Other position/activity restrictions: room air, SPO2% level monitored throughout session  Subjective   General  Chart Reviewed: Yes  Additional Pertinent Hx: 67 y/o male admit from SNF setting 2020 with Altered Mental Status, Severe Sepsis, Acute Renal Failure, and Pelvic Hematoma. CT Head negative. CT Abd/Pelvis + L Pelvic Hematoma. Urology consult for Reeves Placement. PMH as noted including Recent Back Surg (L4/L5 and L5/S1 Ant/Post Fusion, 2020), CAD, Angio with Stent, A-Fib, Aortic Aneurysm. Response To Previous Treatment: Patient with no complaints from previous session. Family / Caregiver Present: No  Referring Practitioner: Dr. Esvin Morales: Pt sitting in wheelchair at beginning of session, more agreeable to PT treatment session but complaining of feeling weakness gernalized with some light headed feeling at this time but willing to try participation at this time.   Vital Signs  Orthostatic B/P and Pulse?: Yes  Blood Pressure Sittin/66  Pulse Sittin PER MINUTE  Blood Pressure Standin/58  Pulse Standin PER MINUTE  Orthostatic B/P and Pulse?: Yes       Orientation  Orientation  Overall Orientation Status: Within Functional Limits  Orientation Level: Oriented X4    Objective      Transfers  Sit to Stand: Contact guard assistance(VC for proper sequencing with safety for hand placement)  Stand to sit: Contact guard assistance  Ambulation  Ambulation?: Yes  Ambulation 1  Surface: level tile  Device: Rolling Walker  Assistance: Contact guard assistance;Minimal assistance  Quality of Gait: Pt ambulates with forward flexed posture and cuing to stand up straight. Takes small shuffling steps with decreased kay, step height and step length.   Ambulates with more of a step-to pattern leaning towards left throughout with narrow ASHLEY  Gait Deviations: Slow Kay;Decreased step length;Decreased step height;Shuffles  Distance: 43' with two turns increased time, short distance in room from w/c to recliner  Comments: light headed during ambulation vitals assess after /66, HR 61 bpm, SPO2% 98%  Stairs/Curb  Stairs?: No(due to medical symptoms with poor tolerance of activity)     Balance  Posture: Poor  Sitting - Static: Good  Sitting - Dynamic: Good  Standing - Static: Fair;-  Standing - Dynamic: Fair;-  Comments: Standing only tolerating 1.5 minutes at RW due to significant dizziness and weaknes, BP 98/58, SPO2% 96% room air, HR 66 bpm.  Exercises  Heelslides: X 15 BLEs with orange maxi sheet  Gluteal Sets: x20  Hip Flexion: X 15  Hip Extension/Leg Presses: hip adduction sets X 20  Hip Abduction: X 20 red t-band medium resistance with bilateral knee flexed  Knee Long Arc Quad: X 10 BLEs  Ankle Pumps: X 20 heel/toe raises  Comments: above exercises performed supported sitting in wheelchair BLEs; vitals at completion of exercises seated in chair /74, HR 62 bpm.                        Goals  Short term goals  Time Frame for Short term goals: 1 week  Short term goal 1: Pt will perform bed mobility with supervision  Short term goal 2: Pt will be CGA-Winter for transfers with LRAD-met  Short term goal 3: Pt will ambulate 48' using LRAD with CGA  Short term goal 4: Pt will ascend/descend 4 stairs with CGA using railing  Short term goal 5: Pt will ascend/descend curb step using LRAD with CGA  Long term goals  Time Frame for Long term goals : 2-3 weeks  Long term goal 1: Pt will be Modif I for bed mobility  Long term goal 2: Pt will perform transfers with Modif I using LRAD  Long term goal 3: Pt will ambulate 150' using LRAD with Modif I  Long term goal 4: Pt will ascend/descend 12 stairs with Modif I using railing  Long term goal 5: Pt will ascend/descend curb using LRAD with Modif I  Patient Goals   Patient goals : Just want to get back to living on my own at home    Plan    Plan  Times per week: 5-6x/week  Times per day: Twice a day  Plan weeks: 2-3 weeks  Current Treatment Recommendations: Strengthening, Home Exercise Program, Safety Education & Training, Balance Training, Endurance Training, Patient/Caregiver Education & Training, Functional Mobility Training, Transfer Training, Gait Training, Stair training  Safety Devices  Type of devices:  All fall risk precautions in place, Call light within reach, Chair alarm in place, Gait belt, Patient at risk for falls, Left in chair(positioned in recliner with BLEs elevated for comfort at completion of session, RN informed of patient refusal and red color in cath bag present throughout session (noted with patient sitting on commode at beginning of session))  Restraints  Initially in place: No     Therapy Time   Individual Concurrent Group Co-treatment   Time In 1115         Time Out 1200         Minutes 45         Timed Code Treatment Minutes: Rodrigo 84, PT     Electronically signed by Shannan Sanchez PT on 9/23/20 at 12:31 PM EDT

## 2020-09-23 NOTE — PLAN OF CARE
Problem: Falls - Risk of:  Goal: Will remain free from falls  Description: Will remain free from falls  Outcome: Ongoing  Goal: Absence of physical injury  Description: Absence of physical injury  Outcome: Ongoing     Problem: Skin Integrity:  Goal: Will show no infection signs and symptoms  Description: Will show no infection signs and symptoms  Outcome: Ongoing  Goal: Absence of new skin breakdown  Description: Absence of new skin breakdown  Outcome: Ongoing     Problem: Nutrition  Goal: Optimal nutrition therapy  9/22/2020 2226 by Jeremy José RN  Outcome: Ongoing  9/22/2020 1256 by Nicholas Queen RD, LD  Outcome: Ongoing     Problem: Pain:  Goal: Pain level will decrease  Description: Pain level will decrease  Outcome: Ongoing     Problem: Musculor/Skeletal Functional Status  Goal: Highest potential functional level  Outcome: Ongoing  Goal: Absence of falls  Outcome: Ongoing     Problem: IP BOWEL ELIMINATION  Goal: LTG - patient will utilize adaptive techniques/equipment to complete bowel elimination  Outcome: Ongoing     Problem: IP BLADDER/VOIDING  Goal: STG - Patient demonstrates ability to take care of indwelling catheter  Outcome: Ongoing     Problem: Infection - Central Venous Catheter-Associated Bloodstream Infection:  Goal: Will show no infection signs and symptoms  Description: Will show no infection signs and symptoms  Outcome: Ongoing

## 2020-09-23 NOTE — PROGRESS NOTES
Physical Therapy  Facility/Department: [address] REHAB  Daily Treatment Note  NAME: Cj Lou Sr.  : 1945  MRN: [id number]    Date of Service: 2020    Discharge Recommendations:  Continue to assess pending progress, Patient would benefit from continued therapy after discharge, 24 hour supervision or assist   PT Equipment Recommendations  Equipment Needed: Yes  Mobility Devices: Judene May: Rolling  Other: will continue to assess for further equipment needs    Assessment   Body structures, Functions, Activity limitations: Decreased functional mobility ; Decreased strength;Decreased ROM; Decreased endurance;Decreased balance  Assessment: Patient demonstrates improving tolerance for short distance gait this afternoon - 28ft x 3 attempts. Denies lightheadedness or dizziness during gait - \"just tried and weak. \"  Patient will continue to benefit from additional skilled PT intervention to facilitate safe mobility and to optimize (I) to promote return to prior level of function. Treatment Diagnosis: LE weakness, adherence to safety precautions, poor activity tolerance, poor balance  Patient Education: posture within RW, safety with all mobility tasks including ambulation, participation with functional tasks to increased strength and decrease pain overall - patient verbalizes understanding but will benefit from additional reinforcement  Barriers to Learning: low motivation to participate due to increased pain with low back  REQUIRES PT FOLLOW UP: Yes  Activity Tolerance  Activity Tolerance: Patient limited by fatigue;Patient limited by endurance; Patient limited by pain     Patient Diagnosis(es): There were no encounter diagnoses. has a past medical history of Aneurysm, aortic ([facility] Atrial fibrillation ([facility] Glaucoma, Heart attack ([facility] Hyperlipidemia, and Hypertension. has a past surgical history that includes Coronary angioplasty with stent; Colonoscopy;  Eye surgery (Right, 2020); and Intracapsular cataract extraction (Right, 7/23/2020). Restrictions  Restrictions/Precautions  Restrictions/Precautions: Fall Risk(high fall risk, up with assistance)  Position Activity Restriction  Spinal Precautions: No Bending, No Lifting, No Twisting(10lb lifting restriction per patient)  Other position/activity restrictions: room air  Subjective   General  Chart Reviewed: Yes  Additional Pertinent Hx: 65 y/o male admit from SNF setting 9/13/2020 with Altered Mental Status, Severe Sepsis, Acute Renal Failure, and Pelvic Hematoma. CT Head negative. CT Abd/Pelvis + L Pelvic Hematoma. Urology consult for Reeves Placement. PMH as noted including Recent Back Surg (L4/L5 and L5/S1 Ant/Post Fusion, 8/31/2020), CAD, Angio with Stent, A-Fib, Aortic Aneurysm. Family / Caregiver Present: No  Referring Practitioner: Dr. Deloris Long: Patient reports ongoing fatigue and weakness. Patient denies pain, \"just tired and weak. \"  Patient reporting \"I just finished with therapy\" referring to 11:15-12:00 session. General Comment  Comments: Patient requiring gentle encouragement and reassurance to perform mobility, PT reiterating the goal for therapy and progress towards goals. Patient seated in recliner upon arrival - agreeable to PT.           Orientation  Orientation  Overall Orientation Status: Within Functional Limits  Orientation Level: Oriented X4    Objective      Transfers  Sit to Stand: Contact guard assistance(to RW)  Stand to sit: Contact guard assistance(from RW)  Stand Pivot Transfers: Contact guard assistance(with RW)  Comment: intermittent cues for hand placement and sequencing  Ambulation  Ambulation?: Yes  Ambulation 1  Surface: level tile  Device: Rolling Walker  Assistance: Contact guard assistance  Quality of Gait: antaglic gait, trendelenburg gait, forward flexed with cues for upright, decreased (B) step length, foot clearance, and heel strike; narrow base of support; kay decreases with fatigue and additional reps  Distance: 28ft x 3 within room - multiple 90 and 180 degree turns     Balance  Posture: Fair(forward flexed, rounded shoulders)  Sitting - Static: Good  Sitting - Dynamic: Good  Standing - Static: Fair  Standing - Dynamic: Fair;-  Comments: CGA with RW - limited standing tolerance at RW secondary back pain    Reviewed HEP - including (B) ankle pumps x 20, (B) quad sets x 20, glut sets x 10, (B) hip ABD x 10, (B) heel slides x 10, and (B) SLR x 5, 2 sets    Goals  Short term goals  Time Frame for Short term goals: 1 week  Short term goal 1: Pt will perform bed mobility with supervision - progressing  Short term goal 2: Pt will be CGA-Winter for transfers with LRAD-met  Short term goal 3: Pt will ambulate 48' using LRAD with CGA - progressing  Short term goal 4: Pt will ascend/descend 4 stairs with CGA using railing - progressing  Short term goal 5: Pt will ascend/descend curb step using LRAD with CGA - progressing  Long term goals  Time Frame for Long term goals : 2-3 weeks - progressing towards goals  Long term goal 1: Pt will be Modif I for bed mobility  Long term goal 2: Pt will perform transfers with Modif I using LRAD  Long term goal 3: Pt will ambulate 150' using LRAD with Modif I  Long term goal 4: Pt will ascend/descend 12 stairs with Modif I using railing  Long term goal 5: Pt will ascend/descend curb using LRAD with Modif I  Patient Goals   Patient goals : Just want to get back to living on my own at home    Plan    Plan  Times per week: 5-6x/week  Times per day: Twice a day  Plan weeks: 2-3 weeks  Current Treatment Recommendations: Strengthening, Home Exercise Program, Safety Education & Training, Balance Training, Endurance Training, Patient/Caregiver Education & Training, Functional Mobility Training, Transfer Training, Gait Training, Stair training, ROM, Equipment Evaluation, Education, & procurement, Neuromuscular Re-education  Safety Devices  Type of devices:

## 2020-09-23 NOTE — PROGRESS NOTES
Department of Physical Medicine & Rehabilitation  Progress Note    Patient Identification:  Veronica De Jesus  [id number]  : 1945  Admit date: 2020    Chief Complaint: Debility    Subjective:   No acute events overnight. Patient seen this afternoon sitting up in gym. Fatigued but overall feeling better than yesterday. Having bright red hematuria in Reeves bag. ROS: No f/c, n/v, cp     Objective:  Patient Vitals for the past 24 hrs:   BP Temp Temp src Pulse Resp SpO2 Weight   20 1428 (!) 124/50 97.7 °F (36.5 °C) Oral 73 16 96 % --   20 1225 126/69 97.8 °F (36.6 °C) Oral 58 16 96 % --   20 0900 138/70 -- -- 64 -- -- --   20 0854 -- -- -- -- 16 95 % --   20 0503 (!) 148/84 97.8 °F (36.6 °C) Oral 75 16 93 % 175 lb 4.3 oz (79.5 kg)   20 0010 133/60 98.8 °F (37.1 °C) Oral 64 16 94 % --   20 (!) 122/53 98.9 °F (37.2 °C) Oral 61 16 93 % --     Const: Alert. No distress, pleasant. HEENT: Normocephalic, atraumatic. Normal sclera/conjunctiva. MMM. CV: Regular rate and rhythm. Resp: No respiratory distress. Lungs decreased. Abd: Soft, nontender, nondistended, NABS+   Ext: trace edema. Neuro: Alert, oriented, appropriately interactive. Psych: Cooperative, appropriate mood and affect    Laboratory data: Available via EMR.    Last 24 hour lab  Recent Results (from the past 24 hour(s))   Basic Metabolic Panel w/ Reflex to MG    Collection Time: 20  5:35 AM   Result Value Ref Range    Sodium 134 (L) 136 - 145 mmol/L    Potassium reflex Magnesium 3.8 3.5 - 5.1 mmol/L    Chloride 102 99 - 110 mmol/L    CO2 24 21 - 32 mmol/L    Anion Gap 8 3 - 16    Glucose 80 70 - 99 mg/dL    BUN 17 7 - 20 mg/dL    CREATININE 1.0 0.8 - 1.3 mg/dL    GFR Non-African American >60 >60    GFR African American >60 >60    Calcium 8.6 8.3 - 10.6 mg/dL   CBC Auto Differential    Collection Time: 20  5:35 AM   Result Value Ref Range    WBC 3.5 (L) 4.0 - 11.0 K/uL    RBC 2.80 (L) 4.20 - 5.90 M/uL    Hemoglobin 8.5 (L) 13.5 - 17.5 g/dL    Hematocrit 26.3 (L) 40.5 - 52.5 %    MCV 93.9 80.0 - 100.0 fL    MCH 30.3 26.0 - 34.0 pg    MCHC 32.2 31.0 - 36.0 g/dL    RDW 17.3 (H) 12.4 - 15.4 %    Platelets 089 374 - 859 K/uL    MPV 9.3 5.0 - 10.5 fL    Neutrophils % 51.5 %    Lymphocytes % 22.1 %    Monocytes % 19.7 %    Eosinophils % 5.3 %    Basophils % 1.4 %    Neutrophils Absolute 1.8 1.7 - 7.7 K/uL    Lymphocytes Absolute 0.8 (L) 1.0 - 5.1 K/uL    Monocytes Absolute 0.7 0.0 - 1.3 K/uL    Eosinophils Absolute 0.2 0.0 - 0.6 K/uL    Basophils Absolute 0.0 0.0 - 0.2 K/uL       Therapy progress:  PT  Position Activity Restriction  Spinal Precautions: No Bending, No Lifting, No Twisting(10lb lifting restriction per patient)  Other position/activity restrictions: room air  Objective     Sit to Stand: Contact guard assistance(to RW)  Stand to sit: Contact guard assistance(from RW)  Device: Rolling Walker  Assistance: Contact guard assistance  Distance: 28ft x 3 within room - multiple 90 and 180 degree turns  OT  PT Equipment Recommendations  Equipment Needed: Yes  Mobility Devices: Curvin Demetrius: Rolling  Other: will continue to assess for further equipment needs  Toilet - Technique: Ambulating(RW)  Equipment Used: Grab bars  Toilet Transfers Comments: SOT managed catheter, & IV line while patient used grab bars on R & L side cues for hand placement  Assessment        SLP          Body mass index is 25.88 kg/m².     Assessment and Plan:    Impairments: generalized weakness + relative LLE weakness, decreased endurance, balance, cognition     Debility  -PT/OT     Sepsis presumed due to UTI  -Completed merrem per ID     Urinary retention   -s/p cystoscopy for Reeves placement, +false urethral passage  -maintain Reeves x 7 days per Urology, consider void trial end of week     Acute hypoxic respiratory failure in setting of baseline COPD  -Supplemental O2, wean as tolerated     TONO on CKD  -Avoid nephrotoxins, renally dose meds  -Monitor     Metabolic encephalopathy  -Improving with treatment of sepsis  -Regulate sleep/wake, emphasis on routine  -Consider SLP consult     Lumbar stenosis s/p recent L4-S1 ALIF/PSF (8/31 with Dr. Shayne Hollis and Dr. Charli Robbins)  -Wound care  -PT/OT     Acute blood loss anemia  -Due to left pelvic hematoma  -Monitor Hgb, transfuse prn <7  -Iron supplement     Afib  - amiodarone  -Hold Xarelto due to hematuria     CAD  -statin, bb, ARB     HTN, uncontrolled  -amlodipine, hydralazine, losartan, metoprolol    Hypokalemia  -Improved with replacement     Bladder   -Urinary retention as above  -Reeves, consider void trial this week     Bowel   -Loose stools  -PRN miralax, MoM, and bisacodyl supp.     Pain control  -Duloxetine, gabapentin, prn Percocet     PPx  -DVT: Xarelto held due to hematuria  -GI: pantoprazole, probiotics    Rehab Progress: Interdisciplinary team conference was held today with entire rehab treatment team including PT, OT, SLP, Dietician, RN, and SW. Discussion focused on progress toward rehab goals and discharge planning. Making gradual progress. Some self-limiting behavior and depressed mood. Working on functional mobility, endurance, strength balance, compensatory strategies. Separate conference then held with patient/family, questions answered and concerns addressed. Total treatment time >35 min with greater than 50% spent in care coordination. Anticipated Dispo: home alone  Services:  PT, OT, RN, Aide  DME: TBD, [facility] hospital bed  ELOS: ~10/3      [address] ASHLEY Perez MD 9/23/2020, 5:06 PM

## 2020-09-23 NOTE — PROGRESS NOTES
Patient admitted with sepsis d/t UTI.  Alert and oriented x 4. On tele, SR with 1st degree block. Transfers with walker x 1.  General diet with thin liquids. Takes pills whole in water. Ensure BID.  Continent of bowels. Reeves cath draining red urine, placed for retention. MD notified.  Surgical incisions to back and abdomen SUDEEP, recent spinal fusion. Patient on RA. PICC to E.  Fall precautions in place.  Belongings and call light within reach

## 2020-09-24 LAB
ANION GAP SERPL CALCULATED.3IONS-SCNC: 10 MMOL/L (ref 3–16)
BASOPHILS ABSOLUTE: 0.1 K/UL (ref 0–0.2)
BASOPHILS RELATIVE PERCENT: 2.1 %
BUN BLDV-MCNC: 15 MG/DL (ref 7–20)
CALCIUM SERPL-MCNC: 8.6 MG/DL (ref 8.3–10.6)
CHLORIDE BLD-SCNC: 102 MMOL/L (ref 99–110)
CO2: 23 MMOL/L (ref 21–32)
CREAT SERPL-MCNC: 1.1 MG/DL (ref 0.8–1.3)
EOSINOPHILS ABSOLUTE: 0.2 K/UL (ref 0–0.6)
EOSINOPHILS RELATIVE PERCENT: 5.8 %
GFR AFRICAN AMERICAN: >60
GFR NON-AFRICAN AMERICAN: >60
GLUCOSE BLD-MCNC: 85 MG/DL (ref 70–99)
HCT VFR BLD CALC: 25.8 % (ref 40.5–52.5)
HEMOGLOBIN: 8.5 G/DL (ref 13.5–17.5)
LYMPHOCYTES ABSOLUTE: 0.9 K/UL (ref 1–5.1)
LYMPHOCYTES RELATIVE PERCENT: 27.6 %
MCH RBC QN AUTO: 30.5 PG (ref 26–34)
MCHC RBC AUTO-ENTMCNC: 32.8 G/DL (ref 31–36)
MCV RBC AUTO: 93.2 FL (ref 80–100)
MONOCYTES ABSOLUTE: 0.7 K/UL (ref 0–1.3)
MONOCYTES RELATIVE PERCENT: 22.4 %
NEUTROPHILS ABSOLUTE: 1.3 K/UL (ref 1.7–7.7)
NEUTROPHILS RELATIVE PERCENT: 42.1 %
PDW BLD-RTO: 17 % (ref 12.4–15.4)
PLATELET # BLD: 192 K/UL (ref 135–450)
PMV BLD AUTO: 9.2 FL (ref 5–10.5)
POTASSIUM REFLEX MAGNESIUM: 3.7 MMOL/L (ref 3.5–5.1)
RBC # BLD: 2.77 M/UL (ref 4.2–5.9)
SODIUM BLD-SCNC: 135 MMOL/L (ref 136–145)
WBC # BLD: 3.1 K/UL (ref 4–11)

## 2020-09-24 PROCEDURE — 97110 THERAPEUTIC EXERCISES: CPT

## 2020-09-24 PROCEDURE — 2580000003 HC RX 258: Performed by: PHYSICAL MEDICINE & REHABILITATION

## 2020-09-24 PROCEDURE — 1280000000 HC REHAB R&B

## 2020-09-24 PROCEDURE — 97535 SELF CARE MNGMENT TRAINING: CPT

## 2020-09-24 PROCEDURE — 6370000000 HC RX 637 (ALT 250 FOR IP): Performed by: PHYSICAL MEDICINE & REHABILITATION

## 2020-09-24 PROCEDURE — 80048 BASIC METABOLIC PNL TOTAL CA: CPT

## 2020-09-24 PROCEDURE — 97530 THERAPEUTIC ACTIVITIES: CPT

## 2020-09-24 PROCEDURE — 85025 COMPLETE CBC W/AUTO DIFF WBC: CPT

## 2020-09-24 PROCEDURE — 97116 GAIT TRAINING THERAPY: CPT

## 2020-09-24 RX ORDER — SENNA AND DOCUSATE SODIUM 50; 8.6 MG/1; MG/1
1 TABLET, FILM COATED ORAL 2 TIMES DAILY
Status: DISCONTINUED | OUTPATIENT
Start: 2020-09-24 | End: 2020-10-01 | Stop reason: HOSPADM

## 2020-09-24 RX ADMIN — METOPROLOL TARTRATE 50 MG: 50 TABLET, FILM COATED ORAL at 08:42

## 2020-09-24 RX ADMIN — PANTOPRAZOLE SODIUM 40 MG: 40 TABLET, DELAYED RELEASE ORAL at 05:11

## 2020-09-24 RX ADMIN — DULOXETINE HYDROCHLORIDE 30 MG: 30 CAPSULE, DELAYED RELEASE ORAL at 08:41

## 2020-09-24 RX ADMIN — DORZOLAMIDE HYDROCHLORIDE AND TIMOLOL MALEATE 1 DROP: 20; 5 SOLUTION/ DROPS OPHTHALMIC at 08:44

## 2020-09-24 RX ADMIN — AMLODIPINE BESYLATE 10 MG: 10 TABLET ORAL at 08:42

## 2020-09-24 RX ADMIN — SENNOSIDES-DOCUSATE SODIUM TAB 8.6-50 MG 1 TABLET: 8.6-5 TAB at 22:38

## 2020-09-24 RX ADMIN — OXYCODONE HYDROCHLORIDE AND ACETAMINOPHEN 1 TABLET: 5; 325 TABLET ORAL at 08:56

## 2020-09-24 RX ADMIN — Medication 2 CAPSULE: at 16:52

## 2020-09-24 RX ADMIN — HYDRALAZINE HYDROCHLORIDE 50 MG: 50 TABLET, FILM COATED ORAL at 04:56

## 2020-09-24 RX ADMIN — SENNOSIDES-DOCUSATE SODIUM TAB 8.6-50 MG 1 TABLET: 8.6-5 TAB at 13:49

## 2020-09-24 RX ADMIN — HYDRALAZINE HYDROCHLORIDE 50 MG: 50 TABLET, FILM COATED ORAL at 22:37

## 2020-09-24 RX ADMIN — ATORVASTATIN CALCIUM 40 MG: 40 TABLET, FILM COATED ORAL at 22:38

## 2020-09-24 RX ADMIN — OXYCODONE HYDROCHLORIDE AND ACETAMINOPHEN 1 TABLET: 5; 325 TABLET ORAL at 04:54

## 2020-09-24 RX ADMIN — LEVOTHYROXINE SODIUM 50 MCG: 0.05 TABLET ORAL at 05:11

## 2020-09-24 RX ADMIN — DULOXETINE HYDROCHLORIDE 30 MG: 30 CAPSULE, DELAYED RELEASE ORAL at 22:38

## 2020-09-24 RX ADMIN — GABAPENTIN 100 MG: 100 CAPSULE ORAL at 13:49

## 2020-09-24 RX ADMIN — Medication 3 MG: at 22:38

## 2020-09-24 RX ADMIN — AMIODARONE HYDROCHLORIDE 200 MG: 200 TABLET ORAL at 08:41

## 2020-09-24 RX ADMIN — OXYCODONE HYDROCHLORIDE AND ACETAMINOPHEN 1 TABLET: 5; 325 TABLET ORAL at 22:39

## 2020-09-24 RX ADMIN — DORZOLAMIDE HYDROCHLORIDE AND TIMOLOL MALEATE 1 DROP: 20; 5 SOLUTION/ DROPS OPHTHALMIC at 22:40

## 2020-09-24 RX ADMIN — LATANOPROST 1 DROP: 50 SOLUTION OPHTHALMIC at 22:40

## 2020-09-24 RX ADMIN — LOSARTAN POTASSIUM 100 MG: 100 TABLET, FILM COATED ORAL at 08:41

## 2020-09-24 RX ADMIN — GABAPENTIN 100 MG: 100 CAPSULE ORAL at 22:38

## 2020-09-24 RX ADMIN — METOPROLOL TARTRATE 50 MG: 50 TABLET, FILM COATED ORAL at 22:37

## 2020-09-24 RX ADMIN — Medication 2 CAPSULE: at 08:42

## 2020-09-24 RX ADMIN — SODIUM CHLORIDE, PRESERVATIVE FREE 10 ML: 5 INJECTION INTRAVENOUS at 08:43

## 2020-09-24 RX ADMIN — GABAPENTIN 100 MG: 100 CAPSULE ORAL at 08:42

## 2020-09-24 RX ADMIN — FERROUS SULFATE TAB EC 324 MG (65 MG FE EQUIVALENT) 324 MG: 324 (65 FE) TABLET DELAYED RESPONSE at 08:42

## 2020-09-24 ASSESSMENT — PAIN DESCRIPTION - DESCRIPTORS
DESCRIPTORS: ACHING;DISCOMFORT
DESCRIPTORS: ACHING;DISCOMFORT
DESCRIPTORS: ACHING

## 2020-09-24 ASSESSMENT — PAIN DESCRIPTION - DIRECTION
RADIATING_TOWARDS: HIP
RADIATING_TOWARDS: HIP

## 2020-09-24 ASSESSMENT — PAIN DESCRIPTION - FREQUENCY
FREQUENCY: CONTINUOUS
FREQUENCY: CONTINUOUS
FREQUENCY: INTERMITTENT

## 2020-09-24 ASSESSMENT — PAIN SCALES - GENERAL
PAINLEVEL_OUTOF10: 0
PAINLEVEL_OUTOF10: 0
PAINLEVEL_OUTOF10: 8
PAINLEVEL_OUTOF10: 8
PAINLEVEL_OUTOF10: 0
PAINLEVEL_OUTOF10: 7

## 2020-09-24 ASSESSMENT — PAIN - FUNCTIONAL ASSESSMENT
PAIN_FUNCTIONAL_ASSESSMENT: ACTIVITIES ARE NOT PREVENTED
PAIN_FUNCTIONAL_ASSESSMENT: PREVENTS OR INTERFERES SOME ACTIVE ACTIVITIES AND ADLS
PAIN_FUNCTIONAL_ASSESSMENT: ACTIVITIES ARE NOT PREVENTED

## 2020-09-24 ASSESSMENT — PAIN DESCRIPTION - ORIENTATION
ORIENTATION: RIGHT;LEFT
ORIENTATION: RIGHT;LEFT
ORIENTATION: LEFT

## 2020-09-24 ASSESSMENT — PAIN DESCRIPTION - PROGRESSION
CLINICAL_PROGRESSION: RESOLVED
CLINICAL_PROGRESSION: RESOLVED
CLINICAL_PROGRESSION: GRADUALLY IMPROVING
CLINICAL_PROGRESSION: GRADUALLY WORSENING

## 2020-09-24 ASSESSMENT — PAIN DESCRIPTION - ONSET
ONSET: ON-GOING

## 2020-09-24 ASSESSMENT — PAIN SCALES - WONG BAKER: WONGBAKER_NUMERICALRESPONSE: 0

## 2020-09-24 ASSESSMENT — PAIN DESCRIPTION - PAIN TYPE
TYPE: ACUTE PAIN

## 2020-09-24 ASSESSMENT — PAIN DESCRIPTION - LOCATION
LOCATION: LEG

## 2020-09-24 NOTE — PROGRESS NOTES
BUN 15 7 - 20 mg/dL    CREATININE 1.1 0.8 - 1.3 mg/dL    GFR Non-African American >60 >60    GFR African American >60 >60    Calcium 8.6 8.3 - 10.6 mg/dL   CBC Auto Differential    Collection Time: 09/24/20  5:15 AM   Result Value Ref Range    WBC 3.1 (L) 4.0 - 11.0 K/uL    RBC 2.77 (L) 4.20 - 5.90 M/uL    Hemoglobin 8.5 (L) 13.5 - 17.5 g/dL    Hematocrit 25.8 (L) 40.5 - 52.5 %    MCV 93.2 80.0 - 100.0 fL    MCH 30.5 26.0 - 34.0 pg    MCHC 32.8 31.0 - 36.0 g/dL    RDW 17.0 (H) 12.4 - 15.4 %    Platelets 828 386 - 307 K/uL    MPV 9.2 5.0 - 10.5 fL    Neutrophils % 42.1 %    Lymphocytes % 27.6 %    Monocytes % 22.4 %    Eosinophils % 5.8 %    Basophils % 2.1 %    Neutrophils Absolute 1.3 (L) 1.7 - 7.7 K/uL    Lymphocytes Absolute 0.9 (L) 1.0 - 5.1 K/uL    Monocytes Absolute 0.7 0.0 - 1.3 K/uL    Eosinophils Absolute 0.2 0.0 - 0.6 K/uL    Basophils Absolute 0.1 0.0 - 0.2 K/uL       Therapy progress:  PT  Position Activity Restriction  Spinal Precautions: No Bending, No Lifting, No Twisting(10lb lifting restriction per patient)  Sternal Precautions: 10# Lifting Restrictions  Sternal Precautions: 10# lifting restriction per pt  Other position/activity restrictions: room air  Objective     Sit to Stand: Contact guard assistance(VC for hand placement for safety and control)  Stand to sit: Contact guard assistance(VC for safety with descent)  Device: Rolling Walker  Assistance: Contact guard assistance, Stand by assistance  Distance: 80' with multiple turns  OT  PT Equipment Recommendations  Equipment Needed: Yes  Mobility Devices: Kristiant Goon: Rolling  Other: will continue to assess for further equipment needs  Toilet - Technique: Ambulating(RW)  Equipment Used: Grab bars(R & L side)  Toilet Transfers Comments: ><RW><comfort height toilet, used R & L grab bars. Assessment        SLP          Body mass index is 25.88 kg/m².     Assessment and Plan:    Impairments: generalized weakness + relative LLE weakness, decreased endurance, balance, cognition     Debility  -PT/OT     Sepsis presumed due to UTI  -Completed merrem per ID     Urinary retention   -s/p cystoscopy for Reeves placement, +false urethral passage  -maintain Reeves x 7 days per Urology, consider void trial soon, will address constipation first     Acute hypoxic respiratory failure in setting of baseline COPD  -Supplemental O2, wean as tolerated     TONO on CKD  -Avoid nephrotoxins, renally dose meds  -Monitor     Metabolic encephalopathy  -Improving with treatment of sepsis  -Regulate sleep/wake, emphasis on routine  -Consider SLP consult     Lumbar stenosis s/p recent L4-S1 ALIF/PSF (8/31 with Dr. Shayne Hollis and Dr. Charli Robbins)  -Wound care  -PT/OT     Acute blood loss anemia  -Due to left pelvic hematoma  -Monitor Hgb, transfuse prn <7  -Iron supplement     Afib  -Amiodarone  -Holding Xarelto due to hematuria     CAD  -statin, bb, ARB     HTN, uncontrolled  -amlodipine, hydralazine, losartan, metoprolol    Hypokalemia  -Improved with replacement    Adjustment disorder with depressed mood  -Neuropsych consulted  -Duloxetine ([facility]ly started at [facility])     Bladder   -Urinary retention as above  -Reeves, consider void trial soon     Bowel   -Initially with loose stools, now with constipation  -started senna+docusate, PRN miralax, MoM, and bisacodyl supp.     Pain control  -Duloxetine, gabapentin, prn Percocet     PPx  -DVT: Xarelto held due to hematuria  -GI: pantoprazole, probiotics    Rehab Progress: Making gradual progress. Some self-limiting behavior and depressed mood. Working on functional mobility, endurance, strength balance, compensatory strategies. Anticipated Dispo: home alone  Services:  PT, OT, RN, Aide  DME: [facility], has hospital bed  ELOS: ~10/3      [address]  Eugenio Perez MD 9/24/2020, 12:41 PM

## 2020-09-24 NOTE — PROGRESS NOTES
Diagnosis(es): There were no encounter diagnoses. has a past medical history of Aneurysm, aortic ([facility] [address] Atrial fibrillation ([address] Glaucoma, Heart attack ([facility] [address] Hyperlipidemia, and Hypertension. has a past surgical history that includes Coronary angioplasty with stent; Colonoscopy; Eye surgery (Right, 7/23/2020); and Intracapsular cataract extraction (Right, 7/23/2020). Social/Functional History  Lives With: Alone  Type of Home: House  Home Layout: Multi-level, Bed/Bath upstairs, Able to Live on Main level with bedroom/bathroom, Laundry in basement  Home Access: Stairs to enter with rails  Entrance Stairs - Number of Steps: 10-11  Entrance Stairs - Rails: Right  Bathroom Shower/Tub: Tub/Shower unit, Walk-in shower(walk in shower on the main level)  Bathroom Toilet: Standard(sink counter by toilet, comfort ht toilet)  Bathroom Equipment: (No DME)  Bathroom Accessibility: [place] Garre accessible(Pt says possibly a w/c or walker could fit through doorway)  Home Equipment: Sock aid, [address] bed(Hospital in family room on 1st floor)  ADL Assistance: Independent  Homemaking Assistance: Independent  Homemaking Responsibilities: Yes  Meal Prep Responsibility: Primary  Laundry Responsibility: Primary  Cleaning Responsibility: Primary  Bill Paying/Finance Responsibility: Primary  Health Care Management: Primary  Ambulation Assistance: Independent  Transfer Assistance: Independent  Active : Yes  Mode of Transportation: Car  Occupation: Retired  Type of occupation: Retired :  for 35 years. Leisure & Hobbies: out to eat with lady friend, travelled to [place], [address] [place] [address], was walking about a mile every 2 days before he got sick  Additional Comments: Information pertains to prior recent back surg (8/31/2020), following d/c to SNF setting [facility]).   Pt reports that he was only at SNF setting few days following surg, did have a fall while there (reports call light to go to bathroom was not answered and he tried to get OOB by self). Pt reports that he has not been home since surgery at [facility].    Restrictions  Restrictions/Precautions  Restrictions/Precautions: Fall Risk(high fall risk, up with assistance)  Position Activity Restriction  Spinal Precautions: No Bending, No Lifting, No Twisting(10lb lifting restriction per patient)  Sternal Precautions: 10# Lifting Restrictions  Sternal Precautions: 10# lifting restriction per pt  Other position/activity restrictions: room air  Subjective   General  Chart Reviewed: Yes  Additional Pertinent Hx: 65 y/o male admit from SNF setting 9/13/2020 with Altered Mental Status, Severe Sepsis, Acute Renal Failure, and Pelvic Hematoma. CT Head negative. CT Abd/Pelvis + L Pelvic Hematoma. Urology consult for Reeves Placement. PMH as noted including Recent Back Surg (L4/L5 and L5/S1 Ant/Post Fusion, 8/31/2020), CAD, Angio with Stent, A-Fib, Aortic Aneurysm. Response To Previous Treatment: Patient with no complaints from previous session. Family / Caregiver Present: No  Referring Practitioner: Dr. Molina Schools  Subjective  Subjective: Pt states pain reports pain 7/10 this morning but states feeling much better since taking shower earlier this date. Pt agreeable to PT treatment session.           Orientation  Orientation  Overall Orientation Status: Within Functional Limits  Orientation Level: Oriented X4    Objective      Transfers  Sit to Stand: Contact guard assistance(VC for hand placement for safety and control)  Stand to sit: Contact guard assistance(VC for safety with descent)  Ambulation  Ambulation?: Yes  Ambulation 1  Surface: level tile  Device: Rolling Walker  Assistance: Contact guard assistance;Stand by assistance  Quality of Gait: antaglic gait, trendelenburg gait, forward flexed with cues for upright, decreased (B) step length, foot clearance, and heel strike; narrow base of support; kay decreases with fatigue and additional reps  Gait Deviations: Slow Sapna;Decreased step length;Decreased step height;Shuffles  Distance: 80' with multiple turns  Comments: increased tolerance this date denies any dizziness  Stairs/Curb  Stairs?: Yes  Stairs  # Steps : 8  Stairs Height: 6\"  Rails: Bilateral  Device: No Device  Assistance: Contact guard assistance  Comment: Ascend/descend nonreciprocal pattern with cues for proper sequencing, increased time with use of BUE on railing with cues, increased tolerance to stairs this date with decreased physical assistance, receptive to cues at this time. Slight TAPIA but increased tolerance this date     Balance  Posture: Fair  Sitting - Static: Good  Sitting - Dynamic: Good  Standing - Static: Fair  Standing - Dynamic: Fair  Comments: CGA with RW - limited standing tolerance at RW secondary back pain, without dizziness this date  Exercises  Heelslides: X 20 red t-band medium resistance with bilateral knee flexed  Gluteal Sets: x20  Hip Flexion: X15 marching  Hip Extension/Leg Presses: hip adduction sets X 20  Hip Abduction: X 20 red t-band medium resistance with bilateral knee flexed  Knee Long Arc Quad: X 15  Ankle Pumps: X 20 heel/toe raises  Comments: above exercises performed supported sitting in wheelchair BLEs, increased time but with increased tolerance this date                       PM session:   S: Pt pleasant this afternoon reporting pain down 6/10 with LLE at this time. Increased joking/interaction with PT at this time and agreeable to PT treatment session. O: Sit <-> stand SBA with increased time to perform cues for safety at this time. Pt ambulated 95' X 2 trials, 48' (w/c <-> curb step),short distance from w/c to recliner in room with RW with SBA-CGA increased time intermittent facilitation of gluts on left to decreased trendelenburg gait hip drop right side. Pt with poor clearance of RLE catching multiple times throughout ambulation needing cues for safety.     Pt ascend/descend curb step with RW with CGA, cues for proper sequencing with increased time to perform due to bilateral hip weakness 6\" height. Standing at [facility] patient performed toe tapping 6\" step to improve hip activation X 10 BLEs alternating with CGA with intermittent cues for proper sequencing. Standing at RW stepping up forward/backward leading with RLE X 5 and then switching leading with LLE X 5 with CGA cues for proper sequencing 6\" height step for strengthening. Assisted with positioning in recliner at completion of session with BLEs elevated to patient comfort. A: Pt with increased tolerance with all functional mobility tasks. Limited with ambulation due to muscle impairments with left hip causing decreased stabilization with increased fatigue.  Pt     Safety Device - Type of devices:  [x]  All fall risk precautions in place [] Bed alarm in place  [x] Call light within reach [x] Chair alarm in place [] Positioning belt [x] Gait belt [x] Patient at risk for falls [] Left in bed [x] Left in chair [] Telesitter in use [] Sitter present [] Nurse notified []  None     Goals  Short term goals  Time Frame for Short term goals: 1 week  Short term goal 1: Pt will perform bed mobility with supervision  Short term goal 2: Pt will be CGA-Winter for transfers with LRAD-met  Short term goal 3: Pt will ambulate 48' using LRAD with CGA  Short term goal 4: Pt will ascend/descend 4 stairs with CGA using railing  Short term goal 5: Pt will ascend/descend curb step using LRAD with CGA  Long term goals  Time Frame for Long term goals : 2-3 weeks  Long term goal 1: Pt will be Modif I for bed mobility  Long term goal 2: Pt will perform transfers with Modif I using LRAD  Long term goal 3: Pt will ambulate 150' using LRAD with Modif I  Long term goal 4: Pt will ascend/descend 12 stairs with Modif I using railing  Long term goal 5: Pt will ascend/descend curb using LRAD with Modif I  Patient Goals   Patient goals : Just want to get back to living on my

## 2020-09-24 NOTE — PLAN OF CARE
Problem: Falls - Risk of:  Goal: Will remain free from falls  Description: Will remain free from falls  9/24/2020 1438 by Meme Rangel RN  Outcome: Ongoing  Note: Fall risk assessment completed. Fall precautions in place. Call light within reach. Pt educated on calling for assistance before getting up. Walkway free of clutter. Will continue to monitor. Problem: Skin Integrity:  Goal: Absence of new skin breakdown  Description: Absence of new skin breakdown  9/24/2020 1438 by Meme Rangel RN  Outcome: Ongoing  Note: Patient's skin was assessed. Pt currently has scattered bruising/abrasions and surgical incision on back and abdomen. No new findings were noted. Patient is being educated on importance of turning/repostioning at least every two hours. RN will continue to educate and monitor. Problem: Musculor/Skeletal Functional Status  Goal: Highest potential functional level  9/24/2020 1438 by Meme Rangel RN  Outcome: Ongoing  Note: Pt mobility is increasing. Pt continues with therapy. Pt is SBA x1 with walker. Will monitor. Problem: Pain:  Goal: Pain level will decrease  Description: Pain level will decrease  9/24/2020 1438 by Meme Rangel RN  Outcome: Ongoing  Note: Pt assessed for pain. Pt in pain and assessed with 0-10 pain rating scale. Pt given prescribed analgesic for pain. (See eMar) Pt satisfied with pain relief thus far. Will reassess and continue to monitor. Problem: IP BLADDER/VOIDING  Goal: STG - Patient demonstrates ability to take care of indwelling catheter  Intervention: EDUCATE PATIENT AND FAMILY ON SIGNS/SYMPTOMS OF A UTI. Note: Pt currently has mendoza draining raphael, cloudy urine. Catheter care done every shift and as needed. Catheter care education being given. Will continue to educate and monitor.

## 2020-09-24 NOTE — PROGRESS NOTES
Occupational Therapy  Facility/Department: [address] REHAB  Daily Treatment Note  NAME: Rylee Cardoza Sr.  : 1945  MRN: [id number]    Date of Service: 2020    Discharge Recommendations:  Continue to assess pending progress, S Level 1, Home with Home health OT, Home with assist PRN  OT Equipment Recommendations  Equipment Needed: Yes  Other: Assess for shower chair, long handle sponge, reacher, shoe horn, sock aide, leg  grab bars, TSF    Assessment   Performance deficits / Impairments: Decreased functional mobility ; Decreased endurance;Decreased ADL status; Decreased balance;Decreased strength;Decreased safe awareness;Decreased high-level IADLs  Assessment: Pt required significant increased time to complete shower ADL session D/T significant fatigue. Pt appeared to lack the endurance to complete ADL tasks, needing Max A for LB dressing, Mod A for bathing buttocks & shelbie area. Pt functional mobility appears unsteady D/T RLE, w/c used to move from bathroom D/T fatigue. Pt complains of pain 8/10 in LLE & lower back. Pt showing progress w/ endurance by taking shower vs sponge bath, but still limited by fatigue. Treatment Diagnosis: Impaired: ADL/IADL function, balance, functional mobility, endurance/strength, safety awareness. Prognosis: Good  OT Education: OT Role;Plan of Care;Precautions; ADL Adaptive Strategies; Energy Conservation;Transfer Training;Equipment  Patient Education: Pt educated to use long handle sponge to bathe feet & back. Pt reminded of precautions during shower. REQUIRES OT FOLLOW UP: Yes  Activity Tolerance  Activity Tolerance: Patient limited by fatigue;Patient limited by pain  Activity Tolerance: Pt took significant time to complete shower ADL session D/T fatigue. Pt complained of pain in LLE & lower back, rated pain 8/10. Safety Devices  Safety Devices in place: Yes  Type of devices: Left in chair;Call light within reach;Nurse notified; Chair alarm in place; Patient at risk for falls;Gait belt(Left in w/c, nurse notified about feet bandages, care transport arrived for pt PT session.)         Patient Diagnosis(es): There were no encounter diagnoses. has a past medical history of Aneurysm, aortic ([facility] [address] Atrial fibrillation ([facility] [address] Glaucoma, Heart attack ([facility] [address] Hyperlipidemia, and Hypertension. has a past surgical history that includes Coronary angioplasty with stent; Colonoscopy; Eye surgery (Right, 7/23/2020); and Intracapsular cataract extraction (Right, 7/23/2020). Restrictions  Restrictions/Precautions  Restrictions/Precautions: Fall Risk(high fall risk, up with assistance)  Position Activity Restriction  Spinal Precautions: No Bending, No Lifting, No Twisting(10lb lifting restriction per patient)  Sternal Precautions: 10# Lifting Restrictions  Sternal Precautions: 10# lifting restriction per pt  Other position/activity restrictions: room air  Subjective   General  Chart Reviewed: Yes, Orders, History and Physical, Progress Notes  Patient assessed for rehabilitation services?: Yes  Additional Pertinent Hx: \"Patient is a 65 yo M with pmh Afib, CAD, HTN, HLD, and recent lumbar spine surgery who initially presented from SNF on 9/13/2020 with fever 105 and altered mental status. Found to have sepsis due to UTI, urinary retention, metabolic encephalopathy, and acute hypoxic respiratory failure. \" Per Dr. Niurka Lezama 9/19/2020 note. Family / Caregiver Present: No  Referring Practitioner: Dr. Alonzo Old  Diagnosis: sepsis, metabolic encephalopathy, UTI  Subjective  Subjective: Pt met in room, awake & supine in bed. Pt agreeable for shower ADL session. Pt rated his back pain & LLE pain 8/10. Orientation  Orientation  Overall Orientation Status: Within Functional Limits  Objective    ADL  Equipment Provided: Sock aid;Long-handled sponge;Reacher  Grooming: Supervision(Combed hair, brushed teeth.  Did not shave D/T time restraint.)  UE Bathing: Increased time to complete;Stand by assistance;Verbal cueing(Seated in shower, reminders for DynaHex soap. S/OT covered PICC line & heart monitor.)  LE Bathing: Minimal assistance(Long handle sponge for feet & below knees.)  UE Dressing: Minimal assistance;Setup(Set-up clothes D/T fatigue. Min A to bring button down shirt around back.)  LE Dressing: Maximum assistance;Setup(Setup clothes D/T fatigue. S/OT doffed/donned pants & depends D/T pt fatigue. Pt in stance @ grab bars, able to pull up depends & pants from knees. Dependent to thread cather through depends & pants.)  Toileting: Other (Comment)(Pt attempted BM, but no need.)  Additional Comments: Pt took significant increased time to complete ADL tasks D/T fatigue. Pt stated he felt light headed when sitting in shower chair. Pt left in w/c, nurse notified to change heel bandages D/T wetness from shower. Pt left in care of transport, Jasmyne Ingram. Balance  Sitting Balance: Modified independent   Standing Balance: Contact guard assistance  Standing Balance  Time: Stood @ grab bars CGA ~1 minute in shower while SOT dried buttocks, pt pulled up pants & depends. Activity: ADL task  Functional Mobility  Functional - Mobility Device: Rolling Walker  Activity: To/from bathroom  Assist Level: Contact guard assistance  Functional Mobility Comments: Pt amb slowly, R foot placement appears to be dragging. Pt unsteady when turning. Needs cues to stay within walker. Pt pushed by S/OT from bathroom D/T fatigue. Toilet Transfers  Toilet - Technique: Ambulating(RW)  Equipment Used: Grab bars(R & L side)  Toilet Transfer: Contact guard assistance  Toilet Transfers Comments: ><RW><comfort height toilet, used R & L grab bars. Shower Transfers  Shower - Transfer From: [facility] - Transfer Type: To and From  Shower - Transfer To: Shower seat with back  Shower - Technique: Ambulating(RW)  Shower Transfers: Contact Guard  Shower Transfers Comments: Cues to turn walker, hand placement, & safety awareness.  Pt used sit & swing technique on shower chair. Wheelchair Bed Transfers  Equipment Used: Bed;Wheelchair  Level of Asssistance: Contact guard assistance  Wheelchair Transfers Comments: from bed > RW><w/c. Pt needed cues to step back to chair. Bed mobility  Rolling to Right: Stand by assistance(HOB elevated, used hand rail, did not follow log roll technique.)  Supine to Sit: Minimal assistance(HOB elevated)  Scooting: Stand by assistance                                                                    Plan   Plan  Times per week: 5-6x  Times per day: Twice a day  Plan weeks: 2.5 weeks from 9/19/20  Current Treatment Recommendations: Strengthening, Patient/Caregiver Education & Training, Home Management Training, Equipment Evaluation, Education, & procurement, Balance Training, Functional Mobility Training, Endurance Training, Safety Education & Training, Self-Care / ADL, Wheelchair Mobility Training, Neuromuscular Re-education  Plan Comment: Wednesday Conference               Goals  Short term goals  Time Frame for Short term goals: 1-1.5 weeks from [facility] 9/19/2020  Short term goal 1: Pt will bathe min A to CGA using AE & shower chair. Short term goal 2: Pt will dress upper body set-up, lower body min A using AE(except melody hose if ordered). Short term goal 3: Pt will toilet CGA to void vs min-mod A for BM w/toilet aid if needed. Short term goal 4: Pt will complete functional transfers/mob using RW/DME with CGA to SBA. Short term goal 5: Pt will perform UE ther exercises/HEP to increase activity tolerance/strength/endurance to tolerate standing for 6-7 minutes for ADL/simple IADL task. Short term goal 6: Pt will recall/follow spinal precautions with only occassional reminders during ADL/mob tasks. .  Long term goals  Time Frame for Long term goals : 1.5-2.5 weeks from 9/19/20  Long term goal 1: Pt will bathe modified independent using AE & shower chair.   Long term goal 2: Pt will dress modified independent using

## 2020-09-25 LAB
ANION GAP SERPL CALCULATED.3IONS-SCNC: 10 MMOL/L (ref 3–16)
BASOPHILS ABSOLUTE: 0.1 K/UL (ref 0–0.2)
BASOPHILS RELATIVE PERCENT: 2.4 %
BUN BLDV-MCNC: 24 MG/DL (ref 7–20)
CALCIUM SERPL-MCNC: 8.7 MG/DL (ref 8.3–10.6)
CHLORIDE BLD-SCNC: 103 MMOL/L (ref 99–110)
CO2: 21 MMOL/L (ref 21–32)
CREAT SERPL-MCNC: 1.1 MG/DL (ref 0.8–1.3)
EOSINOPHILS ABSOLUTE: 0.2 K/UL (ref 0–0.6)
EOSINOPHILS RELATIVE PERCENT: 6.3 %
GFR AFRICAN AMERICAN: >60
GFR NON-AFRICAN AMERICAN: >60
GLUCOSE BLD-MCNC: 88 MG/DL (ref 70–99)
HCT VFR BLD CALC: 25.7 % (ref 40.5–52.5)
HEMOGLOBIN: 8.4 G/DL (ref 13.5–17.5)
LYMPHOCYTES ABSOLUTE: 0.9 K/UL (ref 1–5.1)
LYMPHOCYTES RELATIVE PERCENT: 32.3 %
MCH RBC QN AUTO: 30.3 PG (ref 26–34)
MCHC RBC AUTO-ENTMCNC: 32.6 G/DL (ref 31–36)
MCV RBC AUTO: 92.8 FL (ref 80–100)
MONOCYTES ABSOLUTE: 0.7 K/UL (ref 0–1.3)
MONOCYTES RELATIVE PERCENT: 23.4 %
NEUTROPHILS ABSOLUTE: 1 K/UL (ref 1.7–7.7)
NEUTROPHILS RELATIVE PERCENT: 35.6 %
PDW BLD-RTO: 16.8 % (ref 12.4–15.4)
PLATELET # BLD: 199 K/UL (ref 135–450)
PMV BLD AUTO: 9.2 FL (ref 5–10.5)
POTASSIUM REFLEX MAGNESIUM: 4.1 MMOL/L (ref 3.5–5.1)
RBC # BLD: 2.77 M/UL (ref 4.2–5.9)
SODIUM BLD-SCNC: 134 MMOL/L (ref 136–145)
WBC # BLD: 2.9 K/UL (ref 4–11)

## 2020-09-25 PROCEDURE — 85025 COMPLETE CBC W/AUTO DIFF WBC: CPT

## 2020-09-25 PROCEDURE — 1280000000 HC REHAB R&B

## 2020-09-25 PROCEDURE — 97530 THERAPEUTIC ACTIVITIES: CPT

## 2020-09-25 PROCEDURE — 80048 BASIC METABOLIC PNL TOTAL CA: CPT

## 2020-09-25 PROCEDURE — 6370000000 HC RX 637 (ALT 250 FOR IP): Performed by: PHYSICAL MEDICINE & REHABILITATION

## 2020-09-25 PROCEDURE — 2580000003 HC RX 258: Performed by: PHYSICAL MEDICINE & REHABILITATION

## 2020-09-25 PROCEDURE — 97116 GAIT TRAINING THERAPY: CPT

## 2020-09-25 PROCEDURE — 97110 THERAPEUTIC EXERCISES: CPT

## 2020-09-25 PROCEDURE — 94760 N-INVAS EAR/PLS OXIMETRY 1: CPT

## 2020-09-25 PROCEDURE — 97535 SELF CARE MNGMENT TRAINING: CPT

## 2020-09-25 RX ADMIN — DORZOLAMIDE HYDROCHLORIDE AND TIMOLOL MALEATE 1 DROP: 20; 5 SOLUTION/ DROPS OPHTHALMIC at 07:51

## 2020-09-25 RX ADMIN — SENNOSIDES-DOCUSATE SODIUM TAB 8.6-50 MG 1 TABLET: 8.6-5 TAB at 22:05

## 2020-09-25 RX ADMIN — FERROUS SULFATE TAB EC 324 MG (65 MG FE EQUIVALENT) 324 MG: 324 (65 FE) TABLET DELAYED RESPONSE at 07:49

## 2020-09-25 RX ADMIN — GABAPENTIN 100 MG: 100 CAPSULE ORAL at 07:50

## 2020-09-25 RX ADMIN — DULOXETINE HYDROCHLORIDE 30 MG: 30 CAPSULE, DELAYED RELEASE ORAL at 07:50

## 2020-09-25 RX ADMIN — DULOXETINE HYDROCHLORIDE 30 MG: 30 CAPSULE, DELAYED RELEASE ORAL at 22:01

## 2020-09-25 RX ADMIN — HYDRALAZINE HYDROCHLORIDE 50 MG: 50 TABLET, FILM COATED ORAL at 22:05

## 2020-09-25 RX ADMIN — AMIODARONE HYDROCHLORIDE 200 MG: 200 TABLET ORAL at 07:49

## 2020-09-25 RX ADMIN — HYDRALAZINE HYDROCHLORIDE 50 MG: 50 TABLET, FILM COATED ORAL at 04:57

## 2020-09-25 RX ADMIN — AMLODIPINE BESYLATE 10 MG: 10 TABLET ORAL at 07:50

## 2020-09-25 RX ADMIN — PANTOPRAZOLE SODIUM 40 MG: 40 TABLET, DELAYED RELEASE ORAL at 05:37

## 2020-09-25 RX ADMIN — METOPROLOL TARTRATE 50 MG: 50 TABLET, FILM COATED ORAL at 22:06

## 2020-09-25 RX ADMIN — OXYCODONE HYDROCHLORIDE AND ACETAMINOPHEN 1 TABLET: 5; 325 TABLET ORAL at 07:48

## 2020-09-25 RX ADMIN — SODIUM CHLORIDE, PRESERVATIVE FREE 10 ML: 5 INJECTION INTRAVENOUS at 22:37

## 2020-09-25 RX ADMIN — SODIUM CHLORIDE, PRESERVATIVE FREE 10 ML: 5 INJECTION INTRAVENOUS at 07:55

## 2020-09-25 RX ADMIN — ACETAMINOPHEN 650 MG: 325 TABLET ORAL at 14:52

## 2020-09-25 RX ADMIN — GABAPENTIN 100 MG: 100 CAPSULE ORAL at 14:52

## 2020-09-25 RX ADMIN — GABAPENTIN 100 MG: 100 CAPSULE ORAL at 22:05

## 2020-09-25 RX ADMIN — LATANOPROST 1 DROP: 50 SOLUTION OPHTHALMIC at 22:07

## 2020-09-25 RX ADMIN — SENNOSIDES-DOCUSATE SODIUM TAB 8.6-50 MG 1 TABLET: 8.6-5 TAB at 07:49

## 2020-09-25 RX ADMIN — DORZOLAMIDE HYDROCHLORIDE AND TIMOLOL MALEATE 1 DROP: 20; 5 SOLUTION/ DROPS OPHTHALMIC at 22:07

## 2020-09-25 RX ADMIN — Medication 2 CAPSULE: at 17:38

## 2020-09-25 RX ADMIN — ATORVASTATIN CALCIUM 40 MG: 40 TABLET, FILM COATED ORAL at 22:05

## 2020-09-25 RX ADMIN — LEVOTHYROXINE SODIUM 50 MCG: 0.05 TABLET ORAL at 05:37

## 2020-09-25 RX ADMIN — Medication 2 CAPSULE: at 07:49

## 2020-09-25 RX ADMIN — BISACODYL 10 MG: 10 SUPPOSITORY RECTAL at 17:34

## 2020-09-25 RX ADMIN — MAGNESIUM HYDROXIDE 30 ML: 400 SUSPENSION ORAL at 09:05

## 2020-09-25 RX ADMIN — HYDRALAZINE HYDROCHLORIDE 50 MG: 50 TABLET, FILM COATED ORAL at 14:53

## 2020-09-25 RX ADMIN — LOSARTAN POTASSIUM 100 MG: 100 TABLET, FILM COATED ORAL at 07:49

## 2020-09-25 RX ADMIN — METOPROLOL TARTRATE 50 MG: 50 TABLET, FILM COATED ORAL at 07:49

## 2020-09-25 ASSESSMENT — PAIN SCALES - GENERAL
PAINLEVEL_OUTOF10: 9
PAINLEVEL_OUTOF10: 1
PAINLEVEL_OUTOF10: 3
PAINLEVEL_OUTOF10: 3

## 2020-09-25 ASSESSMENT — PAIN DESCRIPTION - ONSET
ONSET: GRADUAL
ONSET: GRADUAL

## 2020-09-25 ASSESSMENT — PAIN DESCRIPTION - LOCATION
LOCATION: LEG
LOCATION: LEG

## 2020-09-25 ASSESSMENT — PAIN DESCRIPTION - PAIN TYPE
TYPE: ACUTE PAIN
TYPE: ACUTE PAIN

## 2020-09-25 ASSESSMENT — PAIN DESCRIPTION - FREQUENCY
FREQUENCY: CONTINUOUS
FREQUENCY: CONTINUOUS

## 2020-09-25 ASSESSMENT — PAIN DESCRIPTION - PROGRESSION
CLINICAL_PROGRESSION: NOT CHANGED
CLINICAL_PROGRESSION: NOT CHANGED

## 2020-09-25 ASSESSMENT — PAIN DESCRIPTION - DESCRIPTORS
DESCRIPTORS: ACHING;DISCOMFORT
DESCRIPTORS: ACHING;DISCOMFORT

## 2020-09-25 ASSESSMENT — PAIN SCALES - WONG BAKER
WONGBAKER_NUMERICALRESPONSE: 0
WONGBAKER_NUMERICALRESPONSE: 0

## 2020-09-25 ASSESSMENT — PAIN DESCRIPTION - ORIENTATION
ORIENTATION: LEFT
ORIENTATION: LEFT

## 2020-09-25 ASSESSMENT — PAIN DESCRIPTION - DIRECTION: RADIATING_TOWARDS: HIP

## 2020-09-25 NOTE — PROGRESS NOTES
Department of Physical Medicine & Rehabilitation  Progress Note    Patient Identification:  Lauren Alvarez  [id number]  : 1945  Admit date: 2020    Chief Complaint: Debility    Subjective:   No acute events overnight. Patient seen this am sitting up in gym. Still constipated. Had MoM this morning. If not effective will give bisacodyl supp. ROS: No f/c, n/v, cp     Objective:  Patient Vitals for the past 24 hrs:   BP Temp Temp src Pulse Resp SpO2 Weight   20 0748 123/68 -- -- 67 16 93 % --   20 0401 131/69 98.1 °F (36.7 °C) Oral 54 16 93 % 182 lb 1.6 oz (82.6 kg)   20 2355 138/72 98.4 °F (36.9 °C) Oral 57 16 92 % --   20 2023 134/71 97.7 °F (36.5 °C) Oral 58 16 94 % --   20 1552 120/66 97.5 °F (36.4 °C) Oral 55 16 96 % --   20 1345 (!) 93/47 -- -- 61 -- -- --   20 1230 111/61 97.6 °F (36.4 °C) Oral 57 16 94 % --     Const: Alert. No distress, pleasant. HEENT: Normocephalic, atraumatic. Normal sclera/conjunctiva. MMM. CV: Regular rate and rhythm. Resp: No respiratory distress. Lungs decreased. Abd: Soft, nontender, nondistended, NABS+   Ext: trace edema. Neuro: Alert, oriented, appropriately interactive. Psych: Cooperative, appropriate mood and affect    Laboratory data: Available via EMR.    Last 24 hour lab  Recent Results (from the past 24 hour(s))   Basic Metabolic Panel w/ Reflex to MG    Collection Time: 20  7:17 AM   Result Value Ref Range    Sodium 134 (L) 136 - 145 mmol/L    Potassium reflex Magnesium 4.1 3.5 - 5.1 mmol/L    Chloride 103 99 - 110 mmol/L    CO2 21 21 - 32 mmol/L    Anion Gap 10 3 - 16    Glucose 88 70 - 99 mg/dL    BUN 24 (H) 7 - 20 mg/dL    CREATININE 1.1 0.8 - 1.3 mg/dL    GFR Non-African American >60 >60    GFR African American >60 >60    Calcium 8.7 8.3 - 10.6 mg/dL   CBC Auto Differential    Collection Time: 20  7:17 AM   Result Value Ref Range    WBC 2.9 (L) 4.0 - 11.0 K/uL    RBC 2.77 (L) 4.20 - 5.90 M/uL    Hemoglobin 8.4 (L) 13.5 - 17.5 g/dL    Hematocrit 25.7 (L) 40.5 - 52.5 %    MCV 92.8 80.0 - 100.0 fL    MCH 30.3 26.0 - 34.0 pg    MCHC 32.6 31.0 - 36.0 g/dL    RDW 16.8 (H) 12.4 - 15.4 %    Platelets 764 080 - 280 K/uL    MPV 9.2 5.0 - 10.5 fL    Neutrophils % 35.6 %    Lymphocytes % 32.3 %    Monocytes % 23.4 %    Eosinophils % 6.3 %    Basophils % 2.4 %    Neutrophils Absolute 1.0 (L) 1.7 - 7.7 K/uL    Lymphocytes Absolute 0.9 (L) 1.0 - 5.1 K/uL    Monocytes Absolute 0.7 0.0 - 1.3 K/uL    Eosinophils Absolute 0.2 0.0 - 0.6 K/uL    Basophils Absolute 0.1 0.0 - 0.2 K/uL       Therapy progress:  PT  Position Activity Restriction  Spinal Precautions: No Bending, No Lifting, No Twisting(10lb lifting restriction per patient)  Sternal Precautions: 10# Lifting Restrictions  Sternal Precautions: (per pt)  Other position/activity restrictions: room air  Objective     Sit to Stand: Stand by assistance  Stand to sit: Stand by assistance(VC for safety with hand placement)  Device: Rolling Walker  Assistance: Contact guard assistance, Stand by assistance  Distance: 140' with multiple turns increased time  OT  PT Equipment Recommendations  Equipment Needed: Yes  Mobility Devices: Natacha Smith: Rolling  Other: will continue to assess for further equipment needs  Toilet - Technique: Ambulating(RW)  Equipment Used: Grab bars  Toilet Transfers Comments: ><RW><comfort height toilet using safety frame  Assessment        SLP          Body mass index is 26.89 kg/m².     Assessment and Plan:    Impairments: generalized weakness + relative LLE weakness, decreased endurance, balance, cognition     Debility  -PT/OT     Sepsis presumed due to UTI  -Completed merrem per ID     Urinary retention   -s/p cystoscopy for Reeves placement, +false urethral passage  -maintain Reeves x 7 days per Urology, consider void trial soon, will address constipation first     Acute hypoxic respiratory failure in setting of baseline COPD  -Supplemental O2, wean as tolerated     TONO on CKD  -Avoid nephrotoxins, renally dose meds  -Monitor     Metabolic encephalopathy  -Improving with treatment of sepsis  -Regulate sleep/wake, emphasis on routine  -Consider SLP consult     Lumbar stenosis s/p recent L4-S1 ALIF/PSF (8/31 with Dr. Matthieu Foster and Dr. Bladimir Mccray)  -Wound care  -PT/OT     Acute blood loss anemia  -Due to left pelvic hematoma  -Monitor Hgb, transfuse prn <7  -Iron supplement     Afib  -Amiodarone  -Holding Xarelto due to hematuria     CAD  -statin, bb, ARB     HTN, uncontrolled  -amlodipine, hydralazine, losartan, metoprolol    Hypokalemia  -Improved with replacement    Adjustment disorder with depressed mood  -Neuropsych consulted  -Duloxetine (recently started at [facility])     Bladder   -Urinary retention as above  -Reeves, consider void trial soon     Bowel   -Initially with loose stools, now with constipation  -senna+docusate BID, PRN miralax, MoM, and bisacodyl supp.     Pain control  -Duloxetine, gabapentin, prn Percocet     PPx  -DVT: Xarelto held due to hematuria  -GI: pantoprazole, probiotics    Rehab Progress: Making gradual progress. Some self-limiting behavior and depressed mood. Working on functional mobility, endurance, strength balance, compensatory strategies. Anticipated Dispo: home alone  Services: SHEY PT, OT, RN, Aide  DME: JULIÁN, has hospital bed  ELOS: ~10/3      [address] ASHLEY Fuller MD 9/25/2020, 12:00 PM

## 2020-09-25 NOTE — PROGRESS NOTES
Occupational Therapy  Facility/Department: [address] REHAB  Daily Treatment Note    AM and PM Sessions:  NAME: Antoinette Mullins Sr.  : 1945  MRN: [id number]    Date of Service: 2020    Discharge Recommendations:  Continue to assess pending progress, S Level 1, Home with Home health OT, Home with assist PRN  OT Equipment Recommendations  Equipment Needed: Yes  Other: Assess for shower chair, long handle sponge, reacher, shoe horn, sock aide, leg  grab bars, TSF    Assessment   Performance deficits / Impairments: Decreased functional mobility ; Decreased endurance;Decreased ADL status; Decreased balance;Decreased strength;Decreased safe awareness;Decreased high-level IADLs  Assessment: Pt completed toilet transfer and chair transfer amb w/ RW, CGA. Pt imulsive to sit without reaching back first, instructed to take time & transfer slower. Needed cues for stepping back to chair & reaching back before sitting. Pt attempted BM (no result) during toilet transfer, pulled up/down pants standing @ RW CGA. Pt donned socks using sock aide, doffed crossing LEs. Pt tolerated 2lb weight exercises. Pt appeared to have increased endruance & tolerate session well despite feeling pain in LLE. Treatment Diagnosis: Impaired: ADL/IADL function, balance, functional mobility, endurance/strength, safety awareness. Prognosis: Good  OT Education: OT Role;Plan of Care;Precautions; ADL Adaptive Strategies; Energy Conservation;Transfer Training;Equipment  Patient Education: Education of sock aide. REQUIRES OT FOLLOW UP: Yes  Activity Tolerance  Activity Tolerance: Patient Tolerated treatment well  Activity Tolerance: Pt stated he had pain in his LLE 6/10, but appeared to tolerate transfers, exercises & dressing task well. Safety Devices  Safety Devices in place: Yes  Type of devices: Gait belt(Left in care of transport.)         Patient Diagnosis(es): There were no encounter diagnoses.       has a past medical history of Aneurysm, aortic ([facility] [address] Atrial fibrillation ([facility] [address] Glaucoma, Heart attack ([address] Hyperlipidemia, and Hypertension. has a past surgical history that includes Coronary angioplasty with stent; Colonoscopy; Eye surgery (Right, 7/23/2020); and Intracapsular cataract extraction (Right, 7/23/2020). Restrictions  Restrictions/Precautions  Restrictions/Precautions: Fall Risk(high fall risk, up with assistance)  Position Activity Restriction  Spinal Precautions: No Bending, No Lifting, No Twisting(10lb lifting restriction per patient)  Sternal Precautions: 10# Lifting Restrictions  Sternal Precautions: (per pt)  Other position/activity restrictions: room air  Subjective   General  Chart Reviewed: Yes, Orders, History and Physical, Progress Notes  Patient assessed for rehabilitation services?: Yes  Additional Pertinent Hx: \"Patient is a 65 yo M with pmh Afib, CAD, HTN, HLD, and recent lumbar spine surgery who initially presented from SNF on 9/13/2020 with fever 105 and altered mental status. Found to have sepsis due to UTI, urinary retention, metabolic encephalopathy, and acute hypoxic respiratory failure. \" Per Dr. Irene Wills 9/19/2020 note. Family / Caregiver Present: No  Referring Practitioner: Dr. Geno Hayes  Diagnosis: sepsis, metabolic encephalopathy, UTI  Subjective  Subjective: Pt met in room, seated in recliner. Pt transported to dept via w/c by SOT. Pt stated he had pain in his LLE 6/10. Objective    ADL  LE Dressing: Other (Comment)(Pt used sock aide to don socks, pt able to cross LEs to take socks off. Pt stated interest in getting one for home fo energy conservation & back precautions.)          Toilet Transfers  Toilet - Technique: Ambulating(RW)  Equipment Used: Grab bars  Toilet Transfer: Contact guard assistance  Toilet Transfers Comments: ><RW><comfort height toilet using safety frame. Pt needing cues for safety to stay within walker, walk back to the toilet & reach back w/arms.  Pt pulled up/down pants & depends to attempt BM, but no results. Pt remembered to push up from TSF. Wheelchair Bed Transfers  Wheelchair/Bed - Technique: Ambulating(RW)  Equipment Used: Wheelchair; Other(Recliner)  Level of Asssistance: Contact guard assistance  Wheelchair Transfers Comments: w/c>chair w/arms, CGA. Cues to turn, stay within walker, to turn, and to step back & reach back for arms. Pt remembered to push up from the chair. Type of ROM/Therapeutic Exercise  Type of ROM/Therapeutic Exercise: Free weights; Resistive Bands  Comment: Pt used 2lb free weights and yellow theraband (tricep exercise) for 2 sets of 3 exercies for 15 repetitions in order to strengthen UB to participate in ADL/IADL tasks. Pt educated to stop exercise if experience pain. Pt attempted red theraband, but switched to yellow to be easier on the back. Second Session:      Pt met in dept agreeable to tx. Pt rated his pain 6/10 in LLE. Endurance:  Pt stood @ table, CGA while participating in a game of connect four. Pt stood for ~3.5 minutes before needing a rest break. Pt stated that task was very challenging. Pt showed only slight unsteadiness when reaching to place pieces into the board. ROM:  Pt remained seated in w/c @ table D/T increased pain in back after standing, while putting pieces onto ROM tree. Pt used LUE to reach & place/remove 22 pieces on the L side, used RLE to reach & place/remove 22 pieces on R side. Transfers:   Pt reminded of spinal precautions as he could not recall what they were. Pt amb ><w/c<>bed using RW, CGA. Pt sat EOB w/out cues for safety. Pt used grab bar on R side to stay on side, pt able to lift legs up onto bed. Pt  need some cues to stay on side when supine>sit to follow spinal precautions. Pt performed toilet transfer><w/c using RW, CGA, used TSF w/cues to reach back to sit on comfort height toilet.  Pt pulled up/down pants & attempted BM on toilet, but no results. Assessment:  Pt stood @ table for ~3.5 minutes while participating in game of connect before needing to sit down D/T fatigue & pain in lower back, therefore pt performed ROM tree seated in w/c @ table. Pt transferred ><w/c><bed using RW, CGA. Pt used grab bar on his R side for sit>supine, able to bring legs onto bed. Pt needed cues to stay on his side when supine>sit, used grab bar. Pt amb using RW, CGA ><w/c><toilet w/TSF, pt pulled up/down pants & depends in stance @ RW, CGA, attempted BM, no results. Pt appears to be making progress w/ overall endurance. Plan to cont OT tx per POC. Pt left in care of transport, Sudie Leisure. Safety Device - Type of devices:  []  All fall risk precautions in place [] Bed alarm in place  [] Call light within reach [] Chair alarm in place [] Positioning belt [x] Gait belt [] Patient at risk for falls [] Left in bed [] Left in chair [] Telesitter in use [] Sitter present [] Nurse notified []  None                      Plan   Plan  Times per week: 5-6x  Times per day: Twice a day  Plan weeks: 2.5 weeks from 9/19/20  Current Treatment Recommendations: Strengthening, Patient/Caregiver Education & Training, Home Management Training, Equipment Evaluation, Education, & procurement, Balance Training, Functional Mobility Training, Endurance Training, Safety Education & Training, Self-Care / ADL, Wheelchair Mobility Training, Neuromuscular Re-education  Plan Comment: Wednesday Conference               Goals  Short term goals  Time Frame for Short term goals: 1-1.5 weeks from eval 9/19/2020  Short term goal 1: Pt will bathe min A to CGA using AE & shower chair. Short term goal 2: Pt will dress upper body set-up, lower body min A using AE(except melody hose if ordered). Short term goal 3: Pt will toilet CGA to void vs min-mod A for BM w/toilet aid if needed. Short term goal 4: Pt will complete functional transfers/mob using RW/DME with CGA to SBA.   Short term goal 5: Pt will perform UE ther exercises/HEP to increase activity tolerance/strength/endurance to tolerate standing for 6-7 minutes for ADL/simple IADL task. Short term goal 6: Pt will recall/follow spinal precautions with only occassional reminders during ADL/mob tasks. .  Long term goals  Time Frame for Long term goals : 1.5-2.5 weeks from 9/19/20  Long term goal 1: Pt will bathe modified independent using AE & shower chair. Long term goal 2: Pt will dress modified independent using AE.(except assist for melody hose if ordered)  Long term goal 3: Pt will toilet modified independent w/ AE. Long term goal 4: Pt will complete functional transfers/mob using RW & grab bars, modified independent. Long term goal 5: Pt will perform UE ther exercises/HEP to increase activity tolerance/strength/safety to complete IADL tasks, such as cleaning & simple meal prep. Patient Goals   Patient goals : \"I want to get out of here and get walking. \" Above goals will work toward this goal.       Therapy Time   Individual Concurrent Group Co-treatment   Time In 1030         Time Out 1115         Minutes 45         Timed Code Treatment Minutes: 45 Minutes     Therapy Time     Individual Co-treatment   Time In 1345     Time Out 999 [facility] S/OT  Therapist was present, directed patients care, made skilled judgement, and was responsible for assessment and treatment of the patient.       Maria Victoria Dee OTR/L #[id number]

## 2020-09-25 NOTE — PLAN OF CARE
Problem: Falls - Risk of:  Goal: Will remain free from falls  Description: Will remain free from falls  9/25/2020 1003 by Sanna Moore RN  Outcome: Ongoing  9/25/2020 0238 by Dylon Li RN  Outcome: Ongoing  Note: Fall risk band on patient. Orange light on outside of room. Non skid footwear in place. Alarms used appropriately. Patient instructed to call and wait for staff before getting up. Rounding done to anticipate needs. Appropriate safety devices used for transfers.    Goal: Absence of physical injury  Description: Absence of physical injury  9/25/2020 1003 by Sanna Moore RN  Outcome: Ongoing  9/25/2020 0238 by Dylon Li RN  Outcome: Ongoing     Problem: Skin Integrity:  Goal: Will show no infection signs and symptoms  Description: Will show no infection signs and symptoms  9/25/2020 1003 by Sanna Moore RN  Outcome: Ongoing  9/25/2020 0238 by Dylon Li RN  Outcome: Ongoing  Goal: Absence of new skin breakdown  Description: Absence of new skin breakdown  9/25/2020 1003 by Sanna Moore RN  Outcome: Ongoing  9/25/2020 0238 by Dylon Li RN  Outcome: Ongoing     Problem: Nutrition  Goal: Optimal nutrition therapy  9/25/2020 1003 by Sanna Moore RN  Outcome: Ongoing  9/25/2020 0238 by Dylon Li RN  Outcome: Ongoing     Problem: Pain:  Goal: Pain level will decrease  Description: Pain level will decrease  9/25/2020 0238 by Dlyon Li RN  Outcome: Ongoing  Goal: Control of acute pain  Description: Control of acute pain  9/25/2020 1003 by Sanna Moore RN  Outcome: Ongoing  9/25/2020 0238 by Dylon Li RN  Outcome: Ongoing  Goal: Control of chronic pain  Description: Control of chronic pain  9/25/2020 0238 by Dylon Li RN  Outcome: Ongoing     Problem: Musculor/Skeletal Functional Status  Goal: Highest potential functional level  9/25/2020 1003 by Sanna Moore RN  Outcome: Ongoing  9/25/2020 0238 by Dylon Li RN  Outcome: Ongoing  Goal: Absence of falls  9/25/2020 1003 by Sanna Moore RN  Outcome:

## 2020-09-25 NOTE — PROGRESS NOTES
Patient admitted to rehab with sepsis d/t UTI. A/A/O x 3. Pt on telemetry. Transfers with walker x1. On general diet, tolerating well. Medications taken whole with thin liquids. Skin with scattered bruising and abrasions and surgical incision on abdomen and back. On room air. Has been continent of bowel and bladder. LBM 9/22. Chair/bed alarms in use and call light in reach. Will monitor for safety.

## 2020-09-25 NOTE — PLAN OF CARE
Problem: Falls - Risk of:  Goal: Will remain free from falls  Description: Will remain free from falls  9/25/2020 0238 by Zulema Pack RN  Outcome: Ongoing  Note: Fall risk band on patient. Orange light on outside of room. Non skid footwear in place. Alarms used appropriately. Patient instructed to call and wait for staff before getting up. Rounding done to anticipate needs. Appropriate safety devices used for transfers.

## 2020-09-25 NOTE — PROGRESS NOTES
Physical Therapy  Facility/Department: [address] [facility]HAB  Daily Treatment Note  NAME: Lopez Ribeiro Sr.  : 1945  MRN: [id number]    Date of Service: 2020    Discharge Recommendations:  Continue to assess pending progress, Patient would benefit from continued therapy after discharge, 24 hour supervision or assist   PT Equipment Recommendations  Equipment Needed: Yes  Mobility Devices: Luberta Josep: Rolling  Other: will continue to assess for further equipment needs    Assessment   Body structures, Functions, Activity limitations: Decreased functional mobility ; Decreased strength;Decreased ROM; Decreased endurance;Decreased balance  Assessment: Pt with increased tolerance this date for all functional mobility tasks including ambulating household distances with decreased assistance with RW. Pt limited and distracted due to complaints of constipation this date but participates well with therapy this morning bed side. Overall increased participation and appears to be more positive towards recovery at this time. Patient will continue to benefit from additional skilled PT intervention to facilitate safe mobility and to optimize (I) to promote return to prior level of function. Treatment Diagnosis: LE weakness, adherence to safety precautions, poor activity tolerance, poor balance  Prognosis: Good  PT Education: Orientation;General Safety;Gait Training;Equipment;Transfer Training;Weight-bearing Education; Adaptive Device Training  Patient Education: posture within RW, safety with all mobility tasks including ambulation, participation with functional tasks to increased bowel movement  Barriers to Learning: low motivation to participate due to increased pain with low back  REQUIRES PT FOLLOW UP: Yes  Activity Tolerance  Activity Tolerance: Patient limited by fatigue;Patient limited by endurance; Patient limited by pain  Activity Tolerance: pt with poor motivation to participate with PT this morning despite max attempt     Patient Diagnosis(es): There were no encounter diagnoses. has a past medical history of Aneurysm, aortic ([facility] [address] Atrial fibrillation ([address] Glaucoma, Heart attack ([address] Hyperlipidemia, and Hypertension. has a past surgical history that includes Coronary angioplasty with stent; Colonoscopy; Eye surgery (Right, 7/23/2020); and Intracapsular cataract extraction (Right, 7/23/2020). Social/Functional History  Lives With: Alone  Type of Home: House  Home Layout: Multi-level, Bed/Bath upstairs, Able to Live on Main level with bedroom/bathroom, Laundry in basement  Home Access: Stairs to enter with rails  Entrance Stairs - Number of Steps: 10-11  Entrance Stairs - Rails: Right  Bathroom Shower/Tub: Tub/Shower unit, Walk-in shower(walk in shower on the main level)  Bathroom Toilet: Standard(sink counter by toilet, comfort ht toilet)  Bathroom Equipment: (No DME)  Bathroom Accessibility: Rubie Mcardle accessible(Pt says possibly a w/c or walker could fit through doorway)  Home Equipment: Sock aid, [address] bed(Hospital in family room on 1st floor)  ADL Assistance: Independent  Homemaking Assistance: Independent  Homemaking Responsibilities: Yes  Meal Prep Responsibility: Primary  Laundry Responsibility: Primary  Cleaning Responsibility: Primary  Bill Paying/Finance Responsibility: Primary  Health Care Management: Primary  Ambulation Assistance: Independent  Transfer Assistance: Independent  Active : Yes  Mode of Transportation: Car  Occupation: Retired  Type of occupation: Retired :  for 35 years. Leisure & Hobbies: out to eat with lady friend, travelled to [place], [address] [place] [address], was walking about a mile every 2 days before he got sick  Additional Comments: Information pertains to prior recent back surg (8/31/2020), following d/c to SNF setting [facility]).   Pt reports that he was only at SNF setting few days following surg, did have a fall while there (reports call light to go to bathroom was not answered and he tried to get OOB by self). Pt reports that he has not been home since surgery at [facility].    Restrictions  Restrictions/Precautions  Restrictions/Precautions: Fall Risk(high fall risk, up with assistance)  Position Activity Restriction  Spinal Precautions: No Bending, No Lifting, No Twisting(10lb lifting restriction per patient)  Sternal Precautions: 10# Lifting Restrictions  Sternal Precautions: (per pt)  Other position/activity restrictions: room air  Subjective   General  Chart Reviewed: Yes  Additional Pertinent Hx: 65 y/o male admit from SNF setting 9/13/2020 with Altered Mental Status, Severe Sepsis, Acute Renal Failure, and Pelvic Hematoma. CT Head negative. CT Abd/Pelvis + L Pelvic Hematoma. Urology consult for Reeves Placement. PMH as noted including Recent Back Surg (L4/L5 and L5/S1 Ant/Post Fusion, 8/31/2020), CAD, Angio with Stent, A-Fib, Aortic Aneurysm. Response To Previous Treatment: Patient with no complaints from previous session. Family / Caregiver Present: No  Referring Practitioner: Dr. Maik Becker  Subjective  Subjective: Pt supine in bed this morning complaining of constipation at this time, pt states stomach pain 7/10. Pt requires motivation for participation with therapy at this time. General Comment  Comments: Patient requiring gentle encouragement and reassurance to perform mobility, PT reiterating the goal for therapy and progress towards goals including benefits of mobility to help with bowels. Patient seated in recliner upon arrival - agreeable to PT.           Orientation  Orientation  Overall Orientation Status: Within Functional Limits  Orientation Level: Oriented X4       Objective   Bed mobility  Rolling to Right: Supervision  Supine to Sit: Supervision(increased time with cues for log roll technique for safety)  Scooting: Supervision  Comment: hospital bed with HOB elevated 35 degrees use of railing, increased time to perform, sitting EOB pillows to patient right  Transfers  Sit to Stand: Stand by assistance  Stand to sit: Stand by assistance(VC for safety with hand placement)  Ambulation  Ambulation?: Yes  Ambulation 1  Surface: level tile  Device: Rolling Walker  Assistance: Contact guard assistance;Stand by assistance  Quality of Gait: antaglic gait, trendelenburg gait, forward flexed with cues for upright, decreased (B) step length, foot clearance, and heel strike; narrow base of support; kay decreases with fatigue and additional reps, trendlenberg gait with right hip drop in swing phase due to left hip weakness  Gait Deviations: Slow Kay;Decreased step length;Decreased step height;Shuffles  Distance: 80' with multiple turns increased time  Comments: distances limited due to patient requesting to stop due to fatigue, stayed near room at this time due to patient concerns with bowels moving  Stairs/Curb  Stairs?: No(remained in room this morning due to bowels)     Balance  Posture: Fair  Sitting - Static: Good  Sitting - Dynamic: Good  Standing - Static: Good  Standing - Dynamic: Fair;+  Comments: SBA with RW - limited standing tolerance at RW secondary back pain, without dizziness this date  Exercises  Quad Sets: 20x reclined in chair  Gluteal Sets: x20 reclined in chair  Hip Flexion: X 20 seated with BLEs down  Hip Extension/Leg Presses: hip adduction sets X 20 reclined in chair  Hip Abduction: 2x10 BLEs with knee straight recliner in chair wit hBLEs elevated  Knee Long Arc Quad: X 20 seated with BLEs down  Knee Short Arc Quad: X 20 recliner in chair with BLEs elevated folded pillow under knees  Ankle Pumps: X 20 ankle pumps reclined in chair, x20 heel/toe raises seated  Comments: above exercises performed supported sitting in recliner BLEs, increased time but with increased tolerance this date                         PM session:   S: Pt reports continued uncomfortable feeling due to constipation.  Pt reports 5/10 goals  Time Frame for Long term goals : 2-3 weeks  Long term goal 1: Pt will be Modif I for bed mobility  Long term goal 2: Pt will perform transfers with Modif I using LRAD  Long term goal 3: Pt will ambulate 150' using LRAD with Modif I  Long term goal 4: Pt will ascend/descend 12 stairs with Modif I using railing  Long term goal 5: Pt will ascend/descend curb using LRAD with Modif I  Patient Goals   Patient goals : Just want to get back to living on my own at home    Plan    Plan  Times per week: 5-6x/week  Times per day: Twice a day  Plan weeks: 2-3 weeks  Current Treatment Recommendations: Strengthening, Home Exercise Program, Safety Education & Training, Balance Training, Endurance Training, Patient/Caregiver Education & Training, Functional Mobility Training, Transfer Training, Gait Training, Stair training, ROM, Equipment Evaluation, Education, & procurement, Neuromuscular Re-education  Safety Devices  Type of devices:  All fall risk precautions in place, Call light within reach, Chair alarm in place, Gait belt, Patient at risk for falls, Left in chair(Pt positioned in recliner for lunch at completion of session)  Restraints  Initially in place: No     Therapy Time   Individual Concurrent Group Co-treatment   Time In 0920         Time Out 1005         Minutes 45         Timed Code Treatment Minutes: 1208 [facility]   Time In [address]     Minutes 1402 [facility], PT     Electronically signed by James Spencer PT on 9/25/20 at 10:21 AM EDT

## 2020-09-25 NOTE — PROGRESS NOTES
Patient admitted to rehab with sepsis d/t UTI. A/A/O x4. Transfers with walker x1. On general diet, poor appetite. Medications taken whole in water. On Xarelto for DVT prophylaxis (currently on hold). Skin warm, dry. Scattered bruising/abrasions. Surgical incision SUDEEP to abdomen and back. Small redness noted on both, well approximated. On room air. Has been continent of bowel, mendoza in place. LBM 9/22. Chair/bed alarms in use and call light in reach. PICC in [address]. Tele. Will monitor for safety.

## 2020-09-26 LAB
ANION GAP SERPL CALCULATED.3IONS-SCNC: 11 MMOL/L (ref 3–16)
BASOPHILS ABSOLUTE: 0.1 K/UL (ref 0–0.2)
BASOPHILS RELATIVE PERCENT: 2 %
BUN BLDV-MCNC: 19 MG/DL (ref 7–20)
CALCIUM SERPL-MCNC: 9.1 MG/DL (ref 8.3–10.6)
CHLORIDE BLD-SCNC: 102 MMOL/L (ref 99–110)
CO2: 22 MMOL/L (ref 21–32)
CREAT SERPL-MCNC: 1.2 MG/DL (ref 0.8–1.3)
EOSINOPHILS ABSOLUTE: 0.2 K/UL (ref 0–0.6)
EOSINOPHILS RELATIVE PERCENT: 5.5 %
GFR AFRICAN AMERICAN: >60
GFR NON-AFRICAN AMERICAN: 59
GLUCOSE BLD-MCNC: 103 MG/DL (ref 70–99)
HCT VFR BLD CALC: 28.2 % (ref 40.5–52.5)
HEMOGLOBIN: 9.1 G/DL (ref 13.5–17.5)
LYMPHOCYTES ABSOLUTE: 0.8 K/UL (ref 1–5.1)
LYMPHOCYTES RELATIVE PERCENT: 25.7 %
MCH RBC QN AUTO: 29.9 PG (ref 26–34)
MCHC RBC AUTO-ENTMCNC: 32.3 G/DL (ref 31–36)
MCV RBC AUTO: 92.8 FL (ref 80–100)
MONOCYTES ABSOLUTE: 0.6 K/UL (ref 0–1.3)
MONOCYTES RELATIVE PERCENT: 21 %
NEUTROPHILS ABSOLUTE: 1.4 K/UL (ref 1.7–7.7)
NEUTROPHILS RELATIVE PERCENT: 45.8 %
PDW BLD-RTO: 16.7 % (ref 12.4–15.4)
PLATELET # BLD: 253 K/UL (ref 135–450)
PMV BLD AUTO: 9.4 FL (ref 5–10.5)
POTASSIUM REFLEX MAGNESIUM: 4.2 MMOL/L (ref 3.5–5.1)
RBC # BLD: 3.04 M/UL (ref 4.2–5.9)
SODIUM BLD-SCNC: 135 MMOL/L (ref 136–145)
WBC # BLD: 3.1 K/UL (ref 4–11)

## 2020-09-26 PROCEDURE — 80048 BASIC METABOLIC PNL TOTAL CA: CPT

## 2020-09-26 PROCEDURE — 2580000003 HC RX 258: Performed by: PHYSICAL MEDICINE & REHABILITATION

## 2020-09-26 PROCEDURE — 6370000000 HC RX 637 (ALT 250 FOR IP): Performed by: PHYSICAL MEDICINE & REHABILITATION

## 2020-09-26 PROCEDURE — 1280000000 HC REHAB R&B

## 2020-09-26 PROCEDURE — 85025 COMPLETE CBC W/AUTO DIFF WBC: CPT

## 2020-09-26 PROCEDURE — 36415 COLL VENOUS BLD VENIPUNCTURE: CPT

## 2020-09-26 RX ORDER — LACTULOSE 10 G/15ML
20 SOLUTION ORAL DAILY PRN
Status: DISCONTINUED | OUTPATIENT
Start: 2020-09-26 | End: 2020-10-01 | Stop reason: HOSPADM

## 2020-09-26 RX ADMIN — LACTULOSE 20 G: 20 SOLUTION ORAL at 17:25

## 2020-09-26 RX ADMIN — HYDRALAZINE HYDROCHLORIDE 50 MG: 50 TABLET, FILM COATED ORAL at 13:43

## 2020-09-26 RX ADMIN — DORZOLAMIDE HYDROCHLORIDE AND TIMOLOL MALEATE 1 DROP: 20; 5 SOLUTION/ DROPS OPHTHALMIC at 07:57

## 2020-09-26 RX ADMIN — BISACODYL 10 MG: 10 SUPPOSITORY RECTAL at 11:42

## 2020-09-26 RX ADMIN — AMLODIPINE BESYLATE 10 MG: 10 TABLET ORAL at 07:56

## 2020-09-26 RX ADMIN — ATORVASTATIN CALCIUM 40 MG: 40 TABLET, FILM COATED ORAL at 20:57

## 2020-09-26 RX ADMIN — LEVOTHYROXINE SODIUM 50 MCG: 0.05 TABLET ORAL at 05:24

## 2020-09-26 RX ADMIN — PANTOPRAZOLE SODIUM 40 MG: 40 TABLET, DELAYED RELEASE ORAL at 05:24

## 2020-09-26 RX ADMIN — SODIUM CHLORIDE, PRESERVATIVE FREE 10 ML: 5 INJECTION INTRAVENOUS at 07:57

## 2020-09-26 RX ADMIN — GABAPENTIN 100 MG: 100 CAPSULE ORAL at 20:57

## 2020-09-26 RX ADMIN — GABAPENTIN 100 MG: 100 CAPSULE ORAL at 07:56

## 2020-09-26 RX ADMIN — LOSARTAN POTASSIUM 100 MG: 100 TABLET, FILM COATED ORAL at 07:55

## 2020-09-26 RX ADMIN — METOPROLOL TARTRATE 50 MG: 50 TABLET, FILM COATED ORAL at 20:57

## 2020-09-26 RX ADMIN — Medication 2 CAPSULE: at 17:25

## 2020-09-26 RX ADMIN — POLYETHYLENE GLYCOL 3350 17 G: 17 POWDER, FOR SOLUTION ORAL at 04:43

## 2020-09-26 RX ADMIN — DULOXETINE HYDROCHLORIDE 30 MG: 30 CAPSULE, DELAYED RELEASE ORAL at 07:56

## 2020-09-26 RX ADMIN — HYDRALAZINE HYDROCHLORIDE 50 MG: 50 TABLET, FILM COATED ORAL at 20:56

## 2020-09-26 RX ADMIN — DULOXETINE HYDROCHLORIDE 30 MG: 30 CAPSULE, DELAYED RELEASE ORAL at 20:56

## 2020-09-26 RX ADMIN — LATANOPROST 1 DROP: 50 SOLUTION OPHTHALMIC at 20:56

## 2020-09-26 RX ADMIN — DORZOLAMIDE HYDROCHLORIDE AND TIMOLOL MALEATE 1 DROP: 20; 5 SOLUTION/ DROPS OPHTHALMIC at 20:56

## 2020-09-26 RX ADMIN — SENNOSIDES-DOCUSATE SODIUM TAB 8.6-50 MG 1 TABLET: 8.6-5 TAB at 07:55

## 2020-09-26 RX ADMIN — METOPROLOL TARTRATE 50 MG: 50 TABLET, FILM COATED ORAL at 07:56

## 2020-09-26 RX ADMIN — GABAPENTIN 100 MG: 100 CAPSULE ORAL at 13:43

## 2020-09-26 RX ADMIN — Medication 2 CAPSULE: at 07:56

## 2020-09-26 RX ADMIN — AMIODARONE HYDROCHLORIDE 200 MG: 200 TABLET ORAL at 07:56

## 2020-09-26 RX ADMIN — FERROUS SULFATE TAB EC 324 MG (65 MG FE EQUIVALENT) 324 MG: 324 (65 FE) TABLET DELAYED RESPONSE at 07:56

## 2020-09-26 RX ADMIN — SODIUM CHLORIDE, PRESERVATIVE FREE 10 ML: 5 INJECTION INTRAVENOUS at 20:57

## 2020-09-26 RX ADMIN — SENNOSIDES-DOCUSATE SODIUM TAB 8.6-50 MG 1 TABLET: 8.6-5 TAB at 20:56

## 2020-09-26 RX ADMIN — HYDRALAZINE HYDROCHLORIDE 50 MG: 50 TABLET, FILM COATED ORAL at 04:43

## 2020-09-26 ASSESSMENT — PAIN SCALES - GENERAL
PAINLEVEL_OUTOF10: 0
PAINLEVEL_OUTOF10: 0

## 2020-09-26 NOTE — PROGRESS NOTES
Patient admitted to rehab with sepsis d/t UTI. A&Ox4. Pt currently on telemetry. Transfers with walker x1 assist. On general diet, tolerating well. Medications taken whole with thin liquids. Pt has scattered bruising/abrasions and surgical incision on mid-abdomen and back. On room air. Has been continent of bowel. LBM 9/22/2020, senokot, miralax and prune juice given. Pt has mendoza draining hazy, yellow urine. Pt denies pain/disxcomfort. Chair/bed alarms in use and call light in reach. Will continue to monitor.

## 2020-09-26 NOTE — PLAN OF CARE
Problem: Falls - Risk of:  Goal: Will remain free from falls  Description: Will remain free from falls  Outcome: Ongoing  Note: Fall risk assessment completed. Fall precautions in place. Call light within reach. Pt educated on calling for assistance before getting up. Walkway free of clutter. Will continue to monitor. Problem: Skin Integrity:  Goal: Absence of new skin breakdown  Description: Absence of new skin breakdown  Outcome: Ongoing  Note: Patient's skin was assessed. Pt currently has scattered bruising/abrasions and surgical incision on back and abdomen. No new findings were noted. Patient is being educated on importance of turning/repostioning at least every two hours. RN will continue to educate and monitor. Problem: Musculor/Skeletal Functional Status  Goal: Highest potential functional level  Outcome: Ongoing  Note: Pt mobility is increasing. Pt continues with therapy. Pt is SBA x1 with walker. Will monitor. Problem: Pain:  Goal: Pain level will decrease  Description: Pain level will decrease  Outcome: Ongoing  Note: Pt assessed for pain. Pt denies any pain at this time. Will continue to monitor pt and assess for pain throughout rest of shift. Problem: IP BLADDER/VOIDING  Goal: STG - Patient demonstrates ability to take care of indwelling catheter  Intervention: EDUCATE PATIENT AND FAMILY ON SIGNS/SYMPTOMS OF A UTI. Note: Pt currently has mendoza draining raphael, cloudy urine. Catheter care done every shift and as needed. Catheter care education being given. Will continue to educate and monitor.

## 2020-09-26 NOTE — PLAN OF CARE
Problem: Falls - Risk of:  Goal: Will remain free from falls  Description: Will remain free from falls  9/25/2020 2329 by Geovanna Elkins RN  Outcome: Ongoing  Note: Fall risk band on patient. Orange light on outside of room. Non skid footwear in place. Alarms used appropriately. Patient instructed to call and wait for staff before getting up. Rounding done to anticipate needs. Appropriate safety devices used for transfers.

## 2020-09-26 NOTE — PROGRESS NOTES
Department of Physical Medicine & Rehabilitation  Progress Note    Patient Identification:  Lauren Alvarez  [id number]  : 1945  Admit date: 2020    Chief Complaint: Debility    Subjective:   No acute events overnight. Patient seen this afternoon resting in bed. Has been having constipation despite receiving multiple medications. Has just had supp and feels he is ready to have BM now. RN informed. ROS: No f/c, n/v, cp     Objective:  Patient Vitals for the past 24 hrs:   BP Temp Temp src Pulse Resp SpO2 Weight   20 0745 (!) 143/61 98.8 °F (37.1 °C) Oral 62 16 91 % --   20 0406 (!) 141/74 98.4 °F (36.9 °C) Oral 62 17 95 % 186 lb 1.1 oz (84.4 kg)   20 2357 127/67 97.8 °F (36.6 °C) Oral 65 16 96 % --   20 1958 131/73 97.7 °F (36.5 °C) Oral 57 16 94 % --   20 1543 129/64 97.5 °F (36.4 °C) Oral 61 16 94 % --   20 1452 125/76 -- -- -- -- -- --     Const: Alert. No distress, pleasant. HEENT: Normocephalic, atraumatic. Normal sclera/conjunctiva. MMM. CV: Regular rate and rhythm. Resp: No respiratory distress. Lungs decreased. Abd: Soft, nontender, nondistended, NABS+   Ext: trace edema. Neuro: Alert, oriented, appropriately interactive. Psych: Cooperative, appropriate mood and affect    Laboratory data: Available via EMR.    Last 24 hour lab  Recent Results (from the past 24 hour(s))   Basic Metabolic Panel w/ Reflex to MG    Collection Time: 20  9:04 AM   Result Value Ref Range    Sodium 135 (L) 136 - 145 mmol/L    Potassium reflex Magnesium 4.2 3.5 - 5.1 mmol/L    Chloride 102 99 - 110 mmol/L    CO2 22 21 - 32 mmol/L    Anion Gap 11 3 - 16    Glucose 103 (H) 70 - 99 mg/dL    BUN 19 7 - 20 mg/dL    CREATININE 1.2 0.8 - 1.3 mg/dL    GFR Non- 59 (A) >60    GFR African American >60 >60    Calcium 9.1 8.3 - 10.6 mg/dL   CBC Auto Differential    Collection Time: 20  9:04 AM   Result Value Ref Range    WBC 3.1 (L) 4.0 - 11.0 K/uL Reeves placement, +false urethral passage  -maintain Reeves x 7 days per Urology. Consider void trial Monday would like to address constipation first. Will inform Urology, may need their assist if unable to void given false urethral passage     Acute hypoxic respiratory failure in setting of baseline COPD  -Supplemental O2, weaned as tolerated, now stable on RA     TONO on CKD  -Avoid nephrotoxins, renally dose meds  -Monitor     Metabolic encephalopathy  -Improving with treatment of sepsis  -Regulate sleep/wake, emphasis on routine  -Consider SLP consult     Lumbar stenosis s/p recent L4-S1 ALIF/PSF (8/31 with Dr. Mahesh Bolivar and Dr. Carlos De Los Santos)  -Wound care  -PT/OT     Acute blood loss anemia  -Due to left pelvic hematoma  -Monitor Hgb, transfuse prn <7  -Iron supplement     Afib  -Amiodarone  -Holding Xarelto due to hematuria     CAD  -statin, bb, ARB     HTN, uncontrolled  -amlodipine, hydralazine, losartan, metoprolol    Hypokalemia  -Improved with replacement    Adjustment disorder with depressed mood  -Neuropsych consulted  -Duloxetine (recently started at [facility])     Bladder   -Urinary retention as above  -Reeves, consider void trial soon     Bowel   -Initially with loose stools, now with constipation  -senna+docusate BID, PRN miralax, MoM, and bisacodyl supp. --Will add lactulose and enema prn if still no success with supp     Pain control  -Duloxetine, gabapentin, prn Percocet     PPx  -DVT: Xarelto held due to hematuria  -GI: pantoprazole, probiotics    Rehab Progress: Making gradual progress. Some self-limiting behavior and depressed mood. Working on functional mobility, endurance, strength balance, compensatory strategies. Anticipated Dispo: home alone  Services:  PT, OT, RN, Aide  DME: JULI[facility]N, has hospital bed  ELOS: ~10/3      [address]  Marsha Stephens MD 9/26/2020, 12:00 PM

## 2020-09-27 PROCEDURE — 1280000000 HC REHAB R&B

## 2020-09-27 PROCEDURE — 2580000003 HC RX 258: Performed by: PHYSICAL MEDICINE & REHABILITATION

## 2020-09-27 PROCEDURE — 94760 N-INVAS EAR/PLS OXIMETRY 1: CPT

## 2020-09-27 PROCEDURE — 6370000000 HC RX 637 (ALT 250 FOR IP): Performed by: PHYSICAL MEDICINE & REHABILITATION

## 2020-09-27 RX ADMIN — AMIODARONE HYDROCHLORIDE 200 MG: 200 TABLET ORAL at 07:53

## 2020-09-27 RX ADMIN — METOPROLOL TARTRATE 50 MG: 50 TABLET, FILM COATED ORAL at 07:52

## 2020-09-27 RX ADMIN — SODIUM CHLORIDE, PRESERVATIVE FREE 10 ML: 5 INJECTION INTRAVENOUS at 07:54

## 2020-09-27 RX ADMIN — Medication 2 CAPSULE: at 07:53

## 2020-09-27 RX ADMIN — ATORVASTATIN CALCIUM 40 MG: 40 TABLET, FILM COATED ORAL at 20:33

## 2020-09-27 RX ADMIN — Medication 2 CAPSULE: at 16:55

## 2020-09-27 RX ADMIN — Medication 3 MG: at 20:34

## 2020-09-27 RX ADMIN — GABAPENTIN 100 MG: 100 CAPSULE ORAL at 20:33

## 2020-09-27 RX ADMIN — HYDRALAZINE HYDROCHLORIDE 50 MG: 50 TABLET, FILM COATED ORAL at 20:33

## 2020-09-27 RX ADMIN — SENNOSIDES-DOCUSATE SODIUM TAB 8.6-50 MG 1 TABLET: 8.6-5 TAB at 07:53

## 2020-09-27 RX ADMIN — SODIUM CHLORIDE, PRESERVATIVE FREE 10 ML: 5 INJECTION INTRAVENOUS at 20:33

## 2020-09-27 RX ADMIN — LATANOPROST 1 DROP: 50 SOLUTION OPHTHALMIC at 20:33

## 2020-09-27 RX ADMIN — FERROUS SULFATE TAB EC 324 MG (65 MG FE EQUIVALENT) 324 MG: 324 (65 FE) TABLET DELAYED RESPONSE at 07:52

## 2020-09-27 RX ADMIN — AMLODIPINE BESYLATE 10 MG: 10 TABLET ORAL at 07:52

## 2020-09-27 RX ADMIN — METOPROLOL TARTRATE 50 MG: 50 TABLET, FILM COATED ORAL at 20:33

## 2020-09-27 RX ADMIN — DULOXETINE HYDROCHLORIDE 30 MG: 30 CAPSULE, DELAYED RELEASE ORAL at 07:53

## 2020-09-27 RX ADMIN — DULOXETINE HYDROCHLORIDE 30 MG: 30 CAPSULE, DELAYED RELEASE ORAL at 20:33

## 2020-09-27 RX ADMIN — LOSARTAN POTASSIUM 100 MG: 100 TABLET, FILM COATED ORAL at 07:52

## 2020-09-27 RX ADMIN — HYDRALAZINE HYDROCHLORIDE 50 MG: 50 TABLET, FILM COATED ORAL at 14:03

## 2020-09-27 RX ADMIN — DORZOLAMIDE HYDROCHLORIDE AND TIMOLOL MALEATE 1 DROP: 20; 5 SOLUTION/ DROPS OPHTHALMIC at 20:33

## 2020-09-27 RX ADMIN — HYDRALAZINE HYDROCHLORIDE 50 MG: 50 TABLET, FILM COATED ORAL at 05:57

## 2020-09-27 RX ADMIN — GABAPENTIN 100 MG: 100 CAPSULE ORAL at 14:03

## 2020-09-27 RX ADMIN — GABAPENTIN 100 MG: 100 CAPSULE ORAL at 07:52

## 2020-09-27 RX ADMIN — LEVOTHYROXINE SODIUM 50 MCG: 0.05 TABLET ORAL at 05:57

## 2020-09-27 RX ADMIN — PANTOPRAZOLE SODIUM 40 MG: 40 TABLET, DELAYED RELEASE ORAL at 05:57

## 2020-09-27 RX ADMIN — DORZOLAMIDE HYDROCHLORIDE AND TIMOLOL MALEATE 1 DROP: 20; 5 SOLUTION/ DROPS OPHTHALMIC at 07:54

## 2020-09-27 ASSESSMENT — PAIN SCALES - GENERAL
PAINLEVEL_OUTOF10: 0
PAINLEVEL_OUTOF10: 0

## 2020-09-27 NOTE — PROGRESS NOTES
Pt awake and AAO lying in bed requesting to be changed after bowel incontinent episode. Pt has flat affect and soft spoken. Pt is on Cymbalta. Noted eyes red and lashes somewhat crusted. Wiped pts face and eyes with warm wash cloth. Pt s/sp sepsis sec to UTI. Pt also has h/o spinal fusion on 8/31/2020. Lungs decreased. No sob or cough. On RA. On telemetry. Belly round and soft with active active BS. Pt has been constipated and Lactulose given on dayshift. Pt incontinent of bowel and pericare given. Pt refused to get OOB and go into the BR. Pt stated he did not want to use the bedpan, would rather be incontinent in brief. Explained that mobility and fluids will help with constipation. Reeves cath care given. Reeves patent and draining raphael urine. Encouraged fluids. No edema noted. R PICC site and dressing WDL. Noted healing surgical incisions to lower back and lower abdomen. Pt repositioned for comfort. Call light in reach. Bed alarm on.

## 2020-09-27 NOTE — PROGRESS NOTES
Pt continues to refuse to eat meals, use toilet, and get OOB. He says that he's too tired to eat and isn't hungry. He states that he \"had a rough day yesterday. \" RN continues to educate pt on the importance of ambulating and sitting up in chair. RN is also encouraging pt to drink fluids and eat meals. Will continue to monitor.

## 2020-09-27 NOTE — PLAN OF CARE
Problem: IP BOWEL ELIMINATION  Goal: STG - Patient verbalizes knowledge about relationship between diet, fluid intake, activity and medication on constipation  Outcome: Ongoing  Note: Pt constipated. Lactulose given. Monitor I and O. Encourage fluids. Encourage mobility. Encourage meals.

## 2020-09-27 NOTE — PLAN OF CARE
Problem: Falls - Risk of:  Goal: Will remain free from falls  Description: Will remain free from falls  Outcome: Ongoing  Note: Fall risk assessment completed. Fall precautions in place. Call light within reach. Pt educated on calling for assistance before getting up. Walkway free of clutter. Will continue to monitor. Problem: Skin Integrity:  Goal: Absence of new skin breakdown  Description: Absence of new skin breakdown  Outcome: Ongoing  Note: Patient's skin was assessed. Pt currently has scattered bruising/abrasions and surgical incision on lower back and mid-abdomen. No new findings were noted. Patient is being educated on importance of turning/repostioning at least every two hours. RN will continue to educate and monitor. Problem: Musculor/Skeletal Functional Status  Goal: Highest potential functional level  Outcome: Ongoing  Note: Pt continues with therapy. Pt is SBA x1 with walker. Will monitor. Problem: Pain:  Goal: Pain level will decrease  Description: Pain level will decrease  Outcome: Ongoing  Note: Pt assessed for pain. Pt denies any pain at this time. Will continue to monitor pt and assess for pain throughout rest of shift. Problem: IP BLADDER/VOIDING  Goal: STG - Patient demonstrates ability to take care of indwelling catheter  Intervention: EDUCATE PATIENT AND FAMILY ON SIGNS/SYMPTOMS OF A UTI. Note: Pt currently has mendoza draining hazy, yellow urine. Catheter care done every shift and as needed. Catheter care education being given. Will continue to educate and monitor.

## 2020-09-28 LAB
ANION GAP SERPL CALCULATED.3IONS-SCNC: 11 MMOL/L (ref 3–16)
BASOPHILS ABSOLUTE: 0.1 K/UL (ref 0–0.2)
BASOPHILS RELATIVE PERCENT: 2.1 %
BUN BLDV-MCNC: 20 MG/DL (ref 7–20)
CALCIUM SERPL-MCNC: 8.8 MG/DL (ref 8.3–10.6)
CHLORIDE BLD-SCNC: 101 MMOL/L (ref 99–110)
CO2: 21 MMOL/L (ref 21–32)
CREAT SERPL-MCNC: 1.4 MG/DL (ref 0.8–1.3)
EOSINOPHILS ABSOLUTE: 0.1 K/UL (ref 0–0.6)
EOSINOPHILS RELATIVE PERCENT: 2.1 %
GFR AFRICAN AMERICAN: 59
GFR NON-AFRICAN AMERICAN: 49
GLUCOSE BLD-MCNC: 142 MG/DL (ref 70–99)
HCT VFR BLD CALC: 29.2 % (ref 40.5–52.5)
HEMOGLOBIN: 9.5 G/DL (ref 13.5–17.5)
LYMPHOCYTES ABSOLUTE: 0.7 K/UL (ref 1–5.1)
LYMPHOCYTES RELATIVE PERCENT: 16.2 %
MCH RBC QN AUTO: 30 PG (ref 26–34)
MCHC RBC AUTO-ENTMCNC: 32.6 G/DL (ref 31–36)
MCV RBC AUTO: 92.1 FL (ref 80–100)
MONOCYTES ABSOLUTE: 0.8 K/UL (ref 0–1.3)
MONOCYTES RELATIVE PERCENT: 18.5 %
NEUTROPHILS ABSOLUTE: 2.6 K/UL (ref 1.7–7.7)
NEUTROPHILS RELATIVE PERCENT: 61.1 %
PDW BLD-RTO: 16.5 % (ref 12.4–15.4)
PLATELET # BLD: 295 K/UL (ref 135–450)
PMV BLD AUTO: 9.1 FL (ref 5–10.5)
POTASSIUM REFLEX MAGNESIUM: 3.9 MMOL/L (ref 3.5–5.1)
RBC # BLD: 3.18 M/UL (ref 4.2–5.9)
SODIUM BLD-SCNC: 133 MMOL/L (ref 136–145)
WBC # BLD: 4.2 K/UL (ref 4–11)

## 2020-09-28 PROCEDURE — 97110 THERAPEUTIC EXERCISES: CPT

## 2020-09-28 PROCEDURE — 80048 BASIC METABOLIC PNL TOTAL CA: CPT

## 2020-09-28 PROCEDURE — 36415 COLL VENOUS BLD VENIPUNCTURE: CPT

## 2020-09-28 PROCEDURE — 97530 THERAPEUTIC ACTIVITIES: CPT

## 2020-09-28 PROCEDURE — 94760 N-INVAS EAR/PLS OXIMETRY 1: CPT

## 2020-09-28 PROCEDURE — 2580000003 HC RX 258: Performed by: PHYSICAL MEDICINE & REHABILITATION

## 2020-09-28 PROCEDURE — 6370000000 HC RX 637 (ALT 250 FOR IP): Performed by: PHYSICAL MEDICINE & REHABILITATION

## 2020-09-28 PROCEDURE — 85025 COMPLETE CBC W/AUTO DIFF WBC: CPT

## 2020-09-28 PROCEDURE — 1280000000 HC REHAB R&B

## 2020-09-28 PROCEDURE — 97535 SELF CARE MNGMENT TRAINING: CPT

## 2020-09-28 RX ADMIN — DULOXETINE HYDROCHLORIDE 30 MG: 30 CAPSULE, DELAYED RELEASE ORAL at 07:43

## 2020-09-28 RX ADMIN — LOSARTAN POTASSIUM 100 MG: 100 TABLET, FILM COATED ORAL at 07:43

## 2020-09-28 RX ADMIN — AMLODIPINE BESYLATE 10 MG: 10 TABLET ORAL at 07:43

## 2020-09-28 RX ADMIN — GABAPENTIN 100 MG: 100 CAPSULE ORAL at 13:58

## 2020-09-28 RX ADMIN — HYDRALAZINE HYDROCHLORIDE 50 MG: 50 TABLET, FILM COATED ORAL at 06:04

## 2020-09-28 RX ADMIN — LEVOTHYROXINE SODIUM 50 MCG: 0.05 TABLET ORAL at 06:04

## 2020-09-28 RX ADMIN — DORZOLAMIDE HYDROCHLORIDE AND TIMOLOL MALEATE 1 DROP: 20; 5 SOLUTION/ DROPS OPHTHALMIC at 07:44

## 2020-09-28 RX ADMIN — FERROUS SULFATE TAB EC 324 MG (65 MG FE EQUIVALENT) 324 MG: 324 (65 FE) TABLET DELAYED RESPONSE at 07:43

## 2020-09-28 RX ADMIN — SODIUM CHLORIDE, PRESERVATIVE FREE 10 ML: 5 INJECTION INTRAVENOUS at 07:46

## 2020-09-28 RX ADMIN — Medication 2 CAPSULE: at 07:43

## 2020-09-28 RX ADMIN — PANTOPRAZOLE SODIUM 40 MG: 40 TABLET, DELAYED RELEASE ORAL at 06:04

## 2020-09-28 RX ADMIN — GABAPENTIN 100 MG: 100 CAPSULE ORAL at 07:42

## 2020-09-28 RX ADMIN — AMIODARONE HYDROCHLORIDE 200 MG: 200 TABLET ORAL at 07:43

## 2020-09-28 NOTE — PROGRESS NOTES
Pt awake and AAO lying in bed watching TV. Pt denies pain at present. Pt s/p sepsis sec to UTI and spinal fusion 8/31/2020. Lungs clear and decreased. No sob or cough. On RA. On telemetry. Belly round and soft with active BS> LBM today. Pt has been incontinent of stool. Pt does c/o occasional stomach cramping. No nausea. Reeves in place and patent and draining raphael urine. Encouraged fluids. No edema noted. SI to lower back and lower abdomen healing and SUDEEP. R PICC site and dressing WDL. Call light in reach. Bed alarm on.

## 2020-09-28 NOTE — PROGRESS NOTES
Occupational Therapy  Facility/Department: [address] REHAB  Daily Treatment Note  NAME: Nan Ag Sr.  : 1945  MRN: [id number]    Date of Service: 2020    Discharge Recommendations:  Continue to assess pending progress, S Level 1, Home with Home health OT, Home with assist PRN  OT Equipment Recommendations  Other: Assess for shower chair, long handle sponge, reacher, shoe horn, sock aide, leg  grab bars, TSF    Assessment   Performance deficits / Impairments: Decreased functional mobility ; Decreased endurance;Decreased ADL status; Decreased balance;Decreased strength;Decreased safe awareness;Decreased high-level IADLs  Assessment: pt tolerated session fair this am, limited by decreased activity tolerance, endurance, pain. Pt with low BP in standing, 91/56 and required ADL's tasks, bathing, dressing to be completed from bed and requiring up to Max A. Pt CG/Min A for transfers, CG for mobility. Cont poc. Treatment Diagnosis: Impaired: ADL/IADL function, balance, functional mobility, endurance/strength, safety awareness. Prognosis: Good  History: \"Patient is a 65 yo M with pmh Afib, CAD, HTN, HLD, and recent lumbar spine surgery who initially presented from SNF on 2020 with fever 105 and altered mental status. Found to have sepsis due to UTI, urinary retention, metabolic encephalopathy, and acute hypoxic respiratory failure. \" Copied per Dr. Niurka Lezama 2020 note. Pt lives alone in multilevel house, w/ 10-11 stairs to enter (railing on R side). Pt was independent w/ ADL/IADL tasks prior to admission. OT Education: OT Role;Plan of Care;Precautions; ADL Adaptive Strategies;Transfer Training;Equipment  REQUIRES OT FOLLOW UP: Yes  Activity Tolerance  Activity Tolerance: Treatment limited secondary to medical complications (free text)  Activity Tolerance: low BP  Safety Devices  Safety Devices in place: Yes  Type of devices: Bed alarm in place; Left in bed;Call light within reach;Nurse assistance(seated from bed)  LE Dressing: Maximum assistance(seated from bed to thread pants, no AE, don footies, with sock aid, and sidelying to pull pants over hips)  Toileting: Maximum assistance(Pt voided BM. Assist for clothes management and hygiene needs)  Additional Comments: Sponge bath completed from bed d/t pt with low BP(see general section). Standing Balance  Activity: CGA in stance for ADL's. Pt amb bed to commode with RW with CGA  Toilet Transfers  Toilet - Technique: Ambulating;Stand pivot  Equipment Used: Grab bars  Toilet Transfer: Contact guard assistance;Minimal assistance  Toilet Transfers Comments: RW, cues for hand placement, to commode. Stand-pivot from commode to wC d/t low BP  Wheelchair Bed Transfers  Wheelchair/Bed - Technique: Ambulating;Stand pivot  Equipment Used: Wheelchair;Bed  Level of Asssistance: Contact guard assistance;Minimal assistance  Wheelchair Transfers Comments: RW from bed. Back to bed from w/c, stand-pivot, CGA/Min A d/t low BP  Bed mobility  Rolling to Left: Stand by assistance(cues for back precautions)  Rolling to Right: Stand by assistance(cues for back precautions)  Supine to Sit: Supervision(HOB slightly raised and used rail. Pt sitting up from R side)  Sit to Supine: Contact guard assistance(bed flat, cues to lay onto R side to regard back precautions)         Cognition  Overall Cognitive Status: [facility]               Plan   Plan  Times per week: 5-6x  Times per day: Twice a day  Plan weeks: 2.5 weeks from 9/19/20  Current Treatment Recommendations: Strengthening, Patient/Caregiver Education & Training, Home Management Training, Equipment Evaluation, Education, & procurement, Balance Training, Functional Mobility Training, Endurance Training, Safety Education & Training, Self-Care / ADL, Wheelchair Mobility Training, Neuromuscular Re-education  Plan Comment:  Wednesday Conference          Goals  Short term goals  Time Frame for Short term goals: 1-1.5 weeks from aneta 9/19/2020  Short term goal 1: Pt will bathe min A to CGA using AE & shower chair. Short term goal 2: Pt will dress upper body set-up, lower body min A using AE(except melody hose if ordered). Short term goal 3: Pt will toilet CGA to void vs min-mod A for BM w/toilet aid if needed. Short term goal 4: Pt will complete functional transfers/mob using RW/DME with CGA to SBA. Short term goal 5: Pt will perform UE ther exercises/HEP to increase activity tolerance/strength/endurance to tolerate standing for 6-7 minutes for ADL/simple IADL task. Short term goal 6: Pt will recall/follow spinal precautions with only occassional reminders during ADL/mob tasks. .  Long term goals  Time Frame for Long term goals : 1.5-2.5 weeks from 9/19/20  Long term goal 1: Pt will bathe modified independent using AE & shower chair. Long term goal 2: Pt will dress modified independent using AE.(except assist for melody hose if ordered)  Long term goal 3: Pt will toilet modified independent w/ AE. Long term goal 4: Pt will complete functional transfers/mob using RW & grab bars, modified independent. Long term goal 5: Pt will perform UE ther exercises/HEP to increase activity tolerance/strength/safety to complete IADL tasks, such as cleaning & simple meal prep. Patient Goals   Patient goals : \"I want to get out of here and get walking. \" Above goals will work toward this goal.       Therapy Time   Individual Concurrent Group Co-treatment   Time In 0815         Time Out 0916         Minutes [id number] [facility] 28 HUI/L,515

## 2020-09-28 NOTE — PLAN OF CARE
Problem: Skin Integrity:  Goal: Absence of new skin breakdown  Description: Absence of new skin breakdown  Note: Pt s/p sepsis sec to UTI. Pt not wanting to get OOB. Encouraged turning and repositioning. Assess skin with each assessment. Dressing changes per orders. Monitor for any further breakdown. Monitor s/s infection. Encourage adequate nutrition to promote wound healing. Encourage turning and repositioning. Monitor matthew scores.

## 2020-09-28 NOTE — PROGRESS NOTES
Occupational Therapy  Facility/Department: [address] REHAB  Daily Treatment Note  NAME: Nan Ag Sr.  : 1945  MRN: [id number]    Date of Service: 2020    Discharge Recommendations:  Continue to assess pending progress, S Level 1, Home with Home health OT, Home with assist PRN  OT Equipment Recommendations  Other: Assess for shower chair, long handle sponge, reacher, shoe horn, sock aide, leg  grab bars, TSF    Assessment   Performance deficits / Impairments: Decreased functional mobility ; Decreased endurance;Decreased ADL status; Decreased balance;Decreased strength;Decreased safe awareness;Decreased high-level IADLs  Assessment: Pt seen bedside due to low BP. Pt feeding self soup from cup and taking sips of liquids. Pt agreed to bed exercises. Pt completed multiple UE ex without complaint. Still very low energy and required rest breaks between each exercise. Will cont OT POC. Treatment Diagnosis: Impaired: ADL/IADL function, balance, functional mobility, endurance/strength, safety awareness. Prognosis: Good  History: \"Patient is a 67 yo M with pmh Afib, CAD, HTN, HLD, and recent lumbar spine surgery who initially presented from SNF on 2020 with fever 105 and altered mental status. Found to have sepsis due to UTI, urinary retention, metabolic encephalopathy, and acute hypoxic respiratory failure. \" Copied per Dr. Niurka Lezama 2020 note. Pt lives alone in multilevel house, w/ 10-11 stairs to enter (railing on R side). Pt was independent w/ ADL/IADL tasks prior to admission. OT Education: OT Role;Plan of Care;Precautions; Home Exercise Program  REQUIRES OT FOLLOW UP: Yes  Activity Tolerance  Activity Tolerance: Treatment limited secondary to medical complications (free text)  Activity Tolerance: orthostatic hypotension. low motivation too  Safety Devices  Safety Devices in place: Yes  Type of devices: Bed alarm in place; Left in bed;Call light within reach;Nurse notified;Gait Patient/Caregiver Education & Training, Home Management Training, Equipment Evaluation, Education, & procurement, Balance Training, Functional Mobility Training, Endurance Training, Safety Education & Training, Self-Care / ADL, Wheelchair Mobility Training, Neuromuscular Re-education  Plan Comment: Wednesday Conference               Goals  Short term goals  Time Frame for Short term goals: 1-1.5 weeks from eval 9/19/2020  Short term goal 1: Pt will bathe min A to CGA using AE & shower chair. Short term goal 2: Pt will dress upper body set-up, lower body min A using AE(except melody hose if ordered). Short term goal 3: Pt will toilet CGA to void vs min-mod A for BM w/toilet aid if needed. Short term goal 4: Pt will complete functional transfers/mob using RW/DME with CGA to SBA. Short term goal 5: Pt will perform UE ther exercises/HEP to increase activity tolerance/strength/endurance to tolerate standing for 6-7 minutes for ADL/simple IADL task. Short term goal 6: Pt will recall/follow spinal precautions with only occassional reminders during ADL/mob tasks. .  Long term goals  Time Frame for Long term goals : 1.5-2.5 weeks from 9/19/20  Long term goal 1: Pt will bathe modified independent using AE & shower chair. Long term goal 2: Pt will dress modified independent using AE.(except assist for melody hose if ordered)  Long term goal 3: Pt will toilet modified independent w/ AE. Long term goal 4: Pt will complete functional transfers/mob using RW & grab bars, modified independent. Long term goal 5: Pt will perform UE ther exercises/HEP to increase activity tolerance/strength/safety to complete IADL tasks, such as cleaning & simple meal prep. Patient Goals   Patient goals : \"I want to get out of here and get walking. \" Above goals will work toward this goal.       Therapy Time   Individual Concurrent Group Co-treatment   Time In [address]         Minutes 38         Timed Code Treatment Minutes: 38 Minutes       Abe Henriquez, OT

## 2020-09-28 NOTE — PROGRESS NOTES
Department of Physical Medicine & Rehabilitation  Progress Note    Patient Identification:  Veronica De Jesus  [id number]  : 1945  Admit date: 2020    Chief Complaint: Debility    Subjective:   No acute events overnight. Patient seen this afternoon resting in bed. He had orthostatic hypotension with therapies this morning. PO intake poor per RN. Did have multiple loose stools over weekend which likely contributed. Now resolved this afternoon. Patient stating he wants to be with his [place] in [place] and does not want to continue with rehab care. I asked him about depression or any physical symptoms contributing that I might be able to help with. He states he is not depressed, he is happy about his decision. He denies any physical symptoms contributing to his decision. I informed him that I feel he has the potential to improve both medically and functionally with ongoing care. I explained the relative stability of his medical conditions and that he has no terminal diagnosis. He states that he plans to stop eating and drinking. ROS: No f/c, n/v, cp     Objective:  Patient Vitals for the past 24 hrs:   BP Temp Temp src Pulse Resp SpO2 Height Weight   20 1506 134/69 97.9 °F (36.6 °C) Oral 61 16 95 % -- --   20 1234 -- -- -- -- -- 95 % -- --   20 0919 -- -- -- -- -- -- 5' 9\" (1.753 m) --   20 0830 (!) 91/56 -- -- 67 -- -- -- --   20 0730 130/69 -- -- 61 -- -- -- --   20 0604 131/65 -- -- -- -- -- -- --   20 0338 134/68 97.5 °F (36.4 °C) Oral 57 16 96 % -- 186 lb 15.2 oz (84.8 kg)   207 123/65 98.2 °F (36.8 °C) Oral 57 16 93 % -- --   20 0010 120/61 98.6 °F (37 °C) Oral 56 16 95 % -- --   20 (!) 142/70 98.2 °F (36.8 °C) Oral 62 16 95 % -- --     Const: Alert. No distress, pleasant. HEENT: Normocephalic, atraumatic. Normal sclera/conjunctiva. MMM. CV: Regular rate and rhythm. Resp: No respiratory distress. Lungs decreased.    Abd: Soft, nontender, nondistended, NABS+   Ext: trace edema. Neuro: Alert, oriented, appropriately interactive. Psych: Cooperative, flat affect    Laboratory data: Available via EMR.    Last 24 hour lab  Recent Results (from the past 24 hour(s))   Basic Metabolic Panel w/ Reflex to MG    Collection Time: 09/28/20  9:50 AM   Result Value Ref Range    Sodium 133 (L) 136 - 145 mmol/L    Potassium reflex Magnesium 3.9 3.5 - 5.1 mmol/L    Chloride 101 99 - 110 mmol/L    CO2 21 21 - 32 mmol/L    Anion Gap 11 3 - 16    Glucose 142 (H) 70 - 99 mg/dL    BUN 20 7 - 20 mg/dL    CREATININE 1.4 (H) 0.8 - 1.3 mg/dL    GFR Non- 49 (A) >60    GFR  59 (A) >60    Calcium 8.8 8.3 - 10.6 mg/dL   CBC Auto Differential    Collection Time: 09/28/20  9:50 AM   Result Value Ref Range    WBC 4.2 4.0 - 11.0 K/uL    RBC 3.18 (L) 4.20 - 5.90 M/uL    Hemoglobin 9.5 (L) 13.5 - 17.5 g/dL    Hematocrit 29.2 (L) 40.5 - 52.5 %    MCV 92.1 80.0 - 100.0 fL    MCH 30.0 26.0 - 34.0 pg    MCHC 32.6 31.0 - 36.0 g/dL    RDW 16.5 (H) 12.4 - 15.4 %    Platelets 227 045 - 410 K/uL    MPV 9.1 5.0 - 10.5 fL    Neutrophils % 61.1 %    Lymphocytes % 16.2 %    Monocytes % 18.5 %    Eosinophils % 2.1 %    Basophils % 2.1 %    Neutrophils Absolute 2.6 1.7 - 7.7 K/uL    Lymphocytes Absolute 0.7 (L) 1.0 - 5.1 K/uL    Monocytes Absolute 0.8 0.0 - 1.3 K/uL    Eosinophils Absolute 0.1 0.0 - 0.6 K/uL    Basophils Absolute 0.1 0.0 - 0.2 K/uL       Therapy progress:  PT  Position Activity Restriction  Spinal Precautions: No Bending, No Lifting, No Twisting  Sternal Precautions: 10# Lifting Restrictions  Sternal Precautions: (per pt)  Other position/activity restrictions: room air  Objective     Sit to Stand: Stand by assistance  Stand to sit: Stand by assistance  Device: SundaySky  Assistance: Contact guard assistance, Stand by assistance  Distance: 140' with multiple turns increased time  OT  PT Equipment Recommendations  Equipment soon     Bowel   -Initially with loose stools, now with constipation  -senna+docusate BID, PRN miralax, MoM, lactulose, bisacodyl supp, and enema.     Pain control  -Duloxetine, gabapentin, prn Percocet     PPx  -DVT: Xarelto held due to hematuria  -GI: pantoprazole, probiotics    Rehab Progress: Making gradual progress. Now with increased self-limiting behavior and depressed mood. Working on functional mobility, endurance, strength balance, compensatory strategies. Anticipated Dispo: home alone  Services:  PT, OT, RN, Aide  DME: TBD, has hospital bed  ELOS: ~10/3      Brigette Ryan.  Vickey James MD 9/28/2020, 5:38 PM

## 2020-09-28 NOTE — PROGRESS NOTES
There were no encounter diagnoses. has a past medical history of Aneurysm, aortic ([address] Atrial fibrillation ([address] Glaucoma, Heart attack ([address] Hyperlipidemia, and Hypertension. has a past surgical history that includes Coronary angioplasty with stent; Colonoscopy; Eye surgery (Right, 7/23/2020); and Intracapsular cataract extraction (Right, 7/23/2020). Social/Functional History  Lives With: Alone  Type of Home: House  Home Layout: Multi-level, Bed/Bath upstairs, Able to Live on Main level with bedroom/bathroom, Laundry in basement  Home Access: Stairs to enter with rails  Entrance Stairs - Number of Steps: 10-11  Entrance Stairs - Rails: Right  Bathroom Shower/Tub: Tub/Shower unit, Walk-in shower(walk in shower on the main level)  Bathroom Toilet: Standard(sink counter by toilet, comfort ht toilet)  Bathroom Equipment: (No DME)  Bathroom Accessibility: Miriam Dinorah accessible(Pt says possibly a w/c or walker could fit through doorway)  Home Equipment: Sock aid, [address] bed(Hospital in family room on 1st floor)  ADL Assistance: Independent  Homemaking Assistance: Independent  Homemaking Responsibilities: Yes  Meal Prep Responsibility: Primary  Laundry Responsibility: Primary  Cleaning Responsibility: Primary  Bill Paying/Finance Responsibility: Primary  Health Care Management: Primary  Ambulation Assistance: Independent  Transfer Assistance: Independent  Active : Yes  Mode of Transportation: Car  Occupation: Retired  Type of occupation: Retired :  for 35 years. Leisure & Hobbies: out to eat with lady friend, travelled to [place], [address] [place] [address], was walking about a mile every 2 days before he got sick  Additional Comments: Information pertains to prior recent back surg (8/31/2020), following d/c to SNF setting [facility]).   Pt reports that he was only at SNF setting few days following surg, did have a fall while there (reports call light to go to bathroom was not answered and he tried to get OOB by self). Pt reports that he has not been home since surgery at [facility].    Restrictions  Restrictions/Precautions  Restrictions/Precautions: Fall Risk  Position Activity Restriction  Spinal Precautions: No Bending, No Lifting, No Twisting  Sternal Precautions: 10# Lifting Restrictions  Sternal Precautions: (per pt)  Other position/activity restrictions: room air  Subjective   General  Chart Reviewed: Yes  Additional Pertinent Hx: 67 y/o male admit from SNF setting 2020 with Altered Mental Status, Severe Sepsis, Acute Renal Failure, and Pelvic Hematoma. CT Head negative. CT Abd/Pelvis + L Pelvic Hematoma. Urology consult for Reeves Placement. PMH as noted including Recent Back Surg (L4/L5 and L5/S1 Ant/Post Fusion, 2020), CAD, Angio with Stent, A-Fib, Aortic Aneurysm. Response To Previous Treatment: Patient with no complaints from previous session. Family / Caregiver Present: No  Referring Practitioner: Dr. Houser [place]: Pt reports episode of feeling dizzy after AM session this morning, limited due to loose stool all weekend feeling very weak. Pt pleasant and agreeable to attempt movement with PT bed side at this time with some motivation.   Vital Signs  Orthostatic B/P and Pulse?: Yes(performed after 2 minutes in each position)  Blood Pressure Lyin/62  Pulse Lyin PER MINUTE  Blood Pressure Sittin/59  Pulse Sittin PER MINUTE  Blood Pressure Standin/33(only able to tolerate 1.5 minutes of upright with significant dizziness/changes)  Pulse Standin PER MINUTE  Orthostatic B/P and Pulse?: Yes(performed after 2 minutes in each position)       Orientation  Orientation  Overall Orientation Status: Within Functional Limits  Orientation Level: Oriented X4    Objective   Bed mobility  Bridging: Supervision  Rolling to Left: Supervision  Rolling to Right: Supervision  Supine to Sit: Supervision  Sit to Supine: Supervision  Scooting: Supervision  Comment: hospital bed wit hHOB slightly elevated, use of railing, increased time to perform with cues to perform log roll technique throughout for safety. Sitting EOB pillows to patient right  Transfers  Sit to Stand: Stand by assistance  Stand to sit: Stand by assistance  Comment: intermittent cues for hand placement and sequencing  Ambulation  Ambulation?: No(not safe to perform due to symptomatic BP 73/33)  Stairs/Curb  Stairs?: No(not safe to perform this morning)     Balance  Posture: Fair  Sitting - Static: Good  Sitting - Dynamic: Good(pt complains of dizziness in upright positioning but tolerated sitting for 2 minutes well)  Standing - Static: Good;-  Standing - Dynamic: Fair  Comments: Very poor standing tolerance < 2 minutes due to signficant complaints of feeling weak and dizzy due to orthostatic blood pressure changes. Exercises  Heelslides: X 15 with CGA-min assist BLEs  Hip Extension/Leg Presses: hip adduction sets X 20  Knee Short Arc Quad: X 20 pillows under bilateral knees  Ankle Pumps: x20 ankle pumps  Comments: above exercises performed supine in bed BLEs with increased time to perform  Other exercises  Other exercises?: Yes  Other exercises 1: Encouraged fluids throughout session to improve BP: 112/62, HR 59 bpm. 23         Comment: ortho static BP assessed this morning due to patient complaints at this time. RN Alexsandra Kan aware with DONATO Ledesma present to assess patient Alert and oriented at this time. JOSTIN Vasquez informed patient with low blood pressure to limit transfers to stand pivots at this time. PM session:   S: Pt sleeping upon arrival, easy to arouse. Pt state feeling tired needing some motivation to participate with therapy. Pt states \"tired of living\", \"I want to go be with my wife (she passed away 5 years ago)\", \"I' am finished\"  multiple times throughout session.  Pt appears to be in same position that PT left this morning in bed [facility] minimal motivation for OOB activity. O: Supine in bed /69, HR 61 bpm.  Supine to right side lying supervision with increased time, right side lying to sitting EOB with supervision increased time due to fatigue. Sitting EOB after 3 minutes 126/68 BP, HR 64 bpm.   SBA sitting EOB <-> standings with cues for safety. After standing Standing (difficulty getting reading in standing due to strong use of LUE on walker) while sitting EOB /64, HR 68 BPM.    Very poor tolerance to standing with SBA-CGA max encouragement, maintains standing for < 2 minutes due to complaints of feeling light headed. Sitting EOB to right side lying min assist for BLEs due to increased 8/10 (states \"I don't want pain medicine that will back me up again\"), right side lying to supine SBA, scooting in bed SBA with increased time and cues for proper sequencing. Pt refuses additional activity after orthostatic BP this date, \"just let me sleep\" after being upright patient complains 8/10 BLEs after activity. Very difficulty to motivate patient for therapy at this time. A: Pt very limited with PM session due to negative thoughts and feeling down needing max encouragement for minimal participation with therapy. Demonstrated improvements with orthostatic BP this afternoon but continues to complain of feeling lightheaded throughout session.      Safety Device - Type of devices:  [x]  All fall risk precautions in place [x] Bed alarm in place  [x] Call light within reach [] Chair alarm in place [] Positioning belt [x] Gait belt [x] Patient at risk for falls [x] Left in bed [] Left in chair [] Telesitter in use [] Sitter present [x] Nurse brownied Jaime Osborn RN informed of treatment session and patient wants this afternoon) []  None     Goals  Short term goals  Time Frame for Short term goals: 1 week  Short term goal 1: Pt will perform bed mobility with supervision  Short term goal 2: Pt will be CGA-Winter for transfers with LRAD-met  Short term goal 3: Pt will ambulate 48' using LRAD with CGA  Short term goal 4: Pt will ascend/descend 4 stairs with CGA using railing  Short term goal 5: Pt will ascend/descend curb step using LRAD with CGA  Long term goals  Time Frame for Long term goals : 2-3 weeks  Long term goal 1: Pt will be Modif I for bed mobility  Long term goal 2: Pt will perform transfers with Modif I using LRAD  Long term goal 3: Pt will ambulate 150' using LRAD with Modif I  Long term goal 4: Pt will ascend/descend 12 stairs with Modif I using railing  Long term goal 5: Pt will ascend/descend curb using LRAD with Modif I  Patient Goals   Patient goals : Just want to get back to living on my own at home    Plan    Plan  Times per week: 5-6x/week  Times per day: Twice a day  Plan weeks: 2-3 weeks  Current Treatment Recommendations: Strengthening, Home Exercise Program, Safety Education & Training, Balance Training, Endurance Training, Patient/Caregiver Education & Training, Functional Mobility Training, Transfer Training, Gait Training, Stair training, ROM, Equipment Evaluation, Education, & procurement, Neuromuscular Re-education  Safety Devices  Type of devices:  All fall risk precautions in place, Call light within reach, Gait belt, Patient at risk for falls, Bed alarm in place, Left in bed, Nurse notified(DONATO cee,positioned in bed at completion of session for comfort)  Restraints  Initially in place: No     Therapy Time   Individual Concurrent Group Co-treatment   Time In 1030         Time Out 1115         Minutes 45         Timed Code Treatment Minutes: [address] [facility]   Time In 1520     Time Out 1545     Minutes 25     Variance: 20  Reason: Refusal    Eda Jackson PT     Electronically signed by Eda Jackson PT on 9/28/20 at 12:11 PM EDT

## 2020-09-28 NOTE — PROGRESS NOTES
Comprehensive Nutrition Assessment    Type and Reason for Visit:  Reassess    Nutrition Recommendations/Plan:   Continue General diet. Continue Ensure Enlive BID. Nutrition Assessment:  Follow-up. Pt continues with little to no improvement from nutrition standpoint. Pt continues to have inadequate oral intakes and refusing meals daily. Supplement on board and accepting occasionally. Pt reports constipation, regimen on boar. Will continue current nutrition interventions. Malnutrition Assessment:  Malnutrition Status:  Insufficient data        Estimated Daily Nutrient Needs:  Energy (kcal):  [id number] (22-28 x ABW (adj for healing)  Protein (g):   (1.2-2 x IBW 73 kg)  Fluid (ml/day):  1 ml per kcal    Nutrition Related Findings:  BM 9/27      Wounds:  Surgical Wound(SI to abd & back (anterior & posterior approach during surgery). No isues noted)       Current Nutrition Therapies:    DIET GENERAL;  Dietary Nutrition Supplements: Standard High Calorie Oral Supplement    Anthropometric Measures:  · Height: 5' 9\" (175.3 cm)  · Current Body Weight: 186 lb (84.4 kg)   · Admission Body Weight: 179 lb (81.2 kg)    · Ideal Body Weight: 160 lbs; % Ideal Body Weight 116.3 %   · BMI: 27.5  · BMI Categories: Overweight (BMI 25.0-29. 9)       Nutrition Diagnosis:   · Increased nutrient needs related to increase demand for energy/nutrients as evidenced by wounds      Nutrition Interventions:   Food and/or Nutrient Delivery:  Continue Current Diet, Continue Oral Nutrition Supplement  Nutrition Education/Counseling:  No recommendation at this time   Coordination of Nutrition Care:  Continued Inpatient Monitoring    Goals:  consume >/= to 50 %       Nutrition Monitoring and Evaluation:   Food/Nutrient Intake Outcomes:  Food and Nutrient Intake, Supplement Intake  Physical Signs/Symptoms Outcomes:  Biochemical Data, Constipation, Diarrhea, Fluid Status or Edema, Weight, Skin, Nutrition Focused Physical Findings Discharge Planning:     Too soon to determine     Electronically signed by Estuardo Jimenes RD, LD on 9/28/20 at 9:23 AM EDT    Contact: [phone]

## 2020-09-29 PROCEDURE — 6370000000 HC RX 637 (ALT 250 FOR IP): Performed by: PHYSICAL MEDICINE & REHABILITATION

## 2020-09-29 PROCEDURE — 1280000000 HC REHAB R&B

## 2020-09-29 PROCEDURE — 99223 1ST HOSP IP/OBS HIGH 75: CPT | Performed by: PSYCHIATRY & NEUROLOGY

## 2020-09-29 PROCEDURE — 2580000003 HC RX 258: Performed by: PHYSICAL MEDICINE & REHABILITATION

## 2020-09-29 RX ADMIN — GABAPENTIN 100 MG: 100 CAPSULE ORAL at 07:45

## 2020-09-29 RX ADMIN — AMIODARONE HYDROCHLORIDE 200 MG: 200 TABLET ORAL at 20:29

## 2020-09-29 RX ADMIN — GABAPENTIN 100 MG: 100 CAPSULE ORAL at 20:29

## 2020-09-29 RX ADMIN — SODIUM CHLORIDE, PRESERVATIVE FREE 10 ML: 5 INJECTION INTRAVENOUS at 20:31

## 2020-09-29 RX ADMIN — OXYCODONE HYDROCHLORIDE AND ACETAMINOPHEN 1 TABLET: 5; 325 TABLET ORAL at 17:43

## 2020-09-29 RX ADMIN — OXYCODONE HYDROCHLORIDE AND ACETAMINOPHEN 1 TABLET: 5; 325 TABLET ORAL at 04:46

## 2020-09-29 RX ADMIN — SODIUM CHLORIDE, PRESERVATIVE FREE 10 ML: 5 INJECTION INTRAVENOUS at 07:45

## 2020-09-29 RX ADMIN — GABAPENTIN 100 MG: 100 CAPSULE ORAL at 13:12

## 2020-09-29 ASSESSMENT — PAIN SCALES - GENERAL
PAINLEVEL_OUTOF10: 0
PAINLEVEL_OUTOF10: 10
PAINLEVEL_OUTOF10: 0
PAINLEVEL_OUTOF10: 0
PAINLEVEL_OUTOF10: 5

## 2020-09-29 ASSESSMENT — PAIN DESCRIPTION - PROGRESSION
CLINICAL_PROGRESSION: NOT CHANGED
CLINICAL_PROGRESSION: NOT CHANGED

## 2020-09-29 ASSESSMENT — PAIN - FUNCTIONAL ASSESSMENT: PAIN_FUNCTIONAL_ASSESSMENT: PREVENTS OR INTERFERES SOME ACTIVE ACTIVITIES AND ADLS

## 2020-09-29 ASSESSMENT — PAIN DESCRIPTION - PAIN TYPE
TYPE: ACUTE PAIN
TYPE: ACUTE PAIN

## 2020-09-29 ASSESSMENT — PAIN DESCRIPTION - LOCATION
LOCATION: LEG
LOCATION: LEG

## 2020-09-29 ASSESSMENT — PAIN DESCRIPTION - DESCRIPTORS
DESCRIPTORS: DISCOMFORT
DESCRIPTORS: DISCOMFORT

## 2020-09-29 ASSESSMENT — PAIN DESCRIPTION - FREQUENCY
FREQUENCY: CONTINUOUS
FREQUENCY: CONTINUOUS

## 2020-09-29 ASSESSMENT — PAIN DESCRIPTION - ONSET
ONSET: ON-GOING
ONSET: ON-GOING

## 2020-09-29 ASSESSMENT — PAIN DESCRIPTION - ORIENTATION
ORIENTATION: RIGHT;LEFT
ORIENTATION: RIGHT;LEFT

## 2020-09-29 NOTE — PROGRESS NOTES
Physical Therapy  Attempt Note    Name:Mac Alonzo.  :1945  YU  Room: [place] [id number]    Date of Service: 2020    Pt supine in bed resting, opens eyes to name appropriately but immediately shakes hands. Pt states \"no thank you, I am done\". Attempted to offer assisting patient with mobility or OOB activity. Pt continues to state that he is done and without any further needs. Will attempt back later this date per scheduled treatment session this date. Variance: 45  Reason: Refusal    Electronically signed by Niurka Ha PT on 2020 at 8:28 AM    Second attempt    Pt supine in bed upon arrival to room. Pt states unable to participate at this time due to fatigue and 7/10 pain. Educated on importance of ambulation for decreased pain with low back and legs at this time. Pt continued to refuse states no specific reason just does not want to do anything at this time. 45 minute variance due to refusal at this time.      Electronically signed by Niurka Ha PT on 20 at 2:43 PM EDT

## 2020-09-29 NOTE — PROGRESS NOTES
this or any previous visit (from the past 24 hour(s)). Therapy progress:  PT  Position Activity Restriction  Spinal Precautions: No Bending, No Lifting, No Twisting  Sternal Precautions: 10# Lifting Restrictions  Sternal Precautions: (per pt)  Other position/activity restrictions: room air  Objective     Sit to Stand: Stand by assistance  Stand to sit: Stand by assistance  Device: Rolling Walker  Assistance: Contact guard assistance, Stand by assistance  Distance: 140' with multiple turns increased time  OT  PT Equipment Recommendations  Equipment Needed: Yes  Mobility Devices: Hoda Mimi: Rolling  Other: will continue to assess for further equipment needs  Toilet - Technique: Ambulating, Stand pivot  Equipment Used: Grab bars  Toilet Transfers Comments: RW, cues for hand placement, to commode. Stand-pivot from commode to wC d/t low BP  Assessment        SLP          Body mass index is 27.61 kg/m². Assessment and Plan:    Impairments: generalized weakness + relative LLE weakness, decreased endurance, balance, cognition     Debility  -PT/OT     Sepsis presumed due to UTI  -Completed merrem per ID     Urinary retention   -s/p cystoscopy for Reeves placement, +false urethral passage  -Consider void trial this week now constipation resolved. Will need to consult Urology, may need their assist if unable to void given false urethral passage     Acute hypoxic respiratory failure in setting of baseline COPD  -Resolved, now stable on RA     TONO on CKD  -Avoid nephrotoxins, renally dose meds  -Monitor -- Cr increased 9/28, likely due to dehydration. Holding losartan. Encouraged po fluids.  Repeat labs tomorrow.      Metabolic encephalopathy  -Resolved with treatment of sepsis  -Regulate sleep/wake, emphasis on routine     Lumbar stenosis s/p recent L4-S1 ALIF/PSF (8/31 with Dr. Veronica Muse and Dr. Traci Maldonado)  -Wound care  -PT/OT     Acute blood loss anemia  -Due to left pelvic hematoma  -Hgb now stable >9  -Iron

## 2020-09-29 NOTE — PROGRESS NOTES
Resting quietly in bed. Pt admitted with debility after sepsis r/t a UTI. Reeves cath in place draining raphael urine. Lungs CTA. HR regular. Abdomen non tender with active bowel sounds. LBM 9/28. Pt has continued to refuse care and vitals. Able to reposition self in bed. Assessed for needs with rounding and call light in reach at all times. Will continue to monitor.  Negrito Hernandez RN

## 2020-09-29 NOTE — PATIENT CARE CONFERENCE
[facility]  Inpatient Rehabilitation  Weekly Team Conference Note      Date: 2020  Patient Name:  Carlos Motta MRN: [id number]  : 1945  Gender: Male  Physician: Dr. Uma Barnard  Diagnosis: Debility [R53.81]    CASE MANAGEMENT  Assessment:    INpatient Psych Unit      PHYSICAL THERAPY   Information below performed 2020, pt refused out of bed participation with therapy 2020    Bed mobility  Bridging: Supervision  Rolling to Left: Supervision  Rolling to Right: Supervision  Supine to Sit: Supervision  Sit to Supine: Supervision  Scooting: Supervision  Comment: hospital bed wit hHOB slightly elevated, use of railing, increased time to perform with cues to perform log roll technique throughout for safety.  Sitting EOB pillows to patient right    Transfers:  Sit to Stand: Stand by assistance  Stand to sit: Stand by assistance  Comment: intermittent cues for hand placement and sequencing    Ambulation 1  Surface: level tile  Device: Rolling Walker  Assistance: Contact guard assistance, Stand by assistance  Quality of Gait: antaglic gait, trendelenburg gait, forward flexed with cues for upright, decreased (B) step length, foot clearance, and heel strike; narrow base of support; kay decreases with fatigue and additional reps, trendlenberg gait with right hip drop in swing phase due to left hip weakness  Gait Deviations: Slow Kay, Decreased step length, Decreased step height, Shuffles  Distance: 140' with multiple turns increased time  Comments: distances limited due to patient requesting to stop due to fatigue, stayed near room at this time due to patient concerns with bowels moving           Stairs  # Steps : 8  Stairs Height: 6\"  Rails: Bilateral  Curbs: 6\"(X 1 trial with RW CGA-min assist with increased time needing step by step instructions for proper sequencing.)  Device: No Device  Assistance: Contact guard assistance  Comment: Ascend/descend nonreciprocal pattern with cues for proper sequencing, increased time with use of BUE on railing with cues, increased tolerance to stairs this date with decreased physical assistance, receptive to cues at this time. Slight TAPIA but increased tolerance this date           Assessment: Pt with limited ability to participate in upright mobility tasks this date due to orthostatic blood pressures with significant symptomatic dropping in standing limiting ability for OOB at this time. RN and MD aware of patient low blood pressure at this time. Despite symptoms pt demosntrated increased ability to complete transfers with decreased assistance this date. Patient will continue to benefit from additional skilled PT intervention to facilitate safe mobility and to optimize (I) to promote return to prior level of function. SPEECH THERAPY (intentionally left blank if not actively being seen by this service):      OCCUPATIONAL THERAPY    ADL  Equipment Provided: Sock aid, Long-handled sponge, Reacher  Feeding: Independent(Pt feeding self tomato soup from cup)  Grooming: Supervision(Combed hair, brushed teeth. Did not shave D/T time restraint.)  UE Bathing: Minimal assistance(Pt with poor tolerance, d/t fatigue, c/o pain, to fully reach all areas.)  LE Bathing: Maximum assistance(in sidelying, sitting up in bed, to wash buttocks, LE's. Pt assists minimally to wash shelbie area, and thighs.)  UE Dressing: Moderate assistance, Maximum assistance(seated from bed)  LE Dressing: Maximum assistance(seated from bed to thread pants, no AE, don footies, with sock aid, and sidelying to pull pants over hips)  Toileting: Maximum assistance(Pt voided BM. Assist for clothes management and hygiene needs)  Additional Comments: Sponge bath completed from bed d/t pt with low BP(see general section).     Bed mobility  Bridging: Supervision  Rolling to Left: Supervision  Rolling to Right: Supervision  Supine to Sit: Supervision  Sit to Supine: Supervision  Scooting: Supervision  Comment: hospital bed wit hHOB slightly elevated, use of railing, increased time to perform with cues to perform log roll technique throughout for safety. Sitting EOB pillows to patient right    Functional Transfers: Toilet Transfers  Toilet - Technique: Ambulating, Stand pivot  Equipment Used: Grab bars  Toilet Transfer: Contact guard assistance, Minimal assistance  Toilet Transfers Comments: RW, cues for hand placement, to commode. Stand-pivot from commode to wC d/t low BP     Shower Transfers  Shower - Transfer From: Marques Mane - Transfer Type: To and From  Shower - Transfer To: Shower seat with back  Shower - Technique: Ambulating(RW)  Shower Transfers: Contact Guard  Shower Transfers Comments: Cues to turn walker, hand placement, & safety awareness. Pt used sit & swing technique on shower chair. Perception  Overall Perceptual Status: WFL         UE Function:  WFL    Assessment: Pt seen bedside due to low BP. Pt feeding self soup from cup and taking sips of liquids. Pt agreed to bed exercises. Pt completed multiple UE ex without complaint. Still very low energy and required rest breaks between each exercise. Will cont OT POC. Pt declined all therapy 9/29/30. Above status is from prior dates as did not participate in tx 9/29/20 D/T severe depression. Will  require 24 hr supervision/assistance. NUTRITION  Most recent weightWeight: (refused to stand up )  BSA (Calculated - sq m): 1.99 sq meters  BMI (Calculated): 27.7  Please see nutrition note for details. NURSING  Has mendoza, can be incontinent of bm. Last BM 9/28. Monitor skin,abrasions and incision care, PICC site   Family Education:  Patient education: back safety, skin and incision care, medications, mendoza care/and or bladder program for discharge, safety and fall prevention, pain control.     MEDICAL  Meds adjusted due to hypotension and increased Cr  Patient now with some suicidal ideation, psych consulted    TEAM SUMMARY AND DISCHARGE PLAN  Estimated Length of Stay:tentative 10/2/20  Destination: insurance deemed last covered day 10/1- psychiatry recommending inpatient psych for SI,  MD, SATHISH working with patient and family to establish safe DC plan. · Anticipated Services at Discharge:    [x] OT  [x] PT   [] SLP    [x] RN   [x] Home Health aide [] SW  Community Resources: _______________________________  Equipment recommendations:  [] Hospital bed [] Tub bench  [] Shower chair [] Hand held shower  [] Raised toilet seat [] Toilet safety frame [x] Bedside commode   [] W/C: _____  [x] Lucendia Hazard [] Standard walker [] Gait belt [] cane: _________  [] Sliding board [] Alternate seating/furniture [] O2 [] Hip Kit: _______  [] Life Line [] Other: _______  Factors facilitating achievement of predicted outcomes: Family support and Pleasant  Barriers to the achievement of predicted outcomes/Interventions:   Poor motivation to participate in therapy- continue max encouragement, education regarding benefits of therapy participation, PMR, psych MDs addressing mood, behavior,   Reinforce with family education     Interdisciplinary Individualized Plan of Care Review:    · Continue Current Plan of Care: Yes    · Modifications:_____________________________    Special Needs in the Upcoming Week :    [x] Family/Caregiver Education  [] Home visit  []Therapeutic Pass   [] Consults:_______    [] Other;_______    Patient Rehab Team Goals for the Upcoming Week:  1. Pt will re-gain motivation & participate in therapy. 2. Increased participation with all functional transfers to promote increased strength and decrease pain. 3. Original goal:  Just want to get back to living on my own at home, however patient now stating he wants to go be with his wife who  about 5 years ago.           Team Members Present at Conference:  Physician: Dr Tracy Steinberg  : [place], [place]    Occupational Therapist: DAWNA Ramsey/PREMA#[id number]  Physical Therapist: Joy Bartlett, [id number]  Speech Therapist:   Nurse:Marbella Caraballo RN   : ROSALIND Palomino      I led this team conference and I approve the established interdisciplinary plan of care as documented within the medical record of Saad Sanchez. .    MD: Annemarie Quach.  Pieter Darby MD 9/30/2020, 3:45 PM

## 2020-09-29 NOTE — PROGRESS NOTES
Pt called out c/o BLE pain. Requesting pain medication. Agreeable to PRN pain medication but declines scheduled medication. Refuses to stand for weight but was agreeable to vitals being taken. Medicated per PRN orders. Will continue to monitor.  Kamaljit Hercules RN

## 2020-09-29 NOTE — CONSULTS
DULoxetine  30 mg Oral BID    ferrous sulfate  324 mg Oral Daily with breakfast    gabapentin  100 mg Oral TID    [Held by provider] hydrALAZINE  50 mg Oral 3 times per day    lactobacillus  2 capsule Oral BID WC    latanoprost  1 drop Left Eye Nightly    levothyroxine  50 mcg Oral Daily    [Held by provider] losartan  100 mg Oral Daily    metoprolol tartrate  50 mg Oral BID    pantoprazole  40 mg Oral QAM AC    sodium chloride flush  10 mL Intravenous 2 times per day    [Held by provider] rivaroxaban  20 mg Oral Daily with breakfast     lactulose, promethazine **OR** ondansetron, acetaminophen, magnesium hydroxide, polyethylene glycol, benzocaine-menthol, bisacodyl, guaiFENesin, hydrALAZINE, melatonin, nitroGLYCERIN, oxyCODONE-acetaminophen, sodium chloride flush    Examination  Review of Systems - some pain, poor appetite, poor energy    Recent Results (from the past 168 hour(s))   Basic Metabolic Panel w/ Reflex to MG    Collection Time: 09/23/20  5:35 AM   Result Value Ref Range    Sodium 134 (L) 136 - 145 mmol/L    Potassium reflex Magnesium 3.8 3.5 - 5.1 mmol/L    Chloride 102 99 - 110 mmol/L    CO2 24 21 - 32 mmol/L    Anion Gap 8 3 - 16    Glucose 80 70 - 99 mg/dL    BUN 17 7 - 20 mg/dL    CREATININE 1.0 0.8 - 1.3 mg/dL    GFR Non-African American >60 >60    GFR African American >60 >60    Calcium 8.6 8.3 - 10.6 mg/dL   CBC Auto Differential    Collection Time: 09/23/20  5:35 AM   Result Value Ref Range    WBC 3.5 (L) 4.0 - 11.0 K/uL    RBC 2.80 (L) 4.20 - 5.90 M/uL    Hemoglobin 8.5 (L) 13.5 - 17.5 g/dL    Hematocrit 26.3 (L) 40.5 - 52.5 %    MCV 93.9 80.0 - 100.0 fL    MCH 30.3 26.0 - 34.0 pg    MCHC 32.2 31.0 - 36.0 g/dL    RDW 17.3 (H) 12.4 - 15.4 %    Platelets 991 578 - 508 K/uL    MPV 9.3 5.0 - 10.5 fL    Neutrophils % 51.5 %    Lymphocytes % 22.1 %    Monocytes % 19.7 %    Eosinophils % 5.3 %    Basophils % 1.4 %    Neutrophils Absolute 1.8 1.7 - 7.7 K/uL    Lymphocytes Absolute 0.8 (L) 1.0 - 5.1 K/uL    Monocytes Absolute 0.7 0.0 - 1.3 K/uL    Eosinophils Absolute 0.2 0.0 - 0.6 K/uL    Basophils Absolute 0.0 0.0 - 0.2 K/uL   Basic Metabolic Panel w/ Reflex to MG    Collection Time: 09/24/20  5:15 AM   Result Value Ref Range    Sodium 135 (L) 136 - 145 mmol/L    Potassium reflex Magnesium 3.7 3.5 - 5.1 mmol/L    Chloride 102 99 - 110 mmol/L    CO2 23 21 - 32 mmol/L    Anion Gap 10 3 - 16    Glucose 85 70 - 99 mg/dL    BUN 15 7 - 20 mg/dL    CREATININE 1.1 0.8 - 1.3 mg/dL    GFR Non-African American >60 >60    GFR African American >60 >60    Calcium 8.6 8.3 - 10.6 mg/dL   CBC Auto Differential    Collection Time: 09/24/20  5:15 AM   Result Value Ref Range    WBC 3.1 (L) 4.0 - 11.0 K/uL    RBC 2.77 (L) 4.20 - 5.90 M/uL    Hemoglobin 8.5 (L) 13.5 - 17.5 g/dL    Hematocrit 25.8 (L) 40.5 - 52.5 %    MCV 93.2 80.0 - 100.0 fL    MCH 30.5 26.0 - 34.0 pg    MCHC 32.8 31.0 - 36.0 g/dL    RDW 17.0 (H) 12.4 - 15.4 %    Platelets 077 013 - 102 K/uL    MPV 9.2 5.0 - 10.5 fL    Neutrophils % 42.1 %    Lymphocytes % 27.6 %    Monocytes % 22.4 %    Eosinophils % 5.8 %    Basophils % 2.1 %    Neutrophils Absolute 1.3 (L) 1.7 - 7.7 K/uL    Lymphocytes Absolute 0.9 (L) 1.0 - 5.1 K/uL    Monocytes Absolute 0.7 0.0 - 1.3 K/uL    Eosinophils Absolute 0.2 0.0 - 0.6 K/uL    Basophils Absolute 0.1 0.0 - 0.2 K/uL   Basic Metabolic Panel w/ Reflex to MG    Collection Time: 09/25/20  7:17 AM   Result Value Ref Range    Sodium 134 (L) 136 - 145 mmol/L    Potassium reflex Magnesium 4.1 3.5 - 5.1 mmol/L    Chloride 103 99 - 110 mmol/L    CO2 21 21 - 32 mmol/L    Anion Gap 10 3 - 16    Glucose 88 70 - 99 mg/dL    BUN 24 (H) 7 - 20 mg/dL    CREATININE 1.1 0.8 - 1.3 mg/dL    GFR Non-African American >60 >60    GFR African American >60 >60    Calcium 8.7 8.3 - 10.6 mg/dL   CBC Auto Differential    Collection Time: 09/25/20  7:17 AM   Result Value Ref Range    WBC 2.9 (L) 4.0 - 11.0 K/uL    RBC 2.77 (L) 4.20 - 5.90 M/uL    Hemoglobin 8.4 (L) 13.5 - 17.5 g/dL    Hematocrit 25.7 (L) 40.5 - 52.5 %    MCV 92.8 80.0 - 100.0 fL    MCH 30.3 26.0 - 34.0 pg    MCHC 32.6 31.0 - 36.0 g/dL    RDW 16.8 (H) 12.4 - 15.4 %    Platelets 600 254 - 346 K/uL    MPV 9.2 5.0 - 10.5 fL    Neutrophils % 35.6 %    Lymphocytes % 32.3 %    Monocytes % 23.4 %    Eosinophils % 6.3 %    Basophils % 2.4 %    Neutrophils Absolute 1.0 (L) 1.7 - 7.7 K/uL    Lymphocytes Absolute 0.9 (L) 1.0 - 5.1 K/uL    Monocytes Absolute 0.7 0.0 - 1.3 K/uL    Eosinophils Absolute 0.2 0.0 - 0.6 K/uL    Basophils Absolute 0.1 0.0 - 0.2 K/uL   Basic Metabolic Panel w/ Reflex to MG    Collection Time: 09/26/20  9:04 AM   Result Value Ref Range    Sodium 135 (L) 136 - 145 mmol/L    Potassium reflex Magnesium 4.2 3.5 - 5.1 mmol/L    Chloride 102 99 - 110 mmol/L    CO2 22 21 - 32 mmol/L    Anion Gap 11 3 - 16    Glucose 103 (H) 70 - 99 mg/dL    BUN 19 7 - 20 mg/dL    CREATININE 1.2 0.8 - 1.3 mg/dL    GFR Non- 59 (A) >60    GFR African American >60 >60    Calcium 9.1 8.3 - 10.6 mg/dL   CBC Auto Differential    Collection Time: 09/26/20  9:04 AM   Result Value Ref Range    WBC 3.1 (L) 4.0 - 11.0 K/uL    RBC 3.04 (L) 4.20 - 5.90 M/uL    Hemoglobin 9.1 (L) 13.5 - 17.5 g/dL    Hematocrit 28.2 (L) 40.5 - 52.5 %    MCV 92.8 80.0 - 100.0 fL    MCH 29.9 26.0 - 34.0 pg    MCHC 32.3 31.0 - 36.0 g/dL    RDW 16.7 (H) 12.4 - 15.4 %    Platelets 523 155 - 181 K/uL    MPV 9.4 5.0 - 10.5 fL    Neutrophils % 45.8 %    Lymphocytes % 25.7 %    Monocytes % 21.0 %    Eosinophils % 5.5 %    Basophils % 2.0 %    Neutrophils Absolute 1.4 (L) 1.7 - 7.7 K/uL    Lymphocytes Absolute 0.8 (L) 1.0 - 5.1 K/uL    Monocytes Absolute 0.6 0.0 - 1.3 K/uL    Eosinophils Absolute 0.2 0.0 - 0.6 K/uL    Basophils Absolute 0.1 0.0 - 0.2 K/uL   Basic Metabolic Panel w/ Reflex to MG    Collection Time: 09/28/20  9:50 AM   Result Value Ref Range    Sodium 133 (L) 136 - 145 mmol/L    Potassium reflex Magnesium 3.9 3.5 - 5.1 mmol/L Hypertension. CC:  No chief complaint on file. Context:  66 y.o. male c hx of adj d/o c depressed mood, hx of coronary dz, chronic pain, stormy course after lumbar fusion procedure, now to  with fever, white count, jeronimo, urinary retention. Dx'd c sepsis. Treated and sent to ARU on 20. Assc Sx:  Started saying he was \"tired and wanted to die\" in mid-Sept.  Depressed, poor energy and motivation, poor appetite but he's had gi issues. Sleeping a lot.  + PMR.  + hopeless. SI c plan to stop eating and drinking. I called pt's daughter, Eileen Spence, and she said that in  pt went through a very deep depression after the loss of his wife. He \"came out of it,\" and then about a year ago started having a heart issue, and \"that was a hard time, and we had a hard time pulling out of his funk,\" and he said similar things, \"I just want to go be with your mom [meaning allowing himself to die.]\"  She feels like he's \"again in this horrible depression\" thanks to just being physically depleted from all these medical issues. \"I think he's just mentally and physically exhausted, he doesn't see the light at the end of tunnel. \"  And she noted, \"This is not typical behavior for him, he's always very jovial, he's in a black hole right now. \"      Duration:  Weeks to months    Severity:  severe    Modifiers:  Medical issues    Timing:  chronic    Past Medical History:   Diagnosis Date    Aneurysm, aortic (HCC)     Atrial fibrillation (HCC)     Glaucoma     Heart attack (HCC)     Hyperlipidemia     Hypertension        Allergies   Allergen Reactions    Azithromycin     Brimonidine Itching    Clindamycin/Lincomycin Diarrhea    Penicillins Hives    Simvastatin        PPsyHx:  As above. Started on cymbalta. Went to see a  when wife . CD Hx:  Former smoker. Very little EtOH. Social History     Socioeconomic History    Marital status:       Spouse name: None    Number of children: None    Years of education: None    Highest education level: None   Occupational History    None   Social Needs    Financial resource strain: None    Food insecurity     Worry: None     Inability: None    Transportation needs     Medical: None     Non-medical: None   Tobacco Use    Smoking status: Former Smoker     Types: Cigarettes    Smokeless tobacco: Never Used    Tobacco comment: was only social smoker    Substance and Sexual Activity    Alcohol use: Not Currently    Drug use: Never    Sexual activity: None   Lifestyle    Physical activity     Days per week: None     Minutes per session: None    Stress: None   Relationships    Social connections     Talks on phone: None     Gets together: None     Attends Spiritism service: None     Active member of club or organization: None     Attends meetings of clubs or organizations: None     Relationship status: None    Intimate partner violence     Fear of current or ex partner: None     Emotionally abused: None     Physically abused: None     Forced sexual activity: None   Other Topics Concern    None   Social History Narrative    None       Family History   Problem Relation Age of Onset    Heart Disease Mother      FamPsyHx:  None per pt.

## 2020-09-29 NOTE — PLAN OF CARE
Problem: Falls - Risk of:  Goal: Will remain free from falls  Description: Will remain free from falls  Outcome: Ongoing  Note: Pt educated on falls precautions and safety. Call light and personal belongings within reach at all times. Non skid socks in use when up. Hourly rounding and alarms active. Problem: Skin Integrity:  Goal: Absence of new skin breakdown  Description: Absence of new skin breakdown  Outcome: Ongoing  Note: Able to change positions in bed without assist, no evidence of skin breakdown noted. Waffle cushion in place to chair. Problem: Pain:  Goal: Pain level will decrease  Description: Pain level will decrease  Outcome: Ongoing  Note: Able to rate pain using a 1-10 scale, medicated per prn orders, see MAR.  Able to verbalize a reduction in pain and/or able to fall asleep and remain asleep without any s/s of pain

## 2020-09-29 NOTE — CONSULTS
[facility]  Palliative Care   Consult Note    NAME:  [facility] RECORD NUMBER:  [id number]  AGE: 66 y.o. GENDER: male  : 1945  TODAY'S DATE:  2020    Subjective     Reason for Consult:  goals of care and code status   Visit Type: Initial Consult      Shima Marie is a 66 y.o. male referred by:  [x] Physician    PAST MEDICAL HISTORY      Diagnosis Date    Aneurysm, aortic ([address]     Atrial fibrillation ([address]     Glaucoma     Heart attack ([address]     Hyperlipidemia     Hypertension        PAST SURGICAL HISTORY  Past Surgical History:   Procedure Laterality Date    COLONOSCOPY      CORONARY ANGIOPLASTY WITH STENT PLACEMENT      X 2    EYE SURGERY Right 2020    GONIOTOMY performed by Braden Adair MD at [place] [id number] Right 2020    Phacoemulsification with intraocular lens implant performed by Braden Adair MD at [facility]  Family History   Problem Relation Age of Onset    Heart Disease Mother        SOCIAL HISTORY  Social History     Tobacco Use    Smoking status: Former Smoker     Types: Cigarettes    Smokeless tobacco: Never Used    Tobacco comment: was only social smoker    Substance Use Topics    Alcohol use: Not Currently    Drug use: Never       ALLERGIES  Allergies   Allergen Reactions    Azithromycin     Brimonidine Itching    Clindamycin/Lincomycin Diarrhea    Penicillins Hives    Simvastatin        MEDICATIONS  No current facility-administered medications on file prior to encounter. Current Outpatient Medications on File Prior to Encounter   Medication Sig Dispense Refill    meropenem (MERREM) infusion Infuse 500 mg intravenously every 6 hours Compound per protocol.  1 each 0    dorzolamide-timolol (COSOPT) 22.3-6.8 MG/ML ophthalmic solution Place 1 drop into both eyes 2 times daily      DULoxetine (CYMBALTA) 30 MG extended release capsule Take 30 mg by mouth 2 times daily      ferrous sulfate (IRON 325) 325 (65 Fe) MG tablet Take 325 mg by mouth daily (with breakfast)      Multiple Vitamins-Minerals (ICAPS) TABS Take 2 tablets by mouth daily      latanoprost (XALATAN) 0.005 % ophthalmic solution Place 1 drop into the left eye nightly      levothyroxine (SYNTHROID) 50 MCG tablet Take 50 mcg by mouth Daily      oxyCODONE (ROXICODONE) 5 MG immediate release tablet Take 5 mg by mouth every 6 hours as needed for Pain.  pantoprazole (PROTONIX) 40 MG tablet Take 40 mg by mouth daily      senna (SENOKOT) 8.6 MG tablet Take 2 tablets by mouth 2 times daily      hydrALAZINE (APRESOLINE) 50 MG tablet Take 75 mg by mouth 2 times daily       metoprolol succinate (TOPROL XL) 100 MG extended release tablet Take 100 mg by mouth daily       losartan (COZAAR) 100 MG tablet Take 100 mg by mouth daily      atorvastatin (LIPITOR) 40 MG tablet Take 40 mg by mouth daily      nitroGLYCERIN (NITROSTAT) 0.4 MG SL tablet Place 0.4 mg under the tongue every 5 minutes as needed for Chest pain up to max of 3 total doses. If no relief after 1 dose, call 911.  gabapentin (NEURONTIN) 100 MG capsule Take 400 mg by mouth 3 times daily.  amiodarone (CORDARONE) 200 MG tablet Take 200 mg by mouth daily Indications: takes in the evening       rivaroxaban (XARELTO) 20 MG TABS tablet Take 20 mg by mouth daily (with breakfast)          Objective         /78   Pulse 76   Temp 98.4 °F (36.9 °C) (Oral)   Resp 18   Ht 5' 9\" (1.753 m)   Wt 186 lb 15.2 oz (84.8 kg)   SpO2 92%   BMI 27.61 kg/m²     Code Status: DNR CC     Advanced Directives: completed son Ruperto Jones is HPOA      Assessment        Management and Education    Persons available for education:     [x] Self     [] Caregiver       [] Spouse       [] Other Family Member   []  Other    Spiritual History:  notified: Yes,     Does the patient have a Primary Care Physician?   Yes    Level of patient/caregiver understanding able to:        [x] Verbalize Understanding   [] Demonstrate Understanding       [] Teach Back       [] Needs Reinforcement     []  Other:      Teaching Time:  1hours  0 minutes     Plan        Social Service Consult Made:  Yes  Assistance filling out Living Will/HPOA:  No      Discharge Plan:  Education/support to patient  Providing support for coping/adaptation/distress of patient  Clarification of medical condition to patient and family  Code status clarified: [facility]  Palliative care orders introduced    Discharge Environment:  [] Hospice Consult Agency:  [] Inpatient Hospice    [] Home with Hospice Care   [] ECF with Hospice  [] ECF skilled care with Hospice to follow   [] Other:    Discussion: Patient is alert, oriented to person, place, time and situation. He states prior to back surgery he was living alone but was having difficulty at home after surgery he went to [facility] for rehab, readmitted to hospital then to acute rehab. He has now decided he no longer wants to to therapy have any aggressive treatment and wants to be with his wife who has . He has been refusing everything but pain med. We did discuss code status and he does not want chest compressions/shocking or ventilator. At this time he does not have a terminal diagnosis. I discussed with Dr Vickey James above information, she will discuss with his daughter and Dr Caleb Barba to decide best course of action for Mr Leann Leyva. I will continue to follow Mr. Sloan's care as needed. Thank you for allowing me to participate in the care of Mr. Sloan .      Electronically signed by Adilia Mott RN, [address] on 2020 at 1:56 PM  [address] [place] [address]  Office: [phone]

## 2020-09-29 NOTE — PROGRESS NOTES
Occupational Therapy  AM & PM Session  S/OT & OT to pt room, pt lying supine in bed, politely declined therapy stating \"No thank you. \" Multiple attempts to get pt involved in various activities, but he declined all of them. 45 minute variance D/T refusal.    Second Session    S/OT & OT to pt room, pt lying supine in bed. After attempting to get pt to participate in therapy, pt stated he no longer wanted to participate in therapy & that he may go to hospice care, that he is going, ready to leave this world. 45 minute variance D/T refusal.      Sunita Vaughan S/OT    Therapist was present, directed patients care, made skilled judgement, and was responsible for assessment and treatment of the patient.       Myron Morales, OTR/L #[id number]

## 2020-09-29 NOTE — PROGRESS NOTES
Patient admitted with sepsis d/t UTI. Alert and oriented x 4. Patient very apathetic, ready to die and be with his wife. Refuses meal and meds. Psychiatry, psychology, palliative care consulted. On tele, SR with 1st degree block. Transfers with walker x 1. General diet with thin liquids. Takes pills whole in water. Ensure BID. Continent of bowels. Reeves cath draining dark yellow urine, placed for retention. Surgical incisions to back and abdomen SUDEEP, recent spinal fusion. Patient on RA. PICC to [facility]. Fall precautions in place. Belongings and call light within reach.

## 2020-09-29 NOTE — PLAN OF CARE
Problem: Falls - Risk of:  Goal: Will remain free from falls  Description: Will remain free from falls  9/21/2020 1049 by Ann Evans RN  Outcome: Ongoing  Note: Fall risk band on patient. Orange light on outside of room. Non skid footwear in place. Alarms used appropriately. Patient instructed to call and wait for staff before getting up. Rounding done to anticipate needs. Appropriate safety devices used for transfers. Problem: Skin Integrity:  Goal: Absence of new skin breakdown  Description: Absence of new skin breakdown  Outcome: Ongoing  Note: Skin assessment completed on admission and every shift. Barrier wipes used in the event of incontinence. Pressure relief techniques used as needed while in chair and in bed. Position changes encouraged at least every two hours while in bed. Problem: Pain:  Goal: Control of acute pain  Description: Control of acute pain  Outcome: Ongoing  Note: Patient able to express pain and rate pain using pain scale. Medicate as needed per orders. Reassess patient pain level within one hour after oral pain medication/intervention to assure patient has reduced pain sensation and document outcome. Non pharmaceutical interventions as appropriate.

## 2020-09-29 NOTE — PROGRESS NOTES
Pt refusing all HS medications. Reports he just wants to die so he can see his wife again. Encouragement and education provided but pt remains adamant that he will not take his medications or eat/drink anymore. I asked the patient if he has felt this way in the past and he said yes. I asked him what he did at that time and he stated \"My daughter talked me out of it\". MD already aware of pt expressing his desire to die, per report no  needed at this time per MD. Will continue to monitor.  Jaclyn Boo RN

## 2020-09-30 ENCOUNTER — TELEPHONE (OUTPATIENT)
Dept: FAMILY MEDICINE CLINIC | Age: 75
End: 2020-09-30

## 2020-09-30 VITALS
RESPIRATION RATE: 16 BRPM | OXYGEN SATURATION: 94 % | WEIGHT: 187.17 LBS | BODY MASS INDEX: 27.72 KG/M2 | HEART RATE: 89 BPM | SYSTOLIC BLOOD PRESSURE: 150 MMHG | TEMPERATURE: 97.7 F | HEIGHT: 69 IN | DIASTOLIC BLOOD PRESSURE: 62 MMHG

## 2020-09-30 PROBLEM — F33.9 MDD (MAJOR DEPRESSIVE DISORDER), RECURRENT EPISODE (HCC): Status: ACTIVE | Noted: 2020-09-30

## 2020-09-30 LAB
A/G RATIO: 1 (ref 1.1–2.2)
ALBUMIN SERPL-MCNC: 3.5 G/DL (ref 3.4–5)
ALP BLD-CCNC: 121 U/L (ref 40–129)
ALT SERPL-CCNC: 12 U/L (ref 10–40)
ANION GAP SERPL CALCULATED.3IONS-SCNC: 14 MMOL/L (ref 3–16)
ANION GAP SERPL CALCULATED.3IONS-SCNC: 15 MMOL/L (ref 3–16)
AST SERPL-CCNC: 21 U/L (ref 15–37)
BASOPHILS ABSOLUTE: 0.1 K/UL (ref 0–0.2)
BASOPHILS RELATIVE PERCENT: 2.4 %
BILIRUB SERPL-MCNC: 1.2 MG/DL (ref 0–1)
BUN BLDV-MCNC: 16 MG/DL (ref 7–20)
BUN BLDV-MCNC: 16 MG/DL (ref 7–20)
CALCIUM SERPL-MCNC: 8.7 MG/DL (ref 8.3–10.6)
CALCIUM SERPL-MCNC: 8.9 MG/DL (ref 8.3–10.6)
CHLORIDE BLD-SCNC: 102 MMOL/L (ref 99–110)
CHLORIDE BLD-SCNC: 103 MMOL/L (ref 99–110)
CO2: 17 MMOL/L (ref 21–32)
CO2: 18 MMOL/L (ref 21–32)
CREAT SERPL-MCNC: 1.2 MG/DL (ref 0.8–1.3)
CREAT SERPL-MCNC: 1.2 MG/DL (ref 0.8–1.3)
EOSINOPHILS ABSOLUTE: 0.1 K/UL (ref 0–0.6)
EOSINOPHILS RELATIVE PERCENT: 2.7 %
GFR AFRICAN AMERICAN: >60
GFR AFRICAN AMERICAN: >60
GFR NON-AFRICAN AMERICAN: 59
GFR NON-AFRICAN AMERICAN: 59
GLOBULIN: 3.5 G/DL
GLUCOSE BLD-MCNC: 71 MG/DL (ref 70–99)
GLUCOSE BLD-MCNC: 78 MG/DL (ref 70–99)
HCT VFR BLD CALC: 31.4 % (ref 40.5–52.5)
HEMOGLOBIN: 10.3 G/DL (ref 13.5–17.5)
IRON SATURATION: 15 % (ref 20–50)
IRON: 29 UG/DL (ref 59–158)
LYMPHOCYTES ABSOLUTE: 0.9 K/UL (ref 1–5.1)
LYMPHOCYTES RELATIVE PERCENT: 22.7 %
MCH RBC QN AUTO: 30.4 PG (ref 26–34)
MCHC RBC AUTO-ENTMCNC: 32.8 G/DL (ref 31–36)
MCV RBC AUTO: 92.5 FL (ref 80–100)
MONOCYTES ABSOLUTE: 0.8 K/UL (ref 0–1.3)
MONOCYTES RELATIVE PERCENT: 20.7 %
NEUTROPHILS ABSOLUTE: 2 K/UL (ref 1.7–7.7)
NEUTROPHILS RELATIVE PERCENT: 51.5 %
PDW BLD-RTO: 16 % (ref 12.4–15.4)
PLATELET # BLD: 266 K/UL (ref 135–450)
PMV BLD AUTO: 9.2 FL (ref 5–10.5)
POTASSIUM REFLEX MAGNESIUM: 3.9 MMOL/L (ref 3.5–5.1)
POTASSIUM REFLEX MAGNESIUM: 4.4 MMOL/L (ref 3.5–5.1)
RBC # BLD: 3.4 M/UL (ref 4.2–5.9)
SARS-COV-2, NAAT: NOT DETECTED
SODIUM BLD-SCNC: 134 MMOL/L (ref 136–145)
SODIUM BLD-SCNC: 135 MMOL/L (ref 136–145)
TOTAL IRON BINDING CAPACITY: 189 UG/DL (ref 260–445)
TOTAL PROTEIN: 7 G/DL (ref 6.4–8.2)
WBC # BLD: 4 K/UL (ref 4–11)

## 2020-09-30 PROCEDURE — U0002 COVID-19 LAB TEST NON-CDC: HCPCS

## 2020-09-30 PROCEDURE — 6370000000 HC RX 637 (ALT 250 FOR IP): Performed by: PHYSICAL MEDICINE & REHABILITATION

## 2020-09-30 PROCEDURE — 1280000000 HC REHAB R&B

## 2020-09-30 PROCEDURE — 36415 COLL VENOUS BLD VENIPUNCTURE: CPT

## 2020-09-30 PROCEDURE — 99231 SBSQ HOSP IP/OBS SF/LOW 25: CPT | Performed by: PSYCHIATRY & NEUROLOGY

## 2020-09-30 PROCEDURE — 97530 THERAPEUTIC ACTIVITIES: CPT

## 2020-09-30 PROCEDURE — 83540 ASSAY OF IRON: CPT

## 2020-09-30 PROCEDURE — 80053 COMPREHEN METABOLIC PANEL: CPT

## 2020-09-30 PROCEDURE — 83550 IRON BINDING TEST: CPT

## 2020-09-30 PROCEDURE — 85025 COMPLETE CBC W/AUTO DIFF WBC: CPT

## 2020-09-30 PROCEDURE — 6360000002 HC RX W HCPCS: Performed by: PHYSICAL MEDICINE & REHABILITATION

## 2020-09-30 PROCEDURE — 94760 N-INVAS EAR/PLS OXIMETRY 1: CPT

## 2020-09-30 PROCEDURE — 2580000003 HC RX 258: Performed by: PHYSICAL MEDICINE & REHABILITATION

## 2020-09-30 RX ORDER — OXYCODONE HYDROCHLORIDE AND ACETAMINOPHEN 5; 325 MG/1; MG/1
1 TABLET ORAL EVERY 4 HOURS PRN
Status: DISCONTINUED | OUTPATIENT
Start: 2020-09-30 | End: 2020-10-01 | Stop reason: HOSPADM

## 2020-09-30 RX ORDER — DEXTROSE AND SODIUM CHLORIDE 5; .9 G/100ML; G/100ML
INJECTION, SOLUTION INTRAVENOUS ONCE
Status: COMPLETED | OUTPATIENT
Start: 2020-09-30 | End: 2020-09-30

## 2020-09-30 RX ORDER — FERROUS SULFATE TAB EC 324 MG (65 MG FE EQUIVALENT) 324 (65 FE) MG
324 TABLET DELAYED RESPONSE ORAL 2 TIMES DAILY WITH MEALS
Status: DISCONTINUED | OUTPATIENT
Start: 2020-09-30 | End: 2020-10-01 | Stop reason: HOSPADM

## 2020-09-30 RX ORDER — OXYCODONE AND ACETAMINOPHEN 7.5; 325 MG/1; MG/1
1 TABLET ORAL EVERY 4 HOURS PRN
Status: DISCONTINUED | OUTPATIENT
Start: 2020-09-30 | End: 2020-10-01 | Stop reason: HOSPADM

## 2020-09-30 RX ADMIN — OXYCODONE HYDROCHLORIDE AND ACETAMINOPHEN 1 TABLET: 7.5; 325 TABLET ORAL at 22:00

## 2020-09-30 RX ADMIN — DULOXETINE HYDROCHLORIDE 30 MG: 30 CAPSULE, DELAYED RELEASE ORAL at 20:34

## 2020-09-30 RX ADMIN — ALTEPLASE 1 MG: 2.2 INJECTION, POWDER, LYOPHILIZED, FOR SOLUTION INTRAVENOUS at 12:47

## 2020-09-30 RX ADMIN — GABAPENTIN 100 MG: 100 CAPSULE ORAL at 20:34

## 2020-09-30 RX ADMIN — DORZOLAMIDE HYDROCHLORIDE AND TIMOLOL MALEATE 1 DROP: 20; 5 SOLUTION/ DROPS OPHTHALMIC at 20:37

## 2020-09-30 RX ADMIN — DEXTROSE AND SODIUM CHLORIDE: 5; 900 INJECTION, SOLUTION INTRAVENOUS at 12:28

## 2020-09-30 RX ADMIN — ATORVASTATIN CALCIUM 40 MG: 40 TABLET, FILM COATED ORAL at 20:34

## 2020-09-30 RX ADMIN — METOPROLOL TARTRATE 50 MG: 50 TABLET, FILM COATED ORAL at 20:34

## 2020-09-30 RX ADMIN — SODIUM CHLORIDE, PRESERVATIVE FREE 10 ML: 5 INJECTION INTRAVENOUS at 08:51

## 2020-09-30 RX ADMIN — OXYCODONE HYDROCHLORIDE AND ACETAMINOPHEN 1 TABLET: 7.5; 325 TABLET ORAL at 17:50

## 2020-09-30 RX ADMIN — SENNOSIDES-DOCUSATE SODIUM TAB 8.6-50 MG 1 TABLET: 8.6-5 TAB at 20:34

## 2020-09-30 RX ADMIN — LATANOPROST 1 DROP: 50 SOLUTION OPHTHALMIC at 20:37

## 2020-09-30 RX ADMIN — OXYCODONE HYDROCHLORIDE AND ACETAMINOPHEN 1 TABLET: 5; 325 TABLET ORAL at 09:12

## 2020-09-30 ASSESSMENT — PAIN SCALES - GENERAL
PAINLEVEL_OUTOF10: 0
PAINLEVEL_OUTOF10: 8
PAINLEVEL_OUTOF10: 0
PAINLEVEL_OUTOF10: 0
PAINLEVEL_OUTOF10: 10
PAINLEVEL_OUTOF10: 0
PAINLEVEL_OUTOF10: 9
PAINLEVEL_OUTOF10: 0
PAINLEVEL_OUTOF10: 0
PAINLEVEL_OUTOF10: 3
PAINLEVEL_OUTOF10: 0

## 2020-09-30 ASSESSMENT — PAIN DESCRIPTION - ONSET
ONSET: GRADUAL
ONSET: ON-GOING
ONSET: GRADUAL

## 2020-09-30 ASSESSMENT — PAIN DESCRIPTION - PAIN TYPE
TYPE: ACUTE PAIN

## 2020-09-30 ASSESSMENT — PAIN DESCRIPTION - DESCRIPTORS
DESCRIPTORS: ACHING;DISCOMFORT

## 2020-09-30 ASSESSMENT — PAIN DESCRIPTION - ORIENTATION
ORIENTATION: LEFT;RIGHT
ORIENTATION: LEFT;LOWER
ORIENTATION: LEFT;RIGHT

## 2020-09-30 ASSESSMENT — PAIN DESCRIPTION - FREQUENCY
FREQUENCY: CONTINUOUS
FREQUENCY: CONTINUOUS
FREQUENCY: INTERMITTENT

## 2020-09-30 ASSESSMENT — PAIN DESCRIPTION - LOCATION
LOCATION: LEG
LOCATION: LEG
LOCATION: ABDOMEN

## 2020-09-30 ASSESSMENT — PAIN DESCRIPTION - DIRECTION: RADIATING_TOWARDS: UP AND DOWN LEG

## 2020-09-30 ASSESSMENT — PAIN DESCRIPTION - PROGRESSION
CLINICAL_PROGRESSION: NOT CHANGED

## 2020-09-30 ASSESSMENT — PAIN - FUNCTIONAL ASSESSMENT
PAIN_FUNCTIONAL_ASSESSMENT: ACTIVITIES ARE NOT PREVENTED
PAIN_FUNCTIONAL_ASSESSMENT: PREVENTS OR INTERFERES SOME ACTIVE ACTIVITIES AND ADLS
PAIN_FUNCTIONAL_ASSESSMENT: PREVENTS OR INTERFERES SOME ACTIVE ACTIVITIES AND ADLS

## 2020-09-30 NOTE — CARE COORDINATION
Team Confernce held today. Team reviewed progress. Pt is refusing to eat, drink and participate in therapies. He is being followed by Dr Vy Simmons, psychiatrist.  He and Dr Christie Padilla are recommending inpt psych unit.   Call placd to [id number] today, they took message and will call me back to initiate this referral.  DC Saucedo     Case Management   [phone]    9/30/2020  11:17 AM

## 2020-09-30 NOTE — PROGRESS NOTES
Department of Physical Medicine & Rehabilitation  Progress Note    Patient Identification:  Veronica De Jesus  [id number]  : 1945  Admit date: 2020    Chief Complaint: Debility    Subjective:   No acute events overnight. Patient seen this afternoon resting in bed. He reports ongoing fatigue and decreased motivation to work with therapies or eat/drink. He is willing to receive IVF. He tells me he wishes he could \"fall asleep and never wake up. \" I explained that depression is contributing to many of his symptoms. He was more accepting of this today, but did admit to some embarrassment about \"giving up. \" I provided some education and reassurance. Explained that we were planning to transfer him to inpatient psychiatric unit to focus on depression treatment. He does not feel this will necessarily be helpful, but he is not refusing. ROS: No f/c, n/v, cp     Objective:  Patient Vitals for the past 24 hrs:   BP Temp Temp src Pulse Resp SpO2 Weight   20 1337 (!) 143/81 97.6 °F (36.4 °C) Oral 82 18 93 % --   20 0832 -- -- -- -- -- 95 % --   20 0737 (!) 162/87 98 °F (36.7 °C) Oral 80 19 93 % --   20 0452 134/82 98.2 °F (36.8 °C) Oral 78 16 94 % 187 lb 2.7 oz (84.9 kg)   20 (!) 150/84 97.7 °F (36.5 °C) Oral 76 16 93 % --   20 1459 (!) 142/82 98.4 °F (36.9 °C) Oral 76 16 93 % --     Const: Alert. No distress, pleasant. HEENT: Normocephalic, atraumatic. Normal sclera/conjunctiva. MMM. CV: Regular rate and rhythm. Resp: No respiratory distress. Lungs CTAB. Abd: Soft, nontender, nondistended, NABS+ . Incision c/d/i. Ext: trace edema. MSK: Decreased spine ROM. Incision c/d/i. Neuro: Alert, oriented, appropriately interactive. No focal deficits. Generalized weakness with decreased effort. Psych: Cooperative, flat affect    Laboratory data: Available via EMR.    Last 24 hour lab  Recent Results (from the past 24 hour(s))   Basic Metabolic Panel w/ Reflex to MG Collection Time: 09/30/20  6:14 AM   Result Value Ref Range    Sodium 134 (L) 136 - 145 mmol/L    Potassium reflex Magnesium 3.9 3.5 - 5.1 mmol/L    Chloride 102 99 - 110 mmol/L    CO2 18 (L) 21 - 32 mmol/L    Anion Gap 14 3 - 16    Glucose 71 70 - 99 mg/dL    BUN 16 7 - 20 mg/dL    CREATININE 1.2 0.8 - 1.3 mg/dL    GFR Non- 59 (A) >60    GFR African American >60 >60    Calcium 8.7 8.3 - 10.6 mg/dL   CBC Auto Differential    Collection Time: 09/30/20  6:14 AM   Result Value Ref Range    WBC 4.0 4.0 - 11.0 K/uL    RBC 3.40 (L) 4.20 - 5.90 M/uL    Hemoglobin 10.3 (L) 13.5 - 17.5 g/dL    Hematocrit 31.4 (L) 40.5 - 52.5 %    MCV 92.5 80.0 - 100.0 fL    MCH 30.4 26.0 - 34.0 pg    MCHC 32.8 31.0 - 36.0 g/dL    RDW 16.0 (H) 12.4 - 15.4 %    Platelets 613 634 - 445 K/uL    MPV 9.2 5.0 - 10.5 fL    Neutrophils % 51.5 %    Lymphocytes % 22.7 %    Monocytes % 20.7 %    Eosinophils % 2.7 %    Basophils % 2.4 %    Neutrophils Absolute 2.0 1.7 - 7.7 K/uL    Lymphocytes Absolute 0.9 (L) 1.0 - 5.1 K/uL    Monocytes Absolute 0.8 0.0 - 1.3 K/uL    Eosinophils Absolute 0.1 0.0 - 0.6 K/uL    Basophils Absolute 0.1 0.0 - 0.2 K/uL   Iron and TIBC    Collection Time: 09/30/20 12:30 PM   Result Value Ref Range    Iron 29 (L) 59 - 158 ug/dL    TIBC 189 (L) 260 - 445 ug/dL    Iron Saturation 15 (L) 20 - 50 %       Therapy progress:  PT  Position Activity Restriction  Spinal Precautions: No Bending, No Lifting, No Twisting  Sternal Precautions: 10# Lifting Restrictions  Sternal Precautions: (per pt)  Other position/activity restrictions: room air  Objective     Sit to Stand: Stand by assistance  Stand to sit: Stand by assistance  Device: [employer]  Assistance: Contact guard assistance, Stand by assistance  Distance: 140' with multiple turns increased time  OT  PT Equipment Recommendations  Equipment Needed: Yes  Mobility Devices: [place]: Rolling  Other: will continue to assess for further equipment needs  Toilet - Technique: Ambulating, Stand pivot  Equipment Used: Grab bars  Toilet Transfers Comments: RW, cues for hand placement, to commode. Stand-pivot from commode to wC d/t low BP  Assessment        SLP          Body mass index is 27.64 kg/m². Assessment and Plan:    Impairments: generalized weakness + relative LLE weakness, decreased endurance, balance, cognition     Debility  -PT/OT     Sepsis presumed due to UTI  -Resolved. -Completed merrem per ID     Urinary retention   -s/p cystoscopy for Reeves placement, +false urethral passage  -Maintain Reeves until Urology follow-up. Do not feel he is ready for void trial.      Acute hypoxic respiratory failure in setting of baseline COPD  -Resolved, now stable on RA     TONO on CKD  -Avoid nephrotoxins, renally dose meds  -Cr 1.4 on 9/28 due to dehydration and lack of po intake. Now improved to 1.2 with holding losartan. Patient agreeable to 1000cc IVF.      Metabolic encephalopathy  -Resolved with treatment of sepsis  -Regulate sleep/wake, emphasis on routine     Lumbar stenosis s/p recent L4-S1 ALIF/PSF (8/31 with Dr. Kristine Carrillo and Dr. Ena Genao)  -Incisions healing well without evidence of infection  -PT/OT     Acute blood loss anemia  -Due to left pelvic hematoma  -Hgb now stable >9  -Iron supplement increased     Afib  -Amiodarone  -Holding Xarelto due to hematuria and hematoma.      CAD  -statin, bb, holding ARB due to TONO     HTN  -Control improving  -metoprolol, holding losartan and hydralazine  -Dc'ed amlodipine    Hypokalemia  -Improved with replacement    Adjustment disorder with depressed mood  -Per family, patient has had multiple episodes of severe depression since wife passed  -Currently stating wishes to die and intends to stop eating/drinking. Refusing most medical and rehab care. -Psychiatry consulted, appreciate Dr. Linn Barrow input.  He believes patient requires inpatient psych.   -Neuropsych and Palliative Care consulted as well     Bladder -Reeves for urinary retention as above     Bowel   -Initially with loose stools, now with constipation  -senna+docusate BID, PRN miralax, MoM, lactulose, bisacodyl supp, and enema.     Pain control  -Duloxetine, gabapentin, prn Percocet     PPx  -DVT: Xarelto held due to hematuria  -GI: pantoprazole, probiotics    Rehab Progress: Interdisciplinary team conference was held today with entire rehab treatment team including PT, OT, Dietician, RN, and SW. Discussion focused on progress toward rehab goals and discharge planning. Patient refusing therapies. Has potential for functional improvement, was progressing to overall SBA level. Separate conference then held with patient/family, questions answered and concerns addressed. Total treatment time >35 min with greater than 50% spent in care coordination. Dispo: Patient medically ready for discharge. Working on inpatient psych placement. UA, UDS, CBC, CMP, and COVID-19 tests ordered per request.       Lea Rodgers.  Geno Hayes MD 9/30/2020, 2:25 PM

## 2020-09-30 NOTE — PROGRESS NOTES
PHYSICAL THERAPY  Progress Note   PM treatment    Patient Name: Elizabeth Gonzalez Sr.  Medical Record Number: [id number]    Treatment Diagnosis: LE weakness, adherence to safety precautions, poor activity tolerance, poor balance      Chart Reviewed: Yes   Restrictions/Precautions: Fall Risk Other position/activity restrictions: room air   Additional Pertinent Hx: 67 y/o male admit from SNF setting 9/13/2020 with Altered Mental Status, Severe Sepsis, Acute Renal Failure, and Pelvic Hematoma. CT Head negative. CT Abd/Pelvis + L Pelvic Hematoma. Urology consult for Reeves Placement. PMH as noted including Recent Back Surg (L4/L5 and L5/S1 Ant/Post Fusion, 8/31/2020), CAD, Angio with Stent, A-Fib, Aortic Aneurysm. Subjective: Pt agreeable to therapy with encouragement. In bed upon arrival.  Reporting BLE pain and reporting fatigue but agreeable to OOB. Objective:  Supine to sit with HOB slightly elevated SBA-CGA. Pt reported dizziness sitting EOB. /76. Seated exercises sitting EOB with SBA for sitting balance: x20 ankle pumps mina, x15 alternating marches, x15 alternating LAQs. Sit to stand CGA. Pt ambulated approx 15' around bed to recliner chair with RW and CGA. Second therapist managed IV pole. Pt had increased dizziness once in recliner chair. BP 76/44. Pt returned to bed due to low BP. Stand pivot recliner chair to bed (ambulated approx 2') with RW and min-modA x2. Sit to supine Wyatt. Once in bed, BLEs elevated with 2 pillows. BP elevated to 176/66. DONATO Avilez notified. Pt c/o of being too warm. Cold wash cloth placed on forehead and then pt c/o being cold and requested a sheet. Session ended early due to poor patient tolerance. Pt continued to feel dizzy supine in bed. DONATO Avilez notified. Assessment: Pt limited by orthostatic hypotension this PM and was unable to tolerate full session. He was only able to ambulate approx 15' this session with RW and CGA.   Patient will continue to benefit from additional skilled PT intervention to facilitate safe mobility and to optimize (I) to promote return to prior level of function.       Safety Device - Type of devices:  [x]  All fall risk precautions in place [x] Bed alarm in place  [x] Call light within reach [] Chair alarm in place [] Positioning belt [x] Gait belt [] Patient at risk for falls [x] Left in bed [] Left in chair [] Telesitter in use [] Sitter present [x] Nurse notified []  None      Therapy Time   Individual Co-treatment   Time In 1430     Time Out 1500     Minutes 30       Variance: 15 min (orthostatic hypotension)    Electronically signed by Oliver Ward, [place] [id number] on 9/30/2020 at 4:13 PM

## 2020-09-30 NOTE — PROGRESS NOTES
Psych Consult Progress Note    09/30/20      Racheal Jacobson.  [id number]      I have reviewed recent documentation. Racheal Jacobson is a 66 y.o. male  With  has a past medical history of Aneurysm, aortic ([address] Atrial fibrillation ([address] Glaucoma, Heart attack ([facility] [address] Hyperlipidemia, and Hypertension. Subjective/Interval Hx:   Not any better. Still very depressed, not taking much po in order to allow self to die. Quality:  depression  Severity:  severe  Duration:  Months. Context:  As above.   Location:  Brain    Objective:  Vitals:    09/30/20 0832   BP:    Pulse:    Resp:    Temp:    SpO2: 95%     Recent Results (from the past 168 hour(s))   Basic Metabolic Panel w/ Reflex to MG    Collection Time: 09/24/20  5:15 AM   Result Value Ref Range    Sodium 135 (L) 136 - 145 mmol/L    Potassium reflex Magnesium 3.7 3.5 - 5.1 mmol/L    Chloride 102 99 - 110 mmol/L    CO2 23 21 - 32 mmol/L    Anion Gap 10 3 - 16    Glucose 85 70 - 99 mg/dL    BUN 15 7 - 20 mg/dL    CREATININE 1.1 0.8 - 1.3 mg/dL    GFR Non-African American >60 >60    GFR African American >60 >60    Calcium 8.6 8.3 - 10.6 mg/dL   CBC Auto Differential    Collection Time: 09/24/20  5:15 AM   Result Value Ref Range    WBC 3.1 (L) 4.0 - 11.0 K/uL    RBC 2.77 (L) 4.20 - 5.90 M/uL    Hemoglobin 8.5 (L) 13.5 - 17.5 g/dL    Hematocrit 25.8 (L) 40.5 - 52.5 %    MCV 93.2 80.0 - 100.0 fL    MCH 30.5 26.0 - 34.0 pg    MCHC 32.8 31.0 - 36.0 g/dL    RDW 17.0 (H) 12.4 - 15.4 %    Platelets 775 714 - 255 K/uL    MPV 9.2 5.0 - 10.5 fL    Neutrophils % 42.1 %    Lymphocytes % 27.6 %    Monocytes % 22.4 %    Eosinophils % 5.8 %    Basophils % 2.1 %    Neutrophils Absolute 1.3 (L) 1.7 - 7.7 K/uL    Lymphocytes Absolute 0.9 (L) 1.0 - 5.1 K/uL    Monocytes Absolute 0.7 0.0 - 1.3 K/uL    Eosinophils Absolute 0.2 0.0 - 0.6 K/uL    Basophils Absolute 0.1 0.0 - 0.2 K/uL   Basic Metabolic Panel w/ Reflex to MG    Collection Time: 09/25/20  7:17 AM   Result Value Ref Range    Sodium 134 (L) 136 - 145 mmol/L    Potassium reflex Magnesium 4.1 3.5 - 5.1 mmol/L    Chloride 103 99 - 110 mmol/L    CO2 21 21 - 32 mmol/L    Anion Gap 10 3 - 16    Glucose 88 70 - 99 mg/dL    BUN 24 (H) 7 - 20 mg/dL    CREATININE 1.1 0.8 - 1.3 mg/dL    GFR Non-African American >60 >60    GFR African American >60 >60    Calcium 8.7 8.3 - 10.6 mg/dL   CBC Auto Differential    Collection Time: 09/25/20  7:17 AM   Result Value Ref Range    WBC 2.9 (L) 4.0 - 11.0 K/uL    RBC 2.77 (L) 4.20 - 5.90 M/uL    Hemoglobin 8.4 (L) 13.5 - 17.5 g/dL    Hematocrit 25.7 (L) 40.5 - 52.5 %    MCV 92.8 80.0 - 100.0 fL    MCH 30.3 26.0 - 34.0 pg    MCHC 32.6 31.0 - 36.0 g/dL    RDW 16.8 (H) 12.4 - 15.4 %    Platelets 968 997 - 411 K/uL    MPV 9.2 5.0 - 10.5 fL    Neutrophils % 35.6 %    Lymphocytes % 32.3 %    Monocytes % 23.4 %    Eosinophils % 6.3 %    Basophils % 2.4 %    Neutrophils Absolute 1.0 (L) 1.7 - 7.7 K/uL    Lymphocytes Absolute 0.9 (L) 1.0 - 5.1 K/uL    Monocytes Absolute 0.7 0.0 - 1.3 K/uL    Eosinophils Absolute 0.2 0.0 - 0.6 K/uL    Basophils Absolute 0.1 0.0 - 0.2 K/uL   Basic Metabolic Panel w/ Reflex to MG    Collection Time: 09/26/20  9:04 AM   Result Value Ref Range    Sodium 135 (L) 136 - 145 mmol/L    Potassium reflex Magnesium 4.2 3.5 - 5.1 mmol/L    Chloride 102 99 - 110 mmol/L    CO2 22 21 - 32 mmol/L    Anion Gap 11 3 - 16    Glucose 103 (H) 70 - 99 mg/dL    BUN 19 7 - 20 mg/dL    CREATININE 1.2 0.8 - 1.3 mg/dL    GFR Non- 59 (A) >60    GFR African American >60 >60    Calcium 9.1 8.3 - 10.6 mg/dL   CBC Auto Differential    Collection Time: 09/26/20  9:04 AM   Result Value Ref Range    WBC 3.1 (L) 4.0 - 11.0 K/uL    RBC 3.04 (L) 4.20 - 5.90 M/uL    Hemoglobin 9.1 (L) 13.5 - 17.5 g/dL    Hematocrit 28.2 (L) 40.5 - 52.5 %    MCV 92.8 80.0 - 100.0 fL    MCH 29.9 26.0 - 34.0 pg    MCHC 32.3 31.0 - 36.0 g/dL    RDW 16.7 (H) 12.4 - 15.4 %    Platelets 250 709 - 219 K/uL    MPV 9.4 5.0 - 10.5 fL    Neutrophils % 45.8 %    Lymphocytes % 25.7 %    Monocytes % 21.0 %    Eosinophils % 5.5 %    Basophils % 2.0 %    Neutrophils Absolute 1.4 (L) 1.7 - 7.7 K/uL    Lymphocytes Absolute 0.8 (L) 1.0 - 5.1 K/uL    Monocytes Absolute 0.6 0.0 - 1.3 K/uL    Eosinophils Absolute 0.2 0.0 - 0.6 K/uL    Basophils Absolute 0.1 0.0 - 0.2 K/uL   Basic Metabolic Panel w/ Reflex to MG    Collection Time: 09/28/20  9:50 AM   Result Value Ref Range    Sodium 133 (L) 136 - 145 mmol/L    Potassium reflex Magnesium 3.9 3.5 - 5.1 mmol/L    Chloride 101 99 - 110 mmol/L    CO2 21 21 - 32 mmol/L    Anion Gap 11 3 - 16    Glucose 142 (H) 70 - 99 mg/dL    BUN 20 7 - 20 mg/dL    CREATININE 1.4 (H) 0.8 - 1.3 mg/dL    GFR Non- 49 (A) >60    GFR  59 (A) >60    Calcium 8.8 8.3 - 10.6 mg/dL   CBC Auto Differential    Collection Time: 09/28/20  9:50 AM   Result Value Ref Range    WBC 4.2 4.0 - 11.0 K/uL    RBC 3.18 (L) 4.20 - 5.90 M/uL    Hemoglobin 9.5 (L) 13.5 - 17.5 g/dL    Hematocrit 29.2 (L) 40.5 - 52.5 %    MCV 92.1 80.0 - 100.0 fL    MCH 30.0 26.0 - 34.0 pg    MCHC 32.6 31.0 - 36.0 g/dL    RDW 16.5 (H) 12.4 - 15.4 %    Platelets 196 865 - 250 K/uL    MPV 9.1 5.0 - 10.5 fL    Neutrophils % 61.1 %    Lymphocytes % 16.2 %    Monocytes % 18.5 %    Eosinophils % 2.1 %    Basophils % 2.1 %    Neutrophils Absolute 2.6 1.7 - 7.7 K/uL    Lymphocytes Absolute 0.7 (L) 1.0 - 5.1 K/uL    Monocytes Absolute 0.8 0.0 - 1.3 K/uL    Eosinophils Absolute 0.1 0.0 - 0.6 K/uL    Basophils Absolute 0.1 0.0 - 0.2 K/uL   Basic Metabolic Panel w/ Reflex to MG    Collection Time: 09/30/20  6:14 AM   Result Value Ref Range    Sodium 134 (L) 136 - 145 mmol/L    Potassium reflex Magnesium 3.9 3.5 - 5.1 mmol/L    Chloride 102 99 - 110 mmol/L    CO2 18 (L) 21 - 32 mmol/L    Anion Gap 14 3 - 16    Glucose 71 70 - 99 mg/dL    BUN 16 7 - 20 mg/dL    CREATININE 1.2 0.8 - 1.3 mg/dL    GFR Non- 59 (A) >60    GFR  Nightly Donell Williamson MD   40 mg at 09/27/20 2033    benzocaine-menthol (CEPACOL SORE THROAT) lozenge 1 lozenge  1 lozenge Buccal PRN Donell Williamson MD        bisacodyl (DULCOLAX) suppository 10 mg  10 mg Rectal Daily PRN Donell Williamson MD   10 mg at 09/26/20 1142    dorzolamide-timolol (COSOPT) 22.3-6.8 MG/ML ophthalmic solution 1 drop  1 drop Both Eyes BID Donell Williamson MD   1 drop at 09/28/20 0744    DULoxetine (CYMBALTA) extended release capsule 30 mg  30 mg Oral BID Donell Williamson MD   30 mg at 09/28/20 0474    ferrous sulfate EC tablet 324 mg  324 mg Oral Daily with breakfast Donell Williamson MD   324 mg at 09/28/20 0743    gabapentin (NEURONTIN) capsule 100 mg  100 mg Oral TID Donell Williamson MD   100 mg at 09/29/20 2029    guaiFENesin (ROBITUSSIN) 100 MG/5ML oral solution 200 mg  200 mg Oral Q4H PRN Donell Williamson MD       [facility] by [facility]] hydrALAZINE (APRESOLINE) tablet 50 mg  50 mg Oral 3 times per day Donell Williamson MD   50 mg at 09/28/20 0604    hydrALAZINE (APRESOLINE) tablet 25 mg  25 mg Oral Q6H PRN Donell Williamson MD   25 mg at 09/19/20 1736    lactobacillus (CULTURELLE) capsule 2 capsule  2 capsule Oral BID WC Donell Williamson MD   2 capsule at 09/28/20 0743    latanoprost (XALATAN) 0.005 % ophthalmic solution 1 drop  1 drop Left Eye Nightly Donell Williamson MD   1 drop at 09/27/20 2033    levothyroxine (SYNTHROID) tablet 50 mcg  50 mcg Oral Daily Donell Williamson MD   50 mcg at 09/28/20 0604    [Held by provider] losartan (COZAAR) tablet 100 mg  100 mg Oral Daily Donell Williamson MD   100 mg at 09/28/20 0743    melatonin tablet 3 mg  3 mg Oral Nightly PRN Donell Williamson MD   3 mg at 09/27/20 2034    metoprolol tartrate (LOPRESSOR) tablet 50 mg  50 mg Oral BID Donell Williamson MD   50 mg at 09/27/20 2033    nitroGLYCERIN (NITROSTAT) SL tablet 0.4 mg  0.4 mg Sublingual Q5 Min PRN Donell Williamson MD       [facility] oxyCODONE-acetaminophen (PERCOCET) 5-325 MG per tablet 1 tablet  1 tablet Oral Q4H PRN Francois Calloway MD   1 tablet at 09/30/20 0912    pantoprazole (PROTONIX) tablet 40 mg  40 mg Oral QAM AC Francois Calloway MD   40 mg at 09/28/20 0604    sodium chloride flush 0.9 % injection 10 mL  10 mL Intravenous 2 times per day Francois Calloway MD   10 mL at 09/30/20 0851    sodium chloride flush 0.9 % injection 10 mL  10 mL Intravenous PRN Francois Calloway MD   10 mL at 09/19/20 0226    [Held by provider] rivaroxaban (XARELTO) tablet 20 mg  20 mg Oral Daily with breakfast Francois Calloway MD   20 mg at 09/23/20 0912     ROS:  Somewhat weak    MSE:  A-in gowns, some EC, +PMR  A-depressed  M-depressed  S-grossly A + O  I/J-poor/poor  T-linear if a bit slow. Goal-directed. Speech c dec tone.  + SI c plan to starve self of food/H2O. Recs:    1. Depression NOS-Declines further psych med tx. This pt requires psych admit at this point because of his imminent dangerousness.

## 2020-09-30 NOTE — PROGRESS NOTES
Resting quietly in bed. Pt admitted with debility r/t sepsis from a UTI. Reeves cath in place draining raphael urine. Reeves care completed. Lungs CTA. HR regular. On telemetry showing NSR with 1 degree AVB. Abdomen non tender with active bowel sounds. Denies pain. Pt was agreeable to take Neurontin and wanted to take his amiodarone (which he had refused in the morning) But refused all other medication. States he is planning on participating in therapy today, that his son has convinced him to not give up. Encouragement provided. All needs assessed with rounding, will continue to monitor.  Nadine Fisher RN

## 2020-09-30 NOTE — PROGRESS NOTES
Occupational Therapy  Attempted OT treatment. Pt supine in bed with eyes closed. Pt awakened easily to name. Pt immediately refused therapy this date reporting he does not feel well. Attempted to encourage pt to attempt light activity and pt continued to refuse OOB. Noted pt now with running hydration by IV. Encouraged pt to attempt tomorrow.     Canelo Schmitz, [address]

## 2020-09-30 NOTE — PLAN OF CARE
Problem: Falls - Risk of:  Goal: Will remain free from falls  Description: Will remain free from falls  9/30/2020 0856 by Dang Zuniga RN  Outcome: Ongoing  9/29/2020 2331 by Leela Pinon RN  Outcome: Ongoing  Note: Pt educated on falls precautions and safety. Call light and personal belongings within reach at all times. Non skid socks in use when up. Hourly rounding and alarms active. Goal: Absence of physical injury  Description: Absence of physical injury  9/29/2020 2331 by Leela Pinon RN  Outcome: Ongoing     Problem: Skin Integrity:  Goal: Will show no infection signs and symptoms  Description: Will show no infection signs and symptoms  9/30/2020 0856 by Dang Zuniga RN  Outcome: Ongoing  9/29/2020 2331 by Leela Pinon RN  Outcome: Ongoing  Goal: Absence of new skin breakdown  Description: Absence of new skin breakdown  9/29/2020 2331 by Leela Pinon RN  Outcome: Ongoing  Note: Able to change positions in bed without assist, no evidence of skin breakdown noted. Waffle cushion in place to chair. Problem: Nutrition  Goal: Optimal nutrition therapy  9/30/2020 0856 by Dang Zuniga RN  Outcome: Ongoing  9/29/2020 2331 by Leela Pinon RN  Outcome: Ongoing     Problem: Pain:  Goal: Pain level will decrease  Description: Pain level will decrease  9/29/2020 2331 by Leela Pinon RN  Outcome: Ongoing  Note: Able to rate pain using a 1-10 scale, medicated per prn orders, see MAR.  Able to verbalize a reduction in pain and/or able to fall asleep and remain asleep without any s/s of pain    Goal: Control of acute pain  Description: Control of acute pain  9/29/2020 2331 by Leela Pinon RN  Outcome: Ongoing  Goal: Control of chronic pain  Description: Control of chronic pain  9/29/2020 2331 by Leela Pinon RN  Outcome: Ongoing     Problem: Musculor/Skeletal Functional Status  Goal: Highest potential functional level  9/30/2020 0856 by Dang Zuniga RN  Outcome: Ongoing  9/29/2020 2331 by Jaclyn Boo RN  Outcome: Ongoing  Goal: Absence of falls  9/29/2020 2331 by Jaclyn Boo RN  Outcome: Ongoing     Problem: IP BOWEL ELIMINATION  Goal: LTG - patient will utilize adaptive techniques/equipment to complete bowel elimination  9/29/2020 2331 by Jaclyn Boo RN  Outcome: Ongoing  Goal: STG - Patient verbalizes knowledge about relationship between diet, fluid intake, activity and medication on constipation  9/29/2020 2331 by Jaclyn Boo RN  Outcome: Ongoing     Problem: IP BLADDER/VOIDING  Goal: STG - Patient demonstrates ability to take care of indwelling catheter  9/29/2020 2331 by Jaclyn Boo RN  Outcome: Ongoing     Problem: Infection - Central Venous Catheter-Associated Bloodstream Infection:  Goal: Will show no infection signs and symptoms  Description: Will show no infection signs and symptoms  9/30/2020 0856 by Sagrario Bowles RN  Outcome: Ongoing  9/29/2020 2331 by Jaclyn Boo RN  Outcome: Ongoing     Problem:  Activity Intolerance:  Goal: Ability to tolerate increased activity will improve  Description: Ability to tolerate increased activity will improve  9/30/2020 0856 by Sagrario Bowles RN  Outcome: Ongoing  9/29/2020 2331 by Jaclyn Boo RN  Outcome: Ongoing

## 2020-09-30 NOTE — CARE COORDINATION
SAIDAW spoke with Kristi Malone at the [address] [place] [address] to initiate referral.  She reports she needs current CBS, CMP, UA, Drug panel and COVID TEST and a note in chart from MD stating he is medically stable for transfer. Then, Their psychiatrist will need to speak directly with Dr Uma Barnard to determine acceptance. SAIDAW called this update to Dr Johann Navarrete voice message to inform.   [place], [place]     Case Management   [phone]    9/30/2020  2:16 PM

## 2020-09-30 NOTE — TELEPHONE ENCOUNTER
Patient's son Catarina Gusman is calling back stating he missed 's call yesterday and would like him to call him back thanks

## 2020-09-30 NOTE — PROGRESS NOTES
Physical Therapy  PT attempt  Lauren Alvarez Attempted PT treatment this AM.  Pt resting in bed upon arrival.  Refusing therapy at this time due to abdominal pain and states that he just had pain med. Encouraged OOB but pt continued to decline. Offered toileting but pt continued to decline.   Will attempt again this PM.    Variance: 45 min    Electronically signed by Cori Thayer, [place] [id number] on 9/30/2020 at 9:54 AM

## 2020-09-30 NOTE — PROGRESS NOTES
Occupational Therapy    Attempted OT treatment this AM.  Pt laying on left side with covers partially covering his head. Pt reporting pain in his legs and abdomen. Noted breakfast sitting on the tray but untouched. Pt reported he could not eat because he is \"sick. \"  Encouraged pt to complete bathing and pt declined due to being in too much pain. Attempted to complete sponge bath in bed and pt continued to refuse.   Variance 35 minutes due to refusal.  Therapy Time     Individual Co-treatment   Time In 0900     Time Out 0910     [facility] [place] 7, [address]

## 2020-10-01 ENCOUNTER — HOSPITAL ENCOUNTER (INPATIENT)
Age: 75
LOS: 6 days | Discharge: ANOTHER HOSPITAL | DRG: 885 | End: 2020-10-07
Attending: PSYCHIATRY & NEUROLOGY | Admitting: PSYCHIATRY & NEUROLOGY
Payer: MEDICARE

## 2020-10-01 PROBLEM — E43 SEVERE PROTEIN-CALORIE MALNUTRITION (HCC): Status: ACTIVE | Noted: 2020-10-01

## 2020-10-01 LAB
EKG ATRIAL RATE: 59 BPM
EKG DIAGNOSIS: NORMAL
EKG P AXIS: 84 DEGREES
EKG P-R INTERVAL: 298 MS
EKG Q-T INTERVAL: 440 MS
EKG QRS DURATION: 118 MS
EKG QTC CALCULATION (BAZETT): 435 MS
EKG R AXIS: -41 DEGREES
EKG T AXIS: 113 DEGREES
EKG VENTRICULAR RATE: 59 BPM

## 2020-10-01 PROCEDURE — 99223 1ST HOSP IP/OBS HIGH 75: CPT | Performed by: PHYSICIAN ASSISTANT

## 2020-10-01 PROCEDURE — 97535 SELF CARE MNGMENT TRAINING: CPT

## 2020-10-01 PROCEDURE — 97530 THERAPEUTIC ACTIVITIES: CPT

## 2020-10-01 PROCEDURE — 97110 THERAPEUTIC EXERCISES: CPT

## 2020-10-01 PROCEDURE — 97166 OT EVAL MOD COMPLEX 45 MIN: CPT

## 2020-10-01 PROCEDURE — 93010 ELECTROCARDIOGRAM REPORT: CPT | Performed by: INTERNAL MEDICINE

## 2020-10-01 PROCEDURE — 2580000003 HC RX 258: Performed by: PSYCHIATRY & NEUROLOGY

## 2020-10-01 PROCEDURE — 93005 ELECTROCARDIOGRAM TRACING: CPT | Performed by: PSYCHIATRY & NEUROLOGY

## 2020-10-01 PROCEDURE — 1240000000 HC EMOTIONAL WELLNESS R&B

## 2020-10-01 PROCEDURE — 6370000000 HC RX 637 (ALT 250 FOR IP): Performed by: PSYCHIATRY & NEUROLOGY

## 2020-10-01 PROCEDURE — 6370000000 HC RX 637 (ALT 250 FOR IP): Performed by: PHYSICIAN ASSISTANT

## 2020-10-01 PROCEDURE — 99223 1ST HOSP IP/OBS HIGH 75: CPT | Performed by: PSYCHIATRY & NEUROLOGY

## 2020-10-01 PROCEDURE — 97162 PT EVAL MOD COMPLEX 30 MIN: CPT

## 2020-10-01 RX ORDER — ACETAMINOPHEN 325 MG/1
650 TABLET ORAL EVERY 4 HOURS PRN
Status: DISCONTINUED | OUTPATIENT
Start: 2020-10-01 | End: 2020-10-07 | Stop reason: HOSPADM

## 2020-10-01 RX ORDER — SODIUM CHLORIDE 0.9 % (FLUSH) 0.9 %
10 SYRINGE (ML) INJECTION EVERY 12 HOURS
Status: DISCONTINUED | OUTPATIENT
Start: 2020-10-01 | End: 2020-10-07 | Stop reason: HOSPADM

## 2020-10-01 RX ORDER — LATANOPROST 50 UG/ML
1 SOLUTION/ DROPS OPHTHALMIC NIGHTLY
Status: DISCONTINUED | OUTPATIENT
Start: 2020-10-01 | End: 2020-10-07 | Stop reason: HOSPADM

## 2020-10-01 RX ORDER — METHYLPHENIDATE HYDROCHLORIDE 5 MG/1
5 TABLET ORAL 2 TIMES DAILY WITH MEALS
Status: DISCONTINUED | OUTPATIENT
Start: 2020-10-01 | End: 2020-10-07 | Stop reason: HOSPADM

## 2020-10-01 RX ORDER — HALOPERIDOL 1 MG/1
2 TABLET ORAL EVERY 6 HOURS PRN
Status: DISCONTINUED | OUTPATIENT
Start: 2020-10-01 | End: 2020-10-07 | Stop reason: HOSPADM

## 2020-10-01 RX ORDER — LORAZEPAM 2 MG/ML
1 INJECTION INTRAMUSCULAR EVERY 6 HOURS PRN
Status: DISCONTINUED | OUTPATIENT
Start: 2020-10-01 | End: 2020-10-07 | Stop reason: HOSPADM

## 2020-10-01 RX ORDER — METOPROLOL TARTRATE 50 MG/1
50 TABLET, FILM COATED ORAL 2 TIMES DAILY
Qty: 60 TABLET | Refills: 0 | Status: ON HOLD
Start: 2020-10-01 | End: 2020-10-23 | Stop reason: SDUPTHER

## 2020-10-01 RX ORDER — TRAZODONE HYDROCHLORIDE 50 MG/1
25 TABLET ORAL NIGHTLY PRN
Status: DISCONTINUED | OUTPATIENT
Start: 2020-10-01 | End: 2020-10-07 | Stop reason: HOSPADM

## 2020-10-01 RX ORDER — HALOPERIDOL 5 MG/ML
2 INJECTION INTRAMUSCULAR EVERY 6 HOURS PRN
Status: DISCONTINUED | OUTPATIENT
Start: 2020-10-01 | End: 2020-10-07 | Stop reason: HOSPADM

## 2020-10-01 RX ORDER — DULOXETIN HYDROCHLORIDE 30 MG/1
30 CAPSULE, DELAYED RELEASE ORAL 2 TIMES DAILY
Status: DISCONTINUED | OUTPATIENT
Start: 2020-10-01 | End: 2020-10-07 | Stop reason: HOSPADM

## 2020-10-01 RX ORDER — GABAPENTIN 100 MG/1
100 CAPSULE ORAL 3 TIMES DAILY
Qty: 90 CAPSULE | Refills: 0 | Status: ON HOLD
Start: 2020-10-01 | End: 2020-10-23 | Stop reason: SDUPTHER

## 2020-10-01 RX ORDER — GABAPENTIN 100 MG/1
100 CAPSULE ORAL 3 TIMES DAILY
Status: DISCONTINUED | OUTPATIENT
Start: 2020-10-01 | End: 2020-10-07 | Stop reason: HOSPADM

## 2020-10-01 RX ORDER — DORZOLAMIDE HYDROCHLORIDE AND TIMOLOL MALEATE 20; 5 MG/ML; MG/ML
1 SOLUTION/ DROPS OPHTHALMIC 2 TIMES DAILY
Status: DISCONTINUED | OUTPATIENT
Start: 2020-10-01 | End: 2020-10-07 | Stop reason: HOSPADM

## 2020-10-01 RX ORDER — OXYCODONE AND ACETAMINOPHEN 7.5; 325 MG/1; MG/1
1 TABLET ORAL EVERY 4 HOURS PRN
Status: DISCONTINUED | OUTPATIENT
Start: 2020-10-01 | End: 2020-10-07 | Stop reason: HOSPADM

## 2020-10-01 RX ORDER — ATORVASTATIN CALCIUM 40 MG/1
40 TABLET, FILM COATED ORAL NIGHTLY
Status: DISCONTINUED | OUTPATIENT
Start: 2020-10-01 | End: 2020-10-07 | Stop reason: HOSPADM

## 2020-10-01 RX ORDER — NITROGLYCERIN 0.4 MG/1
0.4 TABLET SUBLINGUAL EVERY 5 MIN PRN
Status: DISCONTINUED | OUTPATIENT
Start: 2020-10-01 | End: 2020-10-07 | Stop reason: HOSPADM

## 2020-10-01 RX ORDER — LEVOTHYROXINE SODIUM 0.05 MG/1
50 TABLET ORAL DAILY
Status: DISCONTINUED | OUTPATIENT
Start: 2020-10-01 | End: 2020-10-07 | Stop reason: HOSPADM

## 2020-10-01 RX ORDER — MAGNESIUM HYDROXIDE/ALUMINUM HYDROXICE/SIMETHICONE 120; 1200; 1200 MG/30ML; MG/30ML; MG/30ML
30 SUSPENSION ORAL EVERY 6 HOURS PRN
Status: DISCONTINUED | OUTPATIENT
Start: 2020-10-01 | End: 2020-10-07 | Stop reason: HOSPADM

## 2020-10-01 RX ORDER — PANTOPRAZOLE SODIUM 40 MG/1
40 TABLET, DELAYED RELEASE ORAL DAILY
Status: DISCONTINUED | OUTPATIENT
Start: 2020-10-01 | End: 2020-10-07 | Stop reason: HOSPADM

## 2020-10-01 RX ORDER — FERROUS SULFATE 325(65) MG
325 TABLET ORAL 2 TIMES DAILY WITH MEALS
Qty: 60 TABLET | Refills: 0 | Status: ON HOLD
Start: 2020-10-01 | End: 2020-10-23 | Stop reason: SDUPTHER

## 2020-10-01 RX ORDER — LACTOBACILLUS RHAMNOSUS GG 10B CELL
2 CAPSULE ORAL 2 TIMES DAILY WITH MEALS
Status: DISCONTINUED | OUTPATIENT
Start: 2020-10-01 | End: 2020-10-07 | Stop reason: HOSPADM

## 2020-10-01 RX ORDER — FERROUS SULFATE 325(65) MG
325 TABLET ORAL
Status: DISCONTINUED | OUTPATIENT
Start: 2020-10-01 | End: 2020-10-07 | Stop reason: HOSPADM

## 2020-10-01 RX ORDER — SENNA PLUS 8.6 MG/1
2 TABLET ORAL 2 TIMES DAILY
Status: DISCONTINUED | OUTPATIENT
Start: 2020-10-01 | End: 2020-10-07 | Stop reason: HOSPADM

## 2020-10-01 RX ORDER — AMIODARONE HYDROCHLORIDE 200 MG/1
200 TABLET ORAL DAILY
Status: DISCONTINUED | OUTPATIENT
Start: 2020-10-01 | End: 2020-10-07 | Stop reason: HOSPADM

## 2020-10-01 RX ORDER — LORAZEPAM 0.5 MG/1
0.5 TABLET ORAL EVERY 6 HOURS PRN
Status: DISCONTINUED | OUTPATIENT
Start: 2020-10-01 | End: 2020-10-07 | Stop reason: HOSPADM

## 2020-10-01 RX ORDER — OXYCODONE HYDROCHLORIDE AND ACETAMINOPHEN 5; 325 MG/1; MG/1
1 TABLET ORAL EVERY 4 HOURS PRN
Status: DISCONTINUED | OUTPATIENT
Start: 2020-10-01 | End: 2020-10-07 | Stop reason: HOSPADM

## 2020-10-01 RX ORDER — BENZTROPINE MESYLATE 1 MG/ML
1 INJECTION INTRAMUSCULAR; INTRAVENOUS 2 TIMES DAILY PRN
Status: DISCONTINUED | OUTPATIENT
Start: 2020-10-01 | End: 2020-10-07 | Stop reason: HOSPADM

## 2020-10-01 RX ORDER — METOPROLOL SUCCINATE 50 MG/1
50 TABLET, EXTENDED RELEASE ORAL 2 TIMES DAILY
Status: DISCONTINUED | OUTPATIENT
Start: 2020-10-01 | End: 2020-10-07 | Stop reason: HOSPADM

## 2020-10-01 RX ADMIN — GABAPENTIN 100 MG: 100 CAPSULE ORAL at 20:36

## 2020-10-01 RX ADMIN — SENNOSIDES 17.2 MG: 8.6 TABLET, FILM COATED ORAL at 09:43

## 2020-10-01 RX ADMIN — Medication 2 CAPSULE: at 17:42

## 2020-10-01 RX ADMIN — ATORVASTATIN CALCIUM 40 MG: 40 TABLET, FILM COATED ORAL at 20:36

## 2020-10-01 RX ADMIN — SENNOSIDES 17.2 MG: 8.6 TABLET, FILM COATED ORAL at 20:36

## 2020-10-01 RX ADMIN — Medication 10 ML: at 20:46

## 2020-10-01 RX ADMIN — DORZOLAMIDE HYDROCHLORIDE AND TIMOLOL MALEATE 1 DROP: 20; 5 SOLUTION/ DROPS OPHTHALMIC at 20:36

## 2020-10-01 RX ADMIN — METHYLPHENIDATE HYDROCHLORIDE 5 MG: 5 TABLET ORAL at 17:41

## 2020-10-01 RX ADMIN — LEVOTHYROXINE SODIUM 50 MCG: 50 TABLET ORAL at 06:00

## 2020-10-01 RX ADMIN — AMIODARONE HYDROCHLORIDE 200 MG: 200 TABLET ORAL at 17:43

## 2020-10-01 RX ADMIN — PANTOPRAZOLE SODIUM 40 MG: 40 TABLET, DELAYED RELEASE ORAL at 06:00

## 2020-10-01 RX ADMIN — DULOXETINE HYDROCHLORIDE 30 MG: 30 CAPSULE, DELAYED RELEASE ORAL at 09:42

## 2020-10-01 RX ADMIN — FERROUS SULFATE TAB 325 MG (65 MG ELEMENTAL FE) 325 MG: 325 (65 FE) TAB at 09:42

## 2020-10-01 RX ADMIN — OXYCODONE HYDROCHLORIDE AND ACETAMINOPHEN 1 TABLET: 7.5; 325 TABLET ORAL at 06:00

## 2020-10-01 RX ADMIN — DORZOLAMIDE HYDROCHLORIDE AND TIMOLOL MALEATE 1 DROP: 20; 5 SOLUTION/ DROPS OPHTHALMIC at 09:44

## 2020-10-01 RX ADMIN — METHYLPHENIDATE HYDROCHLORIDE 5 MG: 5 TABLET ORAL at 12:00

## 2020-10-01 RX ADMIN — LATANOPROST 1 DROP: 50 SOLUTION/ DROPS OPHTHALMIC at 20:36

## 2020-10-01 RX ADMIN — RIVAROXABAN 20 MG: 20 TABLET, FILM COATED ORAL at 17:42

## 2020-10-01 RX ADMIN — METOPROLOL SUCCINATE 50 MG: 50 TABLET, EXTENDED RELEASE ORAL at 17:42

## 2020-10-01 RX ADMIN — METOPROLOL SUCCINATE 50 MG: 50 TABLET, EXTENDED RELEASE ORAL at 09:43

## 2020-10-01 RX ADMIN — GABAPENTIN 100 MG: 100 CAPSULE ORAL at 14:45

## 2020-10-01 RX ADMIN — Medication 2 CAPSULE: at 09:42

## 2020-10-01 RX ADMIN — DULOXETINE HYDROCHLORIDE 30 MG: 30 CAPSULE, DELAYED RELEASE ORAL at 20:36

## 2020-10-01 RX ADMIN — GABAPENTIN 100 MG: 100 CAPSULE ORAL at 09:43

## 2020-10-01 RX ADMIN — OXYCODONE HYDROCHLORIDE AND ACETAMINOPHEN 1 TABLET: 5; 325 TABLET ORAL at 12:00

## 2020-10-01 ASSESSMENT — PAIN DESCRIPTION - PROGRESSION: CLINICAL_PROGRESSION: GRADUALLY WORSENING

## 2020-10-01 ASSESSMENT — PATIENT HEALTH QUESTIONNAIRE - PHQ9: SUM OF ALL RESPONSES TO PHQ QUESTIONS 1-9: 16

## 2020-10-01 ASSESSMENT — PAIN DESCRIPTION - PAIN TYPE: TYPE: ACUTE PAIN

## 2020-10-01 ASSESSMENT — PAIN DESCRIPTION - ORIENTATION: ORIENTATION: RIGHT;LEFT

## 2020-10-01 ASSESSMENT — LIFESTYLE VARIABLES
HISTORY_ALCOHOL_USE: NO
HISTORY_ALCOHOL_USE: NO

## 2020-10-01 ASSESSMENT — PAIN DESCRIPTION - DESCRIPTORS: DESCRIPTORS: ACHING;DISCOMFORT

## 2020-10-01 ASSESSMENT — PAIN - FUNCTIONAL ASSESSMENT: PAIN_FUNCTIONAL_ASSESSMENT: PREVENTS OR INTERFERES SOME ACTIVE ACTIVITIES AND ADLS

## 2020-10-01 ASSESSMENT — SLEEP AND FATIGUE QUESTIONNAIRES
DO YOU USE A SLEEP AID: NO
DO YOU HAVE DIFFICULTY SLEEPING: NO
AVERAGE NUMBER OF SLEEP HOURS: 8
DO YOU USE A SLEEP AID: COMMENT
DO YOU HAVE DIFFICULTY SLEEPING: NO

## 2020-10-01 ASSESSMENT — PAIN SCALES - GENERAL
PAINLEVEL_OUTOF10: 5
PAINLEVEL_OUTOF10: 8
PAINLEVEL_OUTOF10: 2
PAINLEVEL_OUTOF10: 8

## 2020-10-01 ASSESSMENT — PAIN DESCRIPTION - LOCATION: LOCATION: BACK;LEG

## 2020-10-01 ASSESSMENT — PAIN DESCRIPTION - ONSET: ONSET: GRADUAL

## 2020-10-01 ASSESSMENT — PAIN DESCRIPTION - FREQUENCY: FREQUENCY: CONTINUOUS

## 2020-10-01 NOTE — DISCHARGE INSTR - COC
Continuity of Care Form    Patient Name: Zi Grant   :  1945  MRN:  [id number]    Admit date:  2020  Discharge date:      Code Status Order: DNR-CCA   Advance Directives:   [facility] Documentation       Date/Time Healthcare Directive Type of Healthcare Directive Copy in [address] Agent's Name Healthcare Agent's Phone Number    20 3164  --  --  No, copy requested from other (See comment) pt to ask family  --  --  --    20 1548  --  --  --  --  --  -- can't remember all of his number    20 7791  Yes, patient has an advance directive for healthcare treatment  Durable power of  for health care  No, copy requested from family  Adult [address] [place] [address]-            Admitting Physician:  Tova Castle MD  PCP: Naseem Mccarty    Discharging Nurse: [facility] Unit/Room#: C3W-0184/5696-65  Discharging Unit Phone Number: [phone]    Emergency Contact:   Extended Emergency Contact Information  Primary Emergency Contact: Gordon Merino Phone: [phone]  Mobile Phone: [phone]  Relation: Child  Secondary Emergency Contact: Teri Fortune  Mobile Phone: [phone]  Relation: Child    Past Surgical History:  Past Surgical History:   Procedure Laterality Date    COLONOSCOPY      CORONARY ANGIOPLASTY WITH STENT PLACEMENT      X 2    EYE SURGERY Right 2020    GONIOTOMY performed by Rickey Draper MD at [address] Right 2020    Phacoemulsification with intraocular lens implant performed by Rickey Draper MD at [address] [place] [address] [place]       Immunization History: There is no immunization history on file for this patient.     Active Problems:  Patient Active Problem List   Diagnosis Code    Acute metabolic encephalopathy Z20.81    Pelvic hematoma in male N50.1    Acute renal failure superimposed on chronic kidney disease (HCC) N17.9, N18.9 Elevated LFTs R94.5    Abnormal chest x-ray R93.89    Acute cystitis without hematuria N30.00    Debility R53.81    Suicide attempt ([facility] T14.91XA    Depression F32.9    MDD (major depressive disorder), recurrent episode ([facility] [address] F33.9       Isolation/Infection:   Isolation            No Isolation          Patient Infection Status       Infection Onset Added Last Indicated Last Indicated By Review Planned Expiration Resolved Resolved By    None active    Resolved    COVID-19 Rule Out 09/30/20 09/30/20 09/30/20 COVID-19 (Ordered)   09/30/20 Rule-Out Test Resulted    C-diff Rule Out 09/15/20 09/15/20 09/15/20 Clostridium Difficile Toxin/Antigen (Ordered)   09/18/20 Jeramie Ruff RN    COVID-19 Rule Out 09/13/20 09/13/20 09/13/20 COVID-19 (Ordered)   09/15/20 Rule-Out Test Resulted    COVID-19 Rule Out 09/13/20 09/13/20 09/13/20 COVID-19 (Ordered)   09/13/20 Rule-Out Test Resulted            Nurse Assessment:  Last Vital Signs: BP (!) 150/62   Pulse 89   Temp 97.7 °F (36.5 °C) (Oral)   Resp 16   Ht 5' 9\" (1.753 m)   Wt 187 lb 2.7 oz (84.9 kg)   SpO2 94%   BMI 27.64 kg/m²     Last documented pain score (0-10 scale): Pain Level: 8  Last Weight:   Wt Readings from Last 1 Encounters:   09/30/20 187 lb 2.7 oz (84.9 kg)     Mental Status:  alert    IV Access:  - PICC - site  R Basilic, insertion date: 9/13/2020    Nursing Mobility/ADLs:  Walking   Dependent  Transfer  Dependent  Bathing  Assisted  Dressing  Assisted  Toileting  Assisted  Feeding  Independent  Med Admin  Assisted  Med Delivery   whole    Wound Care Documentation and Therapy:  Wound 09/13/20 Back Mid;Lower Surgical Incision X3 (Active)   Number of days: 17        Elimination:  Continence: Bowel: Yes  Bladder: Reeves  Urinary Catheter:  Insertion Date: 9/13/20   Colostomy/Ileostomy/Ileal Conduit: No       Date of Last BM: 9/28/20    Intake/Output Summary (Last 24 hours) at 9/30/2020 2133  Last data filed at 9/30/2020 2103  Gross per 24 hour   Intake 1006.34 ml   Output 725 ml   Net 281.34 ml     I/O last 3 completed shifts: In: 160 [P.O.:150; I.V.:10]  Out: 500 [Urine:500]    Safety Concerns:     History of Falls (last 30 days) and suicide ideation with a plan    Impairments/Disabilities:      None    Nutrition Therapy:  Current Nutrition Therapy:   - Oral Diet:  General    Routes of Feeding: refusing food; receiving TPN  Liquids: Thin Liquids  Daily Fluid Restriction: no  Last Modified Barium Swallow with Video (Video Swallowing Test): not done    Treatments at the Time of Hospital Discharge:   Respiratory Treatments:   Oxygen Therapy:  is not on home oxygen therapy. Ventilator:    - No ventilator support    Rehab Therapies:   Weight Bearing Status/Restrictions: No weight bearing restirctions  Other Medical Equipment (for information only, NOT a DME order):  stedy  Other Treatments:     Patient's personal belongings (please select all that are sent with patient):  Glasses,clothing,cell phone, shoes    RN SIGNATURE:  Electronically signed by Vinny Hitchcock RN on 9/30/20 at 9:39 PM EDT    CASE MANAGEMENT/SOCIAL WORK SECTION    Inpatient Status Date: ***    Readmission Risk Assessment Score:  Readmission Risk              Risk of Unplanned Readmission:        18           Discharging to Facility/ Agency   Name:   Address:  Phone:  Fax:    Dialysis Facility (if applicable)   Name:  Address:  Dialysis Schedule:  Phone:  Fax:    / signature: {Esignature:[id number]}    PHYSICIAN SECTION    Prognosis: Good    Condition at Discharge: Stable    Rehab Potential (if transferring to Rehab): Good    Recommended Labs or Other Treatments After Discharge:   Continue PT/OT at inpatient psych if able. Repeat BMP, CBC in 1 week  Maintain Reeves until follow-up with Urology. Follow-up with PCP, Spine Surgery, Urology.      Physician Certification: I certify the above information and transfer of Michael Abdalla  is necessary for the continuing treatment of the diagnosis listed and that he requires Inpatient Psychiatric Hospitalization  for less 30 days.      Update Admission H&P: No change in H&P    PHYSICIAN SIGNATURE:  Electronically signed by Woo Coyne MD on 10/1/20 at 11:13 AM EDT

## 2020-10-01 NOTE — PROGRESS NOTES
Comprehensive Nutrition Assessment    Type and Reason for Visit:  Initial, Positive Nutrition Screen, Consult(+ screen for poor appetite, N/V; consult for patient reported that he lost his taste + smell and has not been eating/drinking well PTA)    Nutrition Recommendations/Plan:   1. Continue General Diet  2. Add Ensure Enlive 2 bottles with meals  3. Monitor po intake, weight and nutrition related labs    Nutrition Assessment:  pt is nutritionally compromised AEB poor po intake x 5 days and suicidal ideation r/t unintentionally starving himself and pt is at risk for further compromise d/t lack of desire to eat meals (because he has lost his taste + smell) and only consuming ONS occasionally + he reported LUQ abdominal pain recently; will continue general diet order + add Ensure Enlive 2 bottles TID    Malnutrition Assessment:  Malnutrition Status:  Severe malnutrition    Context:  Acute Illness     Findings of the 6 clinical characteristics of malnutrition:  Energy Intake:  7 - 50% or less of estimated energy requirements for 5 or more days  Weight Loss:  7 - Greater than 2% over 1 week(-5# or 2.6% weight loss x 5 days)     Body Fat Loss:  (severe) Orbital   Muscle Mass Loss:  (severe) Calf (gastrocnemius), Clavicles (pectoralis & deltoids), Hand (interosseous), Temples (temporalis)(mild- temples, moderate- clavicles and calves, severe- hand)  Fluid Accumulation:  No significant fluid accumulation     Strength:  Not Performed    Estimated Daily Nutrient Needs:  Energy (kcal):  1323-6374 kcals/day based on 20-23 kcals/kg/CBW; Weight Used for Energy Requirements:  Current     Protein (g):   g protein/day based on 1.3-1.6 g/kg/IBW;  Weight Used for Protein Requirements:  Ideal        Fluid (ml/day):  9609-0233 mls/day; Weight Used for Fluid Requirements:  Current      Nutrition Related Findings:  pt was admitted to [facility] for MDD; he has lost his desire to eat/drink and has thoughts of suicidal ideation r/t starving himself to death because he has lost his smell + taste x 6 months PTA; he was agreeable to 2 Ensure Enlive w/ meals - chocolate flavor is preferred; he hasn't been able to smell or taste anything for 6 + months after he had a cold/flu; he reports that he can only taste foods that are super spicy or really sweet; he likes orange juice and Ensure Chocolate; at home his dtr has groceries delivered to his home but he likes to eat out at fast food restaurants (mostly for lunch; [employer], [place]) because it is easier and his dtr doesn't help prepare the food; pt was [facility] from [facility]; A+O x 4, per progress notes, was eating some soup today while speaking w/ psychiatry; he has been drinking Ensure Enlive TID at [facility]; abdomen is soft, rounded, non-tender w/ active bowel sounds; he reported that he was given docusate for constipation that lasted 5 days but it gave him diarrhea for 2 days after he took the docusate; skin pale, warm and dry w/ scattered bruising and a surgical wound in mid lower back and abdomen from recent back surgery (fusion); he is on protonix, toprol, synthroid and lipitor; + catheter in place at this time d/t UTI      Wounds:  Surgical Wound       Current Nutrition Therapies:    DIET GENERAL;  Dietary Nutrition Supplements: Standard High Calorie Oral Supplement    Anthropometric Measures:  · Height: 5' 9\" (175.3 cm)  · Current Body Weight: 187 lb 2.7 oz (84.9 kg)(9/30/20)   · Admission Body Weight: 192 lb 14.4 oz (87.5 kg)(9/13/20)    · Usual Body Weight: 192 lb 14 oz (87.5 kg)(obtained on 9/30/20; patient reported UBW of 170-175#)     · Ideal Body Weight: 160 lbs; % Ideal Body Weight 117 %   · BMI: 27.6  · BMI Categories: Overweight (BMI 25.0-29. 9)       Nutrition Diagnosis:   · Inadequate oral intake related to psychological cause or life stress, inadequate protein-energy intake, altered taste perception as evidenced by wounds, poor intake prior to admission, weight loss greater than or equal to 2% in 1 week      Nutrition Interventions:   Food and/or Nutrient Delivery:  Continue Current Diet, Start Oral Nutrition Supplement  Nutrition Education/Counseling:  No recommendation at this time   Coordination of Nutrition Care:  Continued Inpatient Monitoring    Goals:  pt will consume 50% or greater of meals x 3 meals per day and 75% or greater of 2 Ensure Enlive x 3 meals per day       Nutrition Monitoring and Evaluation:   Behavioral-Environmental Outcomes:  Beliefs and Attitutes, Knowledge or Skill   Food/Nutrient Intake Outcomes:  Food and Nutrient Intake, Supplement Intake  Physical Signs/Symptoms Outcomes:  Biochemical Data, Constipation, Meal Time Behavior, Hemodynamic Status, Nutrition Focused Physical Findings, Skin, Weight     Discharge Planning:     Too soon to determine     Electronically signed by Jb Dumont on 10/1/20 at 1:35 PM EDT    Contact: [phone]

## 2020-10-01 NOTE — FLOWSHEET NOTE
Group Therapy Note    Date: 10/1/2020  Start Time: 1000  End Time:  4986  Number of Participants: 7    Type of Group: Music Group    Notes:  Pt present for part of Music Group, arriving 30 minutes late. While present, Pt sat quietly and listened.     Participation Level: Minimal    Participation Quality: Attentive and Resistant      Speech:  hesitant      Affective Functioning: Blunted      Endings: None Reported    Modes of Intervention: Support, Socialization, Activity and Media      Discipline Responsible: Chaplain Lashae Gill       10/01/20 2502   Encounter Summary   Services provided to: Patient   Continue Visiting   (10/1 Music Group)   Complexity of Encounter Moderate   Length of Encounter 15 minutes

## 2020-10-01 NOTE — PROGRESS NOTES
Received report at 2300. Per report pt being discharged to inpatient physch at [facility], pickup time around 11pm.    10/1 @ [address] arrived to pickup pt. All belongings sent with pt.

## 2020-10-01 NOTE — CONSULTS
results found for: APTT[APTT        Impression/Plan: This is a 66 y.o. male with PMH of Atrial Fibrillation, HLD, Hx of CAD s.p stent, AAA, GERD, hypothyroidism, Hx of CVA and COPD who presented for suicidal ideation. Urology is consulted for evaluation of AUR, Mendoza placement. Had a cystoscopy and complex Mendoza placement on 9/13/2020 with Dr Arlyn Sainz. The patient had a large posterior false passage. The mendoza has been in since then.  Not on Flomax    -- start Flomax   -- want to do voiding trial while in house - please remove the Mendoza at 4am-6am - this will give us all day to see if he voids and urology available if assistance needed with mendoza placement - any future attempts with mendoza placement, would recommend an 18Fr Coude cath  -- call with questions or if fails trial of void - otherwise will sign off for now  -- f/u with our team as outpatient      Thank you for the opportunity to be involved in the care of this patient - please call with questions    Corinne Crocker, MD  [facility]  [phone]

## 2020-10-01 NOTE — PROGRESS NOTES
Pt arrived on unit via stretcher accompanied by transport team. Pt has been oriented to the unit and to the rules of the unit. Patient is not able to demonstrated the ability to move from a reclining position to an upright position within the recliner. however patient is alert, oriented and able to provide informed consent    Pt educated on importance of wearing mask while on the unit. Pt verbalized understanding. Staff will continue to wear mask while providing patient care.

## 2020-10-01 NOTE — PLAN OF CARE
Nutrition Problem #1: Inadequate oral intake  Intervention: Food and/or Nutrient Delivery: Continue Current Diet, Start Oral Nutrition Supplement  Nutritional Goals: pt will consume 50% or greater of meals x 3 meals per day and 75% or greater of 2 Ensure Enlive x 3 meals per day

## 2020-10-01 NOTE — DISCHARGE SUMMARY
Physical Medicine & Rehabilitation  Discharge Summary     Patient Identification:  Ashlee Oleary Sr.  : 1945  Admit date: 2020  Discharge date: 10/1/2020   Attending provider: Conrad Black MD      Primary care provider: Kelli Cain     Discharge Diagnoses:   Patient Active Problem List   Diagnosis    Acute metabolic encephalopathy    Pelvic hematoma in male    [facility] failure superimposed on chronic [facility] disease [facility]    Elevated LFTs    Abnormal chest x-ray    Acute cystitis without hematuria    Debility    Suicide attempt ([facility]    Depression    MDD (major depressive disorder), recurrent episode ([facility]       History of Present Illness/Acute Hospital Course:  Patient is a 67 yo M with pmh Afib, CAD, HTN, HLD, and recent lumbar spine surgery who initially presented from SNF on 2020 with fever 105 and altered mental status. Found to have sepsis due to UTI, urinary retention, metabolic encephalopathy, and acute hypoxic respiratory failure. Reeves placed by Urology due to false urethral passage. Treating with IV antibiotics per ID. Course further complicated by TONO, anemia, and uncontrolled HTN.      Of note, patient recently underwent L4-5 and L5-S1 fusion with anterior and posterior approach (2020 with Dr. Veronica Muse and Dr. Traci Maldonado). Post-op course during that admission complicated by left retroperitoneal/pelvic hematoma and acute blood loss anemia. He was discharged to [facility] 9/10. Inpatient Rehabilitation Course: Dilcia Crockett is a 66 y.o. male admitted to inpatient rehabilitation on 2020 with Debility. The patient participated in an aggressive multidisciplinary inpatient rehabilitation program involving 3 hours of therapy per day, at least 5 days per week. He made gradual progress with regard to functional mobility and compensatory strategies. He progressed to level of overall supervision to contact guard assist. He demonstrated self-limiting behavior. On 9/28 he began to refuse therapies and most medical care. He stated a wish to die and planned to achieve this by not eating/drinking. Psychiatry was consulted. Dr. Jaki Castellanos and I agree that patient has imminent risk of harm to himself with ongoing suicidal ideation with plan. Therefore patient will be transferred to inpatient psychiatric unit. Medical Management:    Adjustment disorder with depressed mood  -Per family, patient has had multiple episodes of severe depression since wife passed 5 years ago  -Currently stating wishes to die and has stopped eating/drinking. Refusing most medical and rehab care. -Psychiatry consulted, appreciate Dr. Jaki Castellanos input.   -Patient is appropriate for and has been accepted to inpatient psych.   -72 hour hold order signed. Sepsis presumed due to UTI  -Resolved. -Completed merrem per ID     Urinary retention   -s/p cystoscopy for Reeves placement, +false urethral passage  -Maintain Reeves until Urology follow-up. Do not feel he is ready for void trial.      Acute hypoxic respiratory failure in setting of baseline COPD  -Resolved, now stable on RA     TONO on CKD  -Avoid nephrotoxins, renally dose meds  -Cr 1.4 on 9/28 due to dehydration and lack of po intake. Now improved to 1.2 with holding losartan.  Patient was agreeable to 1000cc IVF prior to transfer to inpatient psych.      Metabolic encephalopathy  -Resolved with treatment of sepsis  -Regulate sleep/wake, emphasis on routine     Lumbar stenosis s/p recent L4-S1 ALIF/PSF (8/31 with Dr. Matthieu Foster and Dr. Bladimir Mccray)  -Incisions healing well without evidence of infection  -PT/OT     Acute blood loss anemia  -Due to left pelvic hematoma  -Hgb now stable >9  -Iron supplement increased     Afib  -Controlled on Amiodarone, metoprolol  -Holding Xarelto due to hematuria and hematoma.      CAD  -statin, bb, holding ARB due to TONO     HTN  -Control improving, currently only on metoprolol  -Dc'ed amlodipine, hydralazine, losartan     Hypokalemia  -Improved with replacement     Pain control  -Duloxetine, gabapentin, prn Percocet     PPx  -DVT: Xarelto held due to hematuria  -GI: pantoprazole, probiotics        Discharge Exam:  Const: Alert. No distress, pleasant. HEENT: Normocephalic, atraumatic. Normal sclera/conjunctiva. MMM. CV: Regular rate and rhythm. Resp: No respiratory distress. Lungs CTAB. Abd: Soft, nontender, nondistended, NABS+ . Incision c/d/i. Ext: trace edema. MSK: Decreased spine ROM. Incision c/d/i. Neuro: Alert, oriented, appropriately interactive. No focal deficits. Generalized weakness with decreased effort. Psych: Cooperative, flat affect    Discharge Functional Status:    Physical therapy:  Bed Mobility: Scooting: Supervision  Transfers: Sit to Stand: Stand by assistance  Stand to sit: Stand by assistance  Comment: ortho static BP assessed this morning due to patient complaints at this time. DONATO Paulino aware with DONATO [facility] present to assess patient Alert and oriented at this time. JOSTIN Vasquez informed patient with low blood pressure to limit transfers to stand pivots at this time. , Ambulation 1  Surface: level tile  Device: Rolling Walker  Assistance: Contact guard assistance, Stand by assistance  Quality of Gait: antaglic gait, trendelenburg gait, forward flexed with cues for upright, decreased (B) step length, foot clearance, and heel strike; narrow base of support; kay decreases with fatigue and additional reps, trendlenberg gait with right hip drop in swing phase due to left hip weakness  Gait Deviations: Slow Kay, Decreased step length, Decreased step height, Shuffles  Distance: 140' with multiple turns increased time  Comments: distances limited due to patient requesting to stop due to fatigue, stayed near room at this time due to patient concerns with bowels moving, Stairs  # Steps : 8  Stairs Height: 6\"  Rails: Bilateral  Curbs: 6\"(X 1 trial with RW CGA-min assist with increased time needing step by step instructions for proper sequencing.)  Device: No Device  Assistance: Contact guard assistance  Comment: Ascend/descend nonreciprocal pattern with cues for proper sequencing, increased time with use of BUE on railing with cues, increased tolerance to stairs this date with decreased physical assistance, receptive to cues at this time. Slight TAPIA but increased tolerance this date  Mobility:  , PT Equipment Recommendations  Equipment Needed: Yes  Mobility Devices: Sami Keen: Rolling  Other: will continue to assess for further equipment needs, Assessment: Pt with limited ability to participate in upright mobility tasks this date due to orthostatic blood pressures with significant symptomatic dropping in standing limiting ability for OOB at this time. RN and MD aware of patient low blood pressure at this time. Despite symptoms pt demosntrated increased ability to complete transfers with decreased assistance this date. Patient will continue to benefit from additional skilled PT intervention to facilitate safe mobility and to optimize (I) to promote return to prior level of function. Occupational therapy:  ,  , Assessment: Pt seen bedside due to low BP. Pt feeding self soup from cup and taking sips of liquids. Pt agreed to bed exercises. Pt completed multiple UE ex without complaint. Still very low energy and required rest breaks between each exercise. Will cont OT POC.     Speech therapy:         Significant Diagnostics:   Lab Results   Component Value Date    CREATININE 1.2 09/30/2020    BUN 16 09/30/2020     (L) 09/30/2020    K 4.4 09/30/2020     09/30/2020    CO2 17 (L) 09/30/2020       Lab Results   Component Value Date    WBC 4.0 09/30/2020    HGB 10.3 (L) 09/30/2020    HCT 31.4 (L) 09/30/2020    MCV 92.5 09/30/2020     09/30/2020       Disposition:  [facility]    Discharge Condition: Stable    Follow-up:  See after visit summary from hospitalization    Discharge Medications:     Medication List      START taking these medications    metoprolol tartrate 50 MG tablet  Commonly known as:  LOPRESSOR  Take 1 tablet by mouth 2 times daily        CHANGE how you take these medications    ferrous sulfate 325 (65 Fe) MG tablet  Commonly known as:  IRON 325  Take 1 tablet by mouth 2 times daily (with meals)  What changed:  when to take this     gabapentin 100 MG capsule  Commonly known as:  NEURONTIN  Take 1 capsule by mouth 3 times daily for 30 days.   What changed:  how much to take        CONTINUE taking these medications    amiodarone 200 MG tablet  Commonly known as:  CORDARONE     atorvastatin 40 MG tablet  Commonly known as:  LIPITOR     dorzolamide-timolol 22.3-6.8 MG/ML ophthalmic solution  Commonly known as:  COSOPT     DULoxetine 30 MG extended release capsule  Commonly known as:  CYMBALTA     ICAPS Tabs     latanoprost 0.005 % ophthalmic solution  Commonly known as:  XALATAN     levothyroxine 50 MCG tablet  Commonly known as:  SYNTHROID     nitroGLYCERIN 0.4 MG SL tablet  Commonly known as:  NITROSTAT     oxyCODONE 5 MG immediate release tablet  Commonly known as:  ROXICODONE     pantoprazole 40 MG tablet  Commonly known as:  PROTONIX     senna 8.6 MG tablet  Commonly known as:  SENOKOT        STOP taking these medications    hydrALAZINE 50 MG tablet  Commonly known as:  APRESOLINE     losartan 100 MG tablet  Commonly known as:  COZAAR     meropenem  infusion  Commonly known as:  MERREM     metoprolol succinate 100 MG extended release tablet  Commonly known as:  TOPROL XL     Xarelto 20 MG Tabs tablet  Generic drug:  rivaroxaban           Where to Get Your Medications      Information about where to get these medications is not yet available    Ask your nurse or doctor about these medications  · ferrous sulfate 325 (65 Fe) MG tablet  · gabapentin 100 MG capsule  · metoprolol tartrate 50 MG tablet           I spent over 35 minutes on this discharge encounter between counseling, coordination of care, and medication reconciliation. To comply with [facility] flavio R.II.4.1:   Discharge order placed in advance to facilitate patients discharge needs.       Perez Woodard MD

## 2020-10-01 NOTE — H&P
Psychiatry Initial Evaluation    Admission Date:    10/1/2020    Chief complaint / Reason for Admission:  Suicidal ideation    HPI:   Patient is a 66 y.o. male with no formal past psychiatric history who was admitted for severe depression and suicidal ideation. Pt was transferred from [facility].  He had been admitted there from a SNF for sepsis. Pt had a UTI and required IV abx. He ultimately improved, but upon his medical condition clearing, he became quite emotionally withdrawn and started refusing treatments. Pt reported that he was \"tired of trying\" and wanted to \"be with my wife in heaven. \"  Pt's wife  5 years ago and he began stating that he was ready to die and \"join her. \"  He began to refuse all treatments including therapies, medication, getting out of bed, food and drink. He was seen by psychiatry consultant, Dr. Mathew Zhong, and reported to him that he planned to starve himself and intended to die. He maintained that his mother \"did the same thing\" in a nursing home and felt it was his right to decline care for this purpose. Pt was transferred to psychiatry for the above. Today pt reiterated the above. Stated that he has been dealing with \"one problem after another\" for the past two months. Evidently he had a back surgery and experienced complications which led to going to SNF, and then ultiamtely the above. He feels he is not improving globally and \"if it's not one thing it's another\" and he is tired of trying. Additionally he revealed that 6+ months ago he largely lost his sense of smell and taste and \"everything tastes like a tasteless sponge now. \"      Spent time validating patient's emotional experiencing and reassuring him that his mood reaction was understandable, albeit representative of severe depression. Offered patient that, through treatment, it could be possible to feel less hopeless.   He endorsed a willingness to try additional psychiatric therapy      Duration: Subacute  Severity: Severe  Context: As above  Associated symptoms: as above    Past Psychiatric History:    No formal past psychiatric history, though per family has been severely depressed at least twice before since his wife's death. On low dose duloxetine prescribed by PCP. Past Medical History:  Past Medical History:   Diagnosis Date    Aneurysm, aortic (HCC)     Atrial fibrillation (HCC)     Glaucoma     Heart attack ([facility] [address]     Hyperlipidemia     Hypertension        Home Medications:  Prior to Admission medications    Medication Sig Start Date End Date Taking? Authorizing Provider   dorzolamide-timolol (COSOPT) 22.3-6.8 MG/ML ophthalmic solution Place 1 drop into both eyes 2 times daily   Yes Historical Provider, MD   DULoxetine (CYMBALTA) 30 MG extended release capsule Take 30 mg by mouth 2 times daily 9/10/20 10/10/20 Yes Historical Provider, MD   latanoprost (XALATAN) 0.005 % ophthalmic solution Place 1 drop into the left eye nightly   Yes Historical Provider, MD   levothyroxine (SYNTHROID) 50 MCG tablet Take 50 mcg by mouth Daily   Yes Historical Provider, MD   pantoprazole (PROTONIX) 40 MG tablet Take 40 mg by mouth daily   Yes Historical Provider, MD   senna (SENOKOT) 8.6 MG tablet Take 2 tablets by mouth 2 times daily   Yes Historical Provider, MD   atorvastatin (LIPITOR) 40 MG tablet Take 40 mg by mouth daily   Yes Historical Provider, MD   amiodarone (CORDARONE) 200 MG tablet Take 200 mg by mouth daily Indications: takes in the evening    Yes Historical Provider, MD   gabapentin (NEURONTIN) 100 MG capsule Take 1 capsule by mouth 3 times daily for 30 days.  10/1/20 10/31/20  Fox Amaya MD   metoprolol tartrate (LOPRESSOR) 50 MG tablet Take 1 tablet by mouth 2 times daily 10/1/20   Fox Amaya MD   ferrous sulfate (IRON 325) 325 (65 Fe) MG tablet Take 1 tablet by mouth 2 times daily (with meals) 10/1/20   Fox Amaya MD   Multiple Vitamins-Minerals (ICAPS) TABS Take 2 tablets by mouth daily    Historical Provider, MD   oxyCODONE (ROXICODONE) 5 MG immediate release tablet Take 5 mg by mouth every 6 hours as needed for Pain. Historical Provider, MD   nitroGLYCERIN (NITROSTAT) 0.4 MG SL tablet Place 0.4 mg under the tongue every 5 minutes as needed for Chest pain up to max of 3 total doses. If no relief after 1 dose, call 911. Historical Provider, MD       Chemical Dependency History:   Tobacco: Former smoker  Alcohol: Rare  Illicit: Denies    Family Hx:    None per patient    Social Hx: . Retired. Two adult children. Had been living alone. Current Medications Ordered:   atorvastatin  40 mg Oral Nightly    dorzolamide-timolol  1 drop Both Eyes BID    DULoxetine  30 mg Oral BID    ferrous sulfate  325 mg Oral Daily with breakfast    latanoprost  1 drop Left Eye Nightly    levothyroxine  50 mcg Oral Daily    metoprolol succinate  50 mg Oral BID    pantoprazole  40 mg Oral Daily    senna  2 tablet Oral BID    gabapentin  100 mg Oral TID    lactobacillus  2 capsule Oral BID WC    sodium chloride flush  10 mL Intravenous Q12H    methylphenidate  5 mg Oral BID WC      PRN Meds: acetaminophen, LORazepam **OR** LORazepam, haloperidol lactate **OR** haloperidol, traZODone, benztropine mesylate, magnesium hydroxide, aluminum & magnesium hydroxide-simethicone, nitroGLYCERIN, oxyCODONE-acetaminophen, oxyCODONE-acetaminophen     ROS:    Psychiatric: + for PMR, Dc appetite, hopelessness, SI ; Negative for HI  All other systems were reviewed and negative except as previously documented in HPI.     PE:    BP (!) 149/74   Pulse 60   Temp 97.3 °F (36.3 °C) (Oral)   Resp 16   Ht 5' 9\" (1.753 m)   SpO2 94%   BMI 27.64 kg/m²       Motor / Gait: psychomotor retardation, nm tone, no involuntary movements, gait deferred    Mental Status Examination:    Appearance: WM, appears stated age, wearing pajamas, limited grooming and hygiene  Behavior/Attitude toward examiner: cooperative, attentive, limited eye contact  Speech:  spontaneous, normal rate, soft, volume and well articulated   Mood:  \"terrible\"  Affect:  Mood congruent, blunted, dysphoric  Thought processes:  Linear  Thought Content: + SI, denies HI, no delusions voiced, no obsessions, +hopelessness  Perceptions: Denies AVH, no RTIS  Attention: limited  Abstraction: wnl  Cognition: Average AMANDA, Alert and oriented to person, place, time, and situation, recall grossly intact  Insight: Minimal insight   Judgment: Impaired judgment      LAB:   Admission on 10/01/2020   Component Date Value Ref Range Status    Ventricular Rate 10/01/2020 59  BPM Preliminary    Atrial Rate 10/01/2020 59  BPM Preliminary    P-R Interval 10/01/2020 298  ms Preliminary    QRS Duration 10/01/2020 118  ms Preliminary    Q-T Interval 10/01/2020 440  ms Preliminary    QTc Calculation (Bazett) 10/01/2020 435  ms Preliminary    P Axis 10/01/2020 84  degrees Preliminary    R Axis 10/01/2020 -41  degrees Preliminary    T Axis 10/01/2020 113  degrees Preliminary    Diagnosis 10/01/2020 Sinus bradycardia with 1st degree A-V blockLeft axis deviationPulmonary disease patternLeft ventricular hypertrophy with QRS widening and repolarization abnormalityInferior infarct (cited on or before 13-SEP-2020)Abnormal ECGWhen compared with ECG of 13-SEP-2020 08:47,N[place] interval has increasedVent. rate has decreased BY  58 BPMT wave inversion no longer evident in Inferior leadsT wave inversion more evident in Anterolateral leads   Preliminary   No results displayed because visit has over 200 results.               Assessment and Plan:    Diagnoses:   Primary Psychiatric (DSM V) Diagnosis: Major depressive disorder, recurrent, severe without psychotic features  Secondary Psychiatric (DSM V) Diagnoses: None  Chemical Dependency Diagnoses: Tobacco use disorder, severe, in remission  Active Medical Diagnoses: Urinary retention, COPD, CKD, Lumbar Stenosis s/p fusion, Afib, CAD, HTN     All conditions detailed above are being treated while patient is hospitalized. Tx plan: Generally: prevent self injury/aggression, stabilize mood/anxiety/psychotic/behavioral disturbance, establish/maintain aftercare, increase coping mechanisms, improve medication compliance. All conditions present on admission are being treated while pt is hospitalized. Legal Status: Involuntary. Statement of observation signed today. Primary Psychiatric Issues:  1. Major depressive disorder, recurrent:  -Continue duloxetine 30 mg BID. -Start methylphenidate 5 mg BID. Chemical Dependency Issues:  No issues    Function:  -Consult physical therapy  -Consult occupational therapy  -Falls precautions    Medical Problems:  Internal medicine has been consulted. Appreciate recs. Code Status: Full    Disposition:    -Housing: Own home, but likely needs return to SNF. -Current outpatient follow-up: Would benefit from psychiatry referral.    Estimated length of stay: 7 to 10 days. Criteria for Discharge:  Not psychotic, not homicidal, not suicidal, behavioral disturbance under control, sleeping well, mood improved/stable, eating well, aftercare arranged. Spent > 70 minutes face to face with patient of which >50% was spent counseling and providing education regarding diagnosis, treatment options, and prognosis.     Rubén Bobby MD  Staff Psychiatrist

## 2020-10-01 NOTE — PROGRESS NOTES
Pt complained of 8/10 bilateral leg pain. Prn percocet given at 0600 was effective. Pt denies needs at this time.

## 2020-10-01 NOTE — BH NOTE
Amanda Donohue was excused from [facility] [address] due to meeting with various staff members.     ABDIFATAH Clark

## 2020-10-01 NOTE — FLOWSHEET NOTE
10/01/20 1059   Activities of Daily Living   Patient Requires assistance with daily self-care activities?  No   Leisure Activity 1   3 Favorite Leisure Activities \"Go out with my buddies\"   Frequency weekly   Last time in the last 3 months   Barriers to participating  physical   Leisure Activity 2   [address]  \"Go dancing with my lady friend\"   Frequency  weekly   Last time  in the last 3 months   Barriers to participating  physical   Leisure Activity 3   Favorite Leisure Activities  \"Go to the theatre\"   Frequency  monthly   Last time  in the last 3 months   Barriers to participating  physical   Social   Patient reports spending the majority of their free time alone   Patient verbalizes a preference for spending free time with a group   Patients perception of support system more healthy   Patients perception of barriers to socializing with others include(s) transportation;finances  (\"I haven't been out lately because I had surgery\")   Social Details Support System: \"my daughter and son\"   Beliefs & Coping   Has difficulty dealing with feelings   Yes   Internalizes feelings/Keeps feelings in Yes   Externalizes feelings through aggressiveness or poor temper control  No   Feels uncomfortable around others  No   Has difficulty talking to others  No   Depends on others for direction or decisions No   Difficulty dealing with anger of others  No   Difficulty dealing with own anger  No   Difficulty managing stress Yes   Frequently has difficulty with relationships  No   Has recently perceived/experienced loss, disappointment, humiliation or failure  Yes   General perception about self likes self   Attitude about abilities more successful than not   Locus of Control  most of the time   Belief about recovery   (\"I don't know\")   Patient Identified Strengths  \"I can still make a muscle\"   Patient Identified Limitations  \"I'm having trouble with the fact that since I have gotten out of surgery, everything seems to have gone down hill medically. \"   Perception of most stressful event prior to hospitalization \"They decided since I want to go see God and my wife that has been for five years, they thought I was suicidal\"   Perception of changes needed \"If I get get some directions on how to medically go uphill an feel better\"   Strengths and Limitations   Strengths Positive support network;Positive leisure interests   Limitations Difficulty problem solving/relies on others to help solve problems;General negative or hopeless attitude about future/recovery; Tendency to isolate self;Multiple barriers to leisure interests     CTRS completed pt's AT/OT Leisure Assessment.     Randy Beasley, CTRS

## 2020-10-01 NOTE — FLOWSHEET NOTE
10/01/20 1358   Psychiatric History   Psychiatric history treatment Current treatment   Contact information PCP Matias Mazariegos   Are there any medication issues? No   Support System   Support system Adequate   Types of Support System Friend; Other (Comment)  (daughters, son and grandchildren)   Problems in support system Other (Comment)  (back surgery in 2020, reports things have been going down hill since then)   Current Living Situation   Home Living Adequate   Living information Lives alone   Problems with living situation  Yes  (hasn't been able to get back home since his surgery)   Lack of basic needs No   SSDI/SSI NA   Other government assistance Medicare   Problems with environment hasn't been able to care for self because of surgeries and being in hospital since 2020   Current abuse issues denied   Supervised setting None   Relationship problems No   Contact information denied   Medical and Self-Care Issues   Relevant medical problems back surgery in 2020 and has had many health issues since then has had sepsis, hematomo, UTI and constipation   Relevant self-care issues hasn't been able to care for self because of surgeries and being in hospital since 2020   Barriers to treatment No   Family Constellation   Spouse/partner-name/age Wife    Children-names/ages 2 daughters - Makeda Screws 1 son - Luis Hector.    Parents    Siblings    Contact information NA   Support services   (denied)   Comment denied   Childhood   Raised by Biological mother;Biological father   Biological mother    Biological father    Relevant family history denied   History of abuse No   Comment denied   Legal History   Legal history No   Other relevant legal issues denied   Comment denied   Juvenile legal history No    Abuse Assessment   Physical Abuse Denies   Verbal Abuse Denies   Possible abuse reported to:   (denied)   Emotional abuse Denies   Financial Abuse Denies   Sexual abuse Denies   Elder abuse No   Substance Use   Use of substances  No   Motivation for SA Treatment   Stage of engagement   (denied)   Motivation for treatment   (denied)   Current barriers to treatment   (denied)   Comment denied   Education   Education Other (comment)  (1 year of college)   Special education   (denied)   Work History   Currently employed No   Recent job loss or change Other (Comment)  (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 Pt reported he has been going downhill since his back surgery on . Pt reported he has had been depressed because he hasn't been able to do anything lately. Reported he had recent fall at [facility] and that he hasn't been able to walk or care for himself like he had previously. Pt denied any SI and HI during assessment. Reported he was having a wish to die and join his  wife. Reported he doesn't want to hurt himself and reported he has not been able to eat or drink that he can't taste and doesn't have an appeitte.     Es Rivas MA, R-DMT, [facility]

## 2020-10-01 NOTE — PROGRESS NOTES
-Inpatient Occupational Therapy  Evaluation and Treatment    Unit:   [facility]  Date:  10/1/2020  Patient Name:    Carlos Jones. Admitting diagnosis:  MDD (major depressive disorder), recurrent episode ([address] [F33.9]  Admit Date:  10/1/2020  Precautions/Restrictions/WB Status/ Lines/ Wounds/ Oxygen:  Standard [facility] Precautions  Suicide Precautions  Fall Risk  bed/chair alarm  mendoza   Spinal Precautions: No Bending, No Lifting, No Twisting  10# Lifting Restrictions       Orthostatic hypotension    History of Present Illness:  65 y/o male admitted to [facility]  from SNF setting 9/13/2020 with Altered Mental Status, Severe Sepsis, Acute Renal Failure, and Pelvic Hematoma. CT Head negative. CT Abd/Pelvis + L Pelvic Hematoma. Urology consult for Mendoza Placement. PMH as noted including Recent Back Surg (L4/L5 and L5/S1 Ant/Post Fusion, 8/31/2020), CAD, Angio with Stent, A-Fib, Aortic Aneurysm. Pt was receiving OT at [facility] 5-7x/wk, but was not progressing as expected due to pt's depression. Treatment Number:  1    Treatment Time: 039-1467  Timed Code Treatment Minutes:   54  minutes   Total Treatment Time:    64  minutes    Staff Recommendations:    Assist of 2 with RW and gait belt from bed to chair. Spinal precautions (No bending, twisting or lifting over 10#)      Patient Goals for Therapy:  None stated. Pt denies being suicidal, yet states that he does have a plan. Stated he wants to be with his wife who has passed away. (Nurse notified of plan)    Discharge Recommendations:  SNF    DME needs for discharge:      defer to facility     AM-PAC Score:   [address] S4 Level: NA    ACLS: To be assessed    Preadmission Environment    Lives With: Alone  Type of Home: House  Home Layout: Multi-level, Bed/Bath upstairs, Able to Live on Main level with bedroom/bathroom, Laundry in basement(Family have obtained Hospital Bed for main level.  Full bath on main level.)  Home Access: Stairs to enter with rails(Pt reports 10-11 steps to enter.)  Bathroom Shower/Tub: Tub/Shower unit, Walk-in shower(Walk-In Shower Main level.)  Bathroom Toilet: Standard  Bathroom Equipment: (No DME.)  Bathroom Accessibility: Accessible  Home Equipment: Sock aid, [address] bed(Dtr had hosp bed from previous episode and placed in pt's family room on first floor)    Preadmission Status:  ADL Assistance: Independent  Ambulation Assistance: Independent(Without assist device prior recent back surg.)  Transfer Assistance: Independent  Active : Yes  Pt. Required assistance from family for: Marie Ohm only  History of falls Yes-fell on the day of surgery, walking into hospital (tripped on his legs)    Sleep Hygiene:  Usually gets 8 hrs/night. Has been sleeping too much lately  Income: Retired-small pension,SSI, stock income  Type of occupation: Retired : . Financial Management:  Pt manages his money usually, son is helping at this time. Leisure Interests:  out to eat with lady friend, travelling-travelled to [place], [place]  Medication Management: uses mediset-fills it himself  Health Management:  Primary Dr. Jose Ramos, no psych services  Social Network:  2 daughters, one son, lady friend, male friend  Stressors:  Healing from surgery, aggravated re: health going down, down, down  Coping Skills: praying    Pain   No  Rating:NA  Location: NA  Pain Medicine Status: No request made      Cognition    A&O to Person and Situation, month and year, hospital but not specifically to [place] to follow:  2 step commands    Upper Extremity ROM:    WFL, pt able to perform all bed mobility, transfers, and gait without ROM limitation. Upper Extremity Strength:    Impaired-4/5 throughout B UEs    Upper Extremity Sensation:    No apparent deficits. Decreased in B feet    Upper Extremity Proprioception:    No apparent deficits.     Skin Integrity:  No issues noted     Coordination and Tone: Slightly impaired coordination    Balance  Static Sitting:  Good   Dynamic Sitting: Fair +  Static Standing:  Poor  Dynamic Standing: Not tested    Bed mobility:    Supine to sit:   Min A with HOB elevated  Sit to supine:   Not Tested  Scooting to head of bed: Not Tested   Scooting in sitting:  SBA  Rolling:   Not Tested  Bridging:   Not Tested    Transfers:    Sit to stand:   Min A  Stand to sit:   Mod A  Bed/Chair to/from toilet: Not Tested  Bed to chair:   Mod A of 1, Min A of another with stand pivot transfer using RW    Self Care: Toileting: Not Tested  Grooming: Not Tested  Dressing: Not Tested    Activity Tolerance: Reported feeling dizzy/light-headed after each position change  Seated:      HR: 65  BP: 107/64  Standing:  HR: 72  BP: 76/46  Seated several mins. after transfer:    HR: 63  BP: 114/64    Exercise / Activities Initiated:   Pt. Educated on role of OT. Pt. Participated in: discussion re: orthostatic hypotension, functional transfer training. Assessment of Deficits: Pt reports he does have pressure stockings in his suitcase. Discussed with nurse re: possible orders for wearing them to assist with ortho hypotension. No orders currently. Pt demonstrated decreased activity tolerance, decreased strength, decreased balance,  decreased bed mobility, decreased transfer skills, and decreased ADL/IADL status, decreased coping skills, self isolation, limited education    Pt. Limited during evaluation by . . . Orthostatic hypotension    At end of evaluation, pt. In gathering room for group. Goal(s) : To be met in 3 Visits:  1). Supine to Sit  With modified IND  2). Pt will participate in ACLS assessment. To be met in 5 Visits:  1). Pt will verbalize 3 coping skills  2). Bed to Chair/BSC Min A with RW  3). Upper Body Bathing SBA  4). Lower Body Bathing Mod A  5). Upper Body Dressing SBA  6). Lower Body Dressing Mod A  7). Pt to rand UE exs x15 reps  8). Pt.  To identify 2 memory strategies to take medications as prescribed. 9). Pt. To complete interest check list.     Rehabilitation Potential:  Good for goals listed above. Strengths for achieving goals include:  PLOF  Barriers to achieving goals include: Decreased coping skills, Self isolation, Limited education and weakness, orthostatic hypotension    Plan: To be seen 3-5x/week while in acute care setting for therapeutic exercises/act, ADL retraining, NMR and patient/caregiver ed/instruction.        Signature and License Number  Hugo Murillo Shashank 85, OTR/L  #[id number]           If patient discharges from this facility prior to next visit, this note will serve as the Discharge Summary

## 2020-10-01 NOTE — PROGRESS NOTES
`[facility] [place] [facility]  Admission Note     Admission Type:   Admission Type: Involuntary    Reason for admission:  Reason for Admission: Pt has been depressed and has thoughts of wanting to die by not eating/drinking/taking medications.     PATIENT STRENGTHS:  Strengths: Positive Support    Patient Strengths and Limitations:  Limitations: Difficulty problem solving/relies on others to help solve problems, General negative or hopeless attitude about future/recovery, Hopeless about future, Perceives need for assistance with self-care    Addictive Behavior:   Addictive Behavior  In the past 3 months, have you felt or has someone told you that you have a problem with:  : None  Do you have a history of Chemical Use?: No  Do you have a history of Alcohol Use?: No  Do you have a history of Street Drug Abuse?: No  Histroy of Prescripton Drug Abuse?: No    Medical Problems:   Past Medical History:   Diagnosis Date    [facility]     Atrial fibrillation ([facility]     Glaucoma     [facility]     Hyperlipidemia     Hypertension        Status EXAM:  Status and Exam  Normal: No  Facial Expression: Flat, Sad  Affect: Constricted  Level of Consciousness: Alert  Mood:Normal: No  Mood: Depressed, Sad  Motor Activity:Normal: No  Motor Activity: Decreased  Interview Behavior: Cooperative  Preception: [place] to Person, [place] to Time, [place] to Place, [place] to Situation  Attention:Normal: Yes  Thought Processes: Circumstantial  Thought Content:Normal: Yes  Hallucinations: None  Delusions: No  Memory:Normal: No  Memory: Poor Recent  Insight and Judgment: No  Insight and Judgment: Poor Judgment, Poor Insight  Present Suicidal Ideation: Yes  Present Homicidal Ideation: No    Tobacco Screening:  Practical Counseling, on admission, мария X, if applicable and completed (first 3 are required if patient doesn't refuse):            ( )  Recognizing danger situations (included triggers and roadblocks) ( )  Coping skills (new ways to manage stress, exercise, relaxation techniques, changing routine, distraction)                                                           ( )  Basic information about quitting (benefits of quitting, techniques in how to quit, available resources  ( ) Referral for counseling faxed to [facility]                                           ( ) Patient refused counseling  (X ) Patient has not smoked in the last 30 days    Metabolic Screening:    No results found for: LABA1C    No results for input(s): CHOL, TRIG, HDL, LDLCALC, LABVLDL in the last 72 hours. Body mass index is 27.64 kg/m². BP Readings from Last 2 Encounters:   10/01/20 (!) 147/74   09/30/20 (!) 150/62           Pt admitted with followings belongings:  Dentures: None  Vision - Corrective Lenses: Glasses  Hearing Aid: None  Jewelry: None  Body Piercings Removed: N/A  Clothing: Footwear, Pants, Shirt, Undergarments (Comment), Other (Comment)  Were All Patient Medications Collected?: Yes  Other Valuables: Cell phone     Valuables sent home with n/a. Valuables placed in safe and locker. Patient's home medications were verified. Patient oriented to surroundings and program expectations and copy of patient rights given. Received admission packet:  yes. Consents reviewed, signed no. Refused at this time. Patient verbalize understanding:  yes.     Patient education on precautions: yes                   Laurie Jacome RN

## 2020-10-01 NOTE — PROGRESS NOTES
since 9/28/2020. Pt. Currently ambulates 140 feet with RW and CGA-SBA last performed 9/25/2020 with PT. Up/down 8 steps with bilateral railing with CGA-min assist last performed 9/25/2020 with PT   Up/down curb step with RW increased time to perform with cues for proper sequencing CGA, lst perform 9/25/2020  Sit to/from stand with SBA 9/28/2020   Bed mobility with supervision for all aspects 9/28/2020 last performed this date. Recommend continued PT  in order to increased strength, activity tolerance, and improve participation with all functional mobility tasks when stable enough for participation.        Electronically signed by Jim Clarke PT on 10/1/2020 at 12:45 PM

## 2020-10-01 NOTE — PROGRESS NOTES
Inpatient [facility] Physical Therapy Evaluation and Treatment    Unit:  [facility]  Date:  10/1/2020  Patient Name:    James Bullock. Admitting diagnosis:  MDD (major depressive disorder), [facility] [F33.9]  Admit Date:  10/1/2020  Precautions/Restrictions/Medical Indications    Standard BHI Precautions  Fall Risk  mendoza  Spinal precautions: no lifting >10#, no twisting, turning and bending. History of current Episode: 65 y/o male admitted to [facility]  from SNF setting 9/13/2020 with Altered Mental Status, Severe Sepsis, Acute Renal Failure, and Pelvic Hematoma.  CT Head negative.  CT Abd/Pelvis + L Pelvic Hematoma.  Urology consult for Mendoza Placement.   PMH as noted including Recent Back Surg (L4/L5 and L5/S1 Ant/Post Fusion, 8/31/2020), CAD, Angio with Stent, A-Fib, Aortic Aneurysm. Pt was receiving OT at [facility] 5-7x/wk, but was not progressing as expected due to pt's depression. Treatment Time: 3567 - 1600  Treatment Number:  1         Discharge Recommendations from PHYSICAL perspective:                                          SNF    DME needs for discharge:     defer to facility     Therapy recommendations for staff:   Assist of 2 with RW and gait belt from bed to chair. Spinal precautions (No bending, twisting or lifting over 10#)    Therapy recommendation for EMS Transport: requires transport by cot due to increased assistance needed for functional transfers. Home Health S4 Level: NA       AM-PAC Mobility Score     AM-PAC Inpatient Mobility Raw Score : 15        Preadmission Environment    Lives With: Alone  Type of Home: House  Home Layout: Multi-level, Bed/Bath upstairs, Able to Live on Main level with bedroom/bathroom, Laundry in basement(Family have obtained Hospital Bed for main level.  Full bath on main level.)  Home Access: Stairs to enter with rails(Pt reports 10-11 steps to enter.)  Bathroom Shower/Tub: Tub/Shower unit, Walk-in shower(Walk-In Shower Main level.)  Bathroom Toilet: Standard  Bathroom Equipment: (No DME.)  Bathroom Accessibility: Accessible  Home Equipment: Sock aid, [address](Dtr had hosp bed from previous episode and placed in pt's family room on first floor)     Preadmission Status:  ADL Assistance: Independent  Ambulation Assistance: Independent(Without assist device prior recent back surg.)  Transfer Assistance: Independent  Active : Yes  Pt. Required assistance from family for: Kelly Santana only  History of falls Yes-fell on the day of surgery, walking into hospital (tripped on his legs)      Subjective:   Pt agreeable to evaluation and treatment as needed. Pain   Yes  Rating:mild  Location: R buttock  Pain Medicine Status: No request made      Cognition    A&O to x4  Able to follow:  2 step commands    Upper Extremity ROM/Strength  Deferred to OT evaluation: please see OT note    Lower Extremity ROM / Strength (use of 5/5 scale if strength formally assessed)    AROM: WFL    Right LE  quads    4+                                       ant tibs  4+                                                hams          4+                                               iliopsoas   4+                  LEFT LE   quads     3+     ant tibs       3+     hams         3    iliopsoas    3   L abductors     3    Sensation       Light touch:      WFL R UE, L UE, L LE and R LE  Proprioception:    WFL R UE, L UE, L LE and R LE    Coordination Testing:          Alternating pronation/supination:  WFL  Heel to shin (sitting or supine):      WFL  Alternating Toe Tapping:       WFL    Tone     WNL R UE, L UE, L LE and R LE    Trunk Control   Good    Vision:   WFL  Comments:     Hearing:   WFL  Comments:     Balance  Static Sitting:  Fair +  Dynamic Sitting: Good -   Static Standing:  Fair -  Dynamic Standing: Poor +     Balance Functional Outcome Measure   NA  Measure Used:  N/A  Date Assessed: N/A  Score: N/A    Indication for Romberg: postive test = on importance of continued strengthening to facilitate functional return  Educated on importance of continued activity   Educated on safety with transfers  Educated on proper gait pattern and RW distance  Educated on proper positioning and posture with functional mobility  Educated on role of PT, POC as well as importance of continued activity    Patients response to evaluation/treatment:   Pt limited by fatigue  Other: hypotensive response to functional mobility    Impairments/ Deficits  Decreased strength  Abnormality of gait  Decreased balance  Poor posture/alignment  Decreased functional status below baseline    Assessment of Deficits  Pt is 66year old male presenting within the geriatric behavioral health institute at [facility] with medical diagnosis of MDD (major depressive disorder), recurrent episode ([facility] [F33.9]. Presents today with weakness, poor safety awareness, limited ability to perform ADLs, gait abnormality , decreased transfer ability  and poor balance. Would benefit from continued IP PT to address below impairments, improve pain and restore function. Recommending SNF upon discharge as patient functioning well below baseline, demonstrates good rehab potential and unable to return home due to limited or no family support, inability to negotiate stairs to enter home/bedroom/bathroom, burden of care beyond caregiver ability, home environment not conducive to patient recovery and limited safety awareness. Goals :   Patient Goals for Therapy: \" To walk better \"      To be met in 3 visits:  1). Demonstrate improved safety awareness with functional mobility requiring less overall cueing. To be met in 6 visits:  1). Supine to/from sit  SBA to promote return to functional ADLs  2). Sit to/from stand CGA to promote return to functional ADLs  3). Gait: Ambulate 50 ft. with Mod A and use of LRAD (least restrictive assistive device) demonstrating improved gait pattern  4).   Tolerate B LE exercises 3 sets of 10-15 reps to improve strength   5). Tolerated standing balance exercises with improved balance to Fair  grade    Rehabilitation Potential    Good  Strengths for achieving goals include:   Pt motivated and Pt cooperative  Barriers to achieving goals include:    Behavioral stability      Plan    To be seen 2-5x/week while in [facility] setting for   Therapeutic Exercise, Neuromuscular Re-Education, Gait training and Therapeutic Activity     Timed Code Treatment Minutes: 28 minutes  Total Treatment Time:  38 minutes    Electronically signed by Justyna Lucero PT on 10/1/20 at 4:45 PM EDT      If patient discharges from this facility prior to next visit, this note will serve as the Discharge Summary.

## 2020-10-01 NOTE — H&P
Hospital Medicine History & Physical      PCP: Valerie Thrasher    Date of Admission: 10/1/2020    Date of Service: Pt seen/examined on 10/1/2020    Chief Complaint:  No chief complaint on file. History Of Present Illness: The patient is a 66 y.o. male with a PMH of Atrial Fibrillation, HLD, Hx of CAD s.p stent, AAA, GERD, hypothyroidism, Hx of CVA and COPD who presented to [facility] for suicidal ideation. Patient was seen and evaluated in the ED by the ED medical provider, patient was medically cleared for admission to [facility] at [facility]. This note serves as an admission medical H&P.   PCP: Jennifer Mena MD  Tobacco Use: denies  EtOH Use: denies  Illicit Drug Use: denies    Pt denies any medical concerns at this time. Past Medical History:        Diagnosis Date    Aneurysm, aortic (HCC)     Atrial fibrillation ([facility])     Glaucoma     [facility] ([facility]     Hyperlipidemia     Hypertension        Past Surgical History:        Procedure Laterality Date    COLONOSCOPY      CORONARY ANGIOPLASTY WITH STENT PLACEMENT      X 2    EYE SURGERY Right 7/23/2020    GONIOTOMY performed by Elena Lenz MD at [place] [address] Right 7/23/2020    Phacoemulsification with intraocular lens implant performed by Elena Lenz MD at [address] [place] [address]       Medications Prior to Admission:    Prior to Admission medications    Medication Sig Start Date End Date Taking?  Authorizing Provider   dorzolamide-timolol (COSOPT) 22.3-6.8 MG/ML ophthalmic solution Place 1 drop into both eyes 2 times daily   Yes Historical Provider, MD   DULoxetine (CYMBALTA) 30 MG extended release capsule Take 30 mg by mouth 2 times daily 9/10/20 10/10/20 Yes Historical Provider, MD   latanoprost (XALATAN) 0.005 % ophthalmic solution Place 1 drop into the left eye nightly   Yes Historical Provider, MD   levothyroxine (SYNTHROID) 50 MCG tablet Take 50 mcg by mouth Daily   Yes Historical Provider, MD   pantoprazole (PROTONIX) 40 MG tablet Take 40 mg by mouth daily   Yes Historical Provider, MD   senna (SENOKOT) 8.6 MG tablet Take 2 tablets by mouth 2 times daily   Yes Historical Provider, MD   atorvastatin (LIPITOR) 40 MG tablet Take 40 mg by mouth daily   Yes Historical Provider, MD   amiodarone (CORDARONE) 200 MG tablet Take 200 mg by mouth daily Indications: takes in the evening    Yes Historical Provider, MD   gabapentin (NEURONTIN) 100 MG capsule Take 1 capsule by mouth 3 times daily for 30 days. 10/1/20 10/31/20  Taiwo Up MD   metoprolol tartrate (LOPRESSOR) 50 MG tablet Take 1 tablet by mouth 2 times daily 10/1/20   Taiwo Up MD   ferrous sulfate (IRON 325) 325 (65 Fe) MG tablet Take 1 tablet by mouth 2 times daily (with meals) 10/1/20   Taiwo Up MD   Multiple Vitamins-Minerals (ICAPS) TABS Take 2 tablets by mouth daily    Historical Provider, MD   oxyCODONE (ROXICODONE) 5 MG immediate release tablet Take 5 mg by mouth every 6 hours as needed for Pain. Historical Provider, MD   nitroGLYCERIN (NITROSTAT) 0.4 MG SL tablet Place 0.4 mg under the tongue every 5 minutes as needed for Chest pain up to max of 3 total doses. If no relief after 1 dose, call 911. Historical Provider, MD       Allergies:  Azithromycin; Brimonidine; Clindamycin/lincomycin; Penicillins; and Simvastatin    Social History:  The patient currently lives alone. TOBACCO:   reports that he has quit smoking. His smoking use included cigarettes. He has never used smokeless tobacco.  ETOH:   reports previous alcohol use.       Family History:   Positive as follows:        Problem Relation Age of Onset    Heart Disease Mother        REVIEW OF SYSTEMS:     Constitutional: Negative for fever   HENT: Negative for sore throat   Eyes: Negative for redness   Respiratory: Negative  for dyspnea, cough   Cardiovascular: Negative for chest pain   Gastrointestinal: Negative for vomiting, diarrhea   Genitourinary: Negative for hematuria   Musculoskeletal: Negative for arthralgias   Skin: Negative for rash   Neurological: Negative for syncope   Hematological: Negative for adenopathy   Psychiatric/Behavorial: Negative for anxiety    PHYSICAL EXAM:    BP (!) 149/74   Pulse 60   Temp 97.3 °F (36.3 °C) (Oral)   Resp 16   Ht 5' 9\" (1.753 m)   SpO2 94%   BMI 27.64 kg/m²   Gen: No distress. Alert. Elderly  male  Eyes: PERRL. No sclera icterus. No conjunctival injection. ENT: No discharge. Pharynx clear. Neck:  Trachea midline. Resp: No accessory muscle use. No crackles. No wheezes. No rhonchi. CV: Regular rate. Regular rhythm. No murmur. No rub. No edema. GI: Non-tender. Non-distended. Normal bowel sounds. No hernia. : mendoza in place draining orange/yellow urine   Skin: Warm and dry. No rash on exposed extremities. M/S: No cyanosis. No clubbing. Neuro: Awake. Grossly nonfocal, CN II-XII intact    Psych: Oriented x 3. No anxiety or agitation.      CBC:   Recent Labs     09/30/20 0614   WBC 4.0   HGB 10.3*   HCT 31.4*   MCV 92.5        BMP:   Recent Labs     09/30/20  0614 09/30/20  1230   * 135*   K 3.9 4.4    103   CO2 18* 17*   BUN 16 16   CREATININE 1.2 1.2     LIVER PROFILE:   Recent Labs     09/30/20  1230   AST 21   ALT 12   BILITOT 1.2*   ALKPHOS 121     U/A:    Lab Results   Component Value Date    COLORU RED 09/13/2020    WBCUA  09/13/2020    RBCUA >100 09/13/2020    BACTERIA 4+ 09/13/2020    CLARITYU TURBID 09/13/2020    SPECGRAV 1.021 09/13/2020    LEUKOCYTESUR Color Interfer 09/13/2020    BLOODU Color Interfer 09/13/2020    GLUCOSEU Color Interfer 09/13/2020       ABG    Lab Results   Component Value Date    VAG0NMB 17.2 09/13/2020    BEART -6.8 09/13/2020    X9PNHEFL 98.0 09/13/2020    PHART 7.393 09/13/2020    DEH1EHW 28.3 09/13/2020    PO2ART 86.1 09/13/2020    CDM8FHH 18.1 09/13/2020       CULTURES    SARS-CoV-2: not detected    EKG:  I have reviewed the EKG with the following interpretation:   Sinus bradycardia with 1st degree A-V block rate of 59  Left axis deviation  Pulmonary disease pattern  Left ventricular hypertrophy with QRS widening and repolarization abnormality  Inferior infarct (cited on or before 13-SEP-2020)  Abnormal ECG  When compared with ECG of 13-SEP-2020 08:47,  TN interval has increased  Vent. rate has decreased BY  58 BPM  T wave inversion no longer evident in Inferior leads  T wave inversion more evident in Anterolateral leads     RADIOLOGY  None    Pertinent previous results reviewed     TTE 9/19/2020  Summary   Ejection fraction is visually estimated to be   50%. There is mild diffuse hypokinesis. Diastolic filling parameters suggest grade I diastolic dysfunction. moderate aortic regurgitation. The aortic root is severely dilated at 5.4 cm. The ascending aorta is severely dilated 5.6 cm. The aortic arch is borderline dilated at 3.4 cm. Plaque is noted in the aortic arch.     ASSESSMENT/PLAN:    Suicidal Ideation  - cont mgmt per BHI    Urinary Retention  - s/p cystoscopy for mendoza placement; + false urethral passage  - per urology note from [address] on 9/13 - recommended mendoza be removed in 7 days with voiding trial; pt has been discharged and sent to ARU with mendoza with notes stating to remove mendoza upon urology evaluation  - will consult urology for recs regarding mendoza     COPD  - no AE  - add PRN Albuterol    CKD stage III  - Cr on admission: 1.2  - baseline: ~1.1  - stable    CAD s/p stent  AAA  - no c/o chest pain  - hold ASA 2/2 below, cont Lipitor, PRN NTG    Atrial Fibrillation  - sinus bradycardia (rate 59)  - held Xarelto 2/2 hematuria and hematoma at [facility]  - cont Toprol XL, will restart Xarelto here, Hgb trending up, no gross hematuria    HTN  - controlled  - cont Toprol XL (losartan recently d/c'd 2/2 TONO)  - monitor    Hypothyroidism  - home dose of synthroid 50 mcg    GERD  - cont Protonix      Grade I DD  - noted on echo 9/19/2020 - EF 50%  - appears compensated  - Low Na diet  - cont Toprol XL    Recent Retroperitoneal Hematoma  - 2/2 procedure as below  - Xarelto was held  - last Hgb stable at 10.3, improved from prior of 9.5, will restart Xarelto here    Iron Deficiency Anemia  - Hgb stable at 10.3  - cont Ferrous Sulfate    Lumbar Stenosis  - s/p recent L4-S1 ALIF/PSF  - 8/31 with Dr. Naomi Tesfaye and Dr. Estefani Kamara  - PT/OT    Recent Admission for Sepsis 2/2 UTI  - treated with Merrem per ID at [facility]  - now off [address] [place] [address]    Hx of CVA  - cont Lipitor    Pt has no medical complaints at this time. Pt was informed that they may have BHI contact us should any medical concerns arise during this admission.     Sinai Simms PA-C 3:03 PM 10/1/2020

## 2020-10-01 NOTE — PROGRESS NOTES
Occupational Therapy  Discharge Summary     Name:Mac Azevedo Room  TGC:[id number]  :1945  Treatment Diagnosis:  Impaired: ADL/IADL function, balance, functional mobility, endurance/strength, safety awarenessDiagnosis: Altered Mental Status, Severe Sepsis, Acute Renal Failure and Pelvic Hematoma; Recent L/S Fusion. Restrictions/Precautions  Restrictions/Precautions: Fall Risk           Position Activity Restriction  Spinal Precautions: No Bending, No Lifting, No Twisting  Sternal Precautions: 10# Lifting Restrictions  Sternal Precautions: (per pt)  Other position/activity restrictions: room air     Goals:   Short term goals  NO GOALS MET D/T decline in status D/T depression  Time Frame for Short term goals: 1-1.5 weeks from eval 2020  Short term goal 1: Pt will bathe min A to CGA using AE & shower chair. Short term goal 2: Pt will dress upper body set-up, lower body min A using AE(except melody hose if ordered). Short term goal 3: Pt will toilet CGA to void vs min-mod A for BM w/toilet aid if needed. Short term goal 4: Pt will complete functional transfers/mob using RW/DME with CGA to SBA. Short term goal 5: Pt will perform UE ther exercises/HEP to increase activity tolerance/strength/endurance to tolerate standing for 6-7 minutes for ADL/simple IADL task. Short term goal 6: Pt will recall/follow spinal precautions with only occassional reminders during ADL/mob tasks. .   Long term goals  NO GOALS MET D/T decline in status D/T depression  Time Frame for Long term goals : 1.5-2.5 weeks from 20  Long term goal 1: Pt will bathe modified independent using AE & shower chair. Long term goal 2: Pt will dress modified independent using AE.(except assist for melody hose if ordered)  Long term goal 3: Pt will toilet modified independent w/ AE. Long term goal 4: Pt will complete functional transfers/mob using RW & grab bars, modified independent.   Long term goal 5: Pt will perform UE ther exercises/HEP to increase activity tolerance/strength/safety to complete IADL tasks, such as cleaning & simple meal prep. Pt. Met 0/6 short term goals and 0/5 long term goals. Current Functional Status:  Status taken from session on Monday 5/28/20  ADL  Equipment Provided: Sock aid, Long-handled sponge, Reacher  Feeding: Independent(Pt feeding self tomato soup from cup)  Grooming: Supervision(Combed hair, brushed teeth. Did not shave D/T time restraint.)  UE Bathing: Minimal assistance(Pt with poor tolerance, d/t fatigue, c/o pain, to fully reach all areas.)  LE Bathing: Maximum assistance(in sidelying, sitting up in bed, to wash buttocks, LE's. Pt assists minimally to wash shelbie area, and thighs.)  UE Dressing: Moderate assistance, Maximum assistance(seated from bed)  LE Dressing: Maximum assistance(seated from bed to thread pants, no AE, don footies, with sock aid, and sidelying to pull pants over hips)  Toileting: Maximum assistance(Pt voided BM. Assist for clothes management and hygiene needs)  Additional Comments: Sponge bath completed from bed d/t pt with low BP(see general section). Bed mobility  Bridging: Supervision  Rolling to Left: Supervision  Rolling to Right: Supervision  Supine to Sit: Supervision  Sit to Supine: Supervision  Scooting: Supervision  Comment: hospital bed wit hHOB slightly elevated, use of railing, increased time to perform with cues to perform log roll technique throughout for safety. Sitting EOB pillows to patient right    Functional Transfers: Toilet Transfers  Toilet - Technique: Ambulating, Stand pivot  Equipment Used: Grab bars  Toilet Transfer: Contact guard assistance, Minimal assistance  Toilet Transfers Comments: RW, cues for hand placement, to commode. Stand-pivot from commode to wC d/t low BP         Shower Transfers  Shower - Transfer From: [facility] - Transfer Type: To and From  Shower - Transfer To:  Shower seat with back  Shower - Technique: Ambulating(RW)  Shower POC.  Prognosis: Good     REQUIRES OT FOLLOW UP: Yes  Discharge Recommendations: Continue to assess pending progress, S Level 1, Home with Home health OT, Home with assist PRN    Equipment Recommendations:  Defer to next level of care      Electronically signed by Earnest Rahman OT on 10/1/2020 at 8:55 AM

## 2020-10-02 PROCEDURE — 2580000003 HC RX 258: Performed by: PSYCHIATRY & NEUROLOGY

## 2020-10-02 PROCEDURE — 6370000000 HC RX 637 (ALT 250 FOR IP): Performed by: PHYSICIAN ASSISTANT

## 2020-10-02 PROCEDURE — 99232 SBSQ HOSP IP/OBS MODERATE 35: CPT | Performed by: PHYSICIAN ASSISTANT

## 2020-10-02 PROCEDURE — 1240000000 HC EMOTIONAL WELLNESS R&B

## 2020-10-02 PROCEDURE — 99232 SBSQ HOSP IP/OBS MODERATE 35: CPT | Performed by: PSYCHIATRY & NEUROLOGY

## 2020-10-02 PROCEDURE — 6370000000 HC RX 637 (ALT 250 FOR IP): Performed by: PSYCHIATRY & NEUROLOGY

## 2020-10-02 RX ORDER — DOCUSATE SODIUM 100 MG/1
100 CAPSULE, LIQUID FILLED ORAL DAILY
Status: DISCONTINUED | OUTPATIENT
Start: 2020-10-02 | End: 2020-10-07 | Stop reason: HOSPADM

## 2020-10-02 RX ORDER — POLYETHYLENE GLYCOL 3350 17 G/17G
17 POWDER, FOR SOLUTION ORAL DAILY
Status: DISCONTINUED | OUTPATIENT
Start: 2020-10-02 | End: 2020-10-07 | Stop reason: HOSPADM

## 2020-10-02 RX ADMIN — LEVOTHYROXINE SODIUM 50 MCG: 50 TABLET ORAL at 05:46

## 2020-10-02 RX ADMIN — GABAPENTIN 100 MG: 100 CAPSULE ORAL at 21:57

## 2020-10-02 RX ADMIN — GABAPENTIN 100 MG: 100 CAPSULE ORAL at 08:40

## 2020-10-02 RX ADMIN — POLYETHYLENE GLYCOL (3350) 17 G: 17 POWDER, FOR SOLUTION ORAL at 15:27

## 2020-10-02 RX ADMIN — ATORVASTATIN CALCIUM 40 MG: 40 TABLET, FILM COATED ORAL at 21:58

## 2020-10-02 RX ADMIN — PANTOPRAZOLE SODIUM 40 MG: 40 TABLET, DELAYED RELEASE ORAL at 05:46

## 2020-10-02 RX ADMIN — DOCUSATE SODIUM 100 MG: 100 CAPSULE, LIQUID FILLED ORAL at 15:27

## 2020-10-02 RX ADMIN — Medication 10 ML: at 18:20

## 2020-10-02 RX ADMIN — SENNOSIDES 17.2 MG: 8.6 TABLET, FILM COATED ORAL at 21:57

## 2020-10-02 RX ADMIN — RIVAROXABAN 20 MG: 20 TABLET, FILM COATED ORAL at 18:19

## 2020-10-02 RX ADMIN — Medication 2 CAPSULE: at 16:45

## 2020-10-02 RX ADMIN — Medication 10 ML: at 05:45

## 2020-10-02 RX ADMIN — SENNOSIDES 17.2 MG: 8.6 TABLET, FILM COATED ORAL at 08:40

## 2020-10-02 RX ADMIN — LATANOPROST 1 DROP: 50 SOLUTION/ DROPS OPHTHALMIC at 22:00

## 2020-10-02 RX ADMIN — OXYCODONE HYDROCHLORIDE AND ACETAMINOPHEN 1 TABLET: 7.5; 325 TABLET ORAL at 08:51

## 2020-10-02 RX ADMIN — Medication 2 CAPSULE: at 08:40

## 2020-10-02 RX ADMIN — DORZOLAMIDE HYDROCHLORIDE AND TIMOLOL MALEATE 1 DROP: 20; 5 SOLUTION/ DROPS OPHTHALMIC at 09:05

## 2020-10-02 RX ADMIN — GABAPENTIN 100 MG: 100 CAPSULE ORAL at 14:15

## 2020-10-02 RX ADMIN — DULOXETINE HYDROCHLORIDE 30 MG: 30 CAPSULE, DELAYED RELEASE ORAL at 08:40

## 2020-10-02 RX ADMIN — METHYLPHENIDATE HYDROCHLORIDE 5 MG: 5 TABLET ORAL at 16:45

## 2020-10-02 RX ADMIN — ACETAMINOPHEN 650 MG: 325 TABLET ORAL at 07:42

## 2020-10-02 RX ADMIN — METHYLPHENIDATE HYDROCHLORIDE 5 MG: 5 TABLET ORAL at 08:40

## 2020-10-02 RX ADMIN — FERROUS SULFATE TAB 325 MG (65 MG ELEMENTAL FE) 325 MG: 325 (65 FE) TAB at 08:40

## 2020-10-02 RX ADMIN — DORZOLAMIDE HYDROCHLORIDE AND TIMOLOL MALEATE 1 DROP: 20; 5 SOLUTION/ DROPS OPHTHALMIC at 21:56

## 2020-10-02 RX ADMIN — DULOXETINE HYDROCHLORIDE 30 MG: 30 CAPSULE, DELAYED RELEASE ORAL at 21:58

## 2020-10-02 RX ADMIN — METOPROLOL SUCCINATE 50 MG: 50 TABLET, EXTENDED RELEASE ORAL at 18:19

## 2020-10-02 RX ADMIN — METOPROLOL SUCCINATE 50 MG: 50 TABLET, EXTENDED RELEASE ORAL at 08:40

## 2020-10-02 RX ADMIN — AMIODARONE HYDROCHLORIDE 200 MG: 200 TABLET ORAL at 18:19

## 2020-10-02 ASSESSMENT — PAIN SCALES - GENERAL
PAINLEVEL_OUTOF10: 9
PAINLEVEL_OUTOF10: 9
PAINLEVEL_OUTOF10: 5
PAINLEVEL_OUTOF10: 0
PAINLEVEL_OUTOF10: 3

## 2020-10-02 NOTE — PROGRESS NOTES
Progress Note    Admit Date:  10/1/2020    Subjective:  Mr. Rivera Ellison *c/o inability to urinate after mendoza removed and he complains constipation    Objective:     BP (!) 144/78   Pulse 61   Temp 98.2 °F (36.8 °C) (Oral)   Resp 18   Ht 5' 9\" (1.753 m)   Wt 157 lb 12.8 oz (71.6 kg)   SpO2 95%   BMI 23.30 kg/m²          Intake/Output Summary (Last 24 hours) at 10/2/2020 1609  Last data filed at 10/2/2020 1520  Gross per 24 hour   Intake 1600 ml   Output 300 ml   Net 1300 ml       Physical Exam:    Gen: No distress. Alert. Elderly  male  Eyes: PERRL. No sclera icterus. No conjunctival injection. ENT: No discharge. Pharynx clear. Neck:  Trachea midline. Resp: No accessory muscle use. No crackles. No wheezes. No rhonchi. CV: Regular rate. Regular rhythm. No murmur. No rub. No edema. GI: Non-tender. Non-distended. Normal bowel sounds. : mendoza in place draining orange/yellow urine   Skin: Warm and dry. No rash on exposed extremities. M/S: No cyanosis. No clubbing. PICC RUE   Neuro: Awake. Grossly nonfocal  Psych: Oriented x 3. No anxiety or agitation.        Scheduled Meds:   polyethylene glycol  17 g Oral Daily    docusate sodium  100 mg Oral Daily    atorvastatin  40 mg Oral Nightly    dorzolamide-timolol  1 drop Both Eyes BID    DULoxetine  30 mg Oral BID    ferrous sulfate  325 mg Oral Daily with breakfast    latanoprost  1 drop Left Eye Nightly    levothyroxine  50 mcg Oral Daily    metoprolol succinate  50 mg Oral BID    pantoprazole  40 mg Oral Daily    senna  2 tablet Oral BID    gabapentin  100 mg Oral TID    lactobacillus  2 capsule Oral BID     sodium chloride flush  10 mL Intravenous Q12H    methylphenidate  5 mg Oral BID WC    amiodarone  200 mg Oral Daily    rivaroxaban  20 mg Oral Daily       Continuous Infusions:      PRN Meds:  acetaminophen, LORazepam **OR** LORazepam, haloperidol lactate **OR** haloperidol, traZODone, benztropine mesylate, magnesium hydroxide, aluminum & magnesium hydroxide-simethicone, nitroGLYCERIN, oxyCODONE-acetaminophen, oxyCODONE-acetaminophen      Data:  CBC:   Recent Labs     09/30/20  0614   WBC 4.0   HGB 10.3*   HCT 31.4*   MCV 92.5        BMP:   Recent Labs     09/30/20  0614 09/30/20  1230   * 135*   K 3.9 4.4    103   CO2 18* 17*   BUN 16 16   CREATININE 1.2 1.2     LIVER PROFILE:   Recent Labs     09/30/20  1230   AST 21   ALT 12   BILITOT 1.2*   ALKPHOS 121     PT/INR: No results for input(s): PROTIME, INR in the last 72 hours. Assessment/Plan:    #MDD  - cont mgmt per [facility]     #Urinary Retention  - s/p cystoscopy for mendoza placement; + false urethral passage  - per urology note from [address] on 9/13 - recommended mendoza be removed in 7 days with voiding trial; pt has been discharged and sent to ARU with mendoza with notes stating to remove mendoza upon urology evaluation  - urology consulted 10/1.   Mendoza removed 10/2 and patient unable to void- nurse will call urologist now      #COPD  - no AE  - add PRN Albuterol     #CKD stage III  - Cr on admission: 1.2  - baseline: ~1.1  - stable     #CAD s/p stent  #AAA  - no c/o chest pain  - hold ASA 2/2 below, cont Lipitor, PRN NTG     #Atrial Fibrillation  - sinus bradycardia (rate 59)  - held Xarelto 2/2 hematuria and hematoma at [facility]  - cont Toprol XL, will restart Xarelto here, Hgb trending up, no gross hematuria     #HTN  - controlled  - cont Toprol XL (losartan recently d/c'd 2/2 TONO)  - monitor     #Hypothyroidism  - home dose of synthroid 50 mcg     #GERD  - cont Protonix       #Grade I DD  - noted on echo 9/19/2020 - EF 50%  - appears compensated  - Low Na diet  - cont Toprol XL     #Recent Retroperitoneal Hematoma  - 2/2 procedure as below  - Xarelto was held  - last Hgb stable at 10.3, improved from prior of 9.5, will restart Xarelto here     #Iron Deficiency Anemia  - Hgb stable at 10.3  - cont Ferrous Sulfate     #Lumbar Stenosis  - s/p recent L4-S1

## 2020-10-02 NOTE — PLAN OF CARE
Pt. C/o leg pain of 9 on 0-10 scale rest and reposition was ineffective administered Percocet 7.5 325 mg will monitor for effectiveness

## 2020-10-02 NOTE — PLAN OF CARE
Chelsea Contreras has been in a very good mood this evening. He is laughing and joking with staff. Denies SI/HI/AVH. States he has not been getting out of bed but does have walker at bedside. It was reported from days that pt's BP drops significantly when up. VSS this evening. He was also reported to have poor appetite but did eat all of his dinner per report. He is med compliant whole. PICC line lumens flush easily. No c/o pain. Reeves draining dark yellow urine. Will continue to monitor.

## 2020-10-02 NOTE — PLAN OF CARE
Pt. Is sad and withdrawn to bedroom, compliant with medications, fair appetite, good po intake of fluids,urinary output challenge per urology.

## 2020-10-02 NOTE — PROGRESS NOTES
Department of Psychiatry  Attending Progress Note    Admission Date:    10/1/2020    Chief complaint / Reason for Admission:  Suicidal ideation    Patient's chart was reviewed, case was discussed with nursing/OT/RT staff, and collaborated with  about the treatment plan. SUBJECTIVE:   Over last 24 hours:  Behavioral outbursts: No   Non-aggressive behavioral disturbance: No  Medication compliant: Yes  Need for seclusion/restraints: No  Sleeping adequately:  Yes  Appetite adequate: Improved  Attending groups: Yes    Patient actually cooperated with care fully yesterday. He took his medications including new order for stimulant, interacted in a few groups, and tried to eat at each meal.    Today he reports that he is feeling more hopeful. Spoke to several family members and they \"reminded me that I'm still needed and they'd be sad if I were gone. \"  Now feels that, while he looks forward to being reunited with his wife \"I have to wait until god feels I'm ready. \"    Has tolerated first doses of methylphenidate. Denies SEs. We discussed willingness to return to SNF as PT/OT are recommending. Pt open to this.     Progressing overall: Some early improvement  Suicidal ideation: Denies today  Homicidal ideation: denies  Medication side effects: no    ROS: Patient has new complaints: no    Current Medications Ordered:   polyethylene glycol  17 g Oral Daily    docusate sodium  100 mg Oral Daily    atorvastatin  40 mg Oral Nightly    dorzolamide-timolol  1 drop Both Eyes BID    DULoxetine  30 mg Oral BID    ferrous sulfate  325 mg Oral Daily with breakfast    latanoprost  1 drop Left Eye Nightly    levothyroxine  50 mcg Oral Daily    metoprolol succinate  50 mg Oral BID    pantoprazole  40 mg Oral Daily    senna  2 tablet Oral BID    gabapentin  100 mg Oral TID    lactobacillus  2 capsule Oral BID     sodium chloride flush  10 mL Intravenous Q12H    methylphenidate  5 mg Oral BID     amiodarone  200 mg Oral Daily    rivaroxaban  20 mg Oral Daily      PRN Meds: acetaminophen, LORazepam **OR** LORazepam, haloperidol lactate **OR** haloperidol, traZODone, benztropine mesylate, magnesium hydroxide, aluminum & magnesium hydroxide-simethicone, nitroGLYCERIN, oxyCODONE-acetaminophen, oxyCODONE-acetaminophen     Objective:     PE:    BP (!) 144/78   Pulse 61   Temp 98.2 °F (36.8 °C) (Oral)   Resp 18   Ht 5' 9\" (1.753 m)   Wt 157 lb 12.8 oz (71.6 kg)   SpO2 95%   BMI 23.30 kg/m²       Motor / Gait: psychomotor retardation, nm tone, no involuntary movements, gait deferred     Mental Status Examination:    Appearance: WM, appears stated age, wearing pajamas, limited grooming and hygiene  Behavior/Attitude toward examiner:  cooperative, attentive, limited eye contact  Speech:  spontaneous, normal rate, soft, volume and well articulated   Mood:  \"Better today\"  Affect:  Mood congruent,constricted  Thought processes:  Linear  Thought Content: Today denies SI, denies HI, no delusions voiced, no obsessions, Less hopelessness  Perceptions: Denies AVH, no RTIS  Attention: limited  Abstraction: wnl  Cognition: Average AMANDA, Alert and oriented to person, place, time, and situation, recall grossly intact  Insight: Limited insight   Judgment: Limited judgment      LAB: Reviewed labs from last 24 hours      Assessment and Plan:     Diagnoses:   Primary Psychiatric (DSM V) Diagnosis: Major depressive disorder, recurrent, severe without psychotic features  Secondary Psychiatric (DSM V) Diagnoses: None  Chemical Dependency Diagnoses: Tobacco use disorder, severe, in remission  Active Medical Diagnoses: Urinary retention, COPD, CKD, Lumbar Stenosis s/p fusion, Afib, CAD, HTN      All conditions detailed above are being treated while patient is hospitalized.      Tx plan: Generally: prevent self injury/aggression, stabilize mood/anxiety/psychotic/behavioral disturbance, establish/maintain aftercare, increase coping mechanisms, improve medication compliance.  All conditions present on admission are being treated while pt is hospitalized.      Legal Status: Involuntary. Statement of observation signed today.     Primary Psychiatric Issues:  1. Major depressive disorder, recurrent:  -Continue duloxetine 30 mg BID. -Started methylphenidate 5 mg BID on admission.     Chemical Dependency Issues:  No issues     Function:  -Consulted physical therapy  -Consulted occupational therapy - both recommend SNF.  -Falls precautions     Medical Problems:  Internal medicine has been consulted. Appreciate recs.     Code Status: Full     Disposition:    -Housing: Own home, but likely needs return to SNF. -Current outpatient follow-up: Would benefit from psychiatry referral.     Estimated length of stay: 7 to 10 days.     Criteria for Discharge:  Not psychotic, not homicidal, not suicidal, behavioral disturbance under control, sleeping well, mood improved/stable, eating well, aftercare arranged. Total face to face time with patient was 30 minutes and more than 50 % of that time was spent counseling the patient on their symptoms, treatment and expected goals.     Ramsey Wan MD  Staff Psychiatrist

## 2020-10-02 NOTE — GROUP NOTE
Group Therapy Note    Date: 10/2/2020    Group Start Time: 1100  Group End Time: 0639  Group Topic: Cognitive Skills    [facility]        Group Therapy Note    Cognitive skills - Patients processed word games utilizing reminiscence throughout. Patients collaborated across group stimuli in providing responses to structured activities, supported reminiscence discussions with peer/staff interactions. Attendees: 7       Notes:  Patient did not attend cognitive skills group session, lying in bed in semi-private room. Meeting with physician and floor staff across session.       Discipline Responsible: Psychoeducational Specialist      Signature:  Julia Muniz MM, MT-BC

## 2020-10-03 ENCOUNTER — APPOINTMENT (OUTPATIENT)
Dept: CT IMAGING | Age: 75
DRG: 885 | End: 2020-10-03
Attending: PSYCHIATRY & NEUROLOGY
Payer: MEDICARE

## 2020-10-03 ENCOUNTER — APPOINTMENT (OUTPATIENT)
Dept: GENERAL RADIOLOGY | Age: 75
DRG: 885 | End: 2020-10-03
Attending: PSYCHIATRY & NEUROLOGY
Payer: MEDICARE

## 2020-10-03 PROCEDURE — 1240000000 HC EMOTIONAL WELLNESS R&B

## 2020-10-03 PROCEDURE — 6370000000 HC RX 637 (ALT 250 FOR IP): Performed by: PHYSICIAN ASSISTANT

## 2020-10-03 PROCEDURE — 6370000000 HC RX 637 (ALT 250 FOR IP): Performed by: PSYCHIATRY & NEUROLOGY

## 2020-10-03 PROCEDURE — 2580000003 HC RX 258: Performed by: PSYCHIATRY & NEUROLOGY

## 2020-10-03 PROCEDURE — 70450 CT HEAD/BRAIN W/O DYE: CPT

## 2020-10-03 PROCEDURE — 73502 X-RAY EXAM HIP UNI 2-3 VIEWS: CPT

## 2020-10-03 RX ADMIN — SENNOSIDES 17.2 MG: 8.6 TABLET, FILM COATED ORAL at 21:47

## 2020-10-03 RX ADMIN — FERROUS SULFATE TAB 325 MG (65 MG ELEMENTAL FE) 325 MG: 325 (65 FE) TAB at 10:16

## 2020-10-03 RX ADMIN — GABAPENTIN 100 MG: 100 CAPSULE ORAL at 21:47

## 2020-10-03 RX ADMIN — GABAPENTIN 100 MG: 100 CAPSULE ORAL at 15:12

## 2020-10-03 RX ADMIN — GABAPENTIN 100 MG: 100 CAPSULE ORAL at 10:17

## 2020-10-03 RX ADMIN — METOPROLOL SUCCINATE 50 MG: 50 TABLET, EXTENDED RELEASE ORAL at 10:16

## 2020-10-03 RX ADMIN — Medication 2 CAPSULE: at 10:16

## 2020-10-03 RX ADMIN — METHYLPHENIDATE HYDROCHLORIDE 5 MG: 5 TABLET ORAL at 10:16

## 2020-10-03 RX ADMIN — LATANOPROST 1 DROP: 50 SOLUTION/ DROPS OPHTHALMIC at 21:47

## 2020-10-03 RX ADMIN — ATORVASTATIN CALCIUM 40 MG: 40 TABLET, FILM COATED ORAL at 21:47

## 2020-10-03 RX ADMIN — Medication 10 ML: at 21:56

## 2020-10-03 RX ADMIN — DULOXETINE HYDROCHLORIDE 30 MG: 30 CAPSULE, DELAYED RELEASE ORAL at 10:16

## 2020-10-03 RX ADMIN — ALUMINUM HYDROXIDE, MAGNESIUM HYDROXIDE, AND SIMETHICONE 30 ML: 200; 200; 20 SUSPENSION ORAL at 01:08

## 2020-10-03 RX ADMIN — DULOXETINE HYDROCHLORIDE 30 MG: 30 CAPSULE, DELAYED RELEASE ORAL at 21:47

## 2020-10-03 RX ADMIN — AMIODARONE HYDROCHLORIDE 200 MG: 200 TABLET ORAL at 19:33

## 2020-10-03 RX ADMIN — DORZOLAMIDE HYDROCHLORIDE AND TIMOLOL MALEATE 1 DROP: 20; 5 SOLUTION/ DROPS OPHTHALMIC at 21:47

## 2020-10-03 RX ADMIN — RIVAROXABAN 20 MG: 20 TABLET, FILM COATED ORAL at 19:30

## 2020-10-03 RX ADMIN — DORZOLAMIDE HYDROCHLORIDE AND TIMOLOL MALEATE 1 DROP: 20; 5 SOLUTION/ DROPS OPHTHALMIC at 10:17

## 2020-10-03 NOTE — BH NOTE
Sandy Keith is AOx3, pleasant and isolative to room. He denies SIHI and AVH, no RTIS noted. He has decreased but improving oral intake, eating a few snacks and Ensures. Multiple BM's, held related meds for today. [facility] call received.

## 2020-10-03 NOTE — PROGRESS NOTES
Patient was found on the floor. He said that he had to have a BM. He related that when he attempted to get back to bed he held on to the side rail and his feet went out from under him., He denied any complaint of pain, denied hitting his head and said that he landed on his butt and that the only pain he was having was [facility] pride. \" Vital signs were stable. CA was notified. Dr. Ananda Iraheta called and ordered a cat scan be completed.

## 2020-10-03 NOTE — PLAN OF CARE
Problem: Suicide risk  Goal: Provide patient with safe environment  Description: Provide patient with safe environment  10/2/2020 2343 by Felicitas Shukla RN  Outcome: Ongoing  Patient denied SI/HI this evening. Safety chekcs continue Q 15 minutes through out the shift. Problem: Skin Integrity:  Goal: Will show no infection signs and symptoms  Description: Will show no infection signs and symptoms  10/2/2020 2343 by Felicitas Shukla RN  Outcome: Ongoing  Goal: Absence of new skin breakdown  Description: Absence of new skin breakdown  10/2/2020 2343 by Felicitas Shukla RN  Outcome: Ongoing  No signs of infection noted, no new areas of skin breakdown were observed. Problem: Falls - Risk of:  Goal: Will remain free from falls  Description: Will remain free from falls  10/2/2020 2343 by Felicitas Shukla RN  Outcome: Ongoing  Goal: Absence of physical injury  Description: Absence of physical injury  10/2/2020 2343 by Felicitas Shukla RN  Outcome: Ongoing  Patient has remained free of falls thus far this shift. Safety checks continue Q 15 minutes and call light is within reach. Will monitor patient through out the shift.

## 2020-10-03 NOTE — BH NOTE
Cena Sacks was invited and encouraged to attend 1330 Relaxation Group. Tito Rolleks declined maximum encouragement and did not attend group.      Stella Grove, CTRS

## 2020-10-03 NOTE — PROGRESS NOTES
Patient is noted to be resting quietly in bed at this time. Medication helpful to allow sleep and decrease indigestion.

## 2020-10-03 NOTE — PROGRESS NOTES
Patient offered complaint of indigestion and some discomfort with mendoza catheter. Patient given maalox 30 ml po and offered percocet 1 po for complaint of pain. Patient refused pain pill at this time. Asked patient to notify staff if pain requires medication to decrease discomfort. Patient agreed to do so. Will monitor for relief from indigestion.

## 2020-10-03 NOTE — PROGRESS NOTES
Patient stated that his left hip was hurting at this time. On call psychiatrist notified and new order for x-ray of left hip ordered.

## 2020-10-04 PROCEDURE — 6370000000 HC RX 637 (ALT 250 FOR IP): Performed by: PHYSICIAN ASSISTANT

## 2020-10-04 PROCEDURE — 6370000000 HC RX 637 (ALT 250 FOR IP): Performed by: PSYCHIATRY & NEUROLOGY

## 2020-10-04 PROCEDURE — 1240000000 HC EMOTIONAL WELLNESS R&B

## 2020-10-04 PROCEDURE — 99233 SBSQ HOSP IP/OBS HIGH 50: CPT | Performed by: PSYCHIATRY & NEUROLOGY

## 2020-10-04 PROCEDURE — 2580000003 HC RX 258: Performed by: PSYCHIATRY & NEUROLOGY

## 2020-10-04 RX ADMIN — GABAPENTIN 100 MG: 100 CAPSULE ORAL at 20:36

## 2020-10-04 RX ADMIN — Medication 2 CAPSULE: at 09:24

## 2020-10-04 RX ADMIN — ACETAMINOPHEN 650 MG: 325 TABLET ORAL at 20:41

## 2020-10-04 RX ADMIN — DULOXETINE HYDROCHLORIDE 30 MG: 30 CAPSULE, DELAYED RELEASE ORAL at 09:25

## 2020-10-04 RX ADMIN — FERROUS SULFATE TAB 325 MG (65 MG ELEMENTAL FE) 325 MG: 325 (65 FE) TAB at 09:25

## 2020-10-04 RX ADMIN — METHYLPHENIDATE HYDROCHLORIDE 5 MG: 5 TABLET ORAL at 18:16

## 2020-10-04 RX ADMIN — LEVOTHYROXINE SODIUM 50 MCG: 50 TABLET ORAL at 06:20

## 2020-10-04 RX ADMIN — LATANOPROST 1 DROP: 50 SOLUTION/ DROPS OPHTHALMIC at 20:38

## 2020-10-04 RX ADMIN — METOPROLOL SUCCINATE 50 MG: 50 TABLET, EXTENDED RELEASE ORAL at 09:26

## 2020-10-04 RX ADMIN — Medication 10 ML: at 18:58

## 2020-10-04 RX ADMIN — AMIODARONE HYDROCHLORIDE 200 MG: 200 TABLET ORAL at 18:16

## 2020-10-04 RX ADMIN — DOCUSATE SODIUM 100 MG: 100 CAPSULE, LIQUID FILLED ORAL at 09:25

## 2020-10-04 RX ADMIN — ATORVASTATIN CALCIUM 40 MG: 40 TABLET, FILM COATED ORAL at 20:37

## 2020-10-04 RX ADMIN — GABAPENTIN 100 MG: 100 CAPSULE ORAL at 13:48

## 2020-10-04 RX ADMIN — Medication 2 CAPSULE: at 18:16

## 2020-10-04 RX ADMIN — DULOXETINE HYDROCHLORIDE 30 MG: 30 CAPSULE, DELAYED RELEASE ORAL at 20:36

## 2020-10-04 RX ADMIN — DORZOLAMIDE HYDROCHLORIDE AND TIMOLOL MALEATE 1 DROP: 20; 5 SOLUTION/ DROPS OPHTHALMIC at 20:38

## 2020-10-04 RX ADMIN — GABAPENTIN 100 MG: 100 CAPSULE ORAL at 09:25

## 2020-10-04 RX ADMIN — Medication 10 ML: at 06:20

## 2020-10-04 RX ADMIN — DORZOLAMIDE HYDROCHLORIDE AND TIMOLOL MALEATE 1 DROP: 20; 5 SOLUTION/ DROPS OPHTHALMIC at 09:25

## 2020-10-04 RX ADMIN — METHYLPHENIDATE HYDROCHLORIDE 5 MG: 5 TABLET ORAL at 09:25

## 2020-10-04 RX ADMIN — RIVAROXABAN 20 MG: 20 TABLET, FILM COATED ORAL at 18:16

## 2020-10-04 RX ADMIN — PANTOPRAZOLE SODIUM 40 MG: 40 TABLET, DELAYED RELEASE ORAL at 06:20

## 2020-10-04 ASSESSMENT — PAIN SCALES - GENERAL
PAINLEVEL_OUTOF10: 6
PAINLEVEL_OUTOF10: 2

## 2020-10-04 NOTE — GROUP NOTE
Group Therapy Note    Date: 10/4/2020    Group Start Time: 8604  Group End Time: 3907  Group Topic: Psychoeducation    Viri Reynolds        Group Therapy Note    Attendees: 8    Patient's Goal: to identify healthy ways to cope with emotions, stress, and situations. Notes: Jackie Leon identified multiple healthy coping skills. Jackie Leon actively listened and contributed to processing discussion. Jackie Leon was provided with a coping skill resource, to utilize as needed. Status After Intervention:  Improved    Participation Level:  Active Listener and Interactive    Participation Quality: Appropriate, Attentive and Sharing      Speech:  normal      Thought Process/Content: Linear      Affective Functioning: Constricted/Restricted      Mood: anxious and depressed      Level of consciousness:  Alert and Attentive      Response to Learning: Capable of insight, Able to change behavior and Progressing to goal      Endings: None Reported    Modes of Intervention: Education, Support, Socialization, Exploration, Clarifying, Problem-solving and Activity      Discipline Responsible: Psychoeducational Specialist      Signature:  JL Zapata

## 2020-10-04 NOTE — PLAN OF CARE
Pt has been in bed thus far this shift. He is alert to all except the reason he is here. He is pleasant and cooperative with care. He is quiet and does not initiate conversation. He appears sad and guarded, with minimal answers. He denies SI/HI/AVH. He has not been noted to be RTIS. Meds taken without difficulty. He needed encouragement to finish 120ml of H20. He had 150ml of urine out and it was dark raphael colored. PICC lines flushed. The blue port was positive for blood return. There was no blood return form the red port, but it flushed easily.

## 2020-10-04 NOTE — BH NOTE
Juana Quintana is AOx3 this morning. Compliant with medication administration, care and goal setting. He denies all, no RTIS noted. Expresses disappointment about being on a dimentia unit, provided education on unit and he is sad to hear that he is considered \"geratric\". Improving oral intake, up to chair for majority of day and participating in groups. Family calling to check on him.

## 2020-10-04 NOTE — PROGRESS NOTES
Department of Psychiatry  Attending Progress Note    Admission Date:    10/1/2020    Chief complaint / Reason for Admission:  Suicidal ideation    Patient's chart was reviewed, case was discussed with nursing/OT/RT staff, and collaborated with  about the treatment plan. SUBJECTIVE:   Over last 24 hours:  Behavioral outbursts: No   Non-aggressive behavioral disturbance: No  Medication compliant: Yes  Need for seclusion/restraints: No  Sleeping adequately:  Yes  Appetite adequate: Improved  Attending groups: Yes      Today he reports that he is feeling better and he talks about the football game that he is watching. He stated that he is eating better but explains that it is hard and painful to swallow and he stated that he can tolerate soft foods like banana and pudding. Pt yesterday was not eating except for ensures and he stayed on his room but today he was out and stated that he is feeling better.          Progressing overall: Some  improvement  Suicidal ideation: Denies   Homicidal ideation: denies  Medication side effects: no    ROS: Patient has new complaints: no    Current Medications Ordered:   polyethylene glycol  17 g Oral Daily    docusate sodium  100 mg Oral Daily    atorvastatin  40 mg Oral Nightly    dorzolamide-timolol  1 drop Both Eyes BID    DULoxetine  30 mg Oral BID    ferrous sulfate  325 mg Oral Daily with breakfast    latanoprost  1 drop Left Eye Nightly    levothyroxine  50 mcg Oral Daily    metoprolol succinate  50 mg Oral BID    pantoprazole  40 mg Oral Daily    senna  2 tablet Oral BID    gabapentin  100 mg Oral TID    lactobacillus  2 capsule Oral BID     sodium chloride flush  10 mL Intravenous Q12H    methylphenidate  5 mg Oral BID WC    amiodarone  200 mg Oral Daily    rivaroxaban  20 mg Oral Daily      PRN Meds: acetaminophen, LORazepam **OR** LORazepam, haloperidol lactate **OR** haloperidol, traZODone, benztropine mesylate, magnesium hydroxide, disorder, recurrent: Will cont meds as prescribed       Medical Problems:  Internal medicine has been consulted. Appreciate recs.     Code Status: Full     Disposition:    -Housing: Own home, but likely needs return to SNF. -Current outpatient follow-up: Would benefit from psychiatry referral.     Estimated length of stay: per primary team     Criteria for Discharge:  Not psychotic, not homicidal, not suicidal, behavioral disturbance under control, sleeping well, mood improved/stable, eating well, aftercare arranged. Total face to face time with patient was 30 minutes and more than 50 % of that time was spent counseling the patient on their symptoms, treatment and expected goals.

## 2020-10-04 NOTE — GROUP NOTE
Group Therapy Note    Date: 10/4/2020    Group Start Time: 1300  Group End Time: 8293  Group Topic: Recreational    Viri [address]        Group Therapy Note    Attendees: 9    Patient's Goal: to select a meaningful leisure outlet to engage in, to promote; mood improvement, enjoyment, and stress reduction. Notes: Hilario Land selected to engage in watching the [employer] football game with peers. Hilario Land reminisced and shared appropriate stories associated with  [employer] games he has been to. Hilario Land brightened appropriately throughout group. Status After Intervention:  Improved    Participation Level:  Active Listener and Interactive    Participation Quality: Appropriate, Attentive and Sharing      Speech:  normal      Thought Process/Content: Linear      Affective Functioning: Congruent      Mood: euthymic      Level of consciousness:  Alert, Oriented x4 and Attentive      Response to Learning: Able to verbalize current knowledge/experience, Able to verbalize/acknowledge new learning and Progressing to goal      Endings: None Reported    Modes of Intervention: Education, Support, Socialization, Exploration, Activity and Media      Discipline Responsible: Psychoeducational Specialist      Signature:  Sivakumar Jean Baptiste [place]

## 2020-10-05 ENCOUNTER — APPOINTMENT (OUTPATIENT)
Dept: CT IMAGING | Age: 75
DRG: 885 | End: 2020-10-05
Attending: PSYCHIATRY & NEUROLOGY
Payer: MEDICARE

## 2020-10-05 LAB
A/G RATIO: 1.1 (ref 1.1–2.2)
ALBUMIN SERPL-MCNC: 3.6 G/DL (ref 3.4–5)
ALP BLD-CCNC: 98 U/L (ref 40–129)
ALT SERPL-CCNC: 14 U/L (ref 10–40)
ANION GAP SERPL CALCULATED.3IONS-SCNC: 9 MMOL/L (ref 3–16)
AST SERPL-CCNC: 22 U/L (ref 15–37)
BACTERIA: ABNORMAL /HPF
BASOPHILS ABSOLUTE: 0 K/UL (ref 0–0.2)
BASOPHILS RELATIVE PERCENT: 0.5 %
BILIRUB SERPL-MCNC: 0.7 MG/DL (ref 0–1)
BILIRUBIN URINE: NEGATIVE
BLOOD, URINE: ABNORMAL
BUN BLDV-MCNC: 25 MG/DL (ref 7–20)
CALCIUM SERPL-MCNC: 9 MG/DL (ref 8.3–10.6)
CHLORIDE BLD-SCNC: 99 MMOL/L (ref 99–110)
CLARITY: ABNORMAL
CO2: 24 MMOL/L (ref 21–32)
COLOR: YELLOW
COMMENT UA: ABNORMAL
CREAT SERPL-MCNC: 1.4 MG/DL (ref 0.8–1.3)
CRYSTALS, UA: ABNORMAL /HPF
EOSINOPHILS ABSOLUTE: 0.1 K/UL (ref 0–0.6)
EOSINOPHILS RELATIVE PERCENT: 1.6 %
GFR AFRICAN AMERICAN: 59
GFR NON-AFRICAN AMERICAN: 49
GLOBULIN: 3.3 G/DL
GLUCOSE BLD-MCNC: 124 MG/DL (ref 70–99)
GLUCOSE URINE: NEGATIVE MG/DL
HCT VFR BLD CALC: 28.3 % (ref 40.5–52.5)
HEMOGLOBIN: 9.4 G/DL (ref 13.5–17.5)
KETONES, URINE: NEGATIVE MG/DL
LEUKOCYTE ESTERASE, URINE: ABNORMAL
LYMPHOCYTES ABSOLUTE: 1.2 K/UL (ref 1–5.1)
LYMPHOCYTES RELATIVE PERCENT: 14.8 %
MCH RBC QN AUTO: 30.3 PG (ref 26–34)
MCHC RBC AUTO-ENTMCNC: 33.1 G/DL (ref 31–36)
MCV RBC AUTO: 91.5 FL (ref 80–100)
MICROSCOPIC EXAMINATION: YES
MONOCYTES ABSOLUTE: 1.1 K/UL (ref 0–1.3)
MONOCYTES RELATIVE PERCENT: 12.6 %
NEUTROPHILS ABSOLUTE: 5.9 K/UL (ref 1.7–7.7)
NEUTROPHILS RELATIVE PERCENT: 70.5 %
NITRITE, URINE: POSITIVE
PDW BLD-RTO: 15.8 % (ref 12.4–15.4)
PH UA: >=9 (ref 5–8)
PLATELET # BLD: 164 K/UL (ref 135–450)
PMV BLD AUTO: 9.4 FL (ref 5–10.5)
POTASSIUM SERPL-SCNC: 4.6 MMOL/L (ref 3.5–5.1)
PROTEIN UA: 100 MG/DL
RBC # BLD: 3.09 M/UL (ref 4.2–5.9)
RBC UA: ABNORMAL /HPF (ref 0–4)
SODIUM BLD-SCNC: 132 MMOL/L (ref 136–145)
SPECIFIC GRAVITY UA: 1.01 (ref 1–1.03)
TOTAL PROTEIN: 6.9 G/DL (ref 6.4–8.2)
URINE REFLEX TO CULTURE: YES
URINE TYPE: ABNORMAL
UROBILINOGEN, URINE: 0.2 E.U./DL
WBC # BLD: 8.3 K/UL (ref 4–11)
WBC UA: ABNORMAL /HPF (ref 0–5)

## 2020-10-05 PROCEDURE — 80053 COMPREHEN METABOLIC PANEL: CPT

## 2020-10-05 PROCEDURE — 6370000000 HC RX 637 (ALT 250 FOR IP): Performed by: PSYCHIATRY & NEUROLOGY

## 2020-10-05 PROCEDURE — 6370000000 HC RX 637 (ALT 250 FOR IP): Performed by: PHYSICIAN ASSISTANT

## 2020-10-05 PROCEDURE — 1240000000 HC EMOTIONAL WELLNESS R&B

## 2020-10-05 PROCEDURE — 36415 COLL VENOUS BLD VENIPUNCTURE: CPT

## 2020-10-05 PROCEDURE — 87186 SC STD MICRODIL/AGAR DIL: CPT

## 2020-10-05 PROCEDURE — 99232 SBSQ HOSP IP/OBS MODERATE 35: CPT | Performed by: PHYSICIAN ASSISTANT

## 2020-10-05 PROCEDURE — 85025 COMPLETE CBC W/AUTO DIFF WBC: CPT

## 2020-10-05 PROCEDURE — 74177 CT ABD & PELVIS W/CONTRAST: CPT

## 2020-10-05 PROCEDURE — 81001 URINALYSIS AUTO W/SCOPE: CPT

## 2020-10-05 PROCEDURE — 99233 SBSQ HOSP IP/OBS HIGH 50: CPT | Performed by: NURSE PRACTITIONER

## 2020-10-05 PROCEDURE — 2580000003 HC RX 258: Performed by: PSYCHIATRY & NEUROLOGY

## 2020-10-05 PROCEDURE — 6360000004 HC RX CONTRAST MEDICATION: Performed by: PHYSICIAN ASSISTANT

## 2020-10-05 PROCEDURE — 87086 URINE CULTURE/COLONY COUNT: CPT

## 2020-10-05 PROCEDURE — 87077 CULTURE AEROBIC IDENTIFY: CPT

## 2020-10-05 RX ADMIN — DORZOLAMIDE HYDROCHLORIDE AND TIMOLOL MALEATE 1 DROP: 20; 5 SOLUTION/ DROPS OPHTHALMIC at 08:59

## 2020-10-05 RX ADMIN — ACETAMINOPHEN 650 MG: 325 TABLET ORAL at 16:16

## 2020-10-05 RX ADMIN — DOCUSATE SODIUM 100 MG: 100 CAPSULE, LIQUID FILLED ORAL at 08:58

## 2020-10-05 RX ADMIN — LEVOTHYROXINE SODIUM 50 MCG: 50 TABLET ORAL at 06:00

## 2020-10-05 RX ADMIN — POLYETHYLENE GLYCOL (3350) 17 G: 17 POWDER, FOR SOLUTION ORAL at 08:58

## 2020-10-05 RX ADMIN — Medication 2 CAPSULE: at 08:58

## 2020-10-05 RX ADMIN — METHYLPHENIDATE HYDROCHLORIDE 5 MG: 5 TABLET ORAL at 08:58

## 2020-10-05 RX ADMIN — ATORVASTATIN CALCIUM 40 MG: 40 TABLET, FILM COATED ORAL at 20:47

## 2020-10-05 RX ADMIN — PANTOPRAZOLE SODIUM 40 MG: 40 TABLET, DELAYED RELEASE ORAL at 06:01

## 2020-10-05 RX ADMIN — GABAPENTIN 100 MG: 100 CAPSULE ORAL at 14:21

## 2020-10-05 RX ADMIN — GABAPENTIN 100 MG: 100 CAPSULE ORAL at 08:58

## 2020-10-05 RX ADMIN — DORZOLAMIDE HYDROCHLORIDE AND TIMOLOL MALEATE 1 DROP: 20; 5 SOLUTION/ DROPS OPHTHALMIC at 20:47

## 2020-10-05 RX ADMIN — DULOXETINE HYDROCHLORIDE 30 MG: 30 CAPSULE, DELAYED RELEASE ORAL at 08:58

## 2020-10-05 RX ADMIN — OXYCODONE HYDROCHLORIDE AND ACETAMINOPHEN 1 TABLET: 5; 325 TABLET ORAL at 14:21

## 2020-10-05 RX ADMIN — Medication 10 ML: at 06:01

## 2020-10-05 RX ADMIN — FERROUS SULFATE TAB 325 MG (65 MG ELEMENTAL FE) 325 MG: 325 (65 FE) TAB at 08:58

## 2020-10-05 RX ADMIN — DULOXETINE HYDROCHLORIDE 30 MG: 30 CAPSULE, DELAYED RELEASE ORAL at 20:47

## 2020-10-05 RX ADMIN — Medication 2 CAPSULE: at 16:16

## 2020-10-05 RX ADMIN — METOPROLOL SUCCINATE 50 MG: 50 TABLET, EXTENDED RELEASE ORAL at 16:16

## 2020-10-05 RX ADMIN — ACETAMINOPHEN 650 MG: 325 TABLET ORAL at 21:02

## 2020-10-05 RX ADMIN — AMIODARONE HYDROCHLORIDE 200 MG: 200 TABLET ORAL at 16:16

## 2020-10-05 RX ADMIN — Medication 10 ML: at 19:10

## 2020-10-05 RX ADMIN — RIVAROXABAN 20 MG: 20 TABLET, FILM COATED ORAL at 16:20

## 2020-10-05 RX ADMIN — GABAPENTIN 100 MG: 100 CAPSULE ORAL at 20:47

## 2020-10-05 RX ADMIN — LATANOPROST 1 DROP: 50 SOLUTION/ DROPS OPHTHALMIC at 20:47

## 2020-10-05 RX ADMIN — METOPROLOL SUCCINATE 50 MG: 50 TABLET, EXTENDED RELEASE ORAL at 08:58

## 2020-10-05 RX ADMIN — IOPAMIDOL 75 ML: 755 INJECTION, SOLUTION INTRAVENOUS at 18:07

## 2020-10-05 RX ADMIN — METHYLPHENIDATE HYDROCHLORIDE 5 MG: 5 TABLET ORAL at 16:16

## 2020-10-05 ASSESSMENT — PAIN SCALES - GENERAL
PAINLEVEL_OUTOF10: 0
PAINLEVEL_OUTOF10: 3
PAINLEVEL_OUTOF10: 9
PAINLEVEL_OUTOF10: 6

## 2020-10-05 NOTE — PLAN OF CARE
Problem: Falls - Risk of:  Goal: Will remain free from falls  Description: Will remain free from falls  Outcome: Ongoing  Goal: Absence of physical injury  Description: Absence of physical injury  Outcome: Ongoing     Problem: Pain:  Goal: Pain level will decrease  Description: Pain level will decrease  Outcome: Ongoing  Goal: Control of acute pain  Description: Control of acute pain  Outcome: Ongoing    Pt is alert and oriented to person and place but can be forgetful at times. Appeared to be brighter this morning, visible on the unit for morning group. Med compliant. Continent of BM x2. Lg, loose BM. C/o pain in BLE. 5 mg Percocet given @ 1421. Pt then c/o acute onset of pain in abdomen, pelvis, and scrotum. Abdomen is tender upon palpation. Bladder scan of 0 mL, catheter draining properly. Medical notified. CT scan of abdomen and pelvis performed. Tylenol given @ 1616 for pain. Not effective. Urine sample collected. Denies SI/HI/AVH. No RTIS noted. Denies any needs at this time. Will continue to monitor.

## 2020-10-05 NOTE — GROUP NOTE
Group Therapy Note    Date: 10/5/2020    Group Start Time: 3096  Group End Time: 3050  Group Topic: [facility]        Group Therapy Note    Music therapy group consisted of group music making, group Blues songwriting intervention. Therapist facilitated discussion of Blues music, music for emotional expression, and \"story-telling\" in creative expression. Patients then collaborated to write a blues song related to personal feelings of the day. The first verse explored \"I statements\", the second verse explored coping strategies for negative emotions. Attendees: 8         Patient's Goal:  Patient will explore self-expression through creative writing in music, demonstrate collaborative play with structured socialization. Notes:  Patient was actively engaged during group songwriting interventions, constructing lyrics as below:    Everyday, I feel better  Everyday, I feel good  You see me smilin' and joinin' in  On the adventure of life    I'm gonna trust my feelings  And believe in prayer  I want some peace and happiness  To show that I care  We can sing and dance  No matter here or there. Status After Intervention:  Improved    Participation Level:  Active Listener and Interactive    Participation Quality: Attentive, Sharing and Supportive      Speech:  normal      Thought Process/Content: Logical      Affective Functioning: Congruent      Mood: euthymic      Level of consciousness:  Alert and Attentive      Response to Learning: Able to verbalize/acknowledge new learning and Capable of insight      Endings: None Reported    Modes of Intervention: Socialization, Exploration, Activity and Media      Discipline Responsible: Psychoeducational Specialist      Signature:  Quang Vera, MM, MT-BC

## 2020-10-05 NOTE — PROGRESS NOTES
Department of Psychiatry  Progress Note    Admission Date: 10/1/2020    Chief Complaint / Reason for Admission: Suicidal ideation    Patient's chart was reviewed, case was discussed with nursing/OT/RT staff, and collaborated with  about the treatment plan. SUBJECTIVE:   Over last 24 hours:  Behavioral outbursts: No   Non-aggressive behavioral disturbance: No  Medication compliant: Yes  Need for seclusion/restraints: No  Sleeping adequately: Yes  Appetite adequate: Appears to be improving  Attending groups: Sanchez Mondragon has been reclusive to his room, in bed, for most of the morning. He was cooperative with assessment and pleasant during interactions, brightened appropriately at times throughout conversation. He reports that he is feeling better in comparison to when he came into the hospital and that \"I want to live\". He states that he has hope things will get better and he wants them to get better. He was future oriented in conversation, noting that he is open to transitioning into care that will allow him to work with PT regularly so he can work on building his strength back. He denies current SI/HI, AVH, did not appear to be RTIS.      Suicidal ideation: Denies   Homicidal ideation: Denies  Medication side effects: Denies    ROS: Patient has new complaints: No    Current Medications Ordered:   polyethylene glycol  17 g Oral Daily    docusate sodium  100 mg Oral Daily    atorvastatin  40 mg Oral Nightly    dorzolamide-timolol  1 drop Both Eyes BID    DULoxetine  30 mg Oral BID    ferrous sulfate  325 mg Oral Daily with breakfast    latanoprost  1 drop Left Eye Nightly    levothyroxine  50 mcg Oral Daily    metoprolol succinate  50 mg Oral BID    pantoprazole  40 mg Oral Daily    senna  2 tablet Oral BID    gabapentin  100 mg Oral TID    lactobacillus  2 capsule Oral BID     sodium chloride flush  10 mL Intravenous Q12H    methylphenidate  5 mg Oral BID     amiodarone  200 mg Oral Daily    rivaroxaban  20 mg Oral Daily      PRN Meds: acetaminophen, LORazepam **OR** LORazepam, haloperidol lactate **OR** haloperidol, traZODone, benztropine mesylate, magnesium hydroxide, aluminum & magnesium hydroxide-simethicone, nitroGLYCERIN, oxyCODONE-acetaminophen, oxyCODONE-acetaminophen     Objective:     PE:    BP (!) 144/73   Pulse 65   Temp 97.7 °F (36.5 °C) (Oral)   Resp 18   Ht 5' 9\" (1.753 m)   Wt 158 lb 4.8 oz (71.8 kg)   SpO2 96%   BMI 23.38 kg/m²       Motor / Gait: Observed while seated in bed, no abnormal or involuntary movements noted    Mental Status Examination:    Appearance: WM, appears stated age, wearing pajamas, fair grooming and hygiene  Behavior/Attitude Toward Examiner: Cooperative, attentive, good eye contact  Speech: Spontaneous, normal rate, soft, volume and well articulated   Mood: \"I feel better\"  Affect:  Mood congruent, brightened slightly at times in conversation  Thought Processes: Linear  Thought Content: [place], no delusions voiced, no obsessions, less hopelessness  Perceptions: Denies AVH, no RTIS  Attention: Intact to interview  Abstraction: WNL  Cognition: Average AMANDA, Alert and oriented to person, place, time, and situation, recall grossly intact  Insight: Limited insight   Judgment: Limited judgment      LAB: Reviewed labs from last 24 hours      Dx:   Primary Psychiatric (DSM V) Diagnosis: Major depressive disorder, recurrent, severe without psychotic features  Secondary Psychiatric (DSM V) Diagnoses: None  Chemical Dependency Diagnoses: Tobacco use disorder, severe, in remission  Active Medical Diagnoses: Urinary retention, COPD, DKS stage III, CAD s/p stent, AAA, atrial fibrillation, HTN, hypothyroidism, GERD, grade I DD, recent retroperitoneal hematoma, iron deficiency anemia, lumbar stenosis, recent admission for sepsis 2/2 UTI, history of CVA    All conditions detailed above are being treated while patient is hospitalized. Tx plan: Generally: prevent self injury/aggression towards others, stabilize mood/anxiety/psychotic/behavioral disturbance, establish/maintain aftercare, increase coping mechanisms, improve medication compliance. All conditions present on admission are being treated while pt is hospitalized. Legal Status: Involuntary; statement of observation signed 10/2    Primary Psychiatric Issues:   1.  Major depressive disorder, recurrent:  - Continue duloxetine 30 mg BID. - Started methylphenidate 5 mg BID on admission. Chemical Dependency Issues:  - Tobacco use disorder, severe, in remission    Function:  - Consulted physical therapy - appreciate recs; recommends SNF  - Consulted occupational therapy - appreciate recs  - Falls precautions    Medical Problems:  Internal medicine has been consulted. Appreciate recs.     Urinary Retention  - s/p cystoscopy for mendoza placement; + false urethral passage  - per urology note from [address] on 9/13 - recommended mendoza be removed in 7 days with voiding trial; pt has been discharged and sent to ARU with mendoza with notes stating to remove mendoza upon urology evaluation  - will consult urology for recs regarding mendoza      COPD  - no AE  - add PRN Albuterol     CKD stage III  - Cr on admission: 1.2  - baseline: ~1.1  - stable     CAD s/p stent  AAA  - no c/o chest pain  - hold ASA 2/2 below, cont Lipitor, PRN NTG     Atrial Fibrillation  - sinus bradycardia (rate 59)  - held Xarelto 2/2 hematuria and hematoma at [facility]  - cont Toprol XL, will restart Xarelto here, Hgb trending up, no gross hematuria     HTN  - controlled  - cont Toprol XL (losartan recently d/c'd 2/2 TONO)  - monitor     Hypothyroidism  - home dose of synthroid 50 mcg     GERD  - cont Protonix       Grade I DD  - noted on echo 9/19/2020 - EF 50%  - appears compensated  - Low Na diet  - cont Toprol XL     Recent Retroperitoneal Hematoma  - 2/2 procedure as below  - Xarelto was held  - last Hgb stable at 10.3, improved from prior of 9.5, will restart Xarelto here     Iron Deficiency Anemia  - Hgb stable at 10.3  - cont Ferrous Sulfate     Lumbar Stenosis  - s/p recent L4-S1 ALIF/PSF  - 8/31 with Dr. Mojgan Aquino and Dr. Jack Castellanos  - PT/OT     Recent Admission for Sepsis 2/2 UTI  - treated with Merrem per ID at [facility]  - now off Merrem     Hx of CVA  - cont Lipitor    Code Status: Full    Disposition:    - Housing: Own home, but likely needs return to SNF.  - Current outpatient follow-up: Would benefit from psychiatry referral.  - Discharge planning is incomplete    Estimated Length of Stay: 7-10 days    Criteria for Discharge:  Not suicidal, not homicidal, not grossly psychotic, behavioral disturbance improved, sleeping well, mood/affect stable, eating well, aftercare arranged. Total face to face time with patient was 30 minutes and more than 50 % of that time was spent counseling the patient on their symptoms, treatment and expected goals.     Siria Flores, MPH, PMHNP-BC  10/05/20

## 2020-10-05 NOTE — PROGRESS NOTES
Physical Therapy  Attempted PT f/u. RN reports patient unable to participate in physical activity at this time due to significant abdominal pain. RN attending to patient needs. It should be noted, pt requesting for PT/OT treatment earlier this morning. Will re-attempt tomorrow as schedule allows.     Nai Plaza, PT, DPT, OMT-C #[id number]

## 2020-10-05 NOTE — PLAN OF CARE
Pt sitting in day room watching TV at shift change. He is alert and oriented. He continues to be depressed, although he states he is feeling better. He denies SI/HI/AVH. He complained of bilateral leg pain, but declined Percocet. Pt requested Tylenol and it was effective. He declined Senna this evening due to recent loose stools.

## 2020-10-05 NOTE — PROGRESS NOTES
Comprehensive Nutrition Assessment    Type and Reason for Visit:  Reassess    Nutrition Recommendations/Plan:   1. Continue general diet order. 2. Continue Ensure Enlive x 2 with meals. 3. Monitor appetite, po intake, and abdominal pain + results of scan of abdomen/pelvis. 4. Monitor nutrition-related labs, bowel function, and weight trends. Nutrition Assessment:  patient has slightly improved from a nutritional standpoint AEB he is consuming his ONS x 2 with each meal and his mood/affect is brighter, however, he remains at risk for further compromise d/t < 50% of meals consumed x 4 days admission and abdominal pain; will continue general diet order + 2 Ensure Enlive drinks with meals    Malnutrition Assessment:  Malnutrition Status:  Severe malnutrition    Context:  Acute Illness     Findings of the 6 clinical characteristics of malnutrition:  Energy Intake:  7 - 50% or less of estimated energy requirements for 5 or more days  Weight Loss:  (- 34# or 18.5% weight loss since 9/13/20)     Body Fat Loss:  (severe) Orbital   Muscle Mass Loss:  (severe) Calf (gastrocnemius), Clavicles (pectoralis & deltoids), Hand (interosseous), Temples (temporalis)(mild- temples, moderate- clavicles and calves, severe- hand)  Fluid Accumulation:  No significant fluid accumulation     Strength:  Not Performed    Estimated Daily Nutrient Needs:  Energy (kcal):  1800 - 2016 kcals based on 25-28 kcals/kg/CBW; Weight Used for Energy Requirements:  Current     Protein (g):  86 - 108 g protein based on 1.2-1.5 g/kg/CBW;  Weight Used for Protein Requirements:  Current        Fluid (ml/day):  1800 - 2000 ml; Weight Used for Fluid Requirements:  Current      Nutrition Related Findings:  patient was asleep this afternoon; per RN, patient has been having severe abdominal pain at times - he is waiting to have a scan of abdomen/pelvis completed to determine what is going on with him; patient has been consuming both of his ONS drinks with meals but meals remain < 50% of meals during his admission on [facility] (4 days); skin color looked pale this afternoon; per RN, patient had been ambulating on the floor this am and he has been attending groups but that his pain worsened this afternoon; patient was weraing a white baseball cap + he was covered in blankets this afternoon      Wounds:  Surgical Wound       Current Nutrition Therapies:    DIET GENERAL;  Dietary Nutrition Supplements: Standard High Calorie Oral Supplement    Anthropometric Measures:  · Height: 5' 9\" (175.3 cm)  · Current Body Weight: 158 lb 4.8 oz (71.8 kg)(obtained on 10/5/20)   · Admission Body Weight: 192 lb 14.4 oz (87.5 kg)(obtained on 9/13/20)    · Usual Body Weight: 194 lb 3.6 oz (88.1 kg)(obtained at [facility] on 9/13/20)     · Ideal Body Weight: 160 lbs; % Ideal Body Weight 98.9 %   · BMI: 23.4   · BMI Categories: Normal Weight (BMI 22.0 to 24.9) age over 72       Nutrition Diagnosis:   · Severe malnutrition related to psychological cause or life stress, inadequate protein-energy intake, early satiety, pain as evidenced by intake 26-50%, intake 0-25%, poor intake prior to admission, wounds, weight loss, GI abnormality      Nutrition Interventions:   Food and/or Nutrient Delivery:  Continue Current Diet, Continue Oral Nutrition Supplement  Nutrition Education/Counseling:  No recommendation at this time   Coordination of Nutrition Care:  Continued Inpatient Monitoring    Goals:  pt will consume 50% or greater of meals x 3 meals per day and 75% or greater of 2 Ensure Enlive x 3 meals per day       Nutrition Monitoring and Evaluation:   Behavioral-Environmental Outcomes:  Knowledge or Skill, Beliefs and Attitutes   Food/Nutrient Intake Outcomes:  Food and Nutrient Intake, Supplement Intake  Physical Signs/Symptoms Outcomes:  Biochemical Data, Constipation, GI Status, Nutrition Focused Physical Findings, Skin, Weight     Discharge Planning:     Too soon to determine Electronically signed by [facility] AMBER Escalante, LD on 10/5/20 at 4:57 PM EDT    Contact: [phone]

## 2020-10-05 NOTE — PLAN OF CARE
Nutrition Problem #1: Severe malnutrition  Intervention: Food and/or Nutrient Delivery: Continue Current Diet, Continue Oral Nutrition Supplement  Nutritional Goals: pt will consume 50% or greater of meals x 3 meals per day and 75% or greater of 2 Ensure Enlive x 3 meals per day

## 2020-10-05 NOTE — PROGRESS NOTES
traZODone, benztropine mesylate, magnesium hydroxide, aluminum & magnesium hydroxide-simethicone, nitroGLYCERIN, oxyCODONE-acetaminophen, oxyCODONE-acetaminophen      Data:  CBC:   No results for input(s): WBC, HGB, HCT, MCV, PLT in the last 72 hours. BMP:   No results for input(s): NA, K, CL, CO2, PHOS, BUN, CREATININE in the last 72 hours. Invalid input(s): CA  LIVER PROFILE:   No results for input(s): AST, ALT, LIPASE, BILIDIR, BILITOT, ALKPHOS in the last 72 hours. Invalid input(s): AMYLASE,  ALB  PT/INR: No results for input(s): PROTIME, INR in the last 72 hours. Assessment/Plan:  MDD  - cont mgmt per [facility]     Urinary Retention  - s/p cystoscopy for mendoza placement; + false urethral passage  - per urology note from [address] on 9/13 - recommended mendoza be removed in 7 days with voiding trial; pt has been discharged and sent to ARU with mendoza with notes stating to remove mendoza upon urology evaluation  - urology consulted 10/1.   Mendoza removed 10/2  - Nursing reports no issues with voiding     COPD  - no AE  - add PRN Albuterol     CKD stage III  - Cr on admission: 1.2  - baseline: ~1.1  - stable     CAD s/p stent  AAA  - no c/o chest pain  - hold ASA 2/2 below, cont Lipitor, PRN NTG     Atrial Fibrillation  - sinus bradycardia (rate 59)  - held Xarelto 2/2 hematuria and hematoma at [facility]  - cont Toprol XL, will restart Xarelto here, Hgb trending up, no gross hematuria     HTN  - controlled  - cont Toprol XL (losartan recently d/c'd 2/2 TONO)  - monitor     Hypothyroidism  - home dose of synthroid 50 mcg     GERD  - cont Protonix       Grade I DD  - noted on echo 9/19/2020 - EF 50%  - appears compensated  - Low Na diet  - cont Toprol XL     Recent Retroperitoneal Hematoma  - 2/2 procedure as below  - Xarelto was held  - last Hgb stable at 10.3, improved from prior of 9.5, will restart Xarelto here     Iron Deficiency Anemia  - Hgb stable at 10.3  - cont Ferrous Sulfate     Lumbar Stenosis  - s/p recent L4-S1 ALIF/PSF  - 8/31 with Dr. Mahesh Bolivar and Dr. Carlos De Los Santos  - PT/OT     Recent Admission for Sepsis 2/2 UTI  - treated with Merrem per ID at [facility]  - now off [address] [place] [address]     Hx of CVA  - cont Lipitor    RUE PICC in place  -discussed with Dr. Alexander Worrell. He would like to make sure the patient's oral intake is adequate before removing the PICC line. - We will monitor over the weekend.     Constipation  - benign abd exam  - colace daily, glycolax daily, senokot BID and monitor for BM      Check Ua and bladder scanner. Hematoma surrounding the incision. Noted prior, willl check H/H.  May need to re-scan as he was resumed on his [address], [place]  10/5/2020 10:29 AM

## 2020-10-05 NOTE — BH NOTE
Selina Feldman was invited and encouraged to attend [facility] Psychoeducation Group. Pt declined invite, due to leg pain and needing to complete ADL's. Pt did not attend group.      Roel Baxter, CTRS

## 2020-10-06 LAB
GLUCOSE BLD-MCNC: 128 MG/DL (ref 70–99)
PERFORMED ON: ABNORMAL

## 2020-10-06 PROCEDURE — 6360000002 HC RX W HCPCS: Performed by: PHYSICIAN ASSISTANT

## 2020-10-06 PROCEDURE — 97535 SELF CARE MNGMENT TRAINING: CPT

## 2020-10-06 PROCEDURE — 6370000000 HC RX 637 (ALT 250 FOR IP): Performed by: PSYCHIATRY & NEUROLOGY

## 2020-10-06 PROCEDURE — 2580000003 HC RX 258: Performed by: PHYSICIAN ASSISTANT

## 2020-10-06 PROCEDURE — 97530 THERAPEUTIC ACTIVITIES: CPT

## 2020-10-06 PROCEDURE — 97110 THERAPEUTIC EXERCISES: CPT

## 2020-10-06 PROCEDURE — 99232 SBSQ HOSP IP/OBS MODERATE 35: CPT | Performed by: PSYCHIATRY & NEUROLOGY

## 2020-10-06 PROCEDURE — 87040 BLOOD CULTURE FOR BACTERIA: CPT

## 2020-10-06 PROCEDURE — 6370000000 HC RX 637 (ALT 250 FOR IP): Performed by: PHYSICIAN ASSISTANT

## 2020-10-06 PROCEDURE — 6370000000 HC RX 637 (ALT 250 FOR IP): Performed by: INTERNAL MEDICINE

## 2020-10-06 PROCEDURE — 36415 COLL VENOUS BLD VENIPUNCTURE: CPT

## 2020-10-06 PROCEDURE — 99232 SBSQ HOSP IP/OBS MODERATE 35: CPT | Performed by: PHYSICIAN ASSISTANT

## 2020-10-06 PROCEDURE — 2580000003 HC RX 258: Performed by: PSYCHIATRY & NEUROLOGY

## 2020-10-06 PROCEDURE — 1240000000 HC EMOTIONAL WELLNESS R&B

## 2020-10-06 RX ORDER — CEFDINIR 300 MG/1
300 CAPSULE ORAL EVERY 12 HOURS SCHEDULED
Status: DISCONTINUED | OUTPATIENT
Start: 2020-10-06 | End: 2020-10-06

## 2020-10-06 RX ORDER — DIPHENHYDRAMINE HCL 25 MG
25 TABLET ORAL EVERY 6 HOURS PRN
Status: DISCONTINUED | OUTPATIENT
Start: 2020-10-06 | End: 2020-10-07 | Stop reason: HOSPADM

## 2020-10-06 RX ADMIN — DULOXETINE HYDROCHLORIDE 30 MG: 30 CAPSULE, DELAYED RELEASE ORAL at 08:00

## 2020-10-06 RX ADMIN — Medication 2 CAPSULE: at 18:50

## 2020-10-06 RX ADMIN — MEROPENEM 1 G: 1 INJECTION, POWDER, FOR SOLUTION INTRAVENOUS at 23:36

## 2020-10-06 RX ADMIN — DORZOLAMIDE HYDROCHLORIDE AND TIMOLOL MALEATE 1 DROP: 20; 5 SOLUTION/ DROPS OPHTHALMIC at 08:01

## 2020-10-06 RX ADMIN — GABAPENTIN 100 MG: 100 CAPSULE ORAL at 21:08

## 2020-10-06 RX ADMIN — GABAPENTIN 100 MG: 100 CAPSULE ORAL at 16:12

## 2020-10-06 RX ADMIN — METOPROLOL SUCCINATE 50 MG: 50 TABLET, EXTENDED RELEASE ORAL at 18:51

## 2020-10-06 RX ADMIN — ACETAMINOPHEN 650 MG: 325 TABLET ORAL at 07:59

## 2020-10-06 RX ADMIN — ATORVASTATIN CALCIUM 40 MG: 40 TABLET, FILM COATED ORAL at 21:08

## 2020-10-06 RX ADMIN — MEROPENEM 1 G: 1 INJECTION, POWDER, FOR SOLUTION INTRAVENOUS at 17:58

## 2020-10-06 RX ADMIN — DULOXETINE HYDROCHLORIDE 30 MG: 30 CAPSULE, DELAYED RELEASE ORAL at 21:08

## 2020-10-06 RX ADMIN — METHYLPHENIDATE HYDROCHLORIDE 5 MG: 5 TABLET ORAL at 08:00

## 2020-10-06 RX ADMIN — PANTOPRAZOLE SODIUM 40 MG: 40 TABLET, DELAYED RELEASE ORAL at 06:01

## 2020-10-06 RX ADMIN — METHYLPHENIDATE HYDROCHLORIDE 5 MG: 5 TABLET ORAL at 17:50

## 2020-10-06 RX ADMIN — GABAPENTIN 100 MG: 100 CAPSULE ORAL at 08:00

## 2020-10-06 RX ADMIN — Medication 10 ML: at 06:01

## 2020-10-06 RX ADMIN — LATANOPROST 1 DROP: 50 SOLUTION/ DROPS OPHTHALMIC at 21:08

## 2020-10-06 RX ADMIN — Medication 10 ML: at 18:50

## 2020-10-06 RX ADMIN — FERROUS SULFATE TAB 325 MG (65 MG ELEMENTAL FE) 325 MG: 325 (65 FE) TAB at 08:00

## 2020-10-06 RX ADMIN — DORZOLAMIDE HYDROCHLORIDE AND TIMOLOL MALEATE 1 DROP: 20; 5 SOLUTION/ DROPS OPHTHALMIC at 21:08

## 2020-10-06 RX ADMIN — LEVOTHYROXINE SODIUM 50 MCG: 50 TABLET ORAL at 06:01

## 2020-10-06 RX ADMIN — METOPROLOL SUCCINATE 50 MG: 50 TABLET, EXTENDED RELEASE ORAL at 08:00

## 2020-10-06 RX ADMIN — AMIODARONE HYDROCHLORIDE 200 MG: 200 TABLET ORAL at 18:51

## 2020-10-06 RX ADMIN — OXYCODONE HYDROCHLORIDE AND ACETAMINOPHEN 1 TABLET: 7.5; 325 TABLET ORAL at 22:43

## 2020-10-06 RX ADMIN — CEFDINIR 300 MG: 300 CAPSULE ORAL at 02:16

## 2020-10-06 RX ADMIN — Medication 2 CAPSULE: at 08:01

## 2020-10-06 RX ADMIN — RIVAROXABAN 20 MG: 20 TABLET, FILM COATED ORAL at 18:51

## 2020-10-06 ASSESSMENT — PAIN SCALES - GENERAL
PAINLEVEL_OUTOF10: 0
PAINLEVEL_OUTOF10: 0
PAINLEVEL_OUTOF10: 8
PAINLEVEL_OUTOF10: 0

## 2020-10-06 NOTE — BH NOTE
Mary Collins was excused from [address] [place] [address] due to meeting with PT/OT staff at time of invite.     ABDIFATAH Ewing

## 2020-10-06 NOTE — PLAN OF CARE
Luisa Linares has been regressed to his bed. Mood is bright and flirtatious on approach. Oriented x 4. Denies SI/HI/AVH. States he was up earlier today for group, meals, etc.. Complaining of stomach upset and was reported to have had loose BMs x 2 today. Med compliant whole. HS Senokot held. Denies SI/HI/AVH. Temp was initially 100.1, then 101.3 on recheck. PRN Tylenol @ 2102. Temp came down to 98.7. Reeves draining cloudy dark yellow urine. Will continue to monitor.

## 2020-10-06 NOTE — PROGRESS NOTES
Physical Therapy  Attempted PT f/u. RN reporting pt febrile this morning but feeling better after being medicated. Sitting up in group therapy session with other patients participating in painting activity. Not available for PT session at this time. Will re-attempt as schedule allows.     Gretta Duval, PT, DPT, OMT-C #[id number]

## 2020-10-06 NOTE — PROGRESS NOTES
Urology consult ordered for pt due to UTI and CT showing new bilateral hydronephrosis. [facility] contacted for consult. Stated Dr. Lizz Quinteros will contact unit regarding consult.

## 2020-10-06 NOTE — PROGRESS NOTES
Blood culture drawn from PICC line in right arm per orders by this RN and taken down to lab. Pt tolerated blood draw well. PICC line flushed w/ 10 mL of NS following blood draw.

## 2020-10-06 NOTE — PROGRESS NOTES
Pt appears to be feeling better. Brighter mood, joking around with staff. Appears more alert and oriented at this time. Temp down to 97.8. No diaphoresis but continues to have tremors. Will continue to monitor.

## 2020-10-06 NOTE — PLAN OF CARE
UTI, omnicef 300 mg BID, follow cultures  Benadryl if any symptoms of hives are noted, ?  PCN allergy

## 2020-10-06 NOTE — GROUP NOTE
Group Therapy Note    Date: 10/6/2020    Group Start Time: 1000  Group End Time: 8349  Group Topic: Psychoeducation    V[address]        Group Therapy Note    Attendees: 9    Pt's were prompted to engage in a mixed emotions intervention, where they identify emotions that correlate with specific colors and create an art piece mixing colors/emotions they are currently feeling. Patient's Goal: to identify emotions, effects of internalizing and externalizing emotions, and healthy ways to express emotions. Notes: Kelly Ward was approximately 10 minutes late to group. Felton engaged in identifying and expressing his emotions by creating a mixed emotions art piece. Kelly Ward identified effects of internalizing and externalizing emotions and healthy ways to express his emotions. Status After Intervention:  Unchanged    Participation Level:  Active Listener and Interactive    Participation Quality: Appropriate, Attentive and Sharing      Speech:  normal      Thought Process/Content: Linear      Affective Functioning: Constricted/Restricted      Mood: elevated and bright      Level of consciousness:  Alert and Attentive      Response to Learning: Able to verbalize current knowledge/experience, Capable of insight, Able to change behavior and Progressing to goal      Endings: None Reported    Modes of Intervention: Education, Support, Socialization, Exploration, Clarifying, Problem-solving and Activity      Discipline Responsible: Psychoeducational Specialist      Signature:  Anthony Henriquez, [address]

## 2020-10-06 NOTE — PROGRESS NOTES
Dr. Julio C Thompson contacted unit regarding consult. Stated someone from the urology group will come to the unit tomorrow to evaluate the pt.

## 2020-10-06 NOTE — PROGRESS NOTES
Progress Note    Admit Date:  10/1/2020    Subjective:  Mr. Melo Caro is seen sitting up in a recliner chair in the common area. He tells me he feels fatigued, but overall is feeling much better. His abdominal pain has resolved. He asks me if I heard about how he fell at work yesterday, he did sustain a fall over the weekend. He had a fever overnight to 101.1F. His UA is consistent with infection. A CT abd showed bilateral hydro and bladder wall thickening. He has a mendoza in place currently which appears to be draining well. Objective:     BP (!) 141/67   Pulse 61   Temp 97.8 °F (36.6 °C) (Oral)   Resp 18   Ht 5' 9\" (1.753 m)   Wt 153 lb 12.8 oz (69.8 kg)   SpO2 97%   BMI 22.71 kg/m²          Intake/Output Summary (Last 24 hours) at 10/6/2020 1435  Last data filed at 10/6/2020 0751  Gross per 24 hour   Intake 280 ml   Output 600 ml   Net -320 ml       Physical Exam:  Gen: No distress. Alert. Elderly  male  Eyes: No conjunctival injection. ENT: No discharge. Pharynx clear. Neck:  Trachea midline. Resp: No accessory muscle use. No crackles. No wheezes. No rhonchi. CV: Regular rate. Regular rhythm. No murmur. No rub. No edema. GI: Non distended and non tender. BS active x 4. Surgical incision below umbilicus appears well healing with bruising that is improving   Skin: Warm and dry. Midline incision infra-umbilical healing well   M/S: No cyanosis. No clubbing. PICC RUE   Neuro: Awake. Grossly nonfocal  Psych: Oriented x 3. No anxiety or agitation.        Scheduled Meds:   cefdinir  300 mg Oral 2 times per day    polyethylene glycol  17 g Oral Daily    docusate sodium  100 mg Oral Daily    atorvastatin  40 mg Oral Nightly    dorzolamide-timolol  1 drop Both Eyes BID    DULoxetine  30 mg Oral BID    ferrous sulfate  325 mg Oral Daily with breakfast    latanoprost  1 drop Left Eye Nightly    levothyroxine  50 mcg Oral Daily    metoprolol succinate  50 mg Oral BID    he received Merrem during his Sept hospitalization without documented reaction). - F/u blood cultures (one drawn from PICC and one drawn from contralateral arm) and f/u urine cultures  - urology consulted as above     #Recent Retroperitoneal Hematoma  - 2/2 recent Lumbar procedure as below  - Xarelto was held. last Hgb stable at 10.3, improved from prior of 9.5. Xarelto was restarted here    #COPD  - no AE  - add PRN Albuterol     #CKD stage III  - Cr on admission: 1.2  - baseline: ~1.1  - Crt 1.4 now. Repeat BMP 10/7      #CAD s/p stent  - no c/o chest pain  - hold ASA 2/2 below, cont Lipitor, PRN NTG     #AAA  - stable on CT. Will need OP monitoring     #Atrial Fibrillation  - held Xarelto 2/2 hematuria and hematoma at [facility]  - cont Toprol XL and amio.    restarted Xarelto here (h/h stable, no hematuria)     #HTN  - controlled  - cont Toprol XL (losartan recently d/c'd 2/2 TONO)     #Hypothyroidism  - home dose of synthroid 50 mcg     #GERD  - cont Protonix       #Chronic dCHF  - noted on echo 9/19/2020 - EF 50%  - appears compensated  - Low Na diet  - cont Toprol XL    #Iron Deficiency Anemia  - cont Ferrous Sulfate  - h/h stable      #Lumbar Stenosis  - s/p recent L4-S1 ALIF/PSF  - 8/31 with Dr. Jessica Fritz and Dr. Libby Garza  - PT/OT     #Recent Admission for Sepsis 2/2 UTI  - treated with Merrem per ID at [facility].  Now with recurrent UTI and merrem has been restarted     #Hx of CVA  - cont Lipitor    #RUE PICC in place  - cont PICC with resumption of merrem      #Constipation  - see MAR for bowel regimen  - he is having Ronald San PA-C  10/6/2020 2:51 PM

## 2020-10-06 NOTE — PROGRESS NOTES
Temp this morning was 101.1 oral. Pt appears diaphoretic, shaky, skin is warm to the touch. Denies pain. Pt appears confused stating \"I had a few too many with my son last night\" and asking staff member Sanna Diaz did you get to [place]? \" FSBS of 128. Given PRN tylenol for fever @ 0759. Other VSS. Temp dropped to 100.2 @ 0840. Marya SALMERON notified of pt's condition. Blood cultures ordered. Will continue to monitor.

## 2020-10-06 NOTE — BH NOTE
Jeancarlos Lawler was invited and encouraged to attend [address]. Pt declined invite, stating \"no, I have been really active this morning. I need to take a break. \" Pt did not attend group.     Dieter Branham, CTRS

## 2020-10-06 NOTE — PROGRESS NOTES
Department of Psychiatry  Progress Note    Admission Date: 10/1/2020    Chief Complaint / Reason for Admission: Suicidal ideation    Patient's chart was reviewed, case was discussed with nursing/OT/RT staff, and collaborated with  about the treatment plan. SUBJECTIVE:   Over last 24 hours:  Behavioral outbursts: No   Non-aggressive behavioral disturbance: No  Medication compliant: Yes  Need for seclusion/restraints: No  Sleeping adequately: Yes  Appetite adequate: Appears to be improving  Attending groups: Select    Psychiatrically patient is doing better. He has been brighter, and has been denying SI consistently. Feels improved with addition of stimulant and tolerating this. Medically however, he appears to have developed a recurrence of UTI. Developed radames fever overnight and this AM, feeling significantly fatigued. New findings of hydronephrosis bilaterally on abd CT, now needing urology consult. Will discuss transferring patient to medical unit as he is improved psychiatrically but deteriorating acutely from a medical standpoint.     Suicidal ideation: Denies   Homicidal ideation: Denies  Medication side effects: Denies    ROS: Patient has new complaints: Yes, increased fatigue, dizziness, hypotension    Current Medications Ordered:   meropenem  1 g Intravenous Q8H    polyethylene glycol  17 g Oral Daily    docusate sodium  100 mg Oral Daily    atorvastatin  40 mg Oral Nightly    dorzolamide-timolol  1 drop Both Eyes BID    DULoxetine  30 mg Oral BID    ferrous sulfate  325 mg Oral Daily with breakfast    latanoprost  1 drop Left Eye Nightly    levothyroxine  50 mcg Oral Daily    metoprolol succinate  50 mg Oral BID    pantoprazole  40 mg Oral Daily    senna  2 tablet Oral BID    gabapentin  100 mg Oral TID    lactobacillus  2 capsule Oral BID WC    sodium chloride flush  10 mL Intravenous Q12H    methylphenidate  5 mg Oral BID WC    amiodarone  200 mg Oral Daily    rivaroxaban  20 mg Oral Daily      PRN Meds: diphenhydrAMINE, acetaminophen, LORazepam **OR** LORazepam, haloperidol lactate **OR** haloperidol, traZODone, benztropine mesylate, magnesium hydroxide, aluminum & magnesium hydroxide-simethicone, nitroGLYCERIN, oxyCODONE-acetaminophen, oxyCODONE-acetaminophen     Objective:     PE:    BP (!) 141/67   Pulse 61   Temp 97.8 °F (36.6 °C) (Oral)   Resp 18   Ht 5' 9\" (1.753 m)   Wt 153 lb 12.8 oz (69.8 kg)   SpO2 97%   BMI 22.71 kg/m²       Motor / Gait: Observed while seated in recliner, no abnormal or involuntary movements noted    Mental Status Examination:    Appearance: WM, appears stated age, wearing pajamas, fair grooming and hygiene  Behavior/Attitude Toward Examiner: Cooperative, attentive, good eye contact  Speech: Spontaneous, normal rate, soft, volume and well articulated   Mood: \"Pretty sick this morning\"  Affect:  Mood congruent, euthymic, but fatigued appearing  Thought Processes: Linear  Thought Content: Denies SI, denies HI, no delusions voiced, no obsessions, no hopelessness  Perceptions: Denies AVH, no RTIS  Attention: Intact to interview  Abstraction: WNL  Cognition: Average AMANDA, Alert and oriented to person, place, time, and situation, recall grossly intact  Insight: Limited insight   Judgment: Limited judgment      LAB: Reviewed labs from last 24 hours      Dx:   Primary Psychiatric (DSM V) Diagnosis: Major depressive disorder, recurrent, severe without psychotic features  Secondary Psychiatric (DSM V) Diagnoses: None  Chemical Dependency Diagnoses: Tobacco use disorder, severe, in remission  Active Medical Diagnoses: UTI, bilateral hydronephrosis, Urinary retention, COPD, DKS stage III, CAD s/p stent, AAA, atrial fibrillation, HTN, hypothyroidism, GERD, grade I DD, recent retroperitoneal hematoma, iron deficiency anemia, lumbar stenosis, recent admission for sepsis 2/2 UTI, history of CVA    All conditions detailed above are being treated while patient is hospitalized. Tx plan: Generally: prevent self injury/aggression towards others, stabilize mood/anxiety/psychotic/behavioral disturbance, establish/maintain aftercare, increase coping mechanisms, improve medication compliance. All conditions present on admission are being treated while pt is hospitalized. Legal Status: Involuntary; statement of observation signed 10/2    Primary Psychiatric Issues:   1.  Major depressive disorder, recurrent:  - Continue duloxetine 30 mg BID. - Started methylphenidate 5 mg BID on admission. Pt appears much improved from a depression standpoint at this time. Will discuss transferring him to one of the medical units as we are now moreso managing acute medical issues than psychiatric illness. Chemical Dependency Issues:  - Tobacco use disorder, severe, in remission    Function:  - Consulted physical therapy - appreciate recs; recommends SNF  - Consulted occupational therapy - appreciate recs  - Falls precautions    Medical Problems:  Internal medicine has been consulted. Appreciate recs. As detailed above, patient has improved significantly from a psychiatric standpoint, but is becoming more medical ill on the unit. Will discuss with IM and plan for transfer to medical unit with clearance from a psychiatric perspective. Code Status: Full    Disposition:    - Housing: Own home, but likely needs return to SNF.  - Current outpatient follow-up: Would benefit from psychiatry referral.  - Discharge planning is incomplete    Estimated Length of Stay: 7-10 days    Criteria for Discharge:  Not suicidal, not homicidal, not grossly psychotic, behavioral disturbance improved, sleeping well, mood/affect stable, eating well, aftercare arranged. Total face to face time with patient was 30 minutes and more than 50 % of that time was spent counseling the patient on their symptoms, treatment and expected goals.     Yamil Durham MD  Staff Psychiatrist

## 2020-10-06 NOTE — PROGRESS NOTES
Inpatient Occupational Therapy Treatment Note    Unit:  [facility]  Date:  10/6/2020  Patient Name:    Di Lee. Admitting diagnosis:  MDD (major depressive disorder), recurrent episode ([facility] [F33.9]  Admit Date:  10/1/2020  Precautions/Restrictions/WB Status/ Lines/ Wounds/ Oxygen: Standard [facility] Precautions  Suicide Precautions  Fall Risk  bed/chair alarm  mendoza   Spinal Precautions: No Bending, No Lifting, No Twisting  10# Lifting Restrictions       Orthostatic hypotension    History of Present Illness:  67 y/o male admitted to [facility]  from SNF setting 9/13/2020 with Altered Mental Status, Severe Sepsis, Acute Renal Failure, and Pelvic Hematoma.  CT Head negative.  CT Abd/Pelvis + L Pelvic Hematoma.  Urology consult for Mendoza Placement.   PMH as noted including Recent Back Surg (L4/L5 and L5/S1 Ant/Post Fusion, 8/31/2020), CAD, Angio with Stent, A-Fib, Aortic Aneurysm. Pt was receiving OT at [facility] 5-7x/wk, but was not progressing as expected due to pt's depression. Treatment Number:  2    Treatment Time: 4258-3176, [id number]  Timed Code Treatment Minutes:   69  minutes   Total Treatment Time:    69  minutes    Staff Recommendations:   Assist of 2 with RW and gait belt from bed to chair. Spinal precautions (No bending, twisting or lifting over 10#) Orthostatic hypotension with position change    Discharge Recommendations: SNF    DME needs for discharge:  defer to facility    AM-PAC Score: AM-PAC Inpatient Daily Activity Raw Score: 14   Home Health S4 Level: NA    ADDENDUM:  15:34-Pt asked to transfer back to bed this afternoon. Pt needed Min assist of 1 to stand from chair, and took several steps to transfer to bed using RW with Min-mod A. Max A was needed to transfer from sit to supine for his LEs to follow spinal precautions. Pt was able to scoot to [facility] with verbal cues for foot and hand placement. Pt made no complaints of feeling dizzy.     Subjective:  Pt was found sitting in common room today. Was receptive to OT tx. Verbalized feeling dizzy while seated in the chair. ADLs:  Self Care: Toileting: Not tested  Grooming: Not tested  Dressing: Min A to don R slip on shoe, Pt donned L shoe himself while seated in reclined position. Pain   No  Rating:NA  Location: NA  Pain Medicine Status: No request made      Cognition    A&O to x4  Able to follow:  2 step commands    Balance:     Good sitting     Bed mobility:  Not tested    Transfer Training: not tested due to low BP  Scooting in chair with supervision only    Activity Tolerance   Pt completed therapy session with Dizziness noted with sitting-Pushed fluids throughout tx session. Nurse was made aware. Supine:  Room air  SpO2: 95%  HR: 85  BP: 83/49  Seated after UE there ex:     BP: 84/50  Reclined in chair after tx:   BP: 85/55    Therapeutic Exercise:   Shoulder flex/ext:  x25  Bench press: x25  Shoulder abd/add: x15  Bicep curls: x25  Horizontal abd/add: x25     Patient Education:   Role of OT and importance of drinking fluids due to orthostatic hypotension    Positioning Needs: In common room with needs met    Family Present:  No    Assessment: Pt tolerated UE there ex fairly well today, but his low BP is impeding progress with mobility and ADLs. Pt should continue to benefit from skilled OT tx to maximize independence so that he may eventually return home with the least amount of assistance. Recommending SNF upon discharge as patient functioning well below baseline, demonstrates good rehab potential and unable to return home due to limited or no family support, inability to negotiate stairs to enter home/bedroom/bathroom and home environment not conducive to patient recovery. GOALS  To be met in 3 Visits:  1). Supine to Sit  With modified IND  2). Pt will participate in ACLS assessment.     To be met in 5 Visits:  1). Pt will verbalize 3 coping skills  2). Bed to Chair/BSC Min A with RW  3). Upper Body Bathing SBA  4). Lower Body Bathing Mod A  5). Upper Body Dressing SBA  6). Lower Body Dressing Mod A  7). Pt to rand UE exs x15 reps (Goal met 10/6/20)  8). Pt. To identify 2 memory strategies to take medications as prescribed.    9). Pt.  To complete interest check list.       Plan: cont with [facility], OTR/L  #[id number]        If patient discharges from this facility prior to next visit, this note will serve as the Discharge Summary

## 2020-10-07 ENCOUNTER — HOSPITAL ENCOUNTER (INPATIENT)
Age: 75
LOS: 6 days | Discharge: NURSING, REHAB OR LONG-TERM CARE | DRG: 699 | End: 2020-10-13
Attending: INTERNAL MEDICINE | Admitting: INTERNAL MEDICINE
Payer: MEDICARE

## 2020-10-07 VITALS
RESPIRATION RATE: 18 BRPM | HEIGHT: 69 IN | DIASTOLIC BLOOD PRESSURE: 66 MMHG | WEIGHT: 155.5 LBS | TEMPERATURE: 97.8 F | OXYGEN SATURATION: 94 % | HEART RATE: 66 BPM | SYSTOLIC BLOOD PRESSURE: 128 MMHG | BODY MASS INDEX: 23.03 KG/M2

## 2020-10-07 DIAGNOSIS — M79.606 PAIN OF LOWER EXTREMITY, UNSPECIFIED LATERALITY: Primary | ICD-10-CM

## 2020-10-07 DIAGNOSIS — F33.2 SEVERE EPISODE OF RECURRENT MAJOR DEPRESSIVE DISORDER, WITHOUT PSYCHOTIC FEATURES (HCC): ICD-10-CM

## 2020-10-07 LAB
ANION GAP SERPL CALCULATED.3IONS-SCNC: 6 MMOL/L (ref 3–16)
BASOPHILS ABSOLUTE: 0 K/UL (ref 0–0.2)
BASOPHILS RELATIVE PERCENT: 0.4 %
BUN BLDV-MCNC: 27 MG/DL (ref 7–20)
CALCIUM SERPL-MCNC: 8.4 MG/DL (ref 8.3–10.6)
CHLORIDE BLD-SCNC: 98 MMOL/L (ref 99–110)
CO2: 25 MMOL/L (ref 21–32)
CREAT SERPL-MCNC: 1.6 MG/DL (ref 0.8–1.3)
EOSINOPHILS ABSOLUTE: 0.1 K/UL (ref 0–0.6)
EOSINOPHILS RELATIVE PERCENT: 1.3 %
GFR AFRICAN AMERICAN: 51
GFR NON-AFRICAN AMERICAN: 42
GLUCOSE BLD-MCNC: 109 MG/DL (ref 70–99)
HCT VFR BLD CALC: 26.1 % (ref 40.5–52.5)
HEMOGLOBIN: 8.6 G/DL (ref 13.5–17.5)
LYMPHOCYTES ABSOLUTE: 0.7 K/UL (ref 1–5.1)
LYMPHOCYTES RELATIVE PERCENT: 10.8 %
MCH RBC QN AUTO: 30.3 PG (ref 26–34)
MCHC RBC AUTO-ENTMCNC: 33 G/DL (ref 31–36)
MCV RBC AUTO: 91.7 FL (ref 80–100)
MONOCYTES ABSOLUTE: 0.9 K/UL (ref 0–1.3)
MONOCYTES RELATIVE PERCENT: 13.7 %
NEUTROPHILS ABSOLUTE: 4.9 K/UL (ref 1.7–7.7)
NEUTROPHILS RELATIVE PERCENT: 73.8 %
ORGANISM: ABNORMAL
PDW BLD-RTO: 16.2 % (ref 12.4–15.4)
PLATELET # BLD: 125 K/UL (ref 135–450)
PMV BLD AUTO: 9.2 FL (ref 5–10.5)
POTASSIUM REFLEX MAGNESIUM: 4.4 MMOL/L (ref 3.5–5.1)
RBC # BLD: 2.85 M/UL (ref 4.2–5.9)
SODIUM BLD-SCNC: 129 MMOL/L (ref 136–145)
URINE CULTURE, ROUTINE: ABNORMAL
WBC # BLD: 6.6 K/UL (ref 4–11)

## 2020-10-07 PROCEDURE — 2580000003 HC RX 258: Performed by: PHYSICIAN ASSISTANT

## 2020-10-07 PROCEDURE — 6370000000 HC RX 637 (ALT 250 FOR IP): Performed by: PSYCHIATRY & NEUROLOGY

## 2020-10-07 PROCEDURE — 6370000000 HC RX 637 (ALT 250 FOR IP): Performed by: PHYSICIAN ASSISTANT

## 2020-10-07 PROCEDURE — 85025 COMPLETE CBC W/AUTO DIFF WBC: CPT

## 2020-10-07 PROCEDURE — 80048 BASIC METABOLIC PNL TOTAL CA: CPT

## 2020-10-07 PROCEDURE — 99239 HOSP IP/OBS DSCHRG MGMT >30: CPT | Performed by: PSYCHIATRY & NEUROLOGY

## 2020-10-07 PROCEDURE — 99223 1ST HOSP IP/OBS HIGH 75: CPT | Performed by: PHYSICIAN ASSISTANT

## 2020-10-07 PROCEDURE — 36415 COLL VENOUS BLD VENIPUNCTURE: CPT

## 2020-10-07 PROCEDURE — 2580000003 HC RX 258: Performed by: PSYCHIATRY & NEUROLOGY

## 2020-10-07 PROCEDURE — 1200000000 HC SEMI PRIVATE

## 2020-10-07 PROCEDURE — 6360000002 HC RX W HCPCS: Performed by: PHYSICIAN ASSISTANT

## 2020-10-07 RX ORDER — FERROUS SULFATE 325(65) MG
325 TABLET ORAL
Status: DISCONTINUED | OUTPATIENT
Start: 2020-10-08 | End: 2020-10-13 | Stop reason: HOSPADM

## 2020-10-07 RX ORDER — BENZTROPINE MESYLATE 1 MG/ML
1 INJECTION INTRAMUSCULAR; INTRAVENOUS 2 TIMES DAILY PRN
Status: DISCONTINUED | OUTPATIENT
Start: 2020-10-07 | End: 2020-10-13 | Stop reason: HOSPADM

## 2020-10-07 RX ORDER — DOCUSATE SODIUM 100 MG/1
100 CAPSULE, LIQUID FILLED ORAL DAILY
Status: DISCONTINUED | OUTPATIENT
Start: 2020-10-08 | End: 2020-10-13 | Stop reason: HOSPADM

## 2020-10-07 RX ORDER — GABAPENTIN 100 MG/1
100 CAPSULE ORAL 3 TIMES DAILY
Status: CANCELLED | OUTPATIENT
Start: 2020-10-07

## 2020-10-07 RX ORDER — SENNA PLUS 8.6 MG/1
2 TABLET ORAL 2 TIMES DAILY
Status: CANCELLED | OUTPATIENT
Start: 2020-10-07

## 2020-10-07 RX ORDER — LORAZEPAM 0.5 MG/1
0.5 TABLET ORAL EVERY 6 HOURS PRN
Status: DISCONTINUED | OUTPATIENT
Start: 2020-10-07 | End: 2020-10-13 | Stop reason: HOSPADM

## 2020-10-07 RX ORDER — BENZTROPINE MESYLATE 1 MG/ML
1 INJECTION INTRAMUSCULAR; INTRAVENOUS 2 TIMES DAILY PRN
Status: CANCELLED | OUTPATIENT
Start: 2020-10-07

## 2020-10-07 RX ORDER — DIPHENHYDRAMINE HCL 25 MG
25 TABLET ORAL EVERY 6 HOURS PRN
Status: CANCELLED | OUTPATIENT
Start: 2020-10-07

## 2020-10-07 RX ORDER — DORZOLAMIDE HYDROCHLORIDE AND TIMOLOL MALEATE 20; 5 MG/ML; MG/ML
1 SOLUTION/ DROPS OPHTHALMIC 2 TIMES DAILY
Status: CANCELLED | OUTPATIENT
Start: 2020-10-07

## 2020-10-07 RX ORDER — METHYLPHENIDATE HYDROCHLORIDE 10 MG/1
5 TABLET ORAL 2 TIMES DAILY WITH MEALS
Status: DISCONTINUED | OUTPATIENT
Start: 2020-10-07 | End: 2020-10-13 | Stop reason: HOSPADM

## 2020-10-07 RX ORDER — SODIUM CHLORIDE 0.9 % (FLUSH) 0.9 %
10 SYRINGE (ML) INJECTION EVERY 12 HOURS
Status: DISCONTINUED | OUTPATIENT
Start: 2020-10-07 | End: 2020-10-13 | Stop reason: HOSPADM

## 2020-10-07 RX ORDER — METOPROLOL SUCCINATE 50 MG/1
50 TABLET, EXTENDED RELEASE ORAL 2 TIMES DAILY
Status: DISCONTINUED | OUTPATIENT
Start: 2020-10-07 | End: 2020-10-12

## 2020-10-07 RX ORDER — MAGNESIUM HYDROXIDE/ALUMINUM HYDROXICE/SIMETHICONE 120; 1200; 1200 MG/30ML; MG/30ML; MG/30ML
30 SUSPENSION ORAL EVERY 6 HOURS PRN
Status: CANCELLED | OUTPATIENT
Start: 2020-10-07

## 2020-10-07 RX ORDER — FERROUS SULFATE 325(65) MG
325 TABLET ORAL
Status: CANCELLED | OUTPATIENT
Start: 2020-10-08

## 2020-10-07 RX ORDER — LEVOTHYROXINE SODIUM 0.05 MG/1
50 TABLET ORAL DAILY
Status: DISCONTINUED | OUTPATIENT
Start: 2020-10-08 | End: 2020-10-13 | Stop reason: HOSPADM

## 2020-10-07 RX ORDER — LORAZEPAM 2 MG/ML
1 INJECTION INTRAMUSCULAR EVERY 6 HOURS PRN
Status: DISCONTINUED | OUTPATIENT
Start: 2020-10-07 | End: 2020-10-13 | Stop reason: HOSPADM

## 2020-10-07 RX ORDER — HALOPERIDOL 5 MG/ML
2 INJECTION INTRAMUSCULAR EVERY 6 HOURS PRN
Status: DISCONTINUED | OUTPATIENT
Start: 2020-10-07 | End: 2020-10-13 | Stop reason: HOSPADM

## 2020-10-07 RX ORDER — LATANOPROST 50 UG/ML
1 SOLUTION/ DROPS OPHTHALMIC NIGHTLY
Status: DISCONTINUED | OUTPATIENT
Start: 2020-10-07 | End: 2020-10-13 | Stop reason: HOSPADM

## 2020-10-07 RX ORDER — LACTOBACILLUS RHAMNOSUS GG 10B CELL
2 CAPSULE ORAL 2 TIMES DAILY WITH MEALS
Status: CANCELLED | OUTPATIENT
Start: 2020-10-07

## 2020-10-07 RX ORDER — DOCUSATE SODIUM 100 MG/1
100 CAPSULE, LIQUID FILLED ORAL DAILY
Status: CANCELLED | OUTPATIENT
Start: 2020-10-08

## 2020-10-07 RX ORDER — LORAZEPAM 2 MG/ML
1 INJECTION INTRAMUSCULAR EVERY 6 HOURS PRN
Status: CANCELLED | OUTPATIENT
Start: 2020-10-07

## 2020-10-07 RX ORDER — ATORVASTATIN CALCIUM 40 MG/1
40 TABLET, FILM COATED ORAL NIGHTLY
Status: CANCELLED | OUTPATIENT
Start: 2020-10-07

## 2020-10-07 RX ORDER — PANTOPRAZOLE SODIUM 40 MG/1
40 TABLET, DELAYED RELEASE ORAL DAILY
Status: CANCELLED | OUTPATIENT
Start: 2020-10-08

## 2020-10-07 RX ORDER — LEVOTHYROXINE SODIUM 0.05 MG/1
50 TABLET ORAL DAILY
Status: CANCELLED | OUTPATIENT
Start: 2020-10-08

## 2020-10-07 RX ORDER — LATANOPROST 50 UG/ML
1 SOLUTION/ DROPS OPHTHALMIC NIGHTLY
Status: CANCELLED | OUTPATIENT
Start: 2020-10-07

## 2020-10-07 RX ORDER — DORZOLAMIDE HYDROCHLORIDE AND TIMOLOL MALEATE 20; 5 MG/ML; MG/ML
1 SOLUTION/ DROPS OPHTHALMIC 2 TIMES DAILY
Status: DISCONTINUED | OUTPATIENT
Start: 2020-10-07 | End: 2020-10-13 | Stop reason: HOSPADM

## 2020-10-07 RX ORDER — HALOPERIDOL 1 MG/1
2 TABLET ORAL EVERY 6 HOURS PRN
Status: DISCONTINUED | OUTPATIENT
Start: 2020-10-07 | End: 2020-10-13 | Stop reason: HOSPADM

## 2020-10-07 RX ORDER — PANTOPRAZOLE SODIUM 40 MG/1
40 TABLET, DELAYED RELEASE ORAL DAILY
Status: DISCONTINUED | OUTPATIENT
Start: 2020-10-08 | End: 2020-10-13 | Stop reason: HOSPADM

## 2020-10-07 RX ORDER — LACTOBACILLUS RHAMNOSUS GG 10B CELL
2 CAPSULE ORAL 2 TIMES DAILY WITH MEALS
Status: DISCONTINUED | OUTPATIENT
Start: 2020-10-07 | End: 2020-10-13 | Stop reason: HOSPADM

## 2020-10-07 RX ORDER — SODIUM CHLORIDE 0.9 % (FLUSH) 0.9 %
10 SYRINGE (ML) INJECTION EVERY 12 HOURS
Status: CANCELLED | OUTPATIENT
Start: 2020-10-07

## 2020-10-07 RX ORDER — AMIODARONE HYDROCHLORIDE 200 MG/1
200 TABLET ORAL DAILY
Status: CANCELLED | OUTPATIENT
Start: 2020-10-07

## 2020-10-07 RX ORDER — TRAZODONE HYDROCHLORIDE 50 MG/1
25 TABLET ORAL NIGHTLY PRN
Status: DISCONTINUED | OUTPATIENT
Start: 2020-10-07 | End: 2020-10-13 | Stop reason: HOSPADM

## 2020-10-07 RX ORDER — DULOXETIN HYDROCHLORIDE 30 MG/1
30 CAPSULE, DELAYED RELEASE ORAL 2 TIMES DAILY
Status: CANCELLED | OUTPATIENT
Start: 2020-10-07

## 2020-10-07 RX ORDER — POLYETHYLENE GLYCOL 3350 17 G/17G
17 POWDER, FOR SOLUTION ORAL DAILY
Status: CANCELLED | OUTPATIENT
Start: 2020-10-08

## 2020-10-07 RX ORDER — GABAPENTIN 100 MG/1
100 CAPSULE ORAL 3 TIMES DAILY
Status: DISCONTINUED | OUTPATIENT
Start: 2020-10-07 | End: 2020-10-11

## 2020-10-07 RX ORDER — NITROGLYCERIN 0.4 MG/1
0.4 TABLET SUBLINGUAL EVERY 5 MIN PRN
Status: CANCELLED | OUTPATIENT
Start: 2020-10-07

## 2020-10-07 RX ORDER — LORAZEPAM 0.5 MG/1
0.5 TABLET ORAL EVERY 6 HOURS PRN
Status: CANCELLED | OUTPATIENT
Start: 2020-10-07

## 2020-10-07 RX ORDER — ACETAMINOPHEN 325 MG/1
650 TABLET ORAL EVERY 4 HOURS PRN
Status: CANCELLED | OUTPATIENT
Start: 2020-10-07

## 2020-10-07 RX ORDER — OXYCODONE HYDROCHLORIDE AND ACETAMINOPHEN 5; 325 MG/1; MG/1
1 TABLET ORAL EVERY 4 HOURS PRN
Status: DISCONTINUED | OUTPATIENT
Start: 2020-10-07 | End: 2020-10-13 | Stop reason: HOSPADM

## 2020-10-07 RX ORDER — METHYLPHENIDATE HYDROCHLORIDE 5 MG/1
5 TABLET ORAL 2 TIMES DAILY WITH MEALS
Status: CANCELLED | OUTPATIENT
Start: 2020-10-07

## 2020-10-07 RX ORDER — MAGNESIUM HYDROXIDE/ALUMINUM HYDROXICE/SIMETHICONE 120; 1200; 1200 MG/30ML; MG/30ML; MG/30ML
30 SUSPENSION ORAL EVERY 6 HOURS PRN
Status: DISCONTINUED | OUTPATIENT
Start: 2020-10-07 | End: 2020-10-13 | Stop reason: HOSPADM

## 2020-10-07 RX ORDER — NITROGLYCERIN 0.4 MG/1
0.4 TABLET SUBLINGUAL EVERY 5 MIN PRN
Status: DISCONTINUED | OUTPATIENT
Start: 2020-10-07 | End: 2020-10-13 | Stop reason: HOSPADM

## 2020-10-07 RX ORDER — OXYCODONE AND ACETAMINOPHEN 7.5; 325 MG/1; MG/1
1 TABLET ORAL EVERY 4 HOURS PRN
Status: CANCELLED | OUTPATIENT
Start: 2020-10-07

## 2020-10-07 RX ORDER — 0.9 % SODIUM CHLORIDE 0.9 %
1000 INTRAVENOUS SOLUTION INTRAVENOUS ONCE
Status: COMPLETED | OUTPATIENT
Start: 2020-10-07 | End: 2020-10-07

## 2020-10-07 RX ORDER — DIPHENHYDRAMINE HCL 25 MG
25 TABLET ORAL EVERY 6 HOURS PRN
Status: DISCONTINUED | OUTPATIENT
Start: 2020-10-07 | End: 2020-10-13 | Stop reason: HOSPADM

## 2020-10-07 RX ORDER — ATORVASTATIN CALCIUM 40 MG/1
40 TABLET, FILM COATED ORAL NIGHTLY
Status: DISCONTINUED | OUTPATIENT
Start: 2020-10-07 | End: 2020-10-13 | Stop reason: HOSPADM

## 2020-10-07 RX ORDER — ACETAMINOPHEN 325 MG/1
650 TABLET ORAL EVERY 4 HOURS PRN
Status: DISCONTINUED | OUTPATIENT
Start: 2020-10-07 | End: 2020-10-13 | Stop reason: HOSPADM

## 2020-10-07 RX ORDER — OXYCODONE HYDROCHLORIDE AND ACETAMINOPHEN 5; 325 MG/1; MG/1
1 TABLET ORAL EVERY 4 HOURS PRN
Status: CANCELLED | OUTPATIENT
Start: 2020-10-07

## 2020-10-07 RX ORDER — AMIODARONE HYDROCHLORIDE 200 MG/1
200 TABLET ORAL DAILY
Status: DISCONTINUED | OUTPATIENT
Start: 2020-10-07 | End: 2020-10-13 | Stop reason: HOSPADM

## 2020-10-07 RX ORDER — OXYCODONE AND ACETAMINOPHEN 7.5; 325 MG/1; MG/1
1 TABLET ORAL EVERY 4 HOURS PRN
Status: DISCONTINUED | OUTPATIENT
Start: 2020-10-07 | End: 2020-10-13 | Stop reason: HOSPADM

## 2020-10-07 RX ORDER — TRAZODONE HYDROCHLORIDE 50 MG/1
25 TABLET ORAL NIGHTLY PRN
Status: CANCELLED | OUTPATIENT
Start: 2020-10-07

## 2020-10-07 RX ORDER — DULOXETIN HYDROCHLORIDE 30 MG/1
30 CAPSULE, DELAYED RELEASE ORAL 2 TIMES DAILY
Status: DISCONTINUED | OUTPATIENT
Start: 2020-10-07 | End: 2020-10-13 | Stop reason: HOSPADM

## 2020-10-07 RX ORDER — POLYETHYLENE GLYCOL 3350 17 G/17G
17 POWDER, FOR SOLUTION ORAL DAILY
Status: DISCONTINUED | OUTPATIENT
Start: 2020-10-08 | End: 2020-10-13 | Stop reason: HOSPADM

## 2020-10-07 RX ORDER — SODIUM CHLORIDE 9 MG/ML
INJECTION, SOLUTION INTRAVENOUS CONTINUOUS
Status: DISCONTINUED | OUTPATIENT
Start: 2020-10-07 | End: 2020-10-11

## 2020-10-07 RX ORDER — HALOPERIDOL 1 MG/1
2 TABLET ORAL EVERY 6 HOURS PRN
Status: CANCELLED | OUTPATIENT
Start: 2020-10-07

## 2020-10-07 RX ORDER — SENNA PLUS 8.6 MG/1
2 TABLET ORAL 2 TIMES DAILY
Status: DISCONTINUED | OUTPATIENT
Start: 2020-10-07 | End: 2020-10-13 | Stop reason: HOSPADM

## 2020-10-07 RX ORDER — METOPROLOL SUCCINATE 50 MG/1
50 TABLET, EXTENDED RELEASE ORAL 2 TIMES DAILY
Status: CANCELLED | OUTPATIENT
Start: 2020-10-07

## 2020-10-07 RX ORDER — HALOPERIDOL 5 MG/ML
2 INJECTION INTRAMUSCULAR EVERY 6 HOURS PRN
Status: CANCELLED | OUTPATIENT
Start: 2020-10-07

## 2020-10-07 RX ADMIN — POLYETHYLENE GLYCOL (3350) 17 G: 17 POWDER, FOR SOLUTION ORAL at 09:16

## 2020-10-07 RX ADMIN — GABAPENTIN 100 MG: 100 CAPSULE ORAL at 09:04

## 2020-10-07 RX ADMIN — PANTOPRAZOLE SODIUM 40 MG: 40 TABLET, DELAYED RELEASE ORAL at 06:04

## 2020-10-07 RX ADMIN — SENNOSIDES 17.2 MG: 8.6 TABLET, FILM COATED ORAL at 09:04

## 2020-10-07 RX ADMIN — LEVOTHYROXINE SODIUM 50 MCG: 50 TABLET ORAL at 06:04

## 2020-10-07 RX ADMIN — GABAPENTIN 100 MG: 100 CAPSULE ORAL at 20:21

## 2020-10-07 RX ADMIN — SENNOSIDES 17.2 MG: 8.6 TABLET, FILM COATED ORAL at 20:21

## 2020-10-07 RX ADMIN — METOPROLOL SUCCINATE 50 MG: 50 TABLET, EXTENDED RELEASE ORAL at 09:04

## 2020-10-07 RX ADMIN — DOCUSATE SODIUM 100 MG: 100 CAPSULE, LIQUID FILLED ORAL at 09:04

## 2020-10-07 RX ADMIN — DORZOLAMIDE HYDROCHLORIDE AND TIMOLOL MALEATE 1 DROP: 20; 5 SOLUTION/ DROPS OPHTHALMIC at 09:15

## 2020-10-07 RX ADMIN — ATORVASTATIN CALCIUM 40 MG: 40 TABLET, FILM COATED ORAL at 20:21

## 2020-10-07 RX ADMIN — SODIUM CHLORIDE 1000 ML: 9 INJECTION, SOLUTION INTRAVENOUS at 17:19

## 2020-10-07 RX ADMIN — TRAZODONE HYDROCHLORIDE 25 MG: 50 TABLET ORAL at 20:21

## 2020-10-07 RX ADMIN — DORZOLAMIDE HYDROCHLORIDE AND TIMOLOL MALEATE 1 DROP: 20; 5 SOLUTION/ DROPS OPHTHALMIC at 20:35

## 2020-10-07 RX ADMIN — DULOXETINE HYDROCHLORIDE 30 MG: 30 CAPSULE, DELAYED RELEASE ORAL at 20:21

## 2020-10-07 RX ADMIN — FERROUS SULFATE TAB 325 MG (65 MG ELEMENTAL FE) 325 MG: 325 (65 FE) TAB at 09:04

## 2020-10-07 RX ADMIN — MEROPENEM 1 G: 1 INJECTION, POWDER, FOR SOLUTION INTRAVENOUS at 09:52

## 2020-10-07 RX ADMIN — Medication 2 CAPSULE: at 09:01

## 2020-10-07 RX ADMIN — LATANOPROST 1 DROP: 50 SOLUTION/ DROPS OPHTHALMIC at 20:35

## 2020-10-07 RX ADMIN — METHYLPHENIDATE HYDROCHLORIDE 5 MG: 10 TABLET ORAL at 17:28

## 2020-10-07 RX ADMIN — METHYLPHENIDATE HYDROCHLORIDE 5 MG: 5 TABLET ORAL at 09:03

## 2020-10-07 RX ADMIN — OXYCODONE HYDROCHLORIDE AND ACETAMINOPHEN 1 TABLET: 7.5; 325 TABLET ORAL at 20:21

## 2020-10-07 RX ADMIN — AMIODARONE HYDROCHLORIDE 200 MG: 200 TABLET ORAL at 17:27

## 2020-10-07 RX ADMIN — Medication 10 ML: at 06:04

## 2020-10-07 RX ADMIN — RIVAROXABAN 20 MG: 20 TABLET, FILM COATED ORAL at 17:28

## 2020-10-07 RX ADMIN — DULOXETINE HYDROCHLORIDE 30 MG: 30 CAPSULE, DELAYED RELEASE ORAL at 09:03

## 2020-10-07 RX ADMIN — SODIUM CHLORIDE: 9 INJECTION, SOLUTION INTRAVENOUS at 20:20

## 2020-10-07 RX ADMIN — METOPROLOL SUCCINATE 50 MG: 50 TABLET, EXTENDED RELEASE ORAL at 17:27

## 2020-10-07 RX ADMIN — SODIUM CHLORIDE: 9 INJECTION, SOLUTION INTRAVENOUS at 17:24

## 2020-10-07 RX ADMIN — Medication 2 CAPSULE: at 17:27

## 2020-10-07 ASSESSMENT — PAIN SCALES - WONG BAKER: WONGBAKER_NUMERICALRESPONSE: 0

## 2020-10-07 ASSESSMENT — PAIN DESCRIPTION - PAIN TYPE: TYPE: CHRONIC PAIN

## 2020-10-07 ASSESSMENT — PAIN DESCRIPTION - LOCATION: LOCATION: BACK;LEG

## 2020-10-07 ASSESSMENT — PAIN SCALES - GENERAL
PAINLEVEL_OUTOF10: 10
PAINLEVEL_OUTOF10: 0
PAINLEVEL_OUTOF10: 0

## 2020-10-07 ASSESSMENT — PAIN DESCRIPTION - ORIENTATION: ORIENTATION: RIGHT;LEFT

## 2020-10-07 NOTE — PROGRESS NOTES
Physical Therapy    Patient checked on by PT this am twice, first in group and then later and informed by LSW that he is likely to go to medical floor today due to UTI. PT will check on him once to floor and will need new orders to resume PT if/when he is transferred. No charge.   Jhoan Vargas, [facility]

## 2020-10-07 NOTE — PLAN OF CARE
[facility]  Discharge Note    Pt discharged with followings belongings:   Dentures: None  Vision - Corrective Lenses: Glasses  Hearing Aid: None  Jewelry: None  Body Piercings Removed: N/A  Clothing: Footwear, Pants, Shirt, Undergarments (Comment), Other (Comment)  Were All Patient Medications Collected?: Yes  Other Valuables: Cell phone   Valuables sent with patient. Valuables retrieved from safe,  and returned to patient. Patient education on aftercare instructions: n/a  Information faxed to n/a by n/a Patient verbalize understanding of AVS:  yes.     Status EXAM upon discharge:  Status and Exam  Normal: No  Facial Expression: Brightened, Worried  Affect: Stable  Level of Consciousness: Alert  Mood:Normal: No  Mood: Depressed, Anxious  Motor Activity:Normal: No  Motor Activity: Decreased  Interview Behavior: Cooperative  Preception: [place] to Person, Sherryn Juan to Time, [place] to Place, [place] to Situation  Attention:Normal: No  Attention: Distractible  Thought Processes: Circumstantial  Thought Content:Normal: Yes  Thought Content: Preoccupations  Hallucinations: None  Delusions: No  Memory:Normal: No  Memory: Poor Recent  Insight and Judgment: No  Insight and Judgment: Poor Judgment, Poor Insight  Present Suicidal Ideation: No  Present Homicidal Ideation: No      Metabolic Screening:    No results found for: LABA1C    No results found for: CHOL  No results found for: TRIG  No results found for: HDL  No components found for: LDLCAL  No results found for: LABVLDL     Pt transferred to [place] [id number] [address] [place] [address], RN

## 2020-10-07 NOTE — PLAN OF CARE
Pt. Is pleasant and compliant with care, withdrawn and isolative to bedroom, will listen to the music groups and will make comments from his room but will not sit out with peers. Fair appetite, mendoza draining dark yellow urine, ATB IV therapy continues no ADRs noted. Pt denies SI HI AVH.

## 2020-10-07 NOTE — DISCHARGE SUMMARY
Geriatric Psychiatry Discharge Summary     Admit Date: 10/1/2020     Discharge Date:  10/7/2020      Discharge Diagnoses:  Primary Psychiatric (DSM V) Diagnosis: Major depressive disorder, recurrent, severe without psychotic features  Secondary Psychiatric (DSM V) Diagnoses: None  Chemical Dependency Diagnoses: Tobacco use disorder, severe, in remission  Active Medical Diagnoses: UTI, bilateral hydronephrosis, Urinary retention, COPD, DKS stage III, CAD s/p stent, AAA, atrial fibrillation, HTN, hypothyroidism, GERD, grade I DD, recent retroperitoneal hematoma, iron deficiency anemia, lumbar stenosis, recent admission for sepsis 2/2 UTI, history of CVA       All psychiatric conditions and active medical problems above on were treated while patient was hospitalized. Disposition -  [facility]     Discharge Meds:    Scheduled Medications[]Expand by Default    meropenem  1 g Intravenous Q8H    polyethylene glycol  17 g Oral Daily    docusate sodium  100 mg Oral Daily    atorvastatin  40 mg Oral Nightly    dorzolamide-timolol  1 drop Both Eyes BID    DULoxetine  30 mg Oral BID    ferrous sulfate  325 mg Oral Daily with breakfast    latanoprost  1 drop Left Eye Nightly    levothyroxine  50 mcg Oral Daily    metoprolol succinate  50 mg Oral BID    pantoprazole  40 mg Oral Daily    senna  2 tablet Oral BID    gabapentin  100 mg Oral TID    lactobacillus  2 capsule Oral BID WC    sodium chloride flush  10 mL Intravenous Q12H    methylphenidate  5 mg Oral BID WC    amiodarone  200 mg Oral Daily    rivaroxaban  20 mg Oral Daily        Multiple Neuroleptics? No    Reason for admission: Patient is an 66 y.o. male with history of major depression who was admitted to the the older adult behavioral unit on 10/1/2020 for suicidal ideation. Patient met with and was treated by an interdisciplinary treatment team that included social work, occupational therapy, recreational therapy, nursing, and psychiatry.   Patient was admitted on an involuntary basis and subsequently signed in voluntarily. Hospital Course:  Patient was transferred to our unit from [facility].  He had been admitted there from a SNF for sepsis secondary to a UTI. Late in his hospital course there he began to refuse treatment and voice hopelessness and suicidal ideation. He articulated that he was tired of fighting multiple disease processes and complications, and wanted to \"be with\" his wife whom had passed away 5 years ago. He briefly stopped eating and drinking and stated that he planned to kill himself through starvation. On admission we continued patient's home duloxetine 30 mg BID and added methlyphenidate 5 mg BID in the hopes of achieving a more rapid antidepressant augmentation effect. Pt was also engaged in group, mlieu and individual therapy with a focus on validating his emotions surrounding wife's death and personal illness. Pt's mood and outlook improved significantly and quickly. Shortly after admission he began to accept medications, food and drink again. He also quickly endorsed a resolution to his suicidal ideation. Pt improved significantly from a psychiatric standpoint, but continued to manifest medical, primarily urologic issues including a recurrence of UTI, and persistent urinary retention that ultimately resulted in bilateral hydronephrosis. Given this he was transferred to [address] [place] [address] for further acute medical management.     PE: (reviewed) and labs (see medical H&PE)  VITALS:  /66   Pulse 66   Temp 97.8 °F (36.6 °C) (Oral)   Resp 18   Ht 5' 9\" (1.753 m)   Wt 155 lb 8 oz (70.5 kg)   SpO2 94%   BMI 22.96 kg/m²     Motor / Gait: Observed while seated in recliner, no abnormal or involuntary movements noted     Mental Status Examination:    Appearance: WM, appears stated age, wearing pajamas, fair grooming and hygiene  Behavior/Attitude Toward Examiner: Cooperative, attentive, good eye contact  Speech: Spontaneous, normal rate, soft, volume and well articulated   Mood: \"Pretty sick this morning\"  Affect:  Mood congruent, euthymic, but fatigued appearing  Thought Processes: Linear  Thought Content: [place], no delusions voiced, no obsessions, no hopelessness  Perceptions: Denies AVH, no RTIS  Attention: Intact to interview  Abstraction: WNL  Cognition: Average AMANDA, Alert and oriented to person, place, time, and situation, recall grossly intact  Insight: Improved insight   Judgment: Fair judgment       Condition on DC  Mood and affect are stable and pt is not suicidal, homicidal, or psychotic. Recommendations:  1. Continue duloxetine 30 mg BID  2. Continue methylphenidate 5 mg BID. 3.  Patient does not require sitter. 4.  Patient does not require psychiatric hold. 5.  Patient is cleared from a psychiatric standpoint. Will not require readmission to unit once medically stabilized.     Spent over 40 minutes with patient and staff on Neri Waite MD  Staff Psychiatrist

## 2020-10-07 NOTE — PLAN OF CARE
Problem: Falls - Risk of:  Goal: Will remain free from falls  Description: Will remain free from falls  Outcome: Ongoing  Goal: Absence of physical injury  Description: Absence of physical injury  Outcome: Ongoing     Problem: Infection:  Goal: Will remain free from infection  Description: Will remain free from infection  Outcome: Ongoing     Problem: Safety:  Goal: Free from accidental physical injury  Description: Free from accidental physical injury  Outcome: Ongoing  Goal: Free from intentional harm  Description: Free from intentional harm  Outcome: Ongoing     Problem: Daily Care:  Goal: Daily care needs are met  Description: Daily care needs are met  Outcome: Ongoing     Problem: Pain:  Goal: Patient's pain/discomfort is manageable  Description: Patient's pain/discomfort is manageable  Outcome: Ongoing     Problem: Skin Integrity:  Goal: Skin integrity will stabilize  Description: Skin integrity will stabilize  Outcome: Ongoing     Problem: Discharge Planning:  Goal: Patients continuum of care needs are met  Description: Patients continuum of care needs are met  Outcome: Ongoing     Problem: Sensory:  Goal: General experience of comfort will improve  Description: General experience of comfort will improve  Outcome: Ongoing     Problem: Urinary Elimination:  Goal: Signs and symptoms of infection will decrease  Description: Signs and symptoms of infection will decrease  Outcome: Ongoing  Goal: Ability to reestablish a normal urinary elimination pattern will improve - after catheter removal  Description: Ability to reestablish a normal urinary elimination pattern will improve  Outcome: Ongoing  Goal: Complications related to the disease process, condition or treatment will be avoided or minimized  Description: Complications related to the disease process, condition or treatment will be avoided or minimized  Outcome: Ongoing

## 2020-10-07 NOTE — PROGRESS NOTES
Patient admitted to room ___224_ from [address] [place] [address] admit from [facility]. Patient oriented to room, call light, bed rails, phone, lights and bathroom. Patient instructed about the schedule of the day including: vital sign frequency, lab draws, possible tests, frequency of MD and staff rounds, daily weights, I &O's and prescribed diet. Bed alarm on as pt is a high fall risk. . Bed locked, in lowest position, side rails up 2/4, call light within reach. Recliner Assessment:     Patient is able to demonstrated the ability to move from a reclining position to an upright position within the recliner. 4 Eyes Skin Assessment:     The patient is being assess for   Admission    I agree that 2 RN's have performed a thorough Head to Toe Skin Assessment on the patient. ALL assessment sites listed below have been assessed. Areas assessed by both nurses:   [x]   Head, Face, and Ears   [x]   Shoulders, Back, and Chest, Abdomen  [x]   Arms, Elbows, and Hands   [x]   Coccyx, Sacrum, and Ischium  [x]   Legs, Feet, and Heels        Healing incision (3) on lower back from surgery  Healing incision on lower abdomen  Scattered scabs/bruising    **SHARE this note so that the co-signing nurse is able to place an eSignature**    Co-signer eSignature: Electronically signed by Jeison Loya RN on 10/7/20 at 3:11 PM EDT    Does the Patient have Skin Breakdown?   No          Juan Prevention initiated:  No   Wound Care Orders initiated:  No      [facility] nurse consulted for Pressure Injury (Stage 3,4, Unstageable, DTI, NWPT, Complex wounds)and New or Established Ostomies:  No      Primary Nurse eSignature: Electronically signed by Dell Liao RN on 29/8/35 at 3:03 PM EDT

## 2020-10-07 NOTE — H&P
Hospital Medicine History & Physical      PCP: Monik Duque    Date of Admission: 10/7/2020    Date of Service: Pt seen/examined on 10/7/2020     Chief Complaint:  Abdominal pain    History Of Present Illness: The patient is a 66 y.o. male with multiple chronic medical problems including coronary artery disease, COPD, chronic atrial fibrillation, as well as complicated recent hospital admissions for a retroperitoneal hematoma after lumbar surgery as well as sepsis secondary to UTI. He was admitted at [facility] in September for sepsis secondary to UTI, seen by urology, underwent cystoscopy which showed a false urethral passage. Treated with antibiotics and discharged to the acute rehab unit with a Mendoza catheter in place. Eventually transferred to the inpatient geriatric psychiatry unit at [facility] directly from the acute rehab unit. On arrival to the geriatric psychiatry unit he had a Mendoza in place which it seems was the same mendoza that had been placed on September 13th by urology. We removed the Mendoza on admission, but he was still retaining urine and therefore nursing staff replaced a Mendoza on 10/2. On 10/5 he started spiking fevers, urinalysis was consistent with infection. CT abdomen was obtained which showed bilateral hydronephrosis despite having a Mendoza in place which appear to be draining well. He will be transferred to the medical floor for further management and urology consultation.     Past Medical History:        Diagnosis Date    Aneurysm, aortic (HCC)     Atrial fibrillation (HCC)     Glaucoma     Heart attack ([address]     Hyperlipidemia     Hypertension        Past Surgical History:        Procedure Laterality Date    COLONOSCOPY      CORONARY ANGIOPLASTY WITH STENT PLACEMENT      X 2    EYE SURGERY Right 7/23/2020    GONIOTOMY performed by Cassidy Boo MD at [place] [id number] Right 7/23/2020    Phacoemulsification with intraocular Brimonidine; Clindamycin/lincomycin; Penicillins; and Simvastatin    Social History:  The patient currently lives at home alone     TOBACCO:   reports that he has quit smoking. His smoking use included cigarettes. He has never used smokeless tobacco.  ETOH:   reports previous alcohol use. Family History:   Positive as follows:        Problem Relation Age of Onset    Heart Disease Mother        REVIEW OF SYSTEMS:       Constitutional: Negative for fever   + fatigue   HENT: Negative for sore throat   Eyes: Negative for redness   Respiratory: Negative  for dyspnea, cough   Cardiovascular: Negative for chest pain   Gastrointestinal: Negative for vomiting, diarrhea   + constipation  Genitourinary: Negative for hematuria   Musculoskeletal: Negative for arthralgias   Skin: Negative for rash   Neurological: Negative for syncope   Hematological: Negative for adenopathy   Psychiatric/Behavorial: Negative for anxiety    PHYSICAL EXAM:    /65   Pulse 69   Temp 98.1 °F (36.7 °C) (Oral)   Resp 17   Wt 167 lb 11.2 oz (76.1 kg)   SpO2 95%   BMI 24.76 kg/m²     Gen: No distress. Alert. Elderly  male  Eyes: No conjunctival injection. ENT: No discharge. Pharynx clear. Neck:  Trachea midline. Resp: No accessory muscle use. No crackles. No wheezes. No rhonchi. CV: Regular rate. Regular rhythm. No murmur.  No rub. No edema. GI: Non distended and non tender. BS active x 4. Surgical incision below umbilicus appears well healing with bruising that is improving   Skin: Warm and dry. Midline incision infra-umbilical healing well   M/S: No cyanosis. No clubbing. PICC RUE   Neuro: Awake. Grossly nonfocal  Psych: Oriented x 3. No anxiety or agitation.    CBC:   Recent Labs     10/05/20  1718   WBC 8.3   HGB 9.4*   HCT 28.3*   MCV 91.5        BMP:   Recent Labs     10/05/20  1718   *   K 4.6   CL 99   CO2 24   BUN 25*   CREATININE 1.4*     LIVER PROFILE:   Recent Labs     10/05/20  1718   AST 22 ALT 14   BILITOT 0.7   ALKPHOS 98     UA:  Recent Labs     10/05/20  1558   COLORU Yellow   PHUR >=9.0*   WBCUA see below*   RBCUA see below*   BACTERIA 4+*   CLARITYU CLOUDY*   SPECGRAV 1.010   LEUKOCYTESUR LARGE*   UROBILINOGEN 0.2   BILIRUBINUR Negative   BLOODU SMALL*   GLUCOSEU Negative            U/A:    Lab Results   Component Value Date    COLORU Yellow 10/05/2020    WBCUA see below 10/05/2020    RBCUA see below 10/05/2020    BACTERIA 4+ 10/05/2020    CLARITYU CLOUDY 10/05/2020    SPECGRAV 1.010 10/05/2020    LEUKOCYTESUR LARGE 10/05/2020    BLOODU SMALL 10/05/2020    GLUCOSEU Negative 10/05/2020       ABG    Lab Results   Component Value Date    MYN2IGE 17.2 09/13/2020    BEART -6.8 09/13/2020    E8AWSPER 98.0 09/13/2020    PHART 7.393 09/13/2020    CMJ7MSU 28.3 09/13/2020    PO2ART 86.1 09/13/2020    LLO6MQK 18.1 09/13/2020       CULTURES  Blood 10/6/2020: NGTD  Urine 10/5/2020  Proteus mirabilis (1)     Antibiotic  Interpretation  LORENA  Status     ampicillin  Sensitive  <=8  mcg/mL      ceFAZolin  Sensitive  <=2  mcg/mL      cefepime  Sensitive  <=2  mcg/mL      cefTRIAXone  Sensitive  <=1  mcg/mL      cefuroxime  Sensitive  <=4  mcg/mL      ciprofloxacin  Sensitive  <=1  mcg/mL      ertapenem  Sensitive  <=0.5  mcg/mL      gentamicin  Sensitive  <=4  mcg/mL      meropenem  Sensitive  <=1  mcg/mL      nitrofurantoin  Resistant  >64  mcg/mL      piperacillin-tazobactam  Sensitive  <=16  mcg/mL      trimethoprim-sulfamethoxazole  Sensitive  <=2/38  mcg/mL        RADIOLOGY  CT abd pelvis 10/5/2020  HISTORY:    ORDERING SYSTEM PROVIDED HISTORY: lower abdominal pain/testicular pain,  hx    of retroperitoneal hematoma, urinary retention    TECHNOLOGIST PROVIDED HISTORY:    Reason for exam:->lower abdominal pain/testicular pain,  hx of    retroperitoneal hematoma, urinary retention    Additional Contrast?->None    Reason for Exam: lower abdominal pain/testicular pain,  hx of retroperitoneal    hematoma, urinary retention         FINDINGS:    Lower Chest: No acute infiltrate at the lung bases. Linear focus of scarring    or atelectasis in left lower lobe. Coronary vascular calcification with    borderline cardiac size.         Organs: Several small hepatic lesions, too small to fully characterize but    likely cysts or hemangioma.  The gallbladder is distended with increased    attenuation dependently, possibly sludge or calculi.  No pericholecystic    inflammatory changes or biliary dilatation. The spleen, pancreas and adrenal    glands are unremarkable. Bilateral hydronephrosis and hydroureter with no    obstructing calculi.  There is mucosal enhancement of the ureters and    collecting systems, indicative of underlying infection and UTI.  Bilateral    renal cysts.         GI/Bowel: Colonic diverticulosis with no acute features. No small bowel    distension. The stomach and duodenal sweep are intact.         Pelvis: There is redemonstration of a liquefied pelvic hematoma extending    from the postoperative changes in the lumbar region along the pelvic sidewall    on the left into the anterior abdominal wall and rectus sheath.  This is    overall decreased in size compared to the previous study.  No evidence of    acute hemorrhage.  No free pelvic fluid.         Marked bladder wall thickening with infiltration of the pericystic fat. Bilateral hydroureter extends to the level of the bladder.         Peritoneum/Retroperitoneum: Multiple small retroperitoneal nodes, similar to    the previous study.  No upper abdominal ascites.  Moderate aortoiliac    atherosclerotic plaque.  Maximum aortic diameter is unchanged at 2.8 cm.         Bones/Soft Tissues: No acute osseous or soft tissue abnormality.  Stable    postoperative changes anteriorly and posteriorly at L4 through S1. Impression    1.  Bilateral hydronephrosis and hydroureter with mucosal enhancement.  Marked    bladder wall thickening with infiltration of the pericystic fat.  Findings    are consistent with a UTI. 2. Liquefied hematoma extending from the lumbar spine, around the left    hemipelvis and into the anterior abdominal wall with partial resolution.  No    acute hemorrhage or significant free fluid. 3. Colonic diverticulosis with no acute features. 4. Distended gallbladder.  Increased attenuation dependently, possibly sludge    or calculi. 5. Moderate aortoiliac atherosclerotic disease.  Stable aneurysmal change at    2.8 cm.         RECOMMENDATIONS:    For management of fusiform AAA:         2.6-2.9 cm aorta, recommend follow-up every 5 years or aortas meeting the    criteria for AAA (>1.5 x proximal normal segment; no f/u if < 1.5 x proximal    normal segment; no f/u for aortas < 2.6 cm).         * For management of saccular abdominal aortic aneurysms of any size,    recommend vascular consultation.         Note:  For AAA enlargement of >0.5 cm in 6 months or >1 cm in 1 year,    recommend vascular consultation.               ASSESSMENT/PLAN:    #Complicated Proteus UTI    #Urinary Retention   - Admitted for sepsis 2/2 UTI s/p cystoscopy for mendoza placement on 9/13/20 at [facility], + false urethral passage noted at that time. He was then discharged to ARU before coming to [facility] unit for admission   - urology consulted on arrival to [facility], mendoza that was placed on 9/13 was removed on 10/2 but he failed voiding trial, and therefore nursing staff replaced mendoza on 10/2   - on 10/5 he started spiking fevers, UA consistent with UTI. CT showed bilateral hydro- see above- despite mendoza appearing to be draining adequately  - initially placed on IV Merrem, changed to Rocephin D#1 when proteus culture and sensitivity resulted   - Urology consult is pending   - blood cultures (including one drawn from PICC line) are NG    #TONO on CKD stage III  - Cr on admission: 1.2  - baseline: ~1.1  - Crt 1.6 today.   IVF bolus followed by continuous infusion    #Hyponatremia  - Na down to 129, starting IVF now, monitor Na    #MDD  - sp treatment on the geriatric psychiatric unit. Will continue medications per psychiatry recs: duloxetine 30 mg BID and methylphenidate 5 mg BID       #Recent Retroperitoneal Hematoma  - 2/2 recent Lumbar procedure as below  - Xarelto was held initially, but was resumed when he was admitted to [facility] psych. H/h has remained stable if you look at the trend over the last few weeks      #COPD  - no AE  - add PRN Albuterol     #CAD s/p stent  - no c/o chest pain  - holding ASA for now, cont Lipitor     #AAA  - stable on CT. Will need OP monitoring with repeat imaging in 5 years     #Atrial Fibrillation  - held Xarelto 2/2 hematuria and hematoma at [facility]  - cont Toprol XL and amio.    restarted Xarelto now       #HTN  - controlled  - cont Toprol XL (losartan recently d/c'd 2/2 TONO)     #Hypothyroidism  - home dose of synthroid 50 mcg continued      #GERD  - cont Protonix       #Chronic dCHF  - noted on echo 9/19/2020 - EF 50%  - appears compensated  - cont Toprol XL     #Iron Deficiency Anemia  - cont Ferrous Sulfate  - monitor h/h      #Lumbar Stenosis  - s/p recent L4-S1 ALIF/PSF  - 8/31 with Dr. Rafael Nicole and Dr. Liane Huerta     #Hx of CVA  - cont Lipitor     #RUE PICC in place since 9/14/2020     #Constipation  - see MAR for bowel regimen  - he is having BMs    DVT Prophylaxis: Xarelto  Diet: DIET GENERAL;  Dietary Nutrition Supplements: Standard High Calorie Oral Supplement  Code Status: DNR-CCA    Gene Lorenz PA-C  10/7/2020 1:57 PM

## 2020-10-07 NOTE — CONSULTS
10/7/2020  [address] [facility] for Consult:  Retention, hydro, renal failure  Requesting Physician:  Ivy Hagen      History Obtained From:  patient, electronic medical record    HISTORY OF PRESENT ILLNESS:                The patient is a 66 y.o. male who presents with suicidal thoughts, recently in the behavioral unit, now on 2W. He has had issues with retention over at [facility] and here at [facility]. Labs also show an increase in his creatinine. A CT shows marked bilateral hudro and a thickened bladder wall. There also are bilateral renal cysts.     Past Medical History:        Diagnosis Date    Aneurysm, aortic (HCC)     Atrial fibrillation (HCC)     Glaucoma     Heart attack ([address]     Hyperlipidemia     Hypertension      Past Surgical History:        Procedure Laterality Date    COLONOSCOPY      CORONARY ANGIOPLASTY WITH STENT PLACEMENT      X 2    EYE SURGERY Right 7/23/2020    GONIOTOMY performed by Tracy Curry MD at [place] [id number] Right 7/23/2020    Phacoemulsification with intraocular lens implant performed by Tracy Curry MD at [address] [place] [address]     Current Medications:   Current Facility-Administered Medications: acetaminophen (TYLENOL) tablet 650 mg, 650 mg, Oral, Q4H PRN  aluminum & magnesium hydroxide-simethicone (MAALOX) 200-200-20 MG/5ML suspension 30 mL, 30 mL, Oral, Q6H PRN  benztropine mesylate (COGENTIN) injection 1 mg, 1 mg, Intramuscular, BID PRN  haloperidol lactate (HALDOL) injection 2 mg, 2 mg, Intramuscular, Q6H PRN **OR** haloperidol (HALDOL) tablet 2 mg, 2 mg, Oral, Q6H PRN  LORazepam (ATIVAN) tablet 0.5 mg, 0.5 mg, Oral, Q6H PRN **OR** LORazepam (ATIVAN) injection 1 mg, 1 mg, Intramuscular, Q6H PRN  magnesium hydroxide (MILK OF MAGNESIA) 400 MG/5ML suspension 30 mL, 30 mL, Oral, Daily PRN  traZODone (DESYREL) tablet 25 mg, 25 mg, Oral, Nightly PRN  amiodarone (CORDARONE) tablet 200 mg, 200 mg, Oral, Daily  atorvastatin (LIPITOR) tablet 40 mg, 40 mg, Oral, Nightly  [START ON 10/8/2020] cefTRIAXone (ROCEPHIN) 1 g IVPB in 50 mL D5W minibag, 1 g, Intravenous, Q24H  diphenhydrAMINE (BENADRYL) tablet 25 mg, 25 mg, Oral, Q6H PRN  [START ON 10/8/2020] docusate sodium (COLACE) capsule 100 mg, 100 mg, Oral, Daily  dorzolamide-timolol (COSOPT) 22.3-6.8 MG/ML ophthalmic solution 1 drop, 1 drop, Both Eyes, BID  DULoxetine (CYMBALTA) extended release capsule 30 mg, 30 mg, Oral, BID  [START ON 10/8/2020] ferrous sulfate (IRON 325) tablet 325 mg, 325 mg, Oral, Daily with breakfast  gabapentin (NEURONTIN) capsule 100 mg, 100 mg, Oral, TID  lactobacillus (CULTURELLE) capsule 2 capsule, 2 capsule, Oral, BID WC  latanoprost (XALATAN) 0.005 % ophthalmic solution 1 drop, 1 drop, Left Eye, Nightly  [START ON 10/8/2020] levothyroxine (SYNTHROID) tablet 50 mcg, 50 mcg, Oral, Daily  methylphenidate (RITALIN) tablet 5 mg, 5 mg, Oral, BID WC  metoprolol succinate (TOPROL XL) extended release tablet 50 mg, 50 mg, Oral, BID  nitroGLYCERIN (NITROSTAT) SL tablet 0.4 mg, 0.4 mg, Sublingual, Q5 Min PRN  oxyCODONE-acetaminophen (PERCOCET) 5-325 MG per tablet 1 tablet, 1 tablet, Oral, Q4H PRN  oxyCODONE-acetaminophen (PERCOCET) 7.5-325 MG per tablet 1 tablet, 1 tablet, Oral, Q4H PRN  [START ON 10/8/2020] pantoprazole (PROTONIX) tablet 40 mg, 40 mg, Oral, Daily  [START ON 10/8/2020] polyethylene glycol (GLYCOLAX) packet 17 g, 17 g, Oral, Daily  rivaroxaban (XARELTO) tablet 20 mg, 20 mg, Oral, Daily  senna (SENOKOT) tablet 17.2 mg, 2 tablet, Oral, BID  sodium chloride flush 0.9 % injection 10 mL, 10 mL, Intravenous, Q12H  0.9 % sodium chloride infusion, , Intravenous, Continuous  0.9 % sodium chloride bolus, 1,000 mL, Intravenous, Once  Allergies:     Allergies   Allergen Reactions    Azithromycin     Brimonidine Itching    Clindamycin/Lincomycin Diarrhea    Penicillins Hives    Simvastatin      Social History: Reviewed, non contributory    Family History:  Reviewed, non contributory      REVIEW OF SYSTEMS:    12 point ROS has been completed and reviewed    PHYSICAL EXAM:    VITALS:  /65   Pulse 69   Temp 98.1 °F (36.7 °C) (Oral)   Resp 17   Ht 5' 9\" (1.753 m)   Wt 167 lb 11.2 oz (76.1 kg)   SpO2 95%   BMI 24.76 kg/m²   H&N: Sclera normal, no masses, trachea midline, no bruit  CVS: Normal rate and rhythm, no murmurs or rubs, peripheral pulses equal, no clubbing or cyanosis. RESP: Breath sounds equal bilateral, few rhonchi. ABDO: Soft, non-tender, bowel sounds active, no organomegaly, no hernias. LYMPH:  No lymphadenopathy. Skin: Warm dry and intact. : No CVAT, normal external genitalia with mendoza, BPH, no rectal masses, normal tone, no discharge. MSK: Grossly normal for patient  HALINA: Grossly normal for patient  PSY: No acute changes noted in psychosocial assessment.     DATA:    CBC:   Lab Results   Component Value Date    WBC 6.6 10/07/2020    RBC 2.85 10/07/2020    HGB 8.6 10/07/2020    HCT 26.1 10/07/2020    MCV 91.7 10/07/2020    MCH 30.3 10/07/2020    MCHC 33.0 10/07/2020    RDW 16.2 10/07/2020     10/07/2020    MPV 9.2 10/07/2020     BMP:    Lab Results   Component Value Date     10/07/2020    K 4.4 10/07/2020    CL 98 10/07/2020    CO2 25 10/07/2020    BUN 27 10/07/2020    LABALBU 3.6 10/05/2020    CREATININE 1.6 10/07/2020    CALCIUM 8.4 10/07/2020    GFRAA 51 10/07/2020    LABGLOM 42 10/07/2020    GLUCOSE 109 10/07/2020     U/A:    Lab Results   Component Value Date    COLORU Yellow 10/05/2020    PROTEINU 100 10/05/2020    PHUR >=9.0 10/05/2020    WBCUA see below 10/05/2020    RBCUA see below 10/05/2020    BACTERIA 4+ 10/05/2020    CLARITYU CLOUDY 10/05/2020    SPECGRAV 1.010 10/05/2020    LEUKOCYTESUR LARGE 10/05/2020    UROBILINOGEN 0.2 10/05/2020    BILIRUBINUR Negative 10/05/2020    BLOODU SMALL 10/05/2020    GLUCOSEU Negative 10/05/2020       IMPRESSION/RECOMMENDATIONS:

## 2020-10-07 NOTE — PROGRESS NOTES
Patient scored high on the zamora fall risk scale. Interventions taken; verified bed/chair alarm is activated, yellow socks placed on patient, and stay with me sign posted outside patients room.

## 2020-10-08 ENCOUNTER — APPOINTMENT (OUTPATIENT)
Dept: ULTRASOUND IMAGING | Age: 75
DRG: 699 | End: 2020-10-08
Attending: INTERNAL MEDICINE
Payer: MEDICARE

## 2020-10-08 LAB
ANION GAP SERPL CALCULATED.3IONS-SCNC: 8 MMOL/L (ref 3–16)
BASOPHILS ABSOLUTE: 0 K/UL (ref 0–0.2)
BASOPHILS RELATIVE PERCENT: 0.8 %
BUN BLDV-MCNC: 23 MG/DL (ref 7–20)
CALCIUM SERPL-MCNC: 8.2 MG/DL (ref 8.3–10.6)
CHLORIDE BLD-SCNC: 102 MMOL/L (ref 99–110)
CO2: 23 MMOL/L (ref 21–32)
CREAT SERPL-MCNC: 1.5 MG/DL (ref 0.8–1.3)
EOSINOPHILS ABSOLUTE: 0.2 K/UL (ref 0–0.6)
EOSINOPHILS RELATIVE PERCENT: 3.3 %
GFR AFRICAN AMERICAN: 55
GFR NON-AFRICAN AMERICAN: 45
GLUCOSE BLD-MCNC: 89 MG/DL (ref 70–99)
HCT VFR BLD CALC: 26.1 % (ref 40.5–52.5)
HEMOGLOBIN: 8.5 G/DL (ref 13.5–17.5)
LYMPHOCYTES ABSOLUTE: 0.9 K/UL (ref 1–5.1)
LYMPHOCYTES RELATIVE PERCENT: 15.6 %
MCH RBC QN AUTO: 30.5 PG (ref 26–34)
MCHC RBC AUTO-ENTMCNC: 32.7 G/DL (ref 31–36)
MCV RBC AUTO: 93.2 FL (ref 80–100)
MONOCYTES ABSOLUTE: 0.9 K/UL (ref 0–1.3)
MONOCYTES RELATIVE PERCENT: 15.5 %
NEUTROPHILS ABSOLUTE: 3.7 K/UL (ref 1.7–7.7)
NEUTROPHILS RELATIVE PERCENT: 64.8 %
PDW BLD-RTO: 15.8 % (ref 12.4–15.4)
PLATELET # BLD: 132 K/UL (ref 135–450)
PMV BLD AUTO: 9.1 FL (ref 5–10.5)
POTASSIUM REFLEX MAGNESIUM: 4 MMOL/L (ref 3.5–5.1)
RBC # BLD: 2.8 M/UL (ref 4.2–5.9)
SODIUM BLD-SCNC: 133 MMOL/L (ref 136–145)
WBC # BLD: 5.7 K/UL (ref 4–11)

## 2020-10-08 PROCEDURE — 6370000000 HC RX 637 (ALT 250 FOR IP): Performed by: PSYCHIATRY & NEUROLOGY

## 2020-10-08 PROCEDURE — 97165 OT EVAL LOW COMPLEX 30 MIN: CPT

## 2020-10-08 PROCEDURE — 97116 GAIT TRAINING THERAPY: CPT

## 2020-10-08 PROCEDURE — 99232 SBSQ HOSP IP/OBS MODERATE 35: CPT | Performed by: INTERNAL MEDICINE

## 2020-10-08 PROCEDURE — 97535 SELF CARE MNGMENT TRAINING: CPT

## 2020-10-08 PROCEDURE — 6360000002 HC RX W HCPCS: Performed by: PSYCHIATRY & NEUROLOGY

## 2020-10-08 PROCEDURE — 85025 COMPLETE CBC W/AUTO DIFF WBC: CPT

## 2020-10-08 PROCEDURE — 97164 PT RE-EVAL EST PLAN CARE: CPT

## 2020-10-08 PROCEDURE — 2580000003 HC RX 258: Performed by: PHYSICIAN ASSISTANT

## 2020-10-08 PROCEDURE — 97161 PT EVAL LOW COMPLEX 20 MIN: CPT

## 2020-10-08 PROCEDURE — 80048 BASIC METABOLIC PNL TOTAL CA: CPT

## 2020-10-08 PROCEDURE — 1200000000 HC SEMI PRIVATE

## 2020-10-08 PROCEDURE — 2580000003 HC RX 258: Performed by: PSYCHIATRY & NEUROLOGY

## 2020-10-08 PROCEDURE — 97530 THERAPEUTIC ACTIVITIES: CPT

## 2020-10-08 RX ADMIN — SENNOSIDES 17.2 MG: 8.6 TABLET, FILM COATED ORAL at 08:00

## 2020-10-08 RX ADMIN — LATANOPROST 1 DROP: 50 SOLUTION/ DROPS OPHTHALMIC at 22:37

## 2020-10-08 RX ADMIN — DULOXETINE HYDROCHLORIDE 30 MG: 30 CAPSULE, DELAYED RELEASE ORAL at 22:36

## 2020-10-08 RX ADMIN — GABAPENTIN 100 MG: 100 CAPSULE ORAL at 08:17

## 2020-10-08 RX ADMIN — Medication 2 CAPSULE: at 18:28

## 2020-10-08 RX ADMIN — DORZOLAMIDE HYDROCHLORIDE AND TIMOLOL MALEATE 1 DROP: 20; 5 SOLUTION/ DROPS OPHTHALMIC at 12:07

## 2020-10-08 RX ADMIN — DORZOLAMIDE HYDROCHLORIDE AND TIMOLOL MALEATE 1 DROP: 20; 5 SOLUTION/ DROPS OPHTHALMIC at 22:37

## 2020-10-08 RX ADMIN — GABAPENTIN 100 MG: 100 CAPSULE ORAL at 22:37

## 2020-10-08 RX ADMIN — METHYLPHENIDATE HYDROCHLORIDE 5 MG: 10 TABLET ORAL at 18:28

## 2020-10-08 RX ADMIN — Medication 2 CAPSULE: at 07:59

## 2020-10-08 RX ADMIN — DULOXETINE HYDROCHLORIDE 30 MG: 30 CAPSULE, DELAYED RELEASE ORAL at 08:00

## 2020-10-08 RX ADMIN — CEFTRIAXONE SODIUM 1 G: 1 INJECTION, POWDER, FOR SOLUTION INTRAMUSCULAR; INTRAVENOUS at 12:08

## 2020-10-08 RX ADMIN — RIVAROXABAN 20 MG: 20 TABLET, FILM COATED ORAL at 18:28

## 2020-10-08 RX ADMIN — GABAPENTIN 100 MG: 100 CAPSULE ORAL at 14:21

## 2020-10-08 RX ADMIN — ATORVASTATIN CALCIUM 40 MG: 40 TABLET, FILM COATED ORAL at 22:36

## 2020-10-08 RX ADMIN — PANTOPRAZOLE SODIUM 40 MG: 40 TABLET, DELAYED RELEASE ORAL at 05:28

## 2020-10-08 RX ADMIN — SODIUM CHLORIDE: 9 INJECTION, SOLUTION INTRAVENOUS at 22:48

## 2020-10-08 RX ADMIN — FERROUS SULFATE TAB 325 MG (65 MG ELEMENTAL FE) 325 MG: 325 (65 FE) TAB at 08:01

## 2020-10-08 RX ADMIN — AMIODARONE HYDROCHLORIDE 200 MG: 200 TABLET ORAL at 18:28

## 2020-10-08 RX ADMIN — DOCUSATE SODIUM 100 MG: 100 CAPSULE, LIQUID FILLED ORAL at 08:00

## 2020-10-08 RX ADMIN — SODIUM CHLORIDE: 9 INJECTION, SOLUTION INTRAVENOUS at 12:08

## 2020-10-08 RX ADMIN — METHYLPHENIDATE HYDROCHLORIDE 5 MG: 10 TABLET ORAL at 08:02

## 2020-10-08 RX ADMIN — LEVOTHYROXINE SODIUM 50 MCG: 50 TABLET ORAL at 05:28

## 2020-10-08 RX ADMIN — SENNOSIDES 17.2 MG: 8.6 TABLET, FILM COATED ORAL at 22:36

## 2020-10-08 RX ADMIN — POLYETHYLENE GLYCOL 3350 17 G: 17 POWDER, FOR SOLUTION ORAL at 07:59

## 2020-10-08 ASSESSMENT — PAIN SCALES - GENERAL: PAINLEVEL_OUTOF10: 7

## 2020-10-08 NOTE — PLAN OF CARE
Ongoing  Goal: Ability to reestablish a normal urinary elimination pattern will improve - after catheter removal  Description: Ability to reestablish a normal urinary elimination pattern will improve  Outcome: Ongoing  Goal: Complications related to the disease process, condition or treatment will be avoided or minimized  Description: Complications related to the disease process, condition or treatment will be avoided or minimized  Outcome: Ongoing

## 2020-10-08 NOTE — PROGRESS NOTES
Patient:  Maribel Vo YOB: 1945   Date of Service:  10/08/20      Urology Attending Daily Progress Note    Chief complaint: \"my left leg hurst\"    Interval HPI:  Mendoza draining clear yellow urine    Cr down to 1.5 today    Objective:    Patient Vitals for the past 8 hrs:   BP Temp Temp src Pulse Resp SpO2 Weight   10/08/20 1344 138/74 96.9 °F (36.1 °C) Oral 62 16 91 % --   10/08/20 0743 -- -- -- -- -- -- 178 lb (80.7 kg)   10/08/20 0703 138/71 97 °F (36.1 °C) Oral 61 16 92 % --     I/O last 3 completed shifts: In: 897 [I.V.:897]  Out: 1000 [Urine:1000]     Physical Exam:   Constitutional: comfortable in hospital bed, no acute distress  Abdomen: soft, nontender, no guarding, incisions c/d/i  Genitourinary: urethal mendoza draining clear urine  Psych: normal mood and affect, appropriately answers questions   Skin, extremities: stable, exposed skin intact, no digital cyanosis     Labs:     No results found for: PSA  Lab Results   Component Value Date    CREATININE 1.5 (H) 10/08/2020    CREATININE 1.6 (H) 10/07/2020    CREATININE 1.4 (H) 10/05/2020     Lab Results   Component Value Date    COLORU Yellow 10/05/2020    NITRU POSITIVE 10/05/2020    GLUCOSEU Negative 10/05/2020    KETUA Negative 10/05/2020    UROBILINOGEN 0.2 10/05/2020    BILIRUBINUR Negative 10/05/2020     Lab Results   Component Value Date    WBC 5.7 10/08/2020    HGB 8.5 (L) 10/08/2020    HCT 26.1 (L) 10/08/2020    MCV 93.2 10/08/2020     (L) 10/08/2020       Radiology: \"Imaging was independently reviewed by myself and I agree with the radiology interpretation    Assessment:  Maribel Vo is a 66 y.o. male who presents with suicidal thoughts, recently in the behavioral unit, now on 2W. He has had issues with retention over at [facility] and here at [facility]. Labs also show an increase in his creatinine which is getting better with Mendoza. A CT shows marked bilateral hydro and a thickened bladder wall.   There also are bilateral renal cysts.     Plan:  -- CR down today to 1.5 from 1.6 but still some TONO currently  -- continue Reeves to GD  -- plan for renal ultrasound tomorrow to assess status of hydronephrosis  -- will likely need an outlet procedure to address BPH   -- following with you    Thaddeus Jennings MD  [facility]

## 2020-10-08 NOTE — ACP (ADVANCE CARE PLANNING)
Advance Care Planning     Advance Care Planning Activator (Inpatient)  Conversation Note      Date of ACP Conversation: 10/7/2020    Conversation Conducted with: Patient with Decision Making Capacity    DNR-CCA    ACP Activator: Wong Fernandes    *When Decision Maker makes decisions on behalf of the incapacitated patient: Decision Maker is asked to consider and make decisions based on patient values, known preferences, or best interests. Health Care Decision Maker:     Current Designated Health Care Decision Maker:   Primary Decision Maker: Mar Birch - Child - [phone]    Supplemental (Other) Decision Maker: Alexa Howe - Child - [phone]      Care Preferences    Ventilation: \"If you were in your present state of health and suddenly became very ill and were unable to breathe on your own, what would your preference be about the use of a ventilator (breathing machine) if it were available to you? \"      Would the patient desire the use of ventilator (breathing machine)?: no    \"If your health worsens and it becomes clear that your chance of recovery is unlikely, what would your preference be about the use of a ventilator (breathing machine) if it were available to you? \"     Would the patient desire the use of ventilator (breathing machine)?: No      Resuscitation  \"CPR works best to restart the heart when there is a sudden event, like a heart attack, in someone who is otherwise healthy. Unfortunately, CPR does not typically restart the heart for people who have serious health conditions or who are very sick. \"    \"In the event your heart stopped as a result of an underlying serious health condition, would you want attempts to be made to restart your heart (answer \"yes\" for attempt to resuscitate) or would you prefer a natural death (answer \"no\" for do not attempt to resuscitate)? \" no           [x] Yes   [] No   Educated Patient / Decision Maker regarding differences between Advance Directives and portable DNR orders.     Length of ACP Conversation in minutes:      Conversation Outcomes:  [x] ACP discussion completed  [x] Existing advance directive reviewed with patient; no changes to patient's previously recorded wishes

## 2020-10-08 NOTE — PROGRESS NOTES
Progress Note    Admit Date:  10/7/2020    Subjective:  Mr. Dana Nelson was discharged from [facility] psychiatry and admitted to the medical floor. He had issues with his Reeves but the catheter is now draining clear urine. He has Proteus in his urine. No fever or chills. He is awake and alert. Objective:   /71   Pulse 61   Temp 97 °F (36.1 °C) (Oral)   Resp 16   Ht 5' 9\" (1.753 m)   Wt 178 lb (80.7 kg)   SpO2 92%   BMI 26.29 kg/m²          Intake/Output Summary (Last 24 hours) at 10/8/2020 0936  Last data filed at 10/8/2020 0230  Gross per 24 hour   Intake 897.01 ml   Output 1000 ml   Net -102.99 ml       Physical Exam:    General appearance: alert, appears stated age and cooperative  Head: Normocephalic, without obvious abnormality, atraumatic  Eyes: conjunctivae/corneas clear. PERRL, EOM's intact.   Neck: no adenopathy, no carotid bruit, no JVD, supple, symmetrical, trachea midline and thyroid not enlarged, symmetric, no tenderness/mass/nodules  Lungs: clear to auscultation bilaterally  Heart: regular rate and rhythm, S1, S2 normal, no murmur, click, rub or gallop  Abdomen: soft, non-tender; bowel sounds normal; no masses,  no organomegaly  Extremities: extremities normal, atraumatic, no cyanosis or edema  Pulses: 2+ and symmetric  Skin: Skin color, texture, turgor normal. No rashes or lesions  Neurologic: Grossly normal    Scheduled Meds:   amiodarone  200 mg Oral Daily    atorvastatin  40 mg Oral Nightly    cefTRIAXone (ROCEPHIN) IV  1 g Intravenous Q24H    docusate sodium  100 mg Oral Daily    dorzolamide-timolol  1 drop Both Eyes BID    DULoxetine  30 mg Oral BID    ferrous sulfate  325 mg Oral Daily with breakfast    gabapentin  100 mg Oral TID    lactobacillus  2 capsule Oral BID WC    latanoprost  1 drop Left Eye Nightly    levothyroxine  50 mcg Oral Daily    methylphenidate  5 mg Oral BID WC    metoprolol succinate  50 mg Oral BID    pantoprazole  40 mg Oral Daily    polyethylene glycol 17 g Oral Daily    rivaroxaban  20 mg Oral Daily    senna  2 tablet Oral BID    sodium chloride flush  10 mL Intravenous Q12H       Continuous Infusions:   sodium chloride 100 mL/hr at 10/07/20 2020       PRN Meds:  acetaminophen, aluminum & magnesium hydroxide-simethicone, benztropine mesylate, haloperidol lactate **OR** haloperidol, LORazepam **OR** LORazepam, magnesium hydroxide, traZODone, diphenhydrAMINE, nitroGLYCERIN, oxyCODONE-acetaminophen, oxyCODONE-acetaminophen      Data:  CBC:   Recent Labs     10/05/20  1718 10/07/20  1431 10/08/20  0527   WBC 8.3 6.6 5.7   HGB 9.4* 8.6* 8.5*   HCT 28.3* 26.1* 26.1*   MCV 91.5 91.7 93.2    125* 132*     BMP:   Recent Labs     10/05/20  1718 10/07/20  1431 10/08/20  0527   * 129* 133*   K 4.6 4.4 4.0   CL 99 98* 102   CO2 24 25 23   BUN 25* 27* 23*   CREATININE 1.4* 1.6* 1.5*     LIVER PROFILE:   Recent Labs     10/05/20  1718   AST 22   ALT 14   BILITOT 0.7   ALKPHOS 98     PT/INR: No results for input(s): PROTIME, INR in the last 72 hours.   Cultures:   Susceptibility     Proteus mirabilis (1)     Antibiotic  Interpretation  LORENA  Status     ampicillin  Sensitive  <=8  mcg/mL      ceFAZolin  Sensitive  <=2  mcg/mL      cefepime  Sensitive  <=2  mcg/mL      cefTRIAXone  Sensitive  <=1  mcg/mL      cefuroxime  Sensitive  <=4  mcg/mL      ciprofloxacin  Sensitive  <=1  mcg/mL      ertapenem  Sensitive  <=0.5  mcg/mL      gentamicin  Sensitive  <=4  mcg/mL      meropenem  Sensitive  <=1  mcg/mL      nitrofurantoin  Resistant  >64  mcg/mL      piperacillin-tazobactam  Sensitive  <=16  mcg/mL      trimethoprim-sulfamethoxazole  Sensitive  <=2/38  mcg/mL          No orders to display      CT abd pelvis 10/5/2020  HISTORY:    ORDERING SYSTEM PROVIDED HISTORY: lower abdominal pain/testicular pain,  hx    of retroperitoneal hematoma, urinary retention    TECHNOLOGIST PROVIDED HISTORY:    Reason for exam:->lower abdominal pain/testicular pain,  hx of retroperitoneal hematoma, urinary retention    Additional Contrast?->None    Reason for Exam: lower abdominal pain/testicular pain,  hx of retroperitoneal    hematoma, urinary retention         FINDINGS:    Lower Chest: No acute infiltrate at the lung bases. Linear focus of scarring    or atelectasis in left lower lobe. Coronary vascular calcification with    borderline cardiac size.         Organs: Several small hepatic lesions, too small to fully characterize but    likely cysts or hemangioma.  The gallbladder is distended with increased    attenuation dependently, possibly sludge or calculi.  No pericholecystic    inflammatory changes or biliary dilatation. The spleen, pancreas and adrenal    glands are unremarkable. Bilateral hydronephrosis and hydroureter with no    obstructing calculi.  There is mucosal enhancement of the ureters and    collecting systems, indicative of underlying infection and UTI.  Bilateral    renal cysts.         GI/Bowel: Colonic diverticulosis with no acute features. No small bowel    distension. The stomach and duodenal sweep are intact.         Pelvis: There is redemonstration of a liquefied pelvic hematoma extending    from the postoperative changes in the lumbar region along the pelvic sidewall    on the left into the anterior abdominal wall and rectus sheath.  This is    overall decreased in size compared to the previous study.  No evidence of    acute hemorrhage.  No free pelvic fluid.         Marked bladder wall thickening with infiltration of the pericystic fat.     Bilateral hydroureter extends to the level of the bladder.         Peritoneum/Retroperitoneum: Multiple small retroperitoneal nodes, similar to    the previous study.  No upper abdominal ascites.  Moderate aortoiliac    atherosclerotic plaque.  Maximum aortic diameter is unchanged at 2.8 cm.         Bones/Soft Tissues: No acute osseous or soft tissue abnormality.  Stable    postoperative changes anteriorly and posteriorly at L4 through S1.      Impression    1. Bilateral hydronephrosis and hydroureter with mucosal enhancement.  Marked    bladder wall thickening with infiltration of the pericystic fat.  Findings    are consistent with a UTI. 2. Liquefied hematoma extending from the lumbar spine, around the left    hemipelvis and into the anterior abdominal wall with partial resolution.  No    acute hemorrhage or significant free fluid. 3. Colonic diverticulosis with no acute features. 4. Distended gallbladder.  Increased attenuation dependently, possibly sludge    or calculi. 5. Moderate aortoiliac atherosclerotic disease.  Stable aneurysmal change at    2.8 cm.         RECOMMENDATIONS:    For management of fusiform AAA:         2.6-2.9 cm aorta, recommend follow-up every 5 years or aortas meeting the    criteria for AAA (>1.5 x proximal normal segment; no f/u if < 1.5 x proximal    normal segment; no f/u for aortas < 2.6 cm).         * For management of saccular abdominal aortic aneurysms of any size,    recommend vascular consultation.         Note:  For AAA enlargement of >0.5 cm in 6 months or >1 cm in 1 year,    recommend vascular consultation.                   Assessment:  Principal Problem:    Complicated UTI (urinary tract infection)  Active Problems:    MDD (major depressive disorder), recurrent episode (HCC)    PAF (paroxysmal atrial fibrillation) (HCC)    Hypothyroidism    Coronary artery disease involving native heart without angina pectoris    Chronic obstructive pulmonary disease ([facility] [address]  Resolved Problems:    * No resolved hospital problems. *      Plan:    #Complicated Proteus UTI    #Urinary Retention   - Admitted for sepsis 2/2 UTI s/p cystoscopy for mendoza placement on 9/13/20 at [facility], + false urethral passage noted at that time.   He was then discharged to ARU before [facility] for [facility]   - urology consulted on arrival to [facility], mendoza that was placed on 9/13 was removed on 10/2 but he failed voiding trial, and therefore nursing staff replaced mendoza on 10/2   - on 10/5 he started spiking fevers, UA consistent with UTI. CT showed bilateral hydro- see above- despite mendoza appearing to be draining adequately  - initially placed on IV Merrem, changed to Rocephin D#1 when proteus culture and sensitivity resulted   - Urology consult is pending   - blood cultures (including one drawn from PICC line) are NG  -  His catheter is now draining urine.      #TONO on CKD stage III  - Cr on admission: 1.2  - baseline: ~1.1  - Crt 1.5 today. IVF bolus followed by continuous infusion     #Hyponatremia improved  - Na is 133, starting IVF now, monitor Na     #MDD  - sp treatment on the geriatric psychiatric unit. Will continue medications per psychiatry recs: duloxetine 30 mg BID and methylphenidate 5 mg BID       #Recent Retroperitoneal Hematoma  - 2/2 recent Lumbar procedure as below  - Xarelto was held initially, but was resumed when he was admitted to [facility].   H/h has remained stable if you look at the trend over the last few weeks      #COPD  - no AE  - add PRN Albuterol     #CAD s/p stent  - no c/o chest pain  - holding ASA for now, cont Lipitor     #AAA  - stable on CT.  Will need OP monitoring with repeat imaging in 5 years     #Atrial Fibrillation  - held Xarelto 2/2 hematuria and hematoma at [facility]  - cont Toprol Elaine Chairez now       #HTN  - controlled  - cont Toprol XL (losartan recently d/c'd 2/2 TONO)     #Hypothyroidism  - home dose of synthroid 50 mcg continued      #GERD  - cont Protonix       #Chronic dCHF  - noted on echo 9/19/2020 - EF 50%  - appears compensated  - cont Toprol XL     #Iron Deficiency Anemia  - cont Ferrous Sulfate  - monitor h/h      #Lumbar Stenosis  - s/p recent L4-S1 ALIF/PSF  - 8/31 with Dr. Steffen Rodriguez and Dr. Rhett Goldstein     #Hx of CVA  - cont Lipitor     #RUE PICC in place since 9/14/2020     #Constipation  - see MAR for bowel regimen  - he is having BMs     DVT Prophylaxis: Xarelto  Diet: DIET GENERAL;  Dietary Nutrition Supplements: Standard High Calorie Oral Supplement  Code Status: DNR-CCA       Megan Primrose, MD 10/8/2020 9:36 AM

## 2020-10-08 NOTE — PROGRESS NOTES
AM labs drawn at this time. AM meds given. Pt denies any needs.  Call light within reach and bed alarm on

## 2020-10-08 NOTE — PROGRESS NOTES
Physician Progress Note      PATIENT:               Rogue Gosselin  CSN #:                  [id number]  :                       1945  ADMIT DATE:       10/7/2020 1:16 PM  [address] DATE:  RESPONDING  PROVIDER #:        Mirlande Christian MD          QUERY TEXT:    Pt admitted with UTI. Pt noted to have mendoza catheter [id number] but he   failed voiding trial, and therefore nursing staff replaced mendoza. If possible,   please document in the progress notes and discharge summary if you are   evaluating and/or treating any of the following: The medical record reflects the following:  Risk Factors: UTI, mendoza catheter  Clinical Indicators:  placed mendoza catheter then removed 10/2 but he   failed voiding trial, and therefore nursing staff replaced mendoza. 10/5 he   started spiking fevers, UA consistent with UTI  Treatment: Rocephin, urology consult, monitor labs/images    Thank You Jer Moore RN, CDS [email or web address]  Options provided:  -- UTI due to chronic indwelling urinary catheter  -- UTI not due to indwelling urinary catheter  -- Other - I will add my own diagnosis  -- Disagree - Not applicable / Not valid  -- Disagree - Clinically unable to determine / Unknown  -- Refer to Clinical Documentation Reviewer    PROVIDER RESPONSE TEXT:    UTI is due to the chronic indwelling urinary catheter.     Query created by: Leland Lazo on 10/8/2020 8:50 AM      Electronically signed by:  Mirlande Christian MD 10/8/2020 10:35 AM

## 2020-10-08 NOTE — PROGRESS NOTES
Inpatient Physical Therapy Evaluation and Treatment    Unit: [facility]  Date:  10/8/2020  Patient Name:    Michael Abdalla Admitting diagnosis:  Complicated UTI (urinary tract infection) [N39.0]  Admit Date:  10/7/2020  Precautions/Restrictions/WB Status/ Lines/ Wounds/ Oxygen: Fall risk, Bed/chair alarm and Lines -IV and Reeves catheter  Spinal precautions: no lifting >10#, no twisting, turning and bending. Treatment Time: 0900 - 0927  Treatment Number:  1   Timed Code Treatment Minutes: 17 minutes  Total Treatment Minutes:  27  minutes    Patient Goals for Therapy: \" To get better \"          Discharge Recommendations: SNF  DME needs for discharge: defer to facility       Therapy recommendation for EMS Transport: can transport by wheelchair    Therapy recommendations for staff:   Assist of 1 with use of rolling walker (RW), gait belt and spinal precautions (no twisting, turning and bending) for all transfers and ambulation to/from [facility]  to/from chair    History of Present Illness: 67 y/o male admitted to [facility] SNF setting 9/13/2020 with Altered Mental Status, Severe Sepsis, Acute Renal Failure, and Pelvic Hematoma.  CT Head negative.  CT Abd/Pelvis + L Pelvic Hematoma.  Urology consult for Reeves Placement.   PMH as noted including Recent Back Surg (L4/L5 and L5/S1 Ant/Post Fusion, 8/31/2020), CAD, Angio with Stent, A-Fib, Aortic Aneurysm. Pt was receiving OT at [facility] 5-7x/wk, but was not progressing as expected due to pt's depression  The patient is a 66 y.o. male with multiple chronic medical problems including coronary artery disease, COPD, chronic atrial fibrillation, as well as complicated recent hospital admissions for a retroperitoneal hematoma after lumbar surgery as well as sepsis secondary to UTI. He was admitted at [facility] in September for sepsis secondary to UTI, seen by urology, underwent cystoscopy which showed a false urethral passage.   Treated with antibiotics and discharged to the acute rehab unit with a Mendoza catheter in place. Eventually transferred to the inpatient geriatric psychiatry unit at [facility] directly from the acute rehab unit. On arrival to the geriatric psychiatry unit he had a Mendoza in place which it seems was the same mendoza that had been placed on September 13th by urology. We removed the Mendoza on admission, but he was still retaining urine and therefore nursing staff replaced a Mendoza on 10/2. On 10/5 he started spiking fevers, urinalysis was consistent with infection. CT abdomen was obtained which showed bilateral hydronephrosis despite having a Mendoza in place which appear to be draining well. He will be transferred to the medical floor for further management and urology consultation. Home Health S4 Level Recommendation:  NA  AM-PAC Mobility Score    AM-PAC Inpatient Mobility Raw Score : 16     Preadmission Environment    Lives With: Alone  Type of Home: House  Home Layout: Multi-level, Bed/Bath upstairs, Able to Live on Main level with bedroom/bathroom, Laundry in basement(Family have obtained Hospital Bed for main level. Full bath on main level.)  Home Access: Stairs to enter with rails(Pt reports 10-11 steps to enter.)  Bathroom Shower/Tub: Tub/Shower unit, Walk-in shower(Walk-In Shower Main level.)  Bathroom Toilet: Standard  Bathroom Equipment: (No DME)  Bathroom Accessibility: Accessible  Home Equipment: Sock aid, [address] bed(Dtr had hosp bed from previous episode and placed in pt's family room on first floor)     Preadmission Status:  ADL Assistance: Independent  Ambulation Assistance: Independent(Without assist device prior recent back surg.)  Transfer Assistance: Independent  Active : Yes  Pt.  Required assistance from family for: lawncare only  History of falls Yes-fell on the day of surgery, walking into hospital (tripped on his legs)    Pain   Yes  Location: lower abdomen  Rating: moderate /10  Pain Medicine Status: Received pain med prior to tx    Cognition    A&O x4   Able to follow 2 step commands    Subjective  Patient lying supine in bed with no family present. Pt agreeable to this PT eval & tx. Upper Extremity ROM/Strength  Please see OT evaluation. Lower Extremity ROM / Strength   AROM WFL: Yes  ROM limitations: N/A    Strength Assessment (measured on a 0-5 scale):  R LE   Quad   4-   Ant Tib  4-   Hamstring 4-   Iliopsoas 4-  L LE  Quad   4-   Ant Tib  4-   Hamstring 3+   Iliopsoas 3+    Lower Extremity Sensation    WFL    Lower Extremity Proprioception:   WFL    Coordination and Tone  WFL    Balance  Sitting:  Fair +; SBA  Comments:     Standing: Fair -; Min A   Comments: 5 min    Bed Mobility   Supine to Sit:    CGA  Sit to Supine:   Not Tested  Rolling:   Not Tested  Scooting in sitting: CGA  Scooting in supine:  Not Tested    Transfer Training     Sit to stand: Mod A   Stand to sit:   Min A   Bed to Chair:   Mod A  with use of gait belt and rolling walker (RW)    Gait gait completed as indicated below  Distance:      7 ft  Deviations (firm surface/linoleum):  decreased kay, increased ASHLEY, decreased step length bilaterally, decreased stance time bilaterally and with posterior trunk sway  Assistive Device Used:    gait belt and rolling walker (RW)  Level of Assist:    Mod A   Comment: Patient limited in walking distance due to lower abdomen pain and fatigue. Stair Training deferred, pt unsafe/ not appropriate to complete stairs at this time     Activity Tolerance   Pt completed therapy session with Dizziness noted with activity  Pain noted with activity  HR = 88 BPM  BP = 126/67    Positioning Needs   Pt reclined in chair, alarm set, positioned in proper neutral alignment and pressure relief provided.    Call light provided and all needs within reach    Exercises Initiated  all completed bilaterally unless indicated  Ankle Pumps x 10 reps  Glut sets x 10 reps  Heel slides x 10 reps  marching x 10 reps    Other  None. Patient/Family Education   Pt educated on role of inpatient PT, POC, importance of continued activity, DC recommendations, safety awareness, transfer techniques, pursed lip breathing, energy conservation, pacing activity and calling for assist with mobility. Assessment  Pt seen for Physical Therapy evaluation in acute care setting. Pt demonstrated decreased Activity tolerance, Balance, Safety and Strength as well as decreased independence with Ambulation, Bed Mobility  and Transfers. Recommending SNF upon discharge as patient functioning well below baseline, demonstrates good rehab potential and unable to return home due to limited or no family support, inability to negotiate stairs to enter home/bedroom/bathroom, burden of care beyond caregiver ability, home environment not conducive to patient recovery and limited safety awareness. Goals : To be met in 3 visits:  1). Independent with LE Ex x 10 reps    To be met in 6 visits:  1). Supine to/from sit: Supervision  2). Sit to/from stand: CGA  3). Bed to chair: CGA  4). Gait: Ambulate 50 ft.  with  CGA and use of LRAD (least restrictive assistive device)  5). Tolerate B LE exercises 3 sets of 10-15 reps    Rehabilitation Potential: Good  Strengths for achieving goals include:   Pt motivated, PLOF and Pt cooperative   Barriers to achieving goals include:    Pain and Weakness    Plan    To be seen 5x / week  while in acute care setting for therapeutic exercises, bed mobility, transfers, progressive gait training, balance training, and family/patient education. Signature: Bertha Cast MS PT, # [id number]      If patient discharges from this facility prior to next visit, this note will serve as the Discharge Summary.

## 2020-10-08 NOTE — PROGRESS NOTES
Shift assessment complete. See flow sheet. Scheduled meds given. See MAR. Pt has c/o constipation, PRN glycol ax given. Call light and bedside table within reach. No further needs noted at this time. Will continue to monitor.

## 2020-10-08 NOTE — PROGRESS NOTES
Bedside report given to [facility]  pt in stable condition no needs at this time.  Call light within reach

## 2020-10-08 NOTE — PLAN OF CARE
Problem: Falls - Risk of:  Goal: Will remain free from falls  Description: Will remain free from falls  10/7/2020 2306 by Mirella Marina RN  Outcome: Ongoing  36/5/9850 5548 by Marvin Shaikh RN  Outcome: Ongoing  Goal: Absence of physical injury  Description: Absence of physical injury  79/1/2236 5121 by Marvin Shaikh RN  Outcome: Ongoing     Problem: Infection:  Goal: Will remain free from infection  Description: Will remain free from infection  89/7/2218 7307 by Marvin Shaikh RN  Outcome: Ongoing     Problem: Safety:  Goal: Free from accidental physical injury  Description: Free from accidental physical injury  78/5/4821 3490 by Marvin Shaikh RN  Outcome: Ongoing  Goal: Free from intentional harm  Description: Free from intentional harm  62/9/6911 2547 by Marvin Shaikh RN  Outcome: Ongoing     Problem: Daily Care:  Goal: Daily care needs are met  Description: Daily care needs are met  10/7/2020 2306 by Mirella Marina RN  Outcome: Ongoing  08/2/0495 0597 by Marvin Shaikh RN  Outcome: Ongoing     Problem: Pain:  Goal: Patient's pain/discomfort is manageable  Description: Patient's pain/discomfort is manageable  10/7/2020 2306 by Mirella Marina RN  Outcome: Ongoing  69/3/8365 1817 by Marvin Shaikh RN  Outcome: Ongoing     Problem: Skin Integrity:  Goal: Skin integrity will stabilize  Description: Skin integrity will stabilize  10/7/2020 2306 by Mirella Marina RN  Outcome: Ongoing  74/2/5505 8601 by Marvin Shaikh RN  Outcome: Ongoing     Problem: Discharge Planning:  Goal: Patients continuum of care needs are met  Description: Patients continuum of care needs are met  10/7/2020 2306 by Mirella Marina RN  Outcome: Ongoing  55/2/7362 5635 by Marvin Shaikh RN  Outcome: Ongoing     Problem: Sensory:  Goal: General experience of comfort will improve  Description: General experience of comfort will improve  10/7/2020 2306 by Mirella Marina RN  Outcome: Ongoing  67/3/5004 8650 by Marvin Shaikh RN  Outcome: Ongoing     Problem: Urinary Elimination:  Goal: Signs and symptoms of infection will decrease  Description: Signs and symptoms of infection will decrease  85/1/2238 3768 by Cathi Keene RN  Outcome: Ongoing  Goal: Ability to reestablish a normal urinary elimination pattern will improve - after catheter removal  Description: Ability to reestablish a normal urinary elimination pattern will improve  24/2/2906 2634 by Cathi Keene RN  Outcome: Ongoing  Goal: Complications related to the disease process, condition or treatment will be avoided or minimized  Description: Complications related to the disease process, condition or treatment will be avoided or minimized  95/9/2119 2119 by Cathi Keene RN  Outcome: Ongoing

## 2020-10-08 NOTE — CARE COORDINATION
Case Management Assessment  Initial Evaluation      Patient Name: James Bullock. YOB: 1945  Diagnosis: Complicated UTI (urinary tract infection) [N39.0]  Date / Time: 10/7/2020  1:16 PM    Admission status/Date:  10/08/2020  Chart Reviewed: Yes      Patient Interviewed: Yes   Family Interviewed:  Yes - daughterBayron      Hospitalization in the last 30 days:  Yes      Health Care Decision Maker:  Primary Decision Maker: Navjot Man - Child - [phone]    Supplemental (Other) Decision Maker: Yecenia Lyle - Child - [phone]    Met with: patient (selvin) daughter (phone)      Current PCP: Amandeep Boo Medicare  Precert required for SNF : YES        3 night stay required - NA    ADLS  Support Systems/Care Needs: Children  Transportation: EMS transportation/family    Meal Preparation: self (PTA)    Housing  Living Arrangements: from [facility] ([address])  Steps:   Intent for return to present living arrangements:  Needs rehab  Identified Issues: lives alone in a 3 story house and was very active prior to his back surgery on August 31st. Went to [address] [place] [address] then to [facility] for [address]. [id number] Home Care Information  Active with Home Health Care : No Agency: NA  Type of Home Care Services: None  Passport/Waiver : No  :                      Phone Number:    Passport/Waiver Services: NA          Durable Medical Equiptment   DME Provider:   Equipment:   Walker___Cane___RTS___ BSC___Shower Chair___Hospital Bed___W/C____Other________  02 at ____Liter(s)---wears(frequency)_______ HHN ___ CPAP___ BiPap___   N/A_X___      Home O2 Use :  No  92% RA      Community Service Affiliation  Dialysis:  No    · Agency:  · Location:  · Dialysis Schedule:  · Phone:   · Fax: Other Community Services:     DISCHARGE PLAN: Explained Case Management role/services. Chart reviewed.  Met with pt at bedside and daughter via phone,  explained the role of the CM. From rehab s/p back surgery in August to [facility] (depression) now on med-surg due to complicated UTI. Plan: will need physical rehab. Wants to go to the ARU at [facility]. Referral called and is under review. Pt had been in [facility] prior to his admission to [facility] but fel that he was making better progress with the more aggressive therapy at the ARU. +CM will follow and reassess.

## 2020-10-08 NOTE — FLOWSHEET NOTE
10/07/20 1855   Vital Signs   Temp 98.8 °F (37.1 °C)   Temp Source Oral   Pulse 69   Heart Rate Source Monitor   Resp 16   /70   BP Location Left upper arm   BP Upper/Lower Upper   Patient Position Semi fowlers   Level of Consciousness 0   MEWS Score 1   Oxygen Therapy   SpO2 93 %   O2 Device None (Room air)   Pt A/O x 4 assessment completed. PM meds given. PRN Percocet and Trazodone given. Reeves draining raphael color urine at this time. Pt denies any needs.  Call light within reach and bed alarm on

## 2020-10-08 NOTE — PROGRESS NOTES
Pt up in recliner denying any needs. Call light is in reach as well as the bedside table. Will continue to monitor.

## 2020-10-08 NOTE — PROGRESS NOTES
the same mendoza that had been placed on September 13th by urology. Tr Carreno Mendoza on admission, but he was still retaining urine and therefore nursing staff replaced a Mendoza on 10/2. On 10/5 he started spiking fevers, urinalysis was consistent with infection.  CT abdomen was obtained which showed bilateral hydronephrosis despite having a Mendoza in place which appear to be draining well. Joey Cheema will be transferred to the medical floor for further management and urology consultation. Home Health S4 Level Recommendation:  NA  AM-PAC Score: AM-PAC Inpatient Daily Activity Raw Score: 15    Preadmission Environment    Lives Alone  Type of Home: two story House  Home Layout: Bed/Bath upstairs (Able to Live on Main level with bedroom/bathroom)  Home Access: 10-11 steps to enter with HR  Bathroom Shower/Tub: Tub/Shower unit, Walk-in shower (Walk-In Shower Main level.)  Bathroom Toilet: Standard  Bathroom Equipment: (No DME.)  Bathroom Accessibility: Accessible  Home Equipment: Sock aid, [address] bed(Dtr had hosp bed from previous episode and placed in pt's family room on first floor)     Preadmission Status:  ADL Assistance: Independent  Ambulation Assistance: Independent(Without assist device prior recent back surg.)  Transfer Assistance: Independent  Active : Yes  Pt. Required assistance from family for: Uriah Laughlin only  History of falls Yes-fell on the day of surgery, walking into hospital (tripped on his legs)    Pain  Yes  Rating:moderate  Location: back and B LEs  Pain Medicine Status: Denies need      Cognition    A&O x4   Able to follow 2 step commands    Subjective  Patient lying supine in bed with no family present. Pt agreeable to this OT eval & tx. Upper Extremity ROM:    WFL,  pt able to perform all bed mobility, transfers, and gait without ROM limitation.     Upper Extremity Strength:    BUE strength impaired but not formally assessed w/ MMT due to spinal precautions     Upper Extremity Sensation    WFL    Upper Extremity Proprioception:  WFL    Coordination and Tone  Diminished    Balance  Functional Sitting Balance:  WFL  Functional Standing Balance:Diminished    Bed mobility:    Supine to sit:   Min A  Sit to supine:   Not Tested  Rolling:    Not Tested  Scooting in sitting:  SBA  Scooting to head of bed:   Not Tested    Bridging:   Not Tested    Transfers:    Sit to stand:  CGA  Stand to sit:  CGA  Bed to chair:   Min A and with use of RW  Standard toilet: Not Tested  Bed to [facility]:  Not Tested    Dressing:      UE:   Not Tested  LE:    Max A to don socks     Bathing:    UE:  Not Tested  LE:  Not Tested    Eating:   Not Tested    Toileting:  Not Tested    Activity Tolerance   Pt completed therapy session with Pain noted with activity  SpO2:   HR: 90s  BP: 126/68    Positioning Needs:   Reclined in chair with call light and needs in reach. Exercise / Activities Initiated:   N/A    Patient/Family Education:   Role of OT  Recommendations for DC    Assessment of Deficits: Pt seen for Occupational therapy evaluation in acute care setting. Pt demonstrated decreased Activity tolerance, ADLs, IADLs, Balance , Bathing, Bed mobility, Dressing, ROM, Safety Awareness, Strength, Transfers and Coping Skills. Pt functioning below baseline and will likely benefit from skilled occupational therapy services to maximize safety and independence. Goal(s) : To be met in 3 Visits:  1). Bed to toilet/BSC: Supervision    To be met in 5 Visits:  1). Supine to/from Sit:  SBA  2). Upper Body Bathing:   Supervision  3). Lower Body Bathing:   SBA  4). Upper Body Dressing:  Supervision  5). Lower Body Dressing:  SBA  6). Pt to demonstrate UE exs x 15 reps with minimal cues    Rehabilitation Potential:  Good for goals listed above. Strengths for achieving goals include: Pt motivated, PLOF and Pt cooperative  Barriers to achieving goals include:  Complexity of condition     Plan:   To be seen 3-5 x/wk while in acute care setting for therapeutic exercises, bed mobility, transfers, dressing, bathing, family/patient education, ADL/IADL retraining, energy conservation training.        Gareth Craig, OTR/L #[id number]      If patient discharges from this facility prior to next visit, this note will serve as the Discharge Summary

## 2020-10-08 NOTE — PROGRESS NOTES
Progress Note    Admit Date:  10/7/2020    Subjective:  Mr. Giuseppe Chambers was discharged from [facility] psychiatry and admitted to the medical floor. He had issues with his Reeves but the catheter is now draining clear urine. He has Proteus in his urine. No fever or chills. He is awake and alert. Objective:   /71   Pulse 61   Temp 97 °F (36.1 °C) (Oral)   Resp 16   Ht 5' 9\" (1.753 m)   Wt 178 lb (80.7 kg)   SpO2 92%   BMI 26.29 kg/m²          Intake/Output Summary (Last 24 hours) at 10/8/2020 1034  Last data filed at 10/8/2020 0951  Gross per 24 hour   Intake 1017.01 ml   Output 1000 ml   Net 17.01 ml       Physical Exam:    General appearance: alert, appears stated age and cooperative  Head: Normocephalic, without obvious abnormality, atraumatic  Eyes: conjunctivae/corneas clear. PERRL, EOM's intact.   Neck: no adenopathy, no carotid bruit, no JVD, supple, symmetrical, trachea midline and thyroid not enlarged, symmetric, no tenderness/mass/nodules  Lungs: clear to auscultation bilaterally  Heart: regular rate and rhythm, S1, S2 normal, no murmur, click, rub or gallop  Abdomen: soft, non-tender; bowel sounds normal; no masses,  no organomegaly  Extremities: extremities normal, atraumatic, no cyanosis or edema  Pulses: 2+ and symmetric  Skin: Skin color, texture, turgor normal. No rashes or lesions  Neurologic: Grossly normal    Scheduled Meds:   amiodarone  200 mg Oral Daily    atorvastatin  40 mg Oral Nightly    cefTRIAXone (ROCEPHIN) IV  1 g Intravenous Q24H    docusate sodium  100 mg Oral Daily    dorzolamide-timolol  1 drop Both Eyes BID    DULoxetine  30 mg Oral BID    ferrous sulfate  325 mg Oral Daily with breakfast    gabapentin  100 mg Oral TID    lactobacillus  2 capsule Oral BID WC    latanoprost  1 drop Left Eye Nightly    levothyroxine  50 mcg Oral Daily    methylphenidate  5 mg Oral BID WC    metoprolol succinate  50 mg Oral BID    pantoprazole  40 mg Oral Daily    polyethylene glycol 17 g Oral Daily    rivaroxaban  20 mg Oral Daily    senna  2 tablet Oral BID    sodium chloride flush  10 mL Intravenous Q12H       Continuous Infusions:   sodium chloride 100 mL/hr at 10/07/20 2020       PRN Meds:  acetaminophen, aluminum & magnesium hydroxide-simethicone, benztropine mesylate, haloperidol lactate **OR** haloperidol, LORazepam **OR** LORazepam, magnesium hydroxide, traZODone, diphenhydrAMINE, nitroGLYCERIN, oxyCODONE-acetaminophen, oxyCODONE-acetaminophen      Data:  CBC:   Recent Labs     10/05/20  1718 10/07/20  1431 10/08/20  0527   WBC 8.3 6.6 5.7   HGB 9.4* 8.6* 8.5*   HCT 28.3* 26.1* 26.1*   MCV 91.5 91.7 93.2    125* 132*     BMP:   Recent Labs     10/05/20  1718 10/07/20  1431 10/08/20  0527   * 129* 133*   K 4.6 4.4 4.0   CL 99 98* 102   CO2 24 25 23   BUN 25* 27* 23*   CREATININE 1.4* 1.6* 1.5*     LIVER PROFILE:   Recent Labs     10/05/20  1718   AST 22   ALT 14   BILITOT 0.7   ALKPHOS 98     PT/INR: No results for input(s): PROTIME, INR in the last 72 hours.   Cultures:   Susceptibility     Proteus mirabilis (1)     Antibiotic  Interpretation  LORENA  Status     ampicillin  Sensitive  <=8  mcg/mL      ceFAZolin  Sensitive  <=2  mcg/mL      cefepime  Sensitive  <=2  mcg/mL      cefTRIAXone  Sensitive  <=1  mcg/mL      cefuroxime  Sensitive  <=4  mcg/mL      ciprofloxacin  Sensitive  <=1  mcg/mL      ertapenem  Sensitive  <=0.5  mcg/mL      gentamicin  Sensitive  <=4  mcg/mL      meropenem  Sensitive  <=1  mcg/mL      nitrofurantoin  Resistant  >64  mcg/mL      piperacillin-tazobactam  Sensitive  <=16  mcg/mL      trimethoprim-sulfamethoxazole  Sensitive  <=2/38  mcg/mL          No orders to display      CT abd pelvis 10/5/2020  HISTORY:    ORDERING SYSTEM PROVIDED HISTORY: lower abdominal pain/testicular pain,  hx    of retroperitoneal hematoma, urinary retention    TECHNOLOGIST PROVIDED HISTORY:    Reason for exam:->lower abdominal pain/testicular pain,  hx of retroperitoneal hematoma, urinary retention    Additional Contrast?->None    Reason for Exam: lower abdominal pain/testicular pain,  hx of retroperitoneal    hematoma, urinary retention         FINDINGS:    Lower Chest: No acute infiltrate at the lung bases. Linear focus of scarring    or atelectasis in left lower lobe. Coronary vascular calcification with    borderline cardiac size.         Organs: Several small hepatic lesions, too small to fully characterize but    likely cysts or hemangioma.  The gallbladder is distended with increased    attenuation dependently, possibly sludge or calculi.  No pericholecystic    inflammatory changes or biliary dilatation. The spleen, pancreas and adrenal    glands are unremarkable. Bilateral hydronephrosis and hydroureter with no    obstructing calculi.  There is mucosal enhancement of the ureters and    collecting systems, indicative of underlying infection and UTI.  Bilateral    renal cysts.         GI/Bowel: Colonic diverticulosis with no acute features. No small bowel    distension. The stomach and duodenal sweep are intact.         Pelvis: There is redemonstration of a liquefied pelvic hematoma extending    from the postoperative changes in the lumbar region along the pelvic sidewall    on the left into the anterior abdominal wall and rectus sheath.  This is    overall decreased in size compared to the previous study.  No evidence of    acute hemorrhage.  No free pelvic fluid.         Marked bladder wall thickening with infiltration of the pericystic fat.     Bilateral hydroureter extends to the level of the bladder.         Peritoneum/Retroperitoneum: Multiple small retroperitoneal nodes, similar to    the previous study.  No upper abdominal ascites.  Moderate aortoiliac    atherosclerotic plaque.  Maximum aortic diameter is unchanged at 2.8 cm.         Bones/Soft Tissues: No acute osseous or soft tissue abnormality.  Stable    postoperative changes anteriorly and posteriorly at L4 through S1.      Impression    1. Bilateral hydronephrosis and hydroureter with mucosal enhancement.  Marked    bladder wall thickening with infiltration of the pericystic fat.  Findings    are consistent with a UTI. 2. Liquefied hematoma extending from the lumbar spine, around the left    hemipelvis and into the anterior abdominal wall with partial resolution.  No    acute hemorrhage or significant free fluid. 3. Colonic diverticulosis with no acute features. 4. Distended gallbladder.  Increased attenuation dependently, possibly sludge    or calculi. 5. Moderate aortoiliac atherosclerotic disease.  Stable aneurysmal change at    2.8 cm.         RECOMMENDATIONS:    For management of fusiform AAA:         2.6-2.9 cm aorta, recommend follow-up every 5 years or aortas meeting the    criteria for AAA (>1.5 x proximal normal segment; no f/u if < 1.5 x proximal    normal segment; no f/u for aortas < 2.6 cm).         * For management of saccular abdominal aortic aneurysms of any size,    recommend vascular consultation.         Note:  For AAA enlargement of >0.5 cm in 6 months or >1 cm in 1 year,    recommend vascular consultation.                   Assessment:  Principal Problem:    Complicated UTI (urinary tract infection)  Active Problems:    MDD (major depressive disorder), recurrent episode (HCC)    PAF (paroxysmal atrial fibrillation) (HCC)    Hypothyroidism    Coronary artery disease involving native heart without angina pectoris    Chronic obstructive pulmonary disease ([facility] [address]  Resolved Problems:    * No resolved hospital problems. *      Plan:    #Complicated Proteus UTI    #Urinary Retention   - Admitted for sepsis 2/2 UTI s/p cystoscopy for mendoza placement on 9/13/20 at [facility], + false urethral passage noted at that time.   He was then discharged to ARU before [facility]   - urology consulted on arrival to [facility], mendoza that was placed on 9/13 was removed on 10/2 but he failed voiding trial, and therefore nursing staff replaced mendoza on 10/2   - on 10/5 he started spiking fevers, UA consistent with UTI. CT showed bilateral hydro- see above- despite mendoza appearing to be draining adequately  - initially placed on IV Merrem, changed to Rocephin D#1 when proteus culture and sensitivity resulted   - Urology consult is pending   - blood cultures (including one drawn from PICC line) are NG  -  His catheter is now draining urine.      #TONO on CKD stage III  - Cr on admission: 1.2  - baseline: ~1.1  - Crt 1.5 today. IVF bolus followed by continuous infusion     #Hyponatremia improved  - Na is 133, starting IVF now, monitor Na     #MDD  - sp treatment on the geriatric psychiatric unit. Will continue medications per psychiatry recs: duloxetine 30 mg BID and methylphenidate 5 mg BID       #Recent Retroperitoneal Hematoma  - 2/2 recent Lumbar procedure as below  - Xarelto was held initially, but was resumed when he was admitted to [facility] psych.   H/h has remained stable if you look at the trend over the last few weeks      #COPD  - no AE  - add PRN Albuterol     #CAD s/p stent  - no c/o chest pain  - holding ASA for now, cont Lipitor     #AAA  - stable on CT.  Will need OP monitoring with repeat imaging in 5 years     #Atrial Fibrillation  - held Xarelto 2/2 hematuria and hematoma at [facility]  - cont Toprol Jean Meza now       #HTN  - controlled  - cont Toprol XL (losartan recently d/c'd 2/2 TONO)     #Hypothyroidism  - home dose of synthroid 50 mcg continued      #GERD  - cont Protonix       #Chronic dCHF  - noted on echo 9/19/2020 - EF 50%  - appears compensated  - cont Toprol XL     #Iron Deficiency Anemia  - cont Ferrous Sulfate  - monitor h/h      #Lumbar Stenosis  - s/p recent L4-S1 ALIF/PSF  - 8/31 with Dr. Yifan Reddy and Dr. Syed Horner     #Hx of CVA  - cont Lipitor     #RUE PICC in place since 9/14/2020     #Constipation  - see MAR for bowel regimen  - he is having BMs    Discharge planning for am.     DVT Prophylaxis: Xarelto  Diet: DIET GENERAL;  Dietary Nutrition Supplements: Standard High Calorie Oral Supplement  Code Status: DNR-CCA       Kg Mccarty MD 10/8/2020 10:34 AM

## 2020-10-09 ENCOUNTER — APPOINTMENT (OUTPATIENT)
Dept: ULTRASOUND IMAGING | Age: 75
DRG: 699 | End: 2020-10-09
Attending: INTERNAL MEDICINE
Payer: MEDICARE

## 2020-10-09 LAB
ANION GAP SERPL CALCULATED.3IONS-SCNC: 8 MMOL/L (ref 3–16)
BASOPHILS ABSOLUTE: 0 K/UL (ref 0–0.2)
BASOPHILS RELATIVE PERCENT: 0.6 %
BUN BLDV-MCNC: 16 MG/DL (ref 7–20)
CALCIUM SERPL-MCNC: 8.7 MG/DL (ref 8.3–10.6)
CHLORIDE BLD-SCNC: 105 MMOL/L (ref 99–110)
CO2: 22 MMOL/L (ref 21–32)
CREAT SERPL-MCNC: 1.2 MG/DL (ref 0.8–1.3)
EOSINOPHILS ABSOLUTE: 0.2 K/UL (ref 0–0.6)
EOSINOPHILS RELATIVE PERCENT: 4.2 %
GFR AFRICAN AMERICAN: >60
GFR NON-AFRICAN AMERICAN: 59
GLUCOSE BLD-MCNC: 92 MG/DL (ref 70–99)
HCT VFR BLD CALC: 26.2 % (ref 40.5–52.5)
HEMOGLOBIN: 8.6 G/DL (ref 13.5–17.5)
LYMPHOCYTES ABSOLUTE: 0.8 K/UL (ref 1–5.1)
LYMPHOCYTES RELATIVE PERCENT: 19.3 %
MCH RBC QN AUTO: 30.3 PG (ref 26–34)
MCHC RBC AUTO-ENTMCNC: 32.8 G/DL (ref 31–36)
MCV RBC AUTO: 92.3 FL (ref 80–100)
MONOCYTES ABSOLUTE: 0.5 K/UL (ref 0–1.3)
MONOCYTES RELATIVE PERCENT: 12.7 %
NEUTROPHILS ABSOLUTE: 2.5 K/UL (ref 1.7–7.7)
NEUTROPHILS RELATIVE PERCENT: 63.2 %
PDW BLD-RTO: 15.4 % (ref 12.4–15.4)
PLATELET # BLD: 144 K/UL (ref 135–450)
PMV BLD AUTO: 8.7 FL (ref 5–10.5)
POTASSIUM REFLEX MAGNESIUM: 3.9 MMOL/L (ref 3.5–5.1)
RBC # BLD: 2.84 M/UL (ref 4.2–5.9)
SODIUM BLD-SCNC: 135 MMOL/L (ref 136–145)
WBC # BLD: 3.9 K/UL (ref 4–11)

## 2020-10-09 PROCEDURE — 97110 THERAPEUTIC EXERCISES: CPT

## 2020-10-09 PROCEDURE — 1200000000 HC SEMI PRIVATE

## 2020-10-09 PROCEDURE — 80048 BASIC METABOLIC PNL TOTAL CA: CPT

## 2020-10-09 PROCEDURE — 2580000003 HC RX 258: Performed by: PSYCHIATRY & NEUROLOGY

## 2020-10-09 PROCEDURE — 99232 SBSQ HOSP IP/OBS MODERATE 35: CPT | Performed by: INTERNAL MEDICINE

## 2020-10-09 PROCEDURE — 76770 US EXAM ABDO BACK WALL COMP: CPT

## 2020-10-09 PROCEDURE — 6370000000 HC RX 637 (ALT 250 FOR IP): Performed by: INTERNAL MEDICINE

## 2020-10-09 PROCEDURE — 6370000000 HC RX 637 (ALT 250 FOR IP): Performed by: PSYCHIATRY & NEUROLOGY

## 2020-10-09 PROCEDURE — 85025 COMPLETE CBC W/AUTO DIFF WBC: CPT

## 2020-10-09 PROCEDURE — 6360000002 HC RX W HCPCS: Performed by: PSYCHIATRY & NEUROLOGY

## 2020-10-09 PROCEDURE — 2580000003 HC RX 258: Performed by: PHYSICIAN ASSISTANT

## 2020-10-09 PROCEDURE — 97116 GAIT TRAINING THERAPY: CPT

## 2020-10-09 RX ORDER — LACTULOSE 10 G/15ML
30 SOLUTION ORAL
Status: DISPENSED | OUTPATIENT
Start: 2020-10-09 | End: 2020-10-09

## 2020-10-09 RX ORDER — SODIUM PHOSPHATE, DIBASIC AND SODIUM PHOSPHATE, MONOBASIC 7; 19 G/133ML; G/133ML
1 ENEMA RECTAL ONCE
Status: DISCONTINUED | OUTPATIENT
Start: 2020-10-09 | End: 2020-10-13 | Stop reason: HOSPADM

## 2020-10-09 RX ADMIN — Medication 2 CAPSULE: at 08:16

## 2020-10-09 RX ADMIN — RIVAROXABAN 20 MG: 20 TABLET, FILM COATED ORAL at 17:50

## 2020-10-09 RX ADMIN — OXYCODONE HYDROCHLORIDE AND ACETAMINOPHEN 1 TABLET: 7.5; 325 TABLET ORAL at 12:51

## 2020-10-09 RX ADMIN — DOCUSATE SODIUM 100 MG: 100 CAPSULE, LIQUID FILLED ORAL at 08:16

## 2020-10-09 RX ADMIN — LACTULOSE 30 G: 10 SOLUTION ORAL at 11:43

## 2020-10-09 RX ADMIN — GABAPENTIN 100 MG: 100 CAPSULE ORAL at 08:16

## 2020-10-09 RX ADMIN — METOPROLOL SUCCINATE 50 MG: 50 TABLET, EXTENDED RELEASE ORAL at 08:16

## 2020-10-09 RX ADMIN — METOPROLOL SUCCINATE 50 MG: 50 TABLET, EXTENDED RELEASE ORAL at 17:50

## 2020-10-09 RX ADMIN — DULOXETINE HYDROCHLORIDE 30 MG: 30 CAPSULE, DELAYED RELEASE ORAL at 22:24

## 2020-10-09 RX ADMIN — FERROUS SULFATE TAB 325 MG (65 MG ELEMENTAL FE) 325 MG: 325 (65 FE) TAB at 08:16

## 2020-10-09 RX ADMIN — METHYLPHENIDATE HYDROCHLORIDE 5 MG: 10 TABLET ORAL at 17:49

## 2020-10-09 RX ADMIN — GABAPENTIN 100 MG: 100 CAPSULE ORAL at 14:28

## 2020-10-09 RX ADMIN — Medication 2 CAPSULE: at 17:50

## 2020-10-09 RX ADMIN — SODIUM CHLORIDE: 9 INJECTION, SOLUTION INTRAVENOUS at 17:54

## 2020-10-09 RX ADMIN — SODIUM CHLORIDE: 9 INJECTION, SOLUTION INTRAVENOUS at 06:09

## 2020-10-09 RX ADMIN — DORZOLAMIDE HYDROCHLORIDE AND TIMOLOL MALEATE 1 DROP: 20; 5 SOLUTION/ DROPS OPHTHALMIC at 22:27

## 2020-10-09 RX ADMIN — Medication 10 ML: at 05:58

## 2020-10-09 RX ADMIN — LATANOPROST 1 DROP: 50 SOLUTION/ DROPS OPHTHALMIC at 22:27

## 2020-10-09 RX ADMIN — PANTOPRAZOLE SODIUM 40 MG: 40 TABLET, DELAYED RELEASE ORAL at 05:54

## 2020-10-09 RX ADMIN — AMIODARONE HYDROCHLORIDE 200 MG: 200 TABLET ORAL at 17:50

## 2020-10-09 RX ADMIN — GABAPENTIN 100 MG: 100 CAPSULE ORAL at 22:24

## 2020-10-09 RX ADMIN — ATORVASTATIN CALCIUM 40 MG: 40 TABLET, FILM COATED ORAL at 22:24

## 2020-10-09 RX ADMIN — CEFTRIAXONE SODIUM 1 G: 1 INJECTION, POWDER, FOR SOLUTION INTRAMUSCULAR; INTRAVENOUS at 11:43

## 2020-10-09 RX ADMIN — METHYLPHENIDATE HYDROCHLORIDE 5 MG: 10 TABLET ORAL at 08:16

## 2020-10-09 RX ADMIN — LEVOTHYROXINE SODIUM 50 MCG: 50 TABLET ORAL at 05:54

## 2020-10-09 RX ADMIN — POLYETHYLENE GLYCOL 3350 17 G: 17 POWDER, FOR SOLUTION ORAL at 08:15

## 2020-10-09 RX ADMIN — DORZOLAMIDE HYDROCHLORIDE AND TIMOLOL MALEATE 1 DROP: 20; 5 SOLUTION/ DROPS OPHTHALMIC at 08:18

## 2020-10-09 RX ADMIN — DULOXETINE HYDROCHLORIDE 30 MG: 30 CAPSULE, DELAYED RELEASE ORAL at 08:16

## 2020-10-09 RX ADMIN — SENNOSIDES 17.2 MG: 8.6 TABLET, FILM COATED ORAL at 08:16

## 2020-10-09 ASSESSMENT — PAIN SCALES - GENERAL
PAINLEVEL_OUTOF10: 7
PAINLEVEL_OUTOF10: 7
PAINLEVEL_OUTOF10: 3

## 2020-10-09 ASSESSMENT — PAIN DESCRIPTION - ORIENTATION: ORIENTATION: LEFT

## 2020-10-09 ASSESSMENT — PAIN DESCRIPTION - LOCATION: LOCATION: LEG

## 2020-10-09 ASSESSMENT — PAIN DESCRIPTION - ONSET: ONSET: ON-GOING

## 2020-10-09 ASSESSMENT — PAIN DESCRIPTION - DESCRIPTORS: DESCRIPTORS: NUMBNESS;TINGLING

## 2020-10-09 ASSESSMENT — PAIN DESCRIPTION - FREQUENCY: FREQUENCY: CONTINUOUS

## 2020-10-09 NOTE — PROGRESS NOTES
Progress Note    Admit Date:  10/7/2020    Subjective:  Mr. Tae Paris was discharged from [facility] psychiatry and admitted to the medical floor. He had issues with his Reeves but the catheter is now draining clear urine. He has Proteus in his urine. No fever or chills. He is awake and alert. Patient is doing well today. No fevers. Urine is draining well and clear. Still has abdominal pain. He is very constipated. P.o. intake is poor        Objective:   BP (!) 147/85   Pulse 87   Temp 96.8 °F (36 °C) (Oral)   Resp 16   Ht 5' 9\" (1.753 m)   Wt 164 lb 9.6 oz (74.7 kg)   SpO2 98%   BMI 24.31 kg/m²          Intake/Output Summary (Last 24 hours) at 10/9/2020 1055  Last data filed at 10/9/2020 1041  Gross per 24 hour   Intake 3314.23 ml   Output 4425 ml   Net -1110.77 ml       Physical Exam:    General appearance: alert, appears stated age and cooperative  Head: Normocephalic, without obvious abnormality, atraumatic  Eyes: conjunctivae/corneas clear. PERRL, EOM's intact.   Neck: no adenopathy, no carotid bruit, no JVD, supple, symmetrical, trachea midline and thyroid not enlarged, symmetric, no tenderness/mass/nodules  Lungs: clear to auscultation bilaterally  Heart: regular rate and rhythm, S1, S2 normal, no murmur, click, rub or gallop  Abdomen: soft, mildly distended and tender; bowel sounds normal; no masses,  no organomegaly  Extremities: extremities normal, atraumatic, no cyanosis or edema  Pulses: 2+ and symmetric  Skin: Skin color, texture, turgor normal. No rashes or lesions  Neurologic: Grossly normal    Scheduled Meds:   sodium phosphate  1 enema Rectal Once    lactulose  30 g Oral 6 times per day    amiodarone  200 mg Oral Daily    atorvastatin  40 mg Oral Nightly    cefTRIAXone (ROCEPHIN) IV  1 g Intravenous Q24H    docusate sodium  100 mg Oral Daily    dorzolamide-timolol  1 drop Both Eyes BID    DULoxetine  30 mg Oral BID    ferrous sulfate  325 mg Oral Daily with breakfast    gabapentin 100 mg Oral TID    lactobacillus  2 capsule Oral BID     latanoprost  1 drop Left Eye Nightly    levothyroxine  50 mcg Oral Daily    methylphenidate  5 mg Oral BID     metoprolol succinate  50 mg Oral BID    pantoprazole  40 mg Oral Daily    polyethylene glycol  17 g Oral Daily    rivaroxaban  20 mg Oral Daily    senna  2 tablet Oral BID    sodium chloride flush  10 mL Intravenous Q12H       Continuous Infusions:   sodium chloride 100 mL/hr at 10/09/20 0609       PRN Meds:  acetaminophen, aluminum & magnesium hydroxide-simethicone, benztropine mesylate, haloperidol lactate **OR** haloperidol, LORazepam **OR** LORazepam, magnesium hydroxide, traZODone, diphenhydrAMINE, nitroGLYCERIN, oxyCODONE-acetaminophen, oxyCODONE-acetaminophen      Data:  CBC:   Recent Labs     10/07/20  1431 10/08/20  0527 10/09/20  0600   WBC 6.6 5.7 3.9*   HGB 8.6* 8.5* 8.6*   HCT 26.1* 26.1* 26.2*   MCV 91.7 93.2 92.3   * 132* 144     BMP:   Recent Labs     10/07/20  1431 10/08/20  0527 10/09/20  0600   * 133* 135*   K 4.4 4.0 3.9   CL 98* 102 105   CO2 25 23 22   BUN 27* 23* 16   CREATININE 1.6* 1.5* 1.2     LIVER PROFILE:   No results for input(s): AST, ALT, LIPASE, BILIDIR, BILITOT, ALKPHOS in the last 72 hours. Invalid input(s): AMYLASE,  ALB  PT/INR: No results for input(s): PROTIME, INR in the last 72 hours.   Cultures:   Susceptibility     Proteus mirabilis (1)     Antibiotic  Interpretation  LORENA  Status     ampicillin  Sensitive  <=8  mcg/mL      ceFAZolin  Sensitive  <=2  mcg/mL      cefepime  Sensitive  <=2  mcg/mL      cefTRIAXone  Sensitive  <=1  mcg/mL      cefuroxime  Sensitive  <=4  mcg/mL      ciprofloxacin  Sensitive  <=1  mcg/mL      ertapenem  Sensitive  <=0.5  mcg/mL      gentamicin  Sensitive  <=4  mcg/mL      meropenem  Sensitive  <=1  mcg/mL      nitrofurantoin  Resistant  >64  mcg/mL      piperacillin-tazobactam  Sensitive  <=16  mcg/mL      trimethoprim-sulfamethoxazole  Sensitive extends to the level of the bladder.         Peritoneum/Retroperitoneum: Multiple small retroperitoneal nodes, similar to    the previous study.  No upper abdominal ascites.  Moderate aortoiliac    atherosclerotic plaque.  Maximum aortic diameter is unchanged at 2.8 cm.         Bones/Soft Tissues: No acute osseous or soft tissue abnormality.  Stable    postoperative changes anteriorly and posteriorly at L4 through S1.      Impression    1. Bilateral hydronephrosis and hydroureter with mucosal enhancement.  Marked    bladder wall thickening with infiltration of the pericystic fat.  Findings    are consistent with a UTI. 2. Liquefied hematoma extending from the lumbar spine, around the left    hemipelvis and into the anterior abdominal wall with partial resolution.  No    acute hemorrhage or significant free fluid. 3. Colonic diverticulosis with no acute features. 4. Distended gallbladder.  Increased attenuation dependently, possibly sludge    or calculi.     5. Moderate aortoiliac atherosclerotic disease.  Stable aneurysmal change at    2.8 cm.         RECOMMENDATIONS:    For management of fusiform AAA:         2.6-2.9 cm aorta, recommend follow-up every 5 years or aortas meeting the    criteria for AAA (>1.5 x proximal normal segment; no f/u if < 1.5 x proximal    normal segment; no f/u for aortas < 2.6 cm).         * For management of saccular abdominal aortic aneurysms of any size,    recommend vascular consultation.         Note:  For AAA enlargement of >0.5 cm in 6 months or >1 cm in 1 year,    recommend vascular consultation.                   Assessment:  Principal Problem:    Complicated UTI (urinary tract infection)  Active Problems:    MDD (major depressive disorder), recurrent episode (HCC)    PAF (paroxysmal atrial fibrillation) (HCC)    Hypothyroidism    Coronary artery disease involving native heart without angina pectoris    Chronic obstructive pulmonary disease ([address]  Resolved Problems:    * No resolved hospital problems. *      Plan:    #Complicated Proteus UTI    #Urinary Retention   - recent admission  for sepsis 2/2 UTI. s/p cystoscopy for mendoza placement on 9/13/20 at [facility], + false urethral passage noted at that time. He was then discharged to ARU before coming to [facility] psych unit for admission   - urology consulted on arrival to [facility], mendoza that was placed on 9/13 was removed on 10/2 but he failed voiding trial, and therefore nursing staff replaced mendoza on 10/2   - on 10/5 he started spiking fevers, UA consistent with UTI. CT showed bilateral hydro- see above- despite mendoza appearing to be draining adequately   >> discharged from psych unit and readmitted to med surg floor on 10/7  - initially placed on IV Merrem, changed to Rocephin D#2 when proteus culture and sensitivity resulted   - Urology consulted  - renal USG as above. Mild mina hydronephrosis  - blood cultures (including one drawn from PICC line) are NG  -  His catheter is now draining clear urine.      #TONO on CKD stage III  - Cr on admission: 1.2  - baseline: ~1.1  - Crt 1.2 --> 1.5 --> 1.2  Today. Cont IVF      #Hyponatremia improved  - Na is 133 -> 135. cont saline   - monitor Na     #MDD  - s/p treatment on the geriatric psychiatric unit. Will continue medications per psychiatry recs: duloxetine 30 mg BID and methylphenidate 5 mg BID       #Recent Retroperitoneal Hematoma  - 2/2 recent Lumbar procedure as below  - Xarelto was held initially, but was resumed when he was admitted to [facility] psych.   H/h has remained stable if you look at the trend over the last few weeks      #COPD  - no AE  - add PRN Albuterol     #CAD s/p stent  - no c/o chest pain  - holding ASA for now, cont Lipitor     #AAA  - stable on CT.  Will need OP monitoring with repeat imaging in 5 years     #Atrial Fibrillation  - held Xarelto 2/2 hematuria and hematoma at [facility]/ARU  - cont Toprol Chris Ruvalcaba now       #HTN  - controlled  - cont Toprol

## 2020-10-09 NOTE — PROGRESS NOTES
PM assessment complete. Pt in bed A/O x4. PM meds given. See MAR. Denies further needs at this time. Call light and bedside table within reach. Bed alarm on.

## 2020-10-09 NOTE — PLAN OF CARE
Problem: Falls - Risk of:  Goal: Will remain free from falls  Description: Will remain free from falls  21/4/3291 8627 by Fely Mendez RN  Outcome: Ongoing  10/9/2020 0009 by Isidra Peterson RN  Outcome: Ongoing  Goal: Absence of physical injury  Description: Absence of physical injury  52/8/9401 6656 by Fely Mendez RN  Outcome: Ongoing  10/9/2020 0009 by Isidra Peterson RN  Outcome: Ongoing     Problem: Infection:  Goal: Will remain free from infection  Description: Will remain free from infection  93/7/9825 2347 by Fely Mendez RN  Outcome: Ongoing  10/9/2020 0009 by Isidra Peterson RN  Outcome: Ongoing     Problem: Safety:  Goal: Free from accidental physical injury  Description: Free from accidental physical injury  96/8/0455 8165 by Fely Mendez RN  Outcome: Ongoing  10/9/2020 0009 by Isidra Peterson RN  Outcome: Ongoing  Goal: Free from intentional harm  Description: Free from intentional harm  10/2/6742 0123 by Fely Mendez RN  Outcome: Ongoing  10/9/2020 0009 by Isidra Peterson RN  Outcome: Ongoing     Problem: Daily Care:  Goal: Daily care needs are met  Description: Daily care needs are met  52/3/2793 3685 by Fely Mendez RN  Outcome: Ongoing  10/9/2020 0009 by Isidra Peterson RN  Outcome: Ongoing     Problem: Pain:  Goal: Patient's pain/discomfort is manageable  Description: Patient's pain/discomfort is manageable  55/6/3851 7552 by Fely Mendez RN  Outcome: Ongoing  10/9/2020 0009 by Isidra Peterson RN  Outcome: Ongoing     Problem: Skin Integrity:  Goal: Skin integrity will stabilize  Description: Skin integrity will stabilize  66/2/8804 7767 by Fely Mendez RN  Outcome: Ongoing  10/9/2020 0009 by Isidra Peterson RN  Outcome: Ongoing     Problem: Discharge Planning:  Goal: Patients continuum of care needs are met  Description: Patients continuum of care needs are met  55/0/0259 0906 by Fely Mendez RN  Outcome: Ongoing  10/9/2020 0009 by Jazmin Jean-Baptiste RN  Outcome: Ongoing     Problem: Sensory:  Goal: General experience of comfort will improve  Description: General experience of comfort will improve  61/6/0972 0456 by Jim Ware RN  Outcome: Ongoing  10/9/2020 0009 by Jazmin Jean-Baptiste RN  Outcome: Ongoing     Problem: Urinary Elimination:  Goal: Signs and symptoms of infection will decrease  Description: Signs and symptoms of infection will decrease  95/3/2736 8889 by Jim Ware RN  Outcome: Ongoing  10/9/2020 0009 by Jazmin Jean-Baptiste RN  Outcome: Ongoing  Goal: Ability to reestablish a normal urinary elimination pattern will improve - after catheter removal  Description: Ability to reestablish a normal urinary elimination pattern will improve  47/2/8750 6533 by Jim Ware RN  Outcome: Ongoing  10/9/2020 0009 by Jazmin Jean-Baptiste RN  Outcome: Ongoing  Goal: Complications related to the disease process, condition or treatment will be avoided or minimized  Description: Complications related to the disease process, condition or treatment will be avoided or minimized  88/1/8012 4339 by Jim Ware RN  Outcome: Ongoing  10/9/2020 0009 by Jazmin Jean-Baptiste RN  Outcome: Ongoing     Problem: Skin Integrity:  Goal: Will show no infection signs and symptoms  Description: Will show no infection signs and symptoms  52/6/6710 8684 by Jim Ware RN  Outcome: Ongoing  10/9/2020 0009 by Jazmin Jean-Baptiste RN  Outcome: Ongoing  Goal: Absence of new skin breakdown  Description: Absence of new skin breakdown  46/6/3530 9720 by Jim Ware RN  Outcome: Ongoing  10/9/2020 0009 by Jazmin Jean-Baptiste RN  Outcome: Ongoing

## 2020-10-09 NOTE — CARE COORDINATION
INTERDISCIPLINARY PLAN OF CARE CONFERENCE    Date/Time: 10/9/2020 10:15 AM  Completed by: Mikey Delgadillo, Case Management      Patient Name:  Racheal Jacobson. YOB: 1945  Admitting Diagnosis: Complicated UTI (urinary tract infection) [N39.0]     Admit Date/Time:  10/7/2020  1:16 PM    Chart reviewed. Interdisciplinary team contacted or reviewed plan related to patient progress and discharge plans. Disciplines included Case Management, Nursing, and Dietitian. Current Status:stable  PT/OT recommendation for discharge plan of care: SNF    Expected D/C Disposition:  tbd  Confirmed plan with patient and/or family Yes confirmed with: (name) pt    Discharge Plan Comments: Chart reviewed, met with pt at bedside. Pt here from [place] and was brought there from [facility] for [employer], pt is wanting to return to [address] [place] [address]. TC to [address] [place] [address] and spoke with Miriam she is awaiting pt progress related to needing possible TURP procedure and will reevaluate pt after next procedure. 1030 LVM with dtr Carrol regarding dcp, pt will need precert and may need to return to STR vs ARU. Following. [facility] with son Amelia Lewis who states he is pt's DPOA. Son would like pt to go to ARU @ [facility]. Discussed with son and pt at bedside that pt would need PT to recommend ARU then pt would need insurance precert for ARU, son is requesting that writer push hard for ARU, again explained to son that this is precert dependant ultimately an insurance decision. Pt and son v/u. PT to work with pt this afternoon, prior recommendation is for STR. Per Urology pt will need TURP outpatient, LVM for Miriam @ [address] [place] [address]. Following. 1610 PT now recommending ARU.     Home O2 in place on admit: No

## 2020-10-09 NOTE — PROGRESS NOTES
Patient:  Veronica De Jesus YOB: 1945   Date of Service:  10/09/20      Urology Attending Daily Progress Note    Chief complaint: \"i'm okay\"    Interval HPI:  Mendoza draining clear yellow urine    Crt cont to downtrend    Objective:    Patient Vitals for the past 8 hrs:   BP Temp Temp src Pulse Resp   10/09/20 0800 (!) 147/85 96.8 °F (36 °C) Oral 87 16     I/O last 3 completed shifts: In: 3074.2 [P.O.:360; I.V.:2714.2]  Out: 5003 [Urine:4425]     Physical Exam:   Constitutional: comfortable in hospital bed, no acute distress  Abdomen: soft, nontender, no guarding, incisions c/d/i  Genitourinary: urethal mendoza draining clear urine  Psych: normal mood and affect, appropriately answers questions   Skin, extremities: stable, exposed skin intact, no digital cyanosis     Labs:     No results found for: PSA    Lab Results   Component Value Date    CREATININE 1.2 10/09/2020    CREATININE 1.5 (H) 10/08/2020    CREATININE 1.6 (H) 10/07/2020       Lab Results   Component Value Date    COLORU Yellow 10/05/2020    NITRU POSITIVE 10/05/2020    GLUCOSEU Negative 10/05/2020    KETUA Negative 10/05/2020    UROBILINOGEN 0.2 10/05/2020    BILIRUBINUR Negative 10/05/2020       Lab Results   Component Value Date    WBC 3.9 (L) 10/09/2020    HGB 8.6 (L) 10/09/2020    HCT 26.2 (L) 10/09/2020    MCV 92.3 10/09/2020     10/09/2020       Assessment:  Veronica Villegas is a 66 y.o. male who presents with suicidal thoughts, recently in the behavioral unit, now on 2W. He has had issues with retention over at [facility] and here at [facility]. Labs also show an increase in his creatinine which is getting better with Mendoza. A CT shows marked bilateral hydro and a thickened bladder wall. There also are bilateral renal cysts.     Plan:  -- continue Mendoza on discharge and arrange TURP type procedure once medically cleared  -- renal ultrasound shows mild hydronephrosis but overall clinically improved and jeronimo improving      Will sign off, call if questions    Please call if something changes, thank you.     Niurka Wray MD   Cell: [phone]  Office: [phone]

## 2020-10-09 NOTE — PLAN OF CARE
Problem: Falls - Risk of:  Goal: Will remain free from falls  Description: Will remain free from falls  Outcome: Ongoing  Goal: Absence of physical injury  Description: Absence of physical injury  Outcome: Ongoing     Problem: Infection:  Goal: Will remain free from infection  Description: Will remain free from infection  Outcome: Ongoing     Problem: Safety:  Goal: Free from accidental physical injury  Description: Free from accidental physical injury  Outcome: Ongoing  Goal: Free from intentional harm  Description: Free from intentional harm  Outcome: Ongoing     Problem: Daily Care:  Goal: Daily care needs are met  Description: Daily care needs are met  Outcome: Ongoing     Problem: Pain:  Goal: Patient's pain/discomfort is manageable  Description: Patient's pain/discomfort is manageable  Outcome: Ongoing     Problem: Skin Integrity:  Goal: Skin integrity will stabilize  Description: Skin integrity will stabilize  Outcome: Ongoing     Problem: Discharge Planning:  Goal: Patients continuum of care needs are met  Description: Patients continuum of care needs are met  Outcome: Ongoing     Problem: Sensory:  Goal: General experience of comfort will improve  Description: General experience of comfort will improve  Outcome: Ongoing     Problem: Urinary Elimination:  Goal: Signs and symptoms of infection will decrease  Description: Signs and symptoms of infection will decrease  Outcome: Ongoing  Goal: Ability to reestablish a normal urinary elimination pattern will improve - after catheter removal  Description: Ability to reestablish a normal urinary elimination pattern will improve  Outcome: Ongoing  Goal: Complications related to the disease process, condition or treatment will be avoided or minimized  Description: Complications related to the disease process, condition or treatment will be avoided or minimized  Outcome: Ongoing     Problem: Skin Integrity:  Goal: Will show no infection signs and symptoms  Description: Will show no infection signs and symptoms  Outcome: Ongoing  Goal: Absence of new skin breakdown  Description: Absence of new skin breakdown  Outcome: Ongoing

## 2020-10-09 NOTE — FLOWSHEET NOTE
10/09/20 0800   Vital Signs   Temp 96.8 °F (36 °C)   Temp Source Oral   Pulse 87   Heart Rate Source Monitor   Resp 16   BP (!) 147/85   BP Location Left upper arm   BP Upper/Lower Upper   MAP (mmHg) 105   Patient Position Semi fowlers   Level of Consciousness 0   MEWS Score 1   Patient Currently in Pain Denies   Shift assessment complete. See flow sheet. Scheduled meds given. See MAR. Pt continues to have complaints of constipation. Reeves is draining without problems, and urine is yellow/straw (compare to 2 days ago of raphael color). Call light and bedside table within reach. No further needs noted at this time. Will continue to monitor.

## 2020-10-09 NOTE — PROGRESS NOTES
Physical Therapy  Inpatient Physical Therapy Daily Treatment Note    Unit: 2 [place]  Date:  10/9/2020  Patient Name:    Kalpana Collins. Admitting diagnosis:  Complicated UTI (urinary tract infection) [N39.0]  Admit Date:  10/7/2020  Precautions/Restrictions:  Fall risk, Bed/chair alarm and Lines -IV and Reeves catheter  Spinal precautions: no lifting >10#, no twisting, turning and bending. Discharge Recommendations: ARU/IRF (inpatient rehab facility)   DME needs for discharge: defer to facility       Therapy recommendation for EMS Transport: can transport by wheelchair    Therapy recommendations for staff:   Assist of 1 with use of rolling walker (RW), gait belt, and  spinal precautions (no twisting, turning and bending) for all transfers and ambulation to/from [facility]  to/from chair    History of Present Illness: 67 y/o male admitted to [facility] SNF setting 9/13/2020 with Altered Mental Status, Severe Sepsis, Acute Renal Failure, and Pelvic Hematoma.  CT Head negative.  CT Abd/Pelvis + L Pelvic Hematoma.  Urology consult for Reeves Placement.   PMH as noted including Recent Back Surg (L4/L5 and L5/S1 Ant/Post Fusion, 8/31/2020), CAD, Angio with Stent, A-Fib, Aortic Aneurysm. Pt was receiving OT at [facility] 5-7x/wk, but was not progressing as expected due to pt's depression  The patient is a 73 y. o. male with multiple chronic medical problems including coronary artery disease, COPD, chronic atrial fibrillation, as well as complicated recent hospital admissions for a retroperitoneal hematoma after lumbar surgery as well as sepsis secondary to UTI. Francisco Humphries was admitted at [facility] in September for sepsis secondary to UTI, seen by urology, underwent cystoscopy which showed a false urethral passage.  Treated with antibiotics and discharged to the acute rehab unit with a Reeves catheter in place.  Eventually transferred to the inpatient geriatric psychiatry unit at [facility] directly from the acute rehab unit.  On arrival to the geriatric psychiatry unit he had a Mendoza in place which it seems was the same mendoza that had been placed on September 13th by urology. Millicent Batessh Mendoza on admission, but he was still retaining urine and therefore nursing staff replaced a Mendoza on 10/2. On 10/5 he started spiking fevers, urinalysis was consistent with infection.  CT abdomen was obtained which showed bilateral hydronephrosis despite having a Mendoza in place which appear to be draining well. [facility] will be transferred to the medical floor for further management and urology consultation. Home Health S4 Level Recommendation: NA  AM-PAC Mobility Score   AM-PAC Inpatient Mobility Raw Score : 17     Treatment Time:  0558-4645  Treatment number: 2  Timed Code Treatment Minutes: 34 minutes  Total Treatment Minutes:  34  minutes    Cognition    A&O x4   Able to follow 2 step commands    Subjective  Patient lying supine in bed with family at bedside  (son)  Pt agreeable to this PT tx. Pt and son reiterate many times that they want pt to return to [address] [place] [address] and that they believe he will be able to participate better this time because he understands the rigor of the therapy schedule and the need to participate. Also he is not in as much pain as he was before.       Pain   No  Location: n/a  Rating:    NA/10  Pain Medicine Status: Denies need     Bed Mobility   Supine to Sit:    SBA  Sit to Supine:   Not Tested (pt sitting up in chair to end session)  Rolling:   Not Tested  Scooting:   Not Tested    Transfer Training     Sit to stand:   CGA  Stand to sit:   CGA  Bed to Chair:   CGA with use of rolling walker (RW)    Gait Training gait completed as indicated below  Distance:      6 ft (bed>chair) + 15 ft with 1 turn (seated rest break between bouts)  Deviations (firm surface/linoleum):  narrow ASHLEY, forward flexed posture, deviated path, decreased step length bilaterally and decreased stance time bilaterally  Assistive Device Used: rolling walker (RW)  Level of Assist:    1st bout: CGA; 2nd bout: Typically CGA but 2 occasions of LOB (one posterior and one laterally during turning) requiring Max A   Comment: Pt benefitted from cuing for increased ASHLEY and increased kay to prevent posterior LOB. Stair Training deferred, pt unsafe/not appropriate to complete stairs at this time    Therapeutic Exercise all completed bilaterally unless indicated  LAQ x 10 reps  Seated marching x 10 reps  Standing Hip abduction SLR: 2x5 reps with UE support on walker and CGA  Seated hip abduction x 10 reps    Balance  Sitting:  Good ; Supervision  Standing: Fair + (Static); Poor (Dynamic)     Patient Education      Role of PT, POC, Discharge recommendations, DC recommendations, safety awareness and calling for assist with mobility. Positioning Needs       Pt up in chair, alarm set, positioned in proper neutral alignment and pressure relief provided. Call light provided and all needs within reach    Activity Tolerance   Pt completed therapy session with No adverse symptoms noted w/activity. Assessment :  Patient showing improved level of IND with bed mobility and transfers this date. However pt demonstrates poor balance during ambulation, twice requiring Max A due to LOB. Pt ambulated short distances within room with RW and CGA to Max A. Therapist had lengthy discussion with pt and pt's son about their desire to return to ARU with renewed attitude and optimism. Given pt's demonstrated compliance and participation during today's therapy activities, as well as no pain with activity which was a barrier during prior ARU admission, pt is a good candidate for readmission to ARU. Recommending ARU/IRF/Inpatient Rehab Facility upon discharge as patient functioning well below baseline, demonstrates good rehab potential and unable to return home due to burden of care beyond caregiver ability and home environment not conducive to patient recovery.     Goals (all goals ongoing unless otherwise indicated)  To be met in 3 visits:  1). Independent with LE Ex x 10 reps - 10/9 PROGRESSING     To be met in 6 visits:  1). Supine to/from sit: Supervision - 10/9 PROGRESSING  2). Sit to/from stand: CGA - 10/9 GOAL MET  3). Bed to chair: CGA - 10/9 GOAL MET with RW  4). Gait: Ambulate 50 ft.  with  CGA and use of LRAD (least restrictive assistive device)  5). Tolerate B LE exercises 3 sets of 10-15 reps    Plan   Continue with plan of care. Signature: Katia Faith, PT, DPT      If patient discharges from this facility prior to next visit, this note will serve as the Discharge Summary.

## 2020-10-10 LAB
ANION GAP SERPL CALCULATED.3IONS-SCNC: 9 MMOL/L (ref 3–16)
BASOPHILS ABSOLUTE: 0 K/UL (ref 0–0.2)
BASOPHILS RELATIVE PERCENT: 0.5 %
BLOOD CULTURE, ROUTINE: NORMAL
BUN BLDV-MCNC: 13 MG/DL (ref 7–20)
CALCIUM SERPL-MCNC: 8.6 MG/DL (ref 8.3–10.6)
CHLORIDE BLD-SCNC: 105 MMOL/L (ref 99–110)
CO2: 22 MMOL/L (ref 21–32)
CREAT SERPL-MCNC: 1.1 MG/DL (ref 0.8–1.3)
CULTURE, BLOOD 2: NORMAL
EOSINOPHILS ABSOLUTE: 0.1 K/UL (ref 0–0.6)
EOSINOPHILS RELATIVE PERCENT: 3.9 %
GFR AFRICAN AMERICAN: >60
GFR NON-AFRICAN AMERICAN: >60
GLUCOSE BLD-MCNC: 86 MG/DL (ref 70–99)
HCT VFR BLD CALC: 26.4 % (ref 40.5–52.5)
HEMOGLOBIN: 8.6 G/DL (ref 13.5–17.5)
LYMPHOCYTES ABSOLUTE: 0.9 K/UL (ref 1–5.1)
LYMPHOCYTES RELATIVE PERCENT: 28.3 %
MCH RBC QN AUTO: 30.1 PG (ref 26–34)
MCHC RBC AUTO-ENTMCNC: 32.6 G/DL (ref 31–36)
MCV RBC AUTO: 92.4 FL (ref 80–100)
MONOCYTES ABSOLUTE: 0.5 K/UL (ref 0–1.3)
MONOCYTES RELATIVE PERCENT: 14.2 %
NEUTROPHILS ABSOLUTE: 1.8 K/UL (ref 1.7–7.7)
NEUTROPHILS RELATIVE PERCENT: 53.1 %
PDW BLD-RTO: 15.9 % (ref 12.4–15.4)
PLATELET # BLD: 146 K/UL (ref 135–450)
PMV BLD AUTO: 9 FL (ref 5–10.5)
POTASSIUM REFLEX MAGNESIUM: 3.6 MMOL/L (ref 3.5–5.1)
RBC # BLD: 2.85 M/UL (ref 4.2–5.9)
SODIUM BLD-SCNC: 136 MMOL/L (ref 136–145)
WBC # BLD: 3.3 K/UL (ref 4–11)

## 2020-10-10 PROCEDURE — 6370000000 HC RX 637 (ALT 250 FOR IP): Performed by: PSYCHIATRY & NEUROLOGY

## 2020-10-10 PROCEDURE — 97116 GAIT TRAINING THERAPY: CPT

## 2020-10-10 PROCEDURE — 97112 NEUROMUSCULAR REEDUCATION: CPT

## 2020-10-10 PROCEDURE — 97110 THERAPEUTIC EXERCISES: CPT

## 2020-10-10 PROCEDURE — 2580000003 HC RX 258: Performed by: PSYCHIATRY & NEUROLOGY

## 2020-10-10 PROCEDURE — 1200000000 HC SEMI PRIVATE

## 2020-10-10 PROCEDURE — 6360000002 HC RX W HCPCS: Performed by: PSYCHIATRY & NEUROLOGY

## 2020-10-10 PROCEDURE — 85025 COMPLETE CBC W/AUTO DIFF WBC: CPT

## 2020-10-10 PROCEDURE — 80048 BASIC METABOLIC PNL TOTAL CA: CPT

## 2020-10-10 RX ADMIN — GABAPENTIN 100 MG: 100 CAPSULE ORAL at 09:19

## 2020-10-10 RX ADMIN — POLYETHYLENE GLYCOL 3350 17 G: 17 POWDER, FOR SOLUTION ORAL at 09:20

## 2020-10-10 RX ADMIN — FERROUS SULFATE TAB 325 MG (65 MG ELEMENTAL FE) 325 MG: 325 (65 FE) TAB at 09:19

## 2020-10-10 RX ADMIN — DORZOLAMIDE HYDROCHLORIDE AND TIMOLOL MALEATE 1 DROP: 20; 5 SOLUTION/ DROPS OPHTHALMIC at 09:33

## 2020-10-10 RX ADMIN — PANTOPRAZOLE SODIUM 40 MG: 40 TABLET, DELAYED RELEASE ORAL at 06:07

## 2020-10-10 RX ADMIN — METOPROLOL SUCCINATE 50 MG: 50 TABLET, EXTENDED RELEASE ORAL at 17:01

## 2020-10-10 RX ADMIN — DULOXETINE HYDROCHLORIDE 30 MG: 30 CAPSULE, DELAYED RELEASE ORAL at 20:49

## 2020-10-10 RX ADMIN — ATORVASTATIN CALCIUM 40 MG: 40 TABLET, FILM COATED ORAL at 20:49

## 2020-10-10 RX ADMIN — OXYCODONE HYDROCHLORIDE AND ACETAMINOPHEN 1 TABLET: 7.5; 325 TABLET ORAL at 22:48

## 2020-10-10 RX ADMIN — METOPROLOL SUCCINATE 50 MG: 50 TABLET, EXTENDED RELEASE ORAL at 09:19

## 2020-10-10 RX ADMIN — GABAPENTIN 100 MG: 100 CAPSULE ORAL at 20:49

## 2020-10-10 RX ADMIN — Medication 10 ML: at 17:03

## 2020-10-10 RX ADMIN — CEFTRIAXONE SODIUM 1 G: 1 INJECTION, POWDER, FOR SOLUTION INTRAMUSCULAR; INTRAVENOUS at 12:06

## 2020-10-10 RX ADMIN — DULOXETINE HYDROCHLORIDE 30 MG: 30 CAPSULE, DELAYED RELEASE ORAL at 09:19

## 2020-10-10 RX ADMIN — Medication 2 CAPSULE: at 09:19

## 2020-10-10 RX ADMIN — METHYLPHENIDATE HYDROCHLORIDE 5 MG: 10 TABLET ORAL at 09:19

## 2020-10-10 RX ADMIN — DORZOLAMIDE HYDROCHLORIDE AND TIMOLOL MALEATE 1 DROP: 20; 5 SOLUTION/ DROPS OPHTHALMIC at 20:51

## 2020-10-10 RX ADMIN — GABAPENTIN 100 MG: 100 CAPSULE ORAL at 13:47

## 2020-10-10 RX ADMIN — RIVAROXABAN 20 MG: 20 TABLET, FILM COATED ORAL at 17:00

## 2020-10-10 RX ADMIN — ACETAMINOPHEN 650 MG: 325 TABLET ORAL at 05:55

## 2020-10-10 RX ADMIN — AMIODARONE HYDROCHLORIDE 200 MG: 200 TABLET ORAL at 17:01

## 2020-10-10 RX ADMIN — METHYLPHENIDATE HYDROCHLORIDE 5 MG: 10 TABLET ORAL at 17:00

## 2020-10-10 RX ADMIN — ACETAMINOPHEN 650 MG: 325 TABLET ORAL at 21:33

## 2020-10-10 RX ADMIN — Medication 2 CAPSULE: at 17:01

## 2020-10-10 RX ADMIN — LATANOPROST 1 DROP: 50 SOLUTION/ DROPS OPHTHALMIC at 20:51

## 2020-10-10 RX ADMIN — LEVOTHYROXINE SODIUM 50 MCG: 50 TABLET ORAL at 06:07

## 2020-10-10 RX ADMIN — DOCUSATE SODIUM 100 MG: 100 CAPSULE, LIQUID FILLED ORAL at 09:19

## 2020-10-10 ASSESSMENT — PAIN DESCRIPTION - PAIN TYPE: TYPE: CHRONIC PAIN

## 2020-10-10 ASSESSMENT — PAIN DESCRIPTION - LOCATION: LOCATION: LEG

## 2020-10-10 ASSESSMENT — PAIN DESCRIPTION - FREQUENCY: FREQUENCY: CONTINUOUS

## 2020-10-10 ASSESSMENT — PAIN SCALES - GENERAL
PAINLEVEL_OUTOF10: 9
PAINLEVEL_OUTOF10: 9
PAINLEVEL_OUTOF10: 7

## 2020-10-10 ASSESSMENT — PAIN DESCRIPTION - PROGRESSION
CLINICAL_PROGRESSION: NOT CHANGED
CLINICAL_PROGRESSION: NOT CHANGED

## 2020-10-10 ASSESSMENT — PAIN DESCRIPTION - ONSET: ONSET: ON-GOING

## 2020-10-10 ASSESSMENT — PAIN DESCRIPTION - ORIENTATION: ORIENTATION: LEFT

## 2020-10-10 ASSESSMENT — PAIN - FUNCTIONAL ASSESSMENT: PAIN_FUNCTIONAL_ASSESSMENT: PREVENTS OR INTERFERES SOME ACTIVE ACTIVITIES AND ADLS

## 2020-10-10 NOTE — PROGRESS NOTES
Pt sitting up in chair at this time, alert and oriented x 4. Patient accepted all evening medications without issues. Continues to deny any thoughts of wanting to harm self. Pleasant and cooperative with care. Voices no concerns at this time. Fall precautions in place and call light within reach.

## 2020-10-10 NOTE — PROGRESS NOTES
PM assessment complete. Pt in bed A/O x4. PM meds given. See MAR. Pt up to commode, expresses relief from being able to pass stool. Back in bed. Denies further needs at this time. Call light and bedside table within reach. Bed alarm on.

## 2020-10-10 NOTE — PROGRESS NOTES
Occupational Therapy Daily Treatment Note    Unit: 2 [place]  Date:  10/10/2020  Patient Name:    Gabrielle Villalobos. Admitting diagnosis:  Complicated UTI (urinary tract infection) [N39.0]  Admit Date:  10/7/2020  Precautions/Restrictions:  fall risk, IV, bed/chair alarm and mendoza catheter spinal precautions        Discharge Recommendations: Acute Rehab (ARU)/ Inpatient Rehab Facility (IRF)  DME needs for discharge: defer to facility       Therapy recommendations for staff:   Assist of 1 (contact guard assist) with use of rolling walker (RW) for all transfers to/from BSC/chair    AM-PAC Score: AM-PAC Inpatient Daily Activity Raw Score: 15  Home Health S4 Level: Level 1- Standard       Treatment Time:  9886-1336  Treatment number:  2    Total Treatment Time:   15 minutes    History of Present Illness: 67 y/o male admitted to [facility] SNF setting 9/13/2020 with Altered Mental Status, Severe Sepsis, Acute Renal Failure, and Pelvic Hematoma.  CT Head negative.  CT Abd/Pelvis + L Pelvic Hematoma.  Urology consult for Mendoza Placement.   PMH as noted including Recent Back Surg (L4/L5 and L5/S1 Ant/Post Fusion, 8/31/2020), CAD, Angio with Stent, A-Fib, Aortic Aneurysm. Pt was receiving OT at [facility] 5-7x/wk, but was not progressing as expected due to pt's depression  The patient is a 73 y. o. male with multiple chronic medical problems including coronary artery disease, COPD, chronic atrial fibrillation, as well as complicated recent hospital admissions for a retroperitoneal hematoma after lumbar surgery as well as sepsis secondary to UTI. [facility] was admitted at [facility] in September for sepsis secondary to UTI, seen by urology, underwent cystoscopy which showed a false urethral passage.  Treated with antibiotics and discharged to the acute rehab unit with a Mendoza catheter in place.  Eventually transferred to the inpatient geriatric psychiatry unit at [facility] directly from the acute rehab unit.  On arrival to the geriatric psychiatry unit he had a Mendoza in place which it seems was the same mendoza that had been placed on  by urology. Claire Calloway Mendoza on admission, but he was still retaining urine and therefore nursing staff replaced a Mendoza on 10/2. On 10/5 he started spiking fevers, urinalysis was consistent with infection.  CT abdomen was obtained which showed bilateral hydronephrosis despite having a Mendoza in place which appear to be draining well. [facility] will be transferred to the medical floor for further management and urology consultation.       Subjective:  Patient agreeable to therapy and stating he is waiting to see if he can go to rehab for therapy    Pain   Yes  Rating: moderate  Location:left leg  Pain Medicine Status: RN notified      Bed Mobility:   Supine to Sit:  Not Tested  Sit to Supine:  Not Tested  Rolling:           Not Tested  Scooting:        Not Tested    Transfer Trainin LOB posteriorly when standing initially  Sit to stand:   CGA  Stand to sit:  CGA  Bed to Chair:  CGA with RW  Bed to [facility]:   Not Tested  Standard toilet:   Not Tested    Activity Tolerance   Pt completed therapy session with Pain noted with activity  SpO2:   HR:   BP:     ADL Training:   Upper body dressing:  Not Tested  Upper body bathing:  Not Tested  Lower body dressing:  CGA for balance   Lower body bathing:  Not Tested  Toileting:   Not Tested  Grooming/Hygiene:  Not Tested    Therapeutic Exercise:   Completed exercises within full available AROM x10 reps    Patient Education:   Role of OT  Recommendations for DC    Positioning Needs:   Up in chair, call light and needs in reach. Alarm Set    Family Present:  No    Assessment: Patient agreeable to therapy and cooperative. Patient had 1 LOB while standing with UB exercises. Will continue acute OT and recommend therapy at ARU. GOALS  1). Bed to toilet/BSC: Supervision     To be met in 5 Visits:  1). Supine to/from Sit:             SBA  2).  Upper Body Bathing:         Supervision  3). Lower Body Bathing:         SBA  4). Upper Body Dressing:       Supervision  5). Lower Body Dressing:       SBA  6).  Pt to demonstrate UE exs x 15 reps with minimal cues         Plan: cont with POC      Adilene Mathews, OTR/L [id number]        If patient discharges from this facility prior to next visit, this note will serve as the Discharge Summary

## 2020-10-10 NOTE — PROGRESS NOTES
Physical Therapy  Inpatient Physical Therapy Daily Treatment Note    Unit: 2 [address]  Date:  10/10/2020  Patient Name:    Claude Clifton. Admitting diagnosis:  Complicated UTI (urinary tract infection) [N39.0]  Admit Date:  10/7/2020  Precautions/Restrictions:  Fall risk, Bed/chair alarm and Lines -IV and Reeves catheter  Spinal precautions: no lifting >10#, no twisting, turning and bending. Discharge Recommendations: ARU/IRF (inpatient rehab facility)   DME needs for discharge: defer to facility       Therapy recommendation for EMS Transport: can transport by wheelchair    Therapy recommendations for staff:   Assist of 1 with use of rolling walker (RW), gait belt, and  spinal precautions (no twisting, turning and bending) for all transfers and ambulation to/from [facility]  to/from chair    History of Present Illness: 67 y/o male admitted to [facility] SNF setting 9/13/2020 with Altered Mental Status, Severe Sepsis, Acute Renal Failure, and Pelvic Hematoma.  CT Head negative.  CT Abd/Pelvis + L Pelvic Hematoma.  Urology consult for Reeves Placement.   PMH as noted including Recent Back Surg (L4/L5 and L5/S1 Ant/Post Fusion, 8/31/2020), CAD, Angio with Stent, A-Fib, Aortic Aneurysm. Pt was receiving OT at [facility] 5-7x/wk, but was not progressing as expected due to pt's depression  The patient is a 73 y. o. male with multiple chronic medical problems including coronary artery disease, COPD, chronic atrial fibrillation, as well as complicated recent hospital admissions for a retroperitoneal hematoma after lumbar surgery as well as sepsis secondary to UTI. James Bellamy was admitted at [facility] in September for sepsis secondary to UTI, seen by urology, underwent cystoscopy which showed a false urethral passage.  Treated with antibiotics and discharged to the acute rehab unit with a Reeves catheter in place.  Eventually transferred to the inpatient geriatric psychiatry unit at [facility] directly from the acute rehab unit.  On arrival to the geriatric psychiatry unit he had a Mendoza in place which it seems was the same mendoza that had been placed on September 13th by urology. Ty Gambino Mendoza on admission, but he was still retaining urine and therefore nursing staff replaced a Mendoza on 10/2. On 10/5 he started spiking fevers, urinalysis was consistent with infection.  CT abdomen was obtained which showed bilateral hydronephrosis despite having a Mendoza in place which appear to be draining well. Francisco Humphries will be transferred to the medical floor for further management and urology consultation. Home Health S4 Level Recommendation: NA  AM-PAC Mobility Score   AM-PAC Inpatient Mobility Raw Score : 17     Treatment Time:  7336-2113  Treatment number: 3  Timed Code Treatment Minutes: 39 minutes  Total Treatment Minutes:  39  minutes    Cognition    A&O x4   Able to follow 2 step commands    Subjective  Patient sitting up in chair with no family present   Pt agreeable to this PT tx, reports he has been performing exercises in bed.     Pain   No  Location: n/a  Rating:    NA/10  Pain Medicine Status: Denies need     Bed Mobility   Supine to Sit:    Not Tested  Sit to Supine:   Not Tested   Rolling:   Not Tested  Scooting:   Not Tested    Transfer Training  Min VC for hand placement   Sit to stand:   CGA  Stand to sit:   CGA  Bed to Chair:   CGA with use of rolling walker (RW)    Gait Training gait completed as indicated below  Distance:      40 ft   Deviations (firm surface/linoleum):  narrow ASHLEY, forward flexed posture, deviated path, decreased step length bilaterally and decreased stance time bilaterally - occasional scissorying  Assistive Device Used:    rolling walker (RW)  Level of Assist:    CGA   Comment: VC for safety with turning improved safety    Stair Training deferred, pt unsafe/not appropriate to complete stairs at this time    Therapeutic Exercise all completed bilaterally unless indicated  LAQ x 10 reps    Balance  Sitting:  Good ; Supervision  Standing: Fair + (Static)   Feet together, no UE support, eyes open: x 15 sec, min postural sway, no LOB   Feet together, no UE support, eyes closed: x 15 sec, mod postural sway, no LOB   Tandem (R in front), unilateral UE support: x 20 sec with mod A to maintain midline    Patient Education      Role of PT, POC, Discharge recommendations, DC recommendations, safety awareness and calling for assist with mobility. Progression of balance activities, progress in therapy thus far    Positioning Needs       Pt up in chair, alarm set, positioned in proper neutral alignment and pressure relief provided. Call light provided and all needs within reach    Activity Tolerance   Pt completed therapy session with No adverse symptoms noted w/activity. Assessment :  Patient continues to progress well in therapy with reports of working on HEP outside of therapy sessions. Pt demonstrates improve balance with turning this date with no LOB with cues for safety with turning. Pt able to progress to standing balance exercises. Increased assist to maintain midline with tandem stance. Recommending ARU/IRF/Inpatient Rehab Facility upon discharge as patient functioning well below baseline, demonstrates good rehab potential and unable to return home due to burden of care beyond caregiver ability and home environment not conducive to patient recovery. Goals (all goals ongoing unless otherwise indicated)  To be met in 3 visits:  1). Independent with LE Ex x 10 reps - 10/9 PROGRESSING     To be met in 6 visits:  1). Supine to/from sit: Supervision - 10/9 PROGRESSING  2). Sit to/from stand: CGA - 10/9 GOAL MET  3). Bed to chair: CGA - 10/9 GOAL MET with RW  4). Gait: Ambulate 50 ft.  with  CGA and use of LRAD (least restrictive assistive device)  5). Tolerate B LE exercises 3 sets of 10-15 reps    Plan   Continue with plan of care.     Signature: Gladis Cameron, PT, DPT #[id number]      If patient discharges from this facility prior to next visit, this note will serve as the Discharge Summary.

## 2020-10-10 NOTE — CARE COORDINATION
INTERDISCIPLINARY PLAN OF CARE CONFERENCE    Date/Time: 10/10/2020 3:00 PM  Completed by: Cely Summers, Case Management      Patient Name:  Nora Gregory. YOB: 1945  Admitting Diagnosis: Complicated UTI (urinary tract infection) [N39.0]     Admit Date/Time:  10/7/2020  1:16 PM    Chart reviewed. Interdisciplinary team contacted or reviewed plan related to patient progress and discharge plans. Disciplines included Case Management, Nursing, and Dietitian. Current Status: Stable  PT/OT recommendation for discharge plan of care: AUR/IRF    Expected D/C Disposition:  Rehab  Confirmed plan with patient and/or family Yes confirmed with: (name) wilmar Greenberg    Discharge Plan Comments:  Reviewed chart and spoke with Judy Mistry at [address] [place] [address] ([phone]). Per [place] they cannot re accept at this time as pt did not do well at ARU in past. Per Judy Mistry can call  Back on Monday and they will re eval. Discussed with pt and wilmar at bedside. Wilmar would like CM to follow up Loletta Locks on Monday to re eval as feels SNF would be detrimental for pt mental status.  Also would like referral to [facility] on Monday phone [phone]    Home O2 in place on admit: No  Pt informed of need to bring portable home O2 tank on day of discharge for nursing to connect prior to leaving:  Not Indicated  Verbalized agreement/Understanding:  Not Indicated

## 2020-10-10 NOTE — FLOWSHEET NOTE
10/10/20 0912   Vital Signs   Temp 97 °F (36.1 °C)   Temp Source Oral   Pulse 59   Heart Rate Source Monitor   Resp 16   BP (!) 169/86   BP Location Left upper arm   BP Upper/Lower Upper   Patient Position Up in chair   Level of Consciousness 0   MEWS Score 1   Oxygen Therapy   SpO2 97 %   O2 Device None (Room air)   Patient sitting up in chair at this time. Alert and oriented x 4, cooperative with care. Received AM medications per order but did refuse Senokot S. Assessment complete, see flow sheet. Resp e/e, no s/s of distress noted. Voices no concerns at this time. Fall precautions in place. Call light within reach.

## 2020-10-10 NOTE — PLAN OF CARE
Problem: Falls - Risk of:  Goal: Will remain free from falls  Description: Will remain free from falls  Outcome: Ongoing  Goal: Absence of physical injury  Description: Absence of physical injury  Outcome: Ongoing     Problem: Infection:  Goal: Will remain free from infection  Description: Will remain free from infection  Outcome: Ongoing     Problem: Safety:  Goal: Free from accidental physical injury  Description: Free from accidental physical injury  Outcome: Ongoing     Problem: Daily Care:  Goal: Daily care needs are met  Description: Daily care needs are met  Outcome: Ongoing     Problem: Pain:  Goal: Patient's pain/discomfort is manageable  Description: Patient's pain/discomfort is manageable  Outcome: Ongoing     Problem: Skin Integrity:  Goal: Skin integrity will stabilize  Description: Skin integrity will stabilize  Outcome: Ongoing     Problem: Urinary Elimination:  Goal: Signs and symptoms of infection will decrease  Description: Signs and symptoms of infection will decrease  Outcome: Ongoing  Goal: Ability to reestablish a normal urinary elimination pattern will improve - after catheter removal  Description: Ability to reestablish a normal urinary elimination pattern will improve  Outcome: Ongoing     Problem: Skin Integrity:  Goal: Will show no infection signs and symptoms  Description: Will show no infection signs and symptoms  Outcome: Ongoing

## 2020-10-11 LAB
ANION GAP SERPL CALCULATED.3IONS-SCNC: 8 MMOL/L (ref 3–16)
BASOPHILS ABSOLUTE: 0 K/UL (ref 0–0.2)
BASOPHILS RELATIVE PERCENT: 1.2 %
BUN BLDV-MCNC: 10 MG/DL (ref 7–20)
CALCIUM SERPL-MCNC: 8.6 MG/DL (ref 8.3–10.6)
CHLORIDE BLD-SCNC: 105 MMOL/L (ref 99–110)
CO2: 24 MMOL/L (ref 21–32)
CREAT SERPL-MCNC: 1 MG/DL (ref 0.8–1.3)
EOSINOPHILS ABSOLUTE: 0.2 K/UL (ref 0–0.6)
EOSINOPHILS RELATIVE PERCENT: 4.2 %
GFR AFRICAN AMERICAN: >60
GFR NON-AFRICAN AMERICAN: >60
GLUCOSE BLD-MCNC: 88 MG/DL (ref 70–99)
HCT VFR BLD CALC: 23 % (ref 40.5–52.5)
HEMOGLOBIN: 7.6 G/DL (ref 13.5–17.5)
LYMPHOCYTES ABSOLUTE: 1.1 K/UL (ref 1–5.1)
LYMPHOCYTES RELATIVE PERCENT: 28.8 %
MCH RBC QN AUTO: 29.8 PG (ref 26–34)
MCHC RBC AUTO-ENTMCNC: 33 G/DL (ref 31–36)
MCV RBC AUTO: 90.3 FL (ref 80–100)
MONOCYTES ABSOLUTE: 0.5 K/UL (ref 0–1.3)
MONOCYTES RELATIVE PERCENT: 13.3 %
NEUTROPHILS ABSOLUTE: 1.9 K/UL (ref 1.7–7.7)
NEUTROPHILS RELATIVE PERCENT: 52.5 %
PDW BLD-RTO: 15.8 % (ref 12.4–15.4)
PLATELET # BLD: 179 K/UL (ref 135–450)
PMV BLD AUTO: 8.4 FL (ref 5–10.5)
POTASSIUM REFLEX MAGNESIUM: 3.6 MMOL/L (ref 3.5–5.1)
RBC # BLD: 2.55 M/UL (ref 4.2–5.9)
SODIUM BLD-SCNC: 137 MMOL/L (ref 136–145)
WBC # BLD: 3.6 K/UL (ref 4–11)

## 2020-10-11 PROCEDURE — 97110 THERAPEUTIC EXERCISES: CPT

## 2020-10-11 PROCEDURE — 97116 GAIT TRAINING THERAPY: CPT

## 2020-10-11 PROCEDURE — 1200000000 HC SEMI PRIVATE

## 2020-10-11 PROCEDURE — 6370000000 HC RX 637 (ALT 250 FOR IP): Performed by: PSYCHIATRY & NEUROLOGY

## 2020-10-11 PROCEDURE — 2580000003 HC RX 258: Performed by: PHYSICIAN ASSISTANT

## 2020-10-11 PROCEDURE — 80048 BASIC METABOLIC PNL TOTAL CA: CPT

## 2020-10-11 PROCEDURE — 85025 COMPLETE CBC W/AUTO DIFF WBC: CPT

## 2020-10-11 PROCEDURE — 6370000000 HC RX 637 (ALT 250 FOR IP): Performed by: INTERNAL MEDICINE

## 2020-10-11 PROCEDURE — 2580000003 HC RX 258: Performed by: PSYCHIATRY & NEUROLOGY

## 2020-10-11 PROCEDURE — 6360000002 HC RX W HCPCS: Performed by: PSYCHIATRY & NEUROLOGY

## 2020-10-11 RX ORDER — GABAPENTIN 300 MG/1
300 CAPSULE ORAL 3 TIMES DAILY
Status: DISCONTINUED | OUTPATIENT
Start: 2020-10-11 | End: 2020-10-13 | Stop reason: HOSPADM

## 2020-10-11 RX ADMIN — GABAPENTIN 300 MG: 300 CAPSULE ORAL at 14:43

## 2020-10-11 RX ADMIN — SENNOSIDES 17.2 MG: 8.6 TABLET, FILM COATED ORAL at 21:14

## 2020-10-11 RX ADMIN — ACETAMINOPHEN 650 MG: 325 TABLET ORAL at 06:12

## 2020-10-11 RX ADMIN — POLYETHYLENE GLYCOL 3350 17 G: 17 POWDER, FOR SOLUTION ORAL at 09:13

## 2020-10-11 RX ADMIN — SODIUM CHLORIDE: 9 INJECTION, SOLUTION INTRAVENOUS at 11:38

## 2020-10-11 RX ADMIN — SODIUM CHLORIDE: 9 INJECTION, SOLUTION INTRAVENOUS at 01:20

## 2020-10-11 RX ADMIN — DULOXETINE HYDROCHLORIDE 30 MG: 30 CAPSULE, DELAYED RELEASE ORAL at 09:12

## 2020-10-11 RX ADMIN — GABAPENTIN 100 MG: 100 CAPSULE ORAL at 13:02

## 2020-10-11 RX ADMIN — METOPROLOL SUCCINATE 50 MG: 50 TABLET, EXTENDED RELEASE ORAL at 09:12

## 2020-10-11 RX ADMIN — CEFTRIAXONE SODIUM 1 G: 1 INJECTION, POWDER, FOR SOLUTION INTRAMUSCULAR; INTRAVENOUS at 11:21

## 2020-10-11 RX ADMIN — LEVOTHYROXINE SODIUM 50 MCG: 50 TABLET ORAL at 06:04

## 2020-10-11 RX ADMIN — METOPROLOL SUCCINATE 50 MG: 50 TABLET, EXTENDED RELEASE ORAL at 17:07

## 2020-10-11 RX ADMIN — AMIODARONE HYDROCHLORIDE 200 MG: 200 TABLET ORAL at 17:07

## 2020-10-11 RX ADMIN — LATANOPROST 1 DROP: 50 SOLUTION/ DROPS OPHTHALMIC at 21:17

## 2020-10-11 RX ADMIN — ATORVASTATIN CALCIUM 40 MG: 40 TABLET, FILM COATED ORAL at 21:14

## 2020-10-11 RX ADMIN — DORZOLAMIDE HYDROCHLORIDE AND TIMOLOL MALEATE 1 DROP: 20; 5 SOLUTION/ DROPS OPHTHALMIC at 21:17

## 2020-10-11 RX ADMIN — FERROUS SULFATE TAB 325 MG (65 MG ELEMENTAL FE) 325 MG: 325 (65 FE) TAB at 09:13

## 2020-10-11 RX ADMIN — DOCUSATE SODIUM 100 MG: 100 CAPSULE, LIQUID FILLED ORAL at 09:12

## 2020-10-11 RX ADMIN — PANTOPRAZOLE SODIUM 40 MG: 40 TABLET, DELAYED RELEASE ORAL at 06:04

## 2020-10-11 RX ADMIN — GABAPENTIN 100 MG: 100 CAPSULE ORAL at 09:13

## 2020-10-11 RX ADMIN — GABAPENTIN 300 MG: 300 CAPSULE ORAL at 21:14

## 2020-10-11 RX ADMIN — Medication 2 CAPSULE: at 17:07

## 2020-10-11 RX ADMIN — DORZOLAMIDE HYDROCHLORIDE AND TIMOLOL MALEATE 1 DROP: 20; 5 SOLUTION/ DROPS OPHTHALMIC at 09:29

## 2020-10-11 RX ADMIN — DULOXETINE HYDROCHLORIDE 30 MG: 30 CAPSULE, DELAYED RELEASE ORAL at 21:14

## 2020-10-11 RX ADMIN — SENNOSIDES 17.2 MG: 8.6 TABLET, FILM COATED ORAL at 09:13

## 2020-10-11 RX ADMIN — RIVAROXABAN 20 MG: 20 TABLET, FILM COATED ORAL at 17:07

## 2020-10-11 RX ADMIN — METHYLPHENIDATE HYDROCHLORIDE 5 MG: 10 TABLET ORAL at 17:07

## 2020-10-11 RX ADMIN — Medication 2 CAPSULE: at 09:12

## 2020-10-11 RX ADMIN — Medication 10 ML: at 17:07

## 2020-10-11 RX ADMIN — METHYLPHENIDATE HYDROCHLORIDE 5 MG: 10 TABLET ORAL at 09:13

## 2020-10-11 ASSESSMENT — PAIN DESCRIPTION - ORIENTATION: ORIENTATION: RIGHT;LEFT

## 2020-10-11 ASSESSMENT — PAIN DESCRIPTION - PROGRESSION
CLINICAL_PROGRESSION: GRADUALLY WORSENING
CLINICAL_PROGRESSION: NOT CHANGED
CLINICAL_PROGRESSION: GRADUALLY IMPROVING

## 2020-10-11 ASSESSMENT — PAIN DESCRIPTION - ONSET: ONSET: ON-GOING

## 2020-10-11 ASSESSMENT — PAIN SCALES - GENERAL
PAINLEVEL_OUTOF10: 7
PAINLEVEL_OUTOF10: 6

## 2020-10-11 ASSESSMENT — PAIN - FUNCTIONAL ASSESSMENT: PAIN_FUNCTIONAL_ASSESSMENT: ACTIVITIES ARE NOT PREVENTED

## 2020-10-11 ASSESSMENT — PAIN DESCRIPTION - PAIN TYPE: TYPE: ACUTE PAIN

## 2020-10-11 ASSESSMENT — PAIN DESCRIPTION - DESCRIPTORS: DESCRIPTORS: NUMBNESS

## 2020-10-11 ASSESSMENT — PAIN DESCRIPTION - LOCATION: LOCATION: LEG

## 2020-10-11 ASSESSMENT — PAIN DESCRIPTION - FREQUENCY: FREQUENCY: CONTINUOUS

## 2020-10-11 NOTE — PROGRESS NOTES
unit.  On arrival to the geriatric psychiatry unit he had a Mendoza in place which it seems was the same mendoza that had been placed on September 13th by urology. Ginger Youssef Mendoza on admission, but he was still retaining urine and therefore nursing staff replaced a Mendoza on 10/2. On 10/5 he started spiking fevers, urinalysis was consistent with infection.  CT abdomen was obtained which showed bilateral hydronephrosis despite having a Mendoza in place which appear to be draining well. Emerald Chung will be transferred to the medical floor for further management and urology consultation. Home Health S4 Level Recommendation: NA  AM-PAC Mobility Score   AM-PAC Inpatient Mobility Raw Score : 17     Treatment Time:  4293-5596  Treatment number: 4  Timed Code Treatment Minutes: 28 minutes  Total Treatment Minutes:  28  minutes    Cognition    A&O x4   Able to follow 2 step commands    Subjective  Patient lying supine in bed with family at bedside (dtr)   Pt agreeable to this PT tx. Pt reports increased soreness in LLE today.     Pain   Yes  Location: LLE  Rating:    moderate/10  Pain Medicine Status: Pain med requested and RN notified, assisted with elevation of LLE for pain     Bed Mobility   Supine to Sit:    Min A with HOB elevated, use of bed rail  Sit to Supine:   Not Tested   Rolling:   Not Tested  Scooting:   SBA    Transfer Training  Min VC for hand placement   Sit to stand:   CGA  Stand to sit:   CGA  Bed to Chair:   CGA with use of rolling walker (RW)    Gait Training gait completed as indicated below  Distance:      40 ft   Deviations (firm surface/linoleum):  narrow ASHLEY, forward flexed posture, deviated path, decreased step length bilaterally and decreased stance time bilaterally    (+) Trendelenburg, proprioceptive input at L hip improved severity of Trendelenburg gait  Assistive Device Used:    rolling walker (RW)  Level of Assist:    CGA   Comment:     Stair Training deferred, pt unsafe/not appropriate to complete stairs at this time    Therapeutic Exercise all completed bilaterally unless indicated  Seated heel raises/ankle pumps: x 10    Balance  Sitting:  Good ; Supervision  Standing: Fair -, 1 posterior LOB with initial stand from EOB requiring min A to correct     Patient Education      Role of PT, POC, Discharge recommendations, DC recommendations, safety awareness and calling for assist with mobility. Positioning Needs       Pt reclined in chair, alarm set, positioned in proper neutral alignment and pressure relief provided. Call light provided and all needs within reach    Activity Tolerance   Pt completed therapy session with No adverse symptoms noted w/activity. Assessment :  Patient limited this date due to pain in LLE. Trendelenburg present on L (R side dropping) this date. Pt able to maintain gait distance with increased pain levels this date. Recommending ARU/IRF/Inpatient Rehab Facility upon discharge as patient functioning well below baseline, demonstrates good rehab potential and unable to return home due to burden of care beyond caregiver ability and home environment not conducive to patient recovery. Goals (all goals ongoing unless otherwise indicated)  To be met in 3 visits:  1). Independent with LE Ex x 10 reps - 10/9 PROGRESSING     To be met in 6 visits:  1). Supine to/from sit: Supervision - 10/9 PROGRESSING  2). Sit to/from stand: CGA - 10/9 GOAL MET  3). Bed to chair: CGA - 10/9 GOAL MET with RW  4). Gait: Ambulate 50 ft.  with  CGA and use of LRAD (least restrictive assistive device)  5). Tolerate B LE exercises 3 sets of 10-15 reps    Plan   Continue with plan of care. Signature: Madhavi Farah, PT, DPT #[id number]      If patient discharges from this facility prior to next visit, this note will serve as the Discharge Summary.

## 2020-10-11 NOTE — PROGRESS NOTES
Hospitalist Progress Note      PCP: Naseem Mccarty    Date of Admission: 10/7/2020    Subjective: pt states he still feels numbness and tingling in b/l LE, wants to try acute rehab    Medications:  Reviewed    Infusion Medications    sodium chloride 100 mL/hr at 10/09/20 1754     Scheduled Medications    fleet  1 enema Rectal Once    amiodarone  200 mg Oral Daily    atorvastatin  40 mg Oral Nightly    cefTRIAXone (ROCEPHIN) IV  1 g Intravenous Q24H    docusate sodium  100 mg Oral Daily    dorzolamide-timolol  1 drop Both Eyes BID    DULoxetine  30 mg Oral BID    ferrous sulfate  325 mg Oral Daily with breakfast    gabapentin  100 mg Oral TID    lactobacillus  2 capsule Oral BID WC    latanoprost  1 drop Left Eye Nightly    levothyroxine  50 mcg Oral Daily    methylphenidate  5 mg Oral BID WC    metoprolol succinate  50 mg Oral BID    pantoprazole  40 mg Oral Daily    polyethylene glycol  17 g Oral Daily    rivaroxaban  20 mg Oral Daily    senna  2 tablet Oral BID    sodium chloride flush  10 mL Intravenous Q12H     PRN Meds: acetaminophen, aluminum & magnesium hydroxide-simethicone, benztropine mesylate, haloperidol lactate **OR** haloperidol, LORazepam **OR** LORazepam, magnesium hydroxide, traZODone, diphenhydrAMINE, nitroGLYCERIN, oxyCODONE-acetaminophen, oxyCODONE-acetaminophen      Intake/Output Summary (Last 24 hours) at 10/10/2020 2241  Last data filed at 10/10/2020 2126  Gross per 24 hour   Intake 120 ml   Output 2475 ml   Net -2355 ml       Physical Exam Performed:    BP (!) 158/86   Pulse 59   Temp 97.1 °F (36.2 °C) (Oral)   Resp 16   Ht 5' 9\" (1.753 m)   Wt 160 lb 14.4 oz (73 kg)   SpO2 96%   BMI 23.76 kg/m²     General appearance: alert, appears stated age and cooperative  Head: Normocephalic, without obvious abnormality, atraumatic  Eyes: conjunctivae/corneas clear. PERRL, EOM's intact.   Neck: no adenopathy, no carotid bruit, no JVD, supple, symmetrical, trachea midline and thyroid not enlarged, symmetric, no tenderness/mass/nodules  Lungs: clear to auscultation bilaterally  Heart: regular rate and rhythm, S1, S2 normal, no murmur, click, rub or gallop  Abdomen: soft, mildly distended and tender; bowel sounds normal; no masses,  no organomegaly  Extremities: extremities normal, atraumatic, no cyanosis or edema  Pulses: 2+ and symmetric  Skin: Skin color, texture, turgor normal. No rashes or lesions  Neurologic: Grossly normal    Labs:   Recent Labs     10/08/20  0527 10/09/20  0600 10/10/20  0545   WBC 5.7 3.9* 3.3*   HGB 8.5* 8.6* 8.6*   HCT 26.1* 26.2* 26.4*   * 144 146     Recent Labs     10/08/20  0527 10/09/20  0600 10/10/20  0545   * 135* 136   K 4.0 3.9 3.6    105 105   CO2 23 22 22   BUN 23* 16 13   CREATININE 1.5* 1.2 1.1   CALCIUM 8.2* 8.7 8.6     No results for input(s): AST, ALT, BILIDIR, BILITOT, ALKPHOS in the last 72 hours. No results for input(s): INR in the last 72 hours. No results for input(s): Varina Lager in the last 72 hours. Urinalysis:      Lab Results   Component Value Date    NITRU POSITIVE 10/05/2020    WBCUA see below 10/05/2020    BACTERIA 4+ 10/05/2020    RBCUA see below 10/05/2020    BLOODU SMALL 10/05/2020    SPECGRAV 1.010 10/05/2020    GLUCOSEU Negative 10/05/2020       Radiology:  US RENAL COMPLETE   Final Result   Mild bilateral hydronephrosis. Small bladder calculus.                  Assessment/Plan:    Active Hospital Problems    Diagnosis    Constipation [K59.00]    Chronic obstructive pulmonary disease (HCC) [G96.8]    Complicated UTI (urinary tract infection) [N39.0]    PAF (paroxysmal atrial fibrillation) (HCC) [I48.0]    Coronary artery disease involving native heart without angina pectoris [I25.10]    Hypothyroidism [E03.9]    MDD (major depressive disorder), recurrent episode ([facility] [I75.9]         #Complicated Proteus UTI    #Urinary Retention   - recent admission  for sepsis 2/2 UTI.  s/p cystoscopy for mendoza placement on 9/13/20 at [facility], + false urethral passage noted at that time. Steffany Hawkins was then discharged to ARU before coming to [facility] psych unit for admission   - urology consulted on arrival to [facility], mendoza that was placed on 9/13 was removed on 10/2 but he failed voiding trial, and therefore nursing staff replaced mendoza on 10/2   - on 10/5 he started spiking fevers, UA consistent with UTI.  CT showed bilateral hydro- see above- despite mendoza appearing to be draining adequately   >> discharged from psych unit and readmitted to med surg floor on 10/7  - initially placed on IV Merrem, changed to Rocephin D#3 when proteus culture and sensitivity resulted   - Urology consulted  - renal USG as above. Mild mina hydronephrosis  - blood cultures (including one drawn from PICC line) are NG  -  His catheter is now draining clear urine.   - will need TURP outpt when more improved medically     #TONO on CKD stage III  - Cr on admission: 1.2  - baseline: ~1.1  - Crt 1.2 --> 1.5 --> 1.2-->1.1 today.  Cont IVF      #Hyponatremia resolved  - Na is 133 -> 135-->136   cont saline   - monitor Na     #MDD  - s/p treatment on the geriatric psychiatric unit.  Will continue medications per psychiatry recs: duloxetine 30 mg BID and methylphenidate 5 mg BID       #Recent Retroperitoneal Hematoma  - 2/2 recent Lumbar procedure as below  - Xarelto was held initially, but was resumed when he was admitted to [facility].  H/h has remained stable if you look at the trend over the last few weeks      #COPD  - no AE  - add PRN Albuterol     #CAD s/p stent  - no c/o chest pain  - holding ASA for now, cont Lipitor     #AAA  - stable on CT.  Will need OP monitoring with repeat imaging in 5 years     #Atrial Fibrillation  - held Xarelto 2/2 hematuria and hematoma at [facility]  - cont Toprol XL and amio.   restarted Xarelto now       #HTN  - uncontrolled  - cont Toprol XL (losartan recently d/c'd 2/2 TONO)     #Hypothyroidism  - home dose of synthroid 50 mcg continued      #GERD  - cont Protonix       #Chronic dCHF  - noted on echo 9/19/2020 - EF 50%  - appears compensated  - cont Toprol XL     #Iron Deficiency Anemia  - cont Ferrous Sulfate  - monitor h/h      #Lumbar Stenosis  - s/p recent L4-S1 ALIF/PSF  - 8/31 with Dr. Terell Hayes and Dr. Sera Carranza     #Hx of CVA  - cont Lipitor     #RUE PICC in place since 9/14/2020     #Severe constipation  -Patient on MiraLAX, Colace and Senokot.    - Add lactulose 2 doses today  - Placed order for fleets enema      Discharge planning for am.  For SNF placement  Needs precert       DVT Prophylaxis: Xarelto  Diet: DIET GENERAL;  Dietary Nutrition Supplements: Standard High Calorie Oral Supplement  Code Status: DNR-CCA    PT/OT Eval Status: ordered, pt and family wanting ARU    Dispo - cont care, poss d/c to ARU vs SNF Juve Prado MD

## 2020-10-11 NOTE — PLAN OF CARE
Problem: Falls - Risk of:  Goal: Will remain free from falls  Description: Will remain free from falls  10/11/2020 0748 by Alexander Barnett RN  Outcome: Ongoing    Goal: Absence of physical injury  Description: Absence of physical injury  10/11/2020 0748 by Alexander Barnett RN  Outcome: Ongoing       Problem: Infection:  Goal: Will remain free from infection  Description: Will remain free from infection  10/11/2020 0748 by Alexander Barnett RN  Outcome: Ongoing       Problem: Daily Care:  Goal: Daily care needs are met  Description: Daily care needs are met  10/11/2020 0748 by Alexander Barnett RN  Outcome: Ongoing    Problem: Pain:  Goal: Patient's pain/discomfort is manageable  Description: Patient's pain/discomfort is manageable  10/11/2020 0748 by Alexander Barnett RN  Outcome: Ongoing       Problem: Skin Integrity:  Goal: Skin integrity will stabilize  Description: Skin integrity will stabilize  10/11/2020 0748 by Alexander Barnett RN  Outcome: Ongoing       Problem: Discharge Planning:  Goal: Patients continuum of care needs are met  Description: Patients continuum of care needs are met  10/11/2020 0748 by Alexander Barnett RN  Outcome: Ongoing         Problem: Urinary Elimination:  Goal: Signs and symptoms of infection will decrease  Description: Signs and symptoms of infection will decrease  10/11/2020 0748 by Alexander Barnett RN  Outcome: Ongoing    Goal: Ability to reestablish a normal urinary elimination pattern will improve - after catheter removal  Description: Ability to reestablish a normal urinary elimination pattern will improve  10/11/2020 0748 by Alexander Barnett RN  Outcome: Ongoing    Goal: Complications related to the disease process, condition or treatment will be avoided or minimized  Description: Complications related to the disease process, condition or treatment will be avoided or minimized  10/11/2020 0748 by Alexander Barnett RN  Outcome: Ongoing

## 2020-10-11 NOTE — FLOWSHEET NOTE
10/11/20 0844   Vital Signs   Temp 96.8 °F (36 °C)   Temp Source Oral   Pulse 59   Heart Rate Source Monitor   Resp 16   BP (!) 169/84   BP Location Left upper arm   BP Upper/Lower Upper   Patient Position High fowlers   Level of Consciousness 0   MEWS Score 1   Oxygen Therapy   SpO2 95 %   O2 Device None (Room air)   Patient resting in bed at this time, alert and oriented x 4. Received AM medications per order and tolerated well. Resp e/e, no s/s of distress noted. Assessment complete, see flow sheet. Voices no concerns at this time. Fall precautions in place. Call light within reach.

## 2020-10-11 NOTE — PROGRESS NOTES
PM assessment complete. Pt in bed A/O x4. PM meds given. See MAR. Back in bed. Denies further needs at this time. Call light and bedside table within reach.  Bed alarm on.

## 2020-10-11 NOTE — PLAN OF CARE
Problem: Falls - Risk of:  Goal: Will remain free from falls  Description: Will remain free from falls  10/11/2020 0021 by Deedee Dos Santos RN  Outcome: Ongoing  10/10/2020 1601 by Gladis Goncalves RN  Outcome: Ongoing  Goal: Absence of physical injury  Description: Absence of physical injury  10/11/2020 0021 by Deedee Dos Santos RN  Outcome: Ongoing  10/10/2020 1601 by Gladis Goncalves RN  Outcome: Ongoing     Problem: Infection:  Goal: Will remain free from infection  Description: Will remain free from infection  10/11/2020 0021 by Deedee Dos Santos RN  Outcome: Ongoing  10/10/2020 1601 by Gladis Goncalves RN  Outcome: Ongoing     Problem: Safety:  Goal: Free from accidental physical injury  Description: Free from accidental physical injury  10/11/2020 0021 by Deedee Dos Santos RN  Outcome: Ongoing  10/10/2020 1601 by Gladis Goncalves RN  Outcome: Ongoing  Goal: Free from intentional harm  Description: Free from intentional harm  Outcome: Ongoing     Problem: Daily Care:  Goal: Daily care needs are met  Description: Daily care needs are met  10/11/2020 0021 by Deedee Dos Santos RN  Outcome: Ongoing  10/10/2020 1601 by Gladis Goncalves RN  Outcome: Ongoing     Problem: Pain:  Goal: Patient's pain/discomfort is manageable  Description: Patient's pain/discomfort is manageable  10/11/2020 0021 by Deedee Dos Santos RN  Outcome: Ongoing  10/10/2020 1601 by Gladis Goncalves RN  Outcome: Ongoing     Problem: Skin Integrity:  Goal: Skin integrity will stabilize  Description: Skin integrity will stabilize  10/11/2020 0021 by Deedee Dos Santos RN  Outcome: Ongoing  10/10/2020 1601 by Gladis Goncalves RN  Outcome: Ongoing     Problem: Discharge Planning:  Goal: Patients continuum of care needs are met  Description: Patients continuum of care needs are met  Outcome: Ongoing     Problem: Sensory:  Goal: General experience of comfort will improve  Description: General experience of comfort will improve  Outcome: Ongoing     Problem: Urinary Elimination:  Goal: Signs and symptoms of infection will decrease  Description: Signs and symptoms of infection will decrease  10/11/2020 0021 by Rohini Urbina RN  Outcome: Ongoing  10/10/2020 1601 by Florentino Up RN  Outcome: Ongoing  Goal: Ability to reestablish a normal urinary elimination pattern will improve - after catheter removal  Description: Ability to reestablish a normal urinary elimination pattern will improve  10/11/2020 0021 by Rohini Urbina RN  Outcome: Ongoing  10/10/2020 1601 by Florentino Up RN  Outcome: Ongoing  Goal: Complications related to the disease process, condition or treatment will be avoided or minimized  Description: Complications related to the disease process, condition or treatment will be avoided or minimized  Outcome: Ongoing     Problem: Skin Integrity:  Goal: Will show no infection signs and symptoms  Description: Will show no infection signs and symptoms  10/11/2020 0021 by Rohini Urbina RN  Outcome: Ongoing  10/10/2020 1601 by Florentino Up RN  Outcome: Ongoing  Goal: Absence of new skin breakdown  Description: Absence of new skin breakdown  Outcome: Ongoing

## 2020-10-11 NOTE — PROGRESS NOTES
Given PRN meds for pain 9/10. See MAR. Not relieved by initial intervention. 170/82 BP. Pt states his pain started at noon.

## 2020-10-12 LAB
ANION GAP SERPL CALCULATED.3IONS-SCNC: 10 MMOL/L (ref 3–16)
BASOPHILS ABSOLUTE: 0 K/UL (ref 0–0.2)
BASOPHILS RELATIVE PERCENT: 0.7 %
BUN BLDV-MCNC: 15 MG/DL (ref 7–20)
CALCIUM SERPL-MCNC: 8.9 MG/DL (ref 8.3–10.6)
CHLORIDE BLD-SCNC: 101 MMOL/L (ref 99–110)
CO2: 24 MMOL/L (ref 21–32)
CREAT SERPL-MCNC: 1.1 MG/DL (ref 0.8–1.3)
EOSINOPHILS ABSOLUTE: 0.2 K/UL (ref 0–0.6)
EOSINOPHILS RELATIVE PERCENT: 3.9 %
GFR AFRICAN AMERICAN: >60
GFR NON-AFRICAN AMERICAN: >60
GLUCOSE BLD-MCNC: 118 MG/DL (ref 70–99)
GLUCOSE BLD-MCNC: 82 MG/DL (ref 70–99)
HCT VFR BLD CALC: 26.7 % (ref 40.5–52.5)
HEMOGLOBIN: 8.8 G/DL (ref 13.5–17.5)
LYMPHOCYTES ABSOLUTE: 1.3 K/UL (ref 1–5.1)
LYMPHOCYTES RELATIVE PERCENT: 28.3 %
MCH RBC QN AUTO: 30.2 PG (ref 26–34)
MCHC RBC AUTO-ENTMCNC: 33.1 G/DL (ref 31–36)
MCV RBC AUTO: 91.2 FL (ref 80–100)
MONOCYTES ABSOLUTE: 0.5 K/UL (ref 0–1.3)
MONOCYTES RELATIVE PERCENT: 12 %
NEUTROPHILS ABSOLUTE: 2.4 K/UL (ref 1.7–7.7)
NEUTROPHILS RELATIVE PERCENT: 55.1 %
PDW BLD-RTO: 15.4 % (ref 12.4–15.4)
PERFORMED ON: ABNORMAL
PLATELET # BLD: 178 K/UL (ref 135–450)
PMV BLD AUTO: 8.1 FL (ref 5–10.5)
POTASSIUM REFLEX MAGNESIUM: 3.7 MMOL/L (ref 3.5–5.1)
RBC # BLD: 2.92 M/UL (ref 4.2–5.9)
SARS-COV-2, NAAT: NOT DETECTED
SODIUM BLD-SCNC: 135 MMOL/L (ref 136–145)
WBC # BLD: 4.4 K/UL (ref 4–11)

## 2020-10-12 PROCEDURE — 6370000000 HC RX 637 (ALT 250 FOR IP): Performed by: NURSE PRACTITIONER

## 2020-10-12 PROCEDURE — 97113 AQUATIC THERAPY/EXERCISES: CPT

## 2020-10-12 PROCEDURE — 85025 COMPLETE CBC W/AUTO DIFF WBC: CPT

## 2020-10-12 PROCEDURE — 97110 THERAPEUTIC EXERCISES: CPT

## 2020-10-12 PROCEDURE — 6370000000 HC RX 637 (ALT 250 FOR IP): Performed by: INTERNAL MEDICINE

## 2020-10-12 PROCEDURE — 36592 COLLECT BLOOD FROM PICC: CPT

## 2020-10-12 PROCEDURE — 80048 BASIC METABOLIC PNL TOTAL CA: CPT

## 2020-10-12 PROCEDURE — 2580000003 HC RX 258: Performed by: NURSE PRACTITIONER

## 2020-10-12 PROCEDURE — U0002 COVID-19 LAB TEST NON-CDC: HCPCS

## 2020-10-12 PROCEDURE — 99233 SBSQ HOSP IP/OBS HIGH 50: CPT | Performed by: NURSE PRACTITIONER

## 2020-10-12 PROCEDURE — 1200000000 HC SEMI PRIVATE

## 2020-10-12 PROCEDURE — 2580000003 HC RX 258: Performed by: PSYCHIATRY & NEUROLOGY

## 2020-10-12 PROCEDURE — 6360000002 HC RX W HCPCS: Performed by: PSYCHIATRY & NEUROLOGY

## 2020-10-12 PROCEDURE — 6370000000 HC RX 637 (ALT 250 FOR IP): Performed by: PSYCHIATRY & NEUROLOGY

## 2020-10-12 RX ORDER — METOPROLOL TARTRATE 50 MG/1
50 TABLET, FILM COATED ORAL 2 TIMES DAILY
Status: DISCONTINUED | OUTPATIENT
Start: 2020-10-12 | End: 2020-10-13 | Stop reason: HOSPADM

## 2020-10-12 RX ORDER — 0.9 % SODIUM CHLORIDE 0.9 %
500 INTRAVENOUS SOLUTION INTRAVENOUS ONCE
Status: COMPLETED | OUTPATIENT
Start: 2020-10-12 | End: 2020-10-12

## 2020-10-12 RX ADMIN — DORZOLAMIDE HYDROCHLORIDE AND TIMOLOL MALEATE 1 DROP: 20; 5 SOLUTION/ DROPS OPHTHALMIC at 09:31

## 2020-10-12 RX ADMIN — AMIODARONE HYDROCHLORIDE 200 MG: 200 TABLET ORAL at 17:35

## 2020-10-12 RX ADMIN — TRAZODONE HYDROCHLORIDE 25 MG: 50 TABLET ORAL at 22:14

## 2020-10-12 RX ADMIN — GABAPENTIN 300 MG: 300 CAPSULE ORAL at 08:55

## 2020-10-12 RX ADMIN — Medication 2 CAPSULE: at 17:35

## 2020-10-12 RX ADMIN — LEVOTHYROXINE SODIUM 50 MCG: 50 TABLET ORAL at 06:07

## 2020-10-12 RX ADMIN — LATANOPROST 1 DROP: 50 SOLUTION/ DROPS OPHTHALMIC at 22:05

## 2020-10-12 RX ADMIN — METOPROLOL TARTRATE 50 MG: 50 TABLET, FILM COATED ORAL at 22:05

## 2020-10-12 RX ADMIN — Medication 2 CAPSULE: at 08:55

## 2020-10-12 RX ADMIN — PANTOPRAZOLE SODIUM 40 MG: 40 TABLET, DELAYED RELEASE ORAL at 06:07

## 2020-10-12 RX ADMIN — DULOXETINE HYDROCHLORIDE 30 MG: 30 CAPSULE, DELAYED RELEASE ORAL at 08:54

## 2020-10-12 RX ADMIN — CEFTRIAXONE SODIUM 1 G: 1 INJECTION, POWDER, FOR SOLUTION INTRAMUSCULAR; INTRAVENOUS at 12:13

## 2020-10-12 RX ADMIN — SENNOSIDES 17.2 MG: 8.6 TABLET, FILM COATED ORAL at 08:54

## 2020-10-12 RX ADMIN — Medication 10 ML: at 17:35

## 2020-10-12 RX ADMIN — SENNOSIDES 17.2 MG: 8.6 TABLET, FILM COATED ORAL at 22:04

## 2020-10-12 RX ADMIN — ATORVASTATIN CALCIUM 40 MG: 40 TABLET, FILM COATED ORAL at 22:05

## 2020-10-12 RX ADMIN — METHYLPHENIDATE HYDROCHLORIDE 5 MG: 10 TABLET ORAL at 17:40

## 2020-10-12 RX ADMIN — SODIUM CHLORIDE 500 ML: 9 INJECTION, SOLUTION INTRAVENOUS at 12:21

## 2020-10-12 RX ADMIN — DULOXETINE HYDROCHLORIDE 30 MG: 30 CAPSULE, DELAYED RELEASE ORAL at 22:04

## 2020-10-12 RX ADMIN — Medication 10 ML: at 06:07

## 2020-10-12 RX ADMIN — METOPROLOL SUCCINATE 50 MG: 50 TABLET, EXTENDED RELEASE ORAL at 08:55

## 2020-10-12 RX ADMIN — DORZOLAMIDE HYDROCHLORIDE AND TIMOLOL MALEATE 1 DROP: 20; 5 SOLUTION/ DROPS OPHTHALMIC at 22:05

## 2020-10-12 RX ADMIN — METHYLPHENIDATE HYDROCHLORIDE 5 MG: 10 TABLET ORAL at 08:55

## 2020-10-12 RX ADMIN — GABAPENTIN 300 MG: 300 CAPSULE ORAL at 22:04

## 2020-10-12 RX ADMIN — DOCUSATE SODIUM 100 MG: 100 CAPSULE, LIQUID FILLED ORAL at 08:54

## 2020-10-12 RX ADMIN — POLYETHYLENE GLYCOL 3350 17 G: 17 POWDER, FOR SOLUTION ORAL at 08:52

## 2020-10-12 RX ADMIN — RIVAROXABAN 20 MG: 20 TABLET, FILM COATED ORAL at 17:35

## 2020-10-12 RX ADMIN — GABAPENTIN 300 MG: 300 CAPSULE ORAL at 14:14

## 2020-10-12 RX ADMIN — FERROUS SULFATE TAB 325 MG (65 MG ELEMENTAL FE) 325 MG: 325 (65 FE) TAB at 08:54

## 2020-10-12 RX ADMIN — OXYCODONE HYDROCHLORIDE AND ACETAMINOPHEN 1 TABLET: 7.5; 325 TABLET ORAL at 00:27

## 2020-10-12 ASSESSMENT — PAIN SCALES - GENERAL
PAINLEVEL_OUTOF10: 8
PAINLEVEL_OUTOF10: 9

## 2020-10-12 ASSESSMENT — PAIN DESCRIPTION - ONSET: ONSET: ON-GOING

## 2020-10-12 ASSESSMENT — PAIN DESCRIPTION - PROGRESSION: CLINICAL_PROGRESSION: GRADUALLY WORSENING

## 2020-10-12 ASSESSMENT — PAIN DESCRIPTION - DESCRIPTORS: DESCRIPTORS: ACHING;THROBBING

## 2020-10-12 ASSESSMENT — PAIN - FUNCTIONAL ASSESSMENT: PAIN_FUNCTIONAL_ASSESSMENT: ACTIVITIES ARE NOT PREVENTED

## 2020-10-12 ASSESSMENT — PAIN DESCRIPTION - PAIN TYPE: TYPE: ACUTE PAIN

## 2020-10-12 ASSESSMENT — PAIN DESCRIPTION - LOCATION: LOCATION: LEG

## 2020-10-12 ASSESSMENT — PAIN DESCRIPTION - FREQUENCY: FREQUENCY: CONTINUOUS

## 2020-10-12 ASSESSMENT — PAIN DESCRIPTION - ORIENTATION: ORIENTATION: LEFT

## 2020-10-12 NOTE — PROGRESS NOTES
Bedside report given and pt care transferred to [address] [place] [address]. Pt denies needs at this time. Call light within reach.

## 2020-10-12 NOTE — PROGRESS NOTES
PCA notified writer pt c/o being diaphoretic, feeling dizzy. BP 97/61 HR 73. Sat O2 88% RA. Applied 2L O2 NC. A/O x 4. Notified Gabe Nova NP. Assessment complete. Notified primary nurse. Orders for bolus in place.  Notified clinical.

## 2020-10-12 NOTE — CARE COORDINATION
DONATO BAIRD contacted Mallorie Morales with [facility] for re eval per Pt dtr request. Mallorie Morales will have MD assess Pt for admission and will call this RN CM back with decision. Addendum 1405pm: Received phone call from [address] [place] [address], [phone] who states that they will accept the Pt and will start pre cert today. He will need a rapid Covid prior to discharge. Covid ordered and MD aware. Pt had episode of dizziness and hypotension today. Pt will likely be ready for d/c tomorrow pending Pt progress.

## 2020-10-12 NOTE — PROGRESS NOTES
Pt resting. Resp e/e. Shift assessment completed and charted. No needs. Will monitor.  [facility] Records

## 2020-10-12 NOTE — PROGRESS NOTES
Occupational Therapy Daily Treatment Note    Unit: [facility]  Date:  10/12/2020  Patient Name:    Saad Sanchez. Admitting diagnosis:  Complicated UTI (urinary tract infection) [N39.0]  Admit Date:  10/7/2020  Precautions/Restrictions:  fall risk, IV, bed/chair alarm and mendoza catheter spinal precautions        Discharge Recommendations: Acute Rehab (ARU)/ Inpatient Rehab Facility (IRF)  DME needs for discharge: defer to facility       Therapy recommendations for staff:   Assist of 1 (contact guard assist) with use of rolling walker (RW) for all transfers to/from bathroom    AM-PAC Score: AM-PAC Inpatient Daily Activity Raw Score: 15  Home Health S4 Level: Level 1- Standard       Treatment Time:  10:45-11:16  Treatment number:  3  Total Treatment Time:  31 minutes    History of Present Illness: 67 y/o male admitted to [facility] SNF setting 9/13/2020 with Altered Mental Status, Severe Sepsis, Acute Renal Failure, and Pelvic Hematoma.  CT Head negative.  CT Abd/Pelvis + L Pelvic Hematoma.  Urology consult for Mendoza Placement.   PMH as noted including Recent Back Surg (L4/L5 and L5/S1 Ant/Post Fusion, 8/31/2020), CAD, Angio with Stent, A-Fib, Aortic Aneurysm. Pt was receiving OT at [facility] 5-7x/wk, but was not progressing as expected due to pt's depression  The patient is a 73 y. o. male with multiple chronic medical problems including coronary artery disease, COPD, chronic atrial fibrillation, as well as complicated recent hospital admissions for a retroperitoneal hematoma after lumbar surgery as well as sepsis secondary to UTI. [facility] was admitted at [facility] in September for sepsis secondary to UTI, seen by urology, underwent cystoscopy which showed a false urethral passage.  Treated with antibiotics and discharged to the acute rehab unit with a Mendoza catheter in place.  Eventually transferred to the inpatient geriatric psychiatry unit at [facility] directly from the acute rehab unit.  On arrival to the geriatric psychiatry unit he had a Mendoza in place which it seems was the same mendoza that had been placed on September 13th by urology. Claire Calloway Mendoza on admission, but he was still retaining urine and therefore nursing staff replaced a Mnedoza on 10/2. On 10/5 he started spiking fevers, urinalysis was consistent with infection.  CT abdomen was obtained which showed bilateral hydronephrosis despite having a Mendoza in place which appear to be draining well. [facility] will be transferred to the medical floor for further management and urology consultation.       Subjective:  Patient agreeable to therapy and reports feeling better today. Pain   Yes  Rating: moderate  Location:left leg  Pain Medicine Status: RN notified      Bed Mobility:   Supine to Sit:  SBA  Sit to Supine:  Not Tested  Rolling:           Not Tested  Scooting:        SBA    Transfer Training:   Sit to stand:   CGA  Stand to sit:  CGA  Bed to Chair:  CGA with RW  Bed to [facility]:   Not Tested  Standard toilet:   Not Tested     Completed functional mobility to door and back with CGA and use of RW. Patient reports increase pain in L LE with weight bearing. Activity Tolerance   Pt completed therapy session with Pain noted with activity  SpO2:   HR:   BP:     ADL Training:   Upper body dressing:  SBA  Upper body bathing:  SBA  Lower body dressing:  Not Tested    Lower body bathing:  Not Tested  Toileting:   Not Tested  Grooming/Hygiene:  SBA    Therapeutic Exercise:   Completed exercises within full available AROM x10 reps:  Shoulder flex/ext  Shoulder abd/adduction   Shoulder horizontal abd/add  Scapular retraction  Reaching across midline     Patient Education:   Role of OT  Recommendations for DC    Positioning Needs:   Up in chair, call light and needs in reach. Alarm Set    Family Present:  No    Assessment: Patient agreeable to therapy and cooperative. Will continue acute OT and recommend therapy at ARU. GOALS  1).  Bed to toilet/BSC: Supervision     To be met in 5 Visits:  1). Supine to/from Sit:             SBA  2). Upper Body Bathing:         Supervision  3). Lower Body Bathing:         SBA  4). Upper Body Dressing:       Supervision  5). Lower Body Dressing:       SBA  6).  Pt to demonstrate UE exs x 15 reps with minimal cues         Plan: cont with [facility], OTR/L #[id number]        If patient discharges from this facility prior to next visit, this note will serve as the Discharge Summary

## 2020-10-12 NOTE — PROGRESS NOTES
AM assessment done at this time. Patient alert and oriented x 4. Patient has no needs or pain noted at this time. Patient in bed, call light within reach.

## 2020-10-12 NOTE — PROGRESS NOTES
Progress Note    Admit Date:  10/7/2020    Subjective:  Mr. [facility] had an episode of dizziness. He was worked with PT this morning. While sitting in the chair he became dizzy and diaphoretic. BP has dropped to 90's/60's. Blood glucose okay. Objective:   Patient Vitals for the past 4 hrs:   BP Pulse Resp SpO2   10/12/20 1200 (!) 94/58 -- -- --   10/12/20 1159 97/61 61 16 99 %            Intake/Output Summary (Last 24 hours) at 10/12/2020 1209  Last data filed at 10/12/2020 0738  Gross per 24 hour   Intake 480 ml   Output 1850 ml   Net -1370 ml       Physical Exam:  Gen: + diaphoretic. No distress. Alert. Eyes: PERRL. No sclera icterus. No conjunctival injection. ENT: No discharge. Pharynx clear. Neck: No JVD. No Carotid Bruit. Trachea midline. Resp: No accessory muscle use. No crackles. No wheezes. No rhonchi. CV: Regular rate. Regular rhythm. No murmur. No rub. No edema. Capillary Refill: Brisk,< 3 seconds   Peripheral Pulses: +2 palpable, equal bilaterally   GI: Non-tender. Non-distended. Normal bowel sounds. No hernia. Skin: Warm and dry. No nodule on exposed extremities. No rash on exposed extremities. M/S: No cyanosis. No joint deformity. No clubbing. Neuro: Awake. Grossly nonfocal    Psych: Oriented x 3. No anxiety or agitation      Data:  CBC:   Recent Labs     10/10/20  0545 10/11/20  0555 10/12/20  0610   WBC 3.3* 3.6* 4.4   HGB 8.6* 7.6* 8.8*   HCT 26.4* 23.0* 26.7*   MCV 92.4 90.3 91.2    179 178     BMP:   Recent Labs     10/10/20  0545 10/11/20  0555 10/12/20  0610    137 135*   K 3.6 3.6 3.7    105 101   CO2 22 24 24   BUN 13 10 15   CREATININE 1.1 1.0 1.1     LIVER PROFILE: No results for input(s): AST, ALT, LIPASE, BILIDIR, BILITOT, ALKPHOS in the last 72 hours. Invalid input(s): AMYLASE,  ALB  PT/INR: No results for input(s): PROTIME, INR in the last 72 hours.     CULTURES  Blood 10/6/2020: NGTD  Urine 10/5/2020  Proteus mirabilis (1)     Antibiotic  Interpretation  LORENA  Status      ampicillin  Sensitive  <=8  mcg/mL        ceFAZolin  Sensitive  <=2  mcg/mL        cefepime  Sensitive  <=2  mcg/mL        cefTRIAXone  Sensitive  <=1  mcg/mL        cefuroxime  Sensitive  <=4  mcg/mL        ciprofloxacin  Sensitive  <=1  mcg/mL        ertapenem  Sensitive  <=0.5  mcg/mL        gentamicin  Sensitive  <=4  mcg/mL        meropenem  Sensitive  <=1  mcg/mL        nitrofurantoin  Resistant  >64  mcg/mL        piperacillin-tazobactam  Sensitive  <=16  mcg/mL        trimethoprim-sulfamethoxazole  Sensitive  <=2/38  mcg/mL               RADIOLOGY  US RENAL COMPLETE   Final Result   Mild bilateral hydronephrosis. Small bladder calculus. Assessment/Plan:  #Complicated Proteus UTI    #Urinary Retention   - recent admission  for sepsis 2/2 UTI.  s/p cystoscopy for mendoza placement on 9/13/20 at [facility], + false urethral passage noted at that time. Brooke Salas was then discharged to ARU before coming to [facility] psych unit for admission   - urology consulted on arrival to [facility], mendoza that was placed on 9/13 was removed on 10/2 but he failed voiding trial, and therefore nursing staff replaced mendoza on 10/2   - on 10/5 he started spiking fevers, UA consistent with UTI.  CT showed bilateral hydro- see above- despite mendoza appearing to be draining adequately   >> discharged from psych unit and readmitted to [facility] floor on 10/7  - initially placed on IV Merrem, changed to Rocephin D#4 when proteus culture and sensitivity resulted   - Urology consulted  - renal USG as above. Mild mina hydronephrosis  - blood cultures (including one drawn from PICC line) are NGTD  -  His catheter is now draining clear urine.   - will need TURP outpatient when more improved medically     #TONO on CKD stage III  - Cr on admission: 1.2  - baseline: ~1.1  - Crt 1.2 --> 1.5 --> 1.2-->1.1     #Dizziness, near syncope  - BP dropped significantly. Changed Toprol-XL to Lopressor.  Holding

## 2020-10-13 ENCOUNTER — HOSPITAL ENCOUNTER (INPATIENT)
Age: 75
LOS: 12 days | Discharge: HOME HEALTH | DRG: 948 | End: 2020-10-25
Attending: PHYSICAL MEDICINE & REHABILITATION | Admitting: PHYSICAL MEDICINE & REHABILITATION
Payer: MEDICARE

## 2020-10-13 VITALS
DIASTOLIC BLOOD PRESSURE: 63 MMHG | BODY MASS INDEX: 23.13 KG/M2 | SYSTOLIC BLOOD PRESSURE: 149 MMHG | HEART RATE: 57 BPM | HEIGHT: 69 IN | OXYGEN SATURATION: 100 % | WEIGHT: 156.2 LBS | TEMPERATURE: 96.8 F | RESPIRATION RATE: 18 BRPM

## 2020-10-13 LAB
ANION GAP SERPL CALCULATED.3IONS-SCNC: 7 MMOL/L (ref 3–16)
BASOPHILS ABSOLUTE: 0.1 K/UL (ref 0–0.2)
BASOPHILS RELATIVE PERCENT: 1.7 %
BUN BLDV-MCNC: 22 MG/DL (ref 7–20)
CALCIUM SERPL-MCNC: 8.6 MG/DL (ref 8.3–10.6)
CHLORIDE BLD-SCNC: 100 MMOL/L (ref 99–110)
CO2: 26 MMOL/L (ref 21–32)
CREAT SERPL-MCNC: 1.3 MG/DL (ref 0.8–1.3)
EOSINOPHILS ABSOLUTE: 0.1 K/UL (ref 0–0.6)
EOSINOPHILS RELATIVE PERCENT: 3 %
GFR AFRICAN AMERICAN: >60
GFR NON-AFRICAN AMERICAN: 54
GLUCOSE BLD-MCNC: 89 MG/DL (ref 70–99)
HCT VFR BLD CALC: 25.3 % (ref 40.5–52.5)
HEMOGLOBIN: 8.3 G/DL (ref 13.5–17.5)
LYMPHOCYTES ABSOLUTE: 1.3 K/UL (ref 1–5.1)
LYMPHOCYTES RELATIVE PERCENT: 27.7 %
MCH RBC QN AUTO: 30.2 PG (ref 26–34)
MCHC RBC AUTO-ENTMCNC: 32.9 G/DL (ref 31–36)
MCV RBC AUTO: 91.8 FL (ref 80–100)
MONOCYTES ABSOLUTE: 0.6 K/UL (ref 0–1.3)
MONOCYTES RELATIVE PERCENT: 12.3 %
NEUTROPHILS ABSOLUTE: 2.6 K/UL (ref 1.7–7.7)
NEUTROPHILS RELATIVE PERCENT: 55.3 %
PDW BLD-RTO: 15.5 % (ref 12.4–15.4)
PLATELET # BLD: 165 K/UL (ref 135–450)
PMV BLD AUTO: 8.5 FL (ref 5–10.5)
POTASSIUM REFLEX MAGNESIUM: 3.8 MMOL/L (ref 3.5–5.1)
RBC # BLD: 2.75 M/UL (ref 4.2–5.9)
SODIUM BLD-SCNC: 133 MMOL/L (ref 136–145)
WBC # BLD: 4.8 K/UL (ref 4–11)

## 2020-10-13 PROCEDURE — 6370000000 HC RX 637 (ALT 250 FOR IP): Performed by: INTERNAL MEDICINE

## 2020-10-13 PROCEDURE — 85025 COMPLETE CBC W/AUTO DIFF WBC: CPT

## 2020-10-13 PROCEDURE — 6370000000 HC RX 637 (ALT 250 FOR IP): Performed by: PSYCHIATRY & NEUROLOGY

## 2020-10-13 PROCEDURE — 80048 BASIC METABOLIC PNL TOTAL CA: CPT

## 2020-10-13 PROCEDURE — 6360000002 HC RX W HCPCS: Performed by: PSYCHIATRY & NEUROLOGY

## 2020-10-13 PROCEDURE — 99238 HOSP IP/OBS DSCHRG MGMT 30/<: CPT | Performed by: INTERNAL MEDICINE

## 2020-10-13 PROCEDURE — 97110 THERAPEUTIC EXERCISES: CPT

## 2020-10-13 PROCEDURE — 6370000000 HC RX 637 (ALT 250 FOR IP): Performed by: NURSE PRACTITIONER

## 2020-10-13 PROCEDURE — 2580000003 HC RX 258: Performed by: PSYCHIATRY & NEUROLOGY

## 2020-10-13 PROCEDURE — 1280000000 HC REHAB R&B

## 2020-10-13 PROCEDURE — 97530 THERAPEUTIC ACTIVITIES: CPT

## 2020-10-13 PROCEDURE — 6370000000 HC RX 637 (ALT 250 FOR IP): Performed by: PHYSICAL MEDICINE & REHABILITATION

## 2020-10-13 RX ORDER — LACTOBACILLUS RHAMNOSUS GG 10B CELL
2 CAPSULE ORAL 2 TIMES DAILY WITH MEALS
Status: ON HOLD | DISCHARGE
Start: 2020-10-13 | End: 2020-10-23 | Stop reason: HOSPADM

## 2020-10-13 RX ORDER — ONDANSETRON 4 MG/1
4 TABLET, FILM COATED ORAL EVERY 8 HOURS PRN
Status: DISCONTINUED | OUTPATIENT
Start: 2020-10-13 | End: 2020-10-25 | Stop reason: HOSPADM

## 2020-10-13 RX ORDER — FERROUS SULFATE TAB EC 324 MG (65 MG FE EQUIVALENT) 324 (65 FE) MG
324 TABLET DELAYED RESPONSE ORAL
Status: DISCONTINUED | OUTPATIENT
Start: 2020-10-14 | End: 2020-10-25 | Stop reason: HOSPADM

## 2020-10-13 RX ORDER — METHYLPHENIDATE HYDROCHLORIDE 10 MG/1
5 TABLET ORAL 2 TIMES DAILY WITH MEALS
Status: DISCONTINUED | OUTPATIENT
Start: 2020-10-14 | End: 2020-10-16

## 2020-10-13 RX ORDER — METHYLPHENIDATE HYDROCHLORIDE 5 MG/1
5 TABLET ORAL 2 TIMES DAILY WITH MEALS
Refills: 0 | Status: ON HOLD | OUTPATIENT
Start: 2020-10-13 | End: 2020-10-23 | Stop reason: HOSPADM

## 2020-10-13 RX ORDER — SENNA AND DOCUSATE SODIUM 50; 8.6 MG/1; MG/1
1 TABLET, FILM COATED ORAL 2 TIMES DAILY
Status: DISCONTINUED | OUTPATIENT
Start: 2020-10-13 | End: 2020-10-25 | Stop reason: HOSPADM

## 2020-10-13 RX ORDER — LEVOTHYROXINE SODIUM 0.05 MG/1
50 TABLET ORAL DAILY
Status: DISCONTINUED | OUTPATIENT
Start: 2020-10-14 | End: 2020-10-25 | Stop reason: HOSPADM

## 2020-10-13 RX ORDER — DIPHENHYDRAMINE HCL 25 MG
25 TABLET ORAL EVERY 6 HOURS PRN
Status: DISCONTINUED | OUTPATIENT
Start: 2020-10-13 | End: 2020-10-25 | Stop reason: HOSPADM

## 2020-10-13 RX ORDER — POLYETHYLENE GLYCOL 3350 17 G/17G
17 POWDER, FOR SOLUTION ORAL DAILY
Status: DISCONTINUED | OUTPATIENT
Start: 2020-10-14 | End: 2020-10-25 | Stop reason: HOSPADM

## 2020-10-13 RX ORDER — ATORVASTATIN CALCIUM 40 MG/1
40 TABLET, FILM COATED ORAL NIGHTLY
Status: DISCONTINUED | OUTPATIENT
Start: 2020-10-13 | End: 2020-10-25 | Stop reason: HOSPADM

## 2020-10-13 RX ORDER — POLYETHYLENE GLYCOL 3350 17 G/17G
17 POWDER, FOR SOLUTION ORAL DAILY
DISCHARGE
Start: 2020-10-14 | End: 2020-11-13

## 2020-10-13 RX ORDER — GABAPENTIN 300 MG/1
300 CAPSULE ORAL 3 TIMES DAILY
Status: DISCONTINUED | OUTPATIENT
Start: 2020-10-13 | End: 2020-10-25 | Stop reason: HOSPADM

## 2020-10-13 RX ORDER — DORZOLAMIDE HYDROCHLORIDE AND TIMOLOL MALEATE 20; 5 MG/ML; MG/ML
1 SOLUTION/ DROPS OPHTHALMIC 2 TIMES DAILY
Status: DISCONTINUED | OUTPATIENT
Start: 2020-10-13 | End: 2020-10-25 | Stop reason: HOSPADM

## 2020-10-13 RX ORDER — LACTOBACILLUS RHAMNOSUS GG 10B CELL
2 CAPSULE ORAL 2 TIMES DAILY WITH MEALS
Status: DISCONTINUED | OUTPATIENT
Start: 2020-10-14 | End: 2020-10-25 | Stop reason: HOSPADM

## 2020-10-13 RX ORDER — TRAZODONE HYDROCHLORIDE 50 MG/1
25 TABLET ORAL NIGHTLY PRN
Status: ON HOLD | DISCHARGE
Start: 2020-10-13 | End: 2020-10-23 | Stop reason: SDUPTHER

## 2020-10-13 RX ORDER — NITROGLYCERIN 0.4 MG/1
0.4 TABLET SUBLINGUAL EVERY 5 MIN PRN
Status: DISCONTINUED | OUTPATIENT
Start: 2020-10-13 | End: 2020-10-25 | Stop reason: HOSPADM

## 2020-10-13 RX ORDER — LATANOPROST 50 UG/ML
1 SOLUTION/ DROPS OPHTHALMIC NIGHTLY
Status: DISCONTINUED | OUTPATIENT
Start: 2020-10-13 | End: 2020-10-25 | Stop reason: HOSPADM

## 2020-10-13 RX ORDER — METOPROLOL TARTRATE 50 MG/1
50 TABLET, FILM COATED ORAL 2 TIMES DAILY
Status: DISCONTINUED | OUTPATIENT
Start: 2020-10-13 | End: 2020-10-25 | Stop reason: HOSPADM

## 2020-10-13 RX ORDER — MAGNESIUM HYDROXIDE/ALUMINUM HYDROXICE/SIMETHICONE 120; 1200; 1200 MG/30ML; MG/30ML; MG/30ML
30 SUSPENSION ORAL EVERY 6 HOURS PRN
Status: DISCONTINUED | OUTPATIENT
Start: 2020-10-13 | End: 2020-10-25 | Stop reason: HOSPADM

## 2020-10-13 RX ORDER — ACETAMINOPHEN 325 MG/1
650 TABLET ORAL EVERY 6 HOURS PRN
Status: DISCONTINUED | OUTPATIENT
Start: 2020-10-13 | End: 2020-10-25 | Stop reason: HOSPADM

## 2020-10-13 RX ORDER — AMIODARONE HYDROCHLORIDE 200 MG/1
200 TABLET ORAL DAILY
Status: DISCONTINUED | OUTPATIENT
Start: 2020-10-13 | End: 2020-10-13

## 2020-10-13 RX ORDER — PSEUDOEPHEDRINE HCL 30 MG
100 TABLET ORAL DAILY
Status: ON HOLD | DISCHARGE
Start: 2020-10-14 | End: 2020-10-23 | Stop reason: HOSPADM

## 2020-10-13 RX ORDER — AMIODARONE HYDROCHLORIDE 200 MG/1
200 TABLET ORAL DAILY
Status: DISCONTINUED | OUTPATIENT
Start: 2020-10-13 | End: 2020-10-25 | Stop reason: HOSPADM

## 2020-10-13 RX ORDER — OXYCODONE AND ACETAMINOPHEN 7.5; 325 MG/1; MG/1
1 TABLET ORAL EVERY 4 HOURS PRN
Qty: 15 TABLET | Refills: 0 | Status: SHIPPED | OUTPATIENT
Start: 2020-10-13 | End: 2020-10-13 | Stop reason: ALTCHOICE

## 2020-10-13 RX ORDER — TRAZODONE HYDROCHLORIDE 50 MG/1
25 TABLET ORAL NIGHTLY PRN
Status: DISCONTINUED | OUTPATIENT
Start: 2020-10-13 | End: 2020-10-25 | Stop reason: HOSPADM

## 2020-10-13 RX ORDER — DULOXETIN HYDROCHLORIDE 30 MG/1
30 CAPSULE, DELAYED RELEASE ORAL 2 TIMES DAILY
Status: ON HOLD | DISCHARGE
Start: 2020-10-13 | End: 2020-10-23 | Stop reason: SDUPTHER

## 2020-10-13 RX ORDER — OXYCODONE AND ACETAMINOPHEN 7.5; 325 MG/1; MG/1
1 TABLET ORAL EVERY 4 HOURS PRN
Status: DISCONTINUED | OUTPATIENT
Start: 2020-10-13 | End: 2020-10-25 | Stop reason: HOSPADM

## 2020-10-13 RX ORDER — HYDRALAZINE HYDROCHLORIDE 25 MG/1
25 TABLET, FILM COATED ORAL EVERY 6 HOURS PRN
Status: DISCONTINUED | OUTPATIENT
Start: 2020-10-13 | End: 2020-10-25 | Stop reason: HOSPADM

## 2020-10-13 RX ORDER — LACTOBACILLUS RHAMNOSUS GG 10B CELL
2 CAPSULE ORAL 2 TIMES DAILY WITH MEALS
Status: DISCONTINUED | OUTPATIENT
Start: 2020-10-13 | End: 2020-10-13

## 2020-10-13 RX ORDER — PANTOPRAZOLE SODIUM 40 MG/1
40 TABLET, DELAYED RELEASE ORAL DAILY
Status: DISCONTINUED | OUTPATIENT
Start: 2020-10-14 | End: 2020-10-25 | Stop reason: HOSPADM

## 2020-10-13 RX ORDER — BISACODYL 10 MG
10 SUPPOSITORY, RECTAL RECTAL DAILY PRN
Status: DISCONTINUED | OUTPATIENT
Start: 2020-10-13 | End: 2020-10-25 | Stop reason: HOSPADM

## 2020-10-13 RX ORDER — DULOXETIN HYDROCHLORIDE 30 MG/1
30 CAPSULE, DELAYED RELEASE ORAL 2 TIMES DAILY
Status: DISCONTINUED | OUTPATIENT
Start: 2020-10-13 | End: 2020-10-19

## 2020-10-13 RX ORDER — OXYCODONE HYDROCHLORIDE AND ACETAMINOPHEN 5; 325 MG/1; MG/1
1 TABLET ORAL EVERY 4 HOURS PRN
Status: DISCONTINUED | OUTPATIENT
Start: 2020-10-13 | End: 2020-10-25 | Stop reason: HOSPADM

## 2020-10-13 RX ADMIN — OXYCODONE HYDROCHLORIDE AND ACETAMINOPHEN 1 TABLET: 7.5; 325 TABLET ORAL at 22:05

## 2020-10-13 RX ADMIN — TRAZODONE HYDROCHLORIDE 25 MG: 50 TABLET ORAL at 22:05

## 2020-10-13 RX ADMIN — DORZOLAMIDE HYDROCHLORIDE AND TIMOLOL MALEATE 1 DROP: 20; 5 SOLUTION/ DROPS OPHTHALMIC at 09:19

## 2020-10-13 RX ADMIN — METOPROLOL TARTRATE 50 MG: 50 TABLET, FILM COATED ORAL at 09:14

## 2020-10-13 RX ADMIN — DULOXETINE HYDROCHLORIDE 30 MG: 30 CAPSULE, DELAYED RELEASE ORAL at 21:00

## 2020-10-13 RX ADMIN — Medication 2 CAPSULE: at 09:14

## 2020-10-13 RX ADMIN — LEVOTHYROXINE SODIUM 50 MCG: 50 TABLET ORAL at 05:18

## 2020-10-13 RX ADMIN — POLYETHYLENE GLYCOL 3350 17 G: 17 POWDER, FOR SOLUTION ORAL at 09:15

## 2020-10-13 RX ADMIN — AMIODARONE HYDROCHLORIDE 200 MG: 200 TABLET ORAL at 21:00

## 2020-10-13 RX ADMIN — PANTOPRAZOLE SODIUM 40 MG: 40 TABLET, DELAYED RELEASE ORAL at 05:18

## 2020-10-13 RX ADMIN — GABAPENTIN 300 MG: 300 CAPSULE ORAL at 14:44

## 2020-10-13 RX ADMIN — GABAPENTIN 300 MG: 300 CAPSULE ORAL at 09:15

## 2020-10-13 RX ADMIN — Medication 10 ML: at 05:17

## 2020-10-13 RX ADMIN — GABAPENTIN 300 MG: 300 CAPSULE ORAL at 21:00

## 2020-10-13 RX ADMIN — DULOXETINE HYDROCHLORIDE 30 MG: 30 CAPSULE, DELAYED RELEASE ORAL at 09:15

## 2020-10-13 RX ADMIN — DOCUSATE SODIUM 50 MG AND SENNOSIDES 8.6 MG 1 TABLET: 8.6; 5 TABLET, FILM COATED ORAL at 21:00

## 2020-10-13 RX ADMIN — MAGNESIUM HYDROXIDE 30 ML: 400 SUSPENSION ORAL at 21:00

## 2020-10-13 RX ADMIN — SENNOSIDES 17.2 MG: 8.6 TABLET, FILM COATED ORAL at 09:15

## 2020-10-13 RX ADMIN — ATORVASTATIN CALCIUM 40 MG: 40 TABLET, FILM COATED ORAL at 21:00

## 2020-10-13 RX ADMIN — DOCUSATE SODIUM 100 MG: 100 CAPSULE, LIQUID FILLED ORAL at 09:14

## 2020-10-13 RX ADMIN — CEFTRIAXONE SODIUM 1 G: 1 INJECTION, POWDER, FOR SOLUTION INTRAMUSCULAR; INTRAVENOUS at 11:25

## 2020-10-13 RX ADMIN — FERROUS SULFATE TAB 325 MG (65 MG ELEMENTAL FE) 325 MG: 325 (65 FE) TAB at 09:14

## 2020-10-13 RX ADMIN — RIVAROXABAN 20 MG: 20 TABLET, FILM COATED ORAL at 21:00

## 2020-10-13 ASSESSMENT — PAIN DESCRIPTION - ONSET
ONSET: ON-GOING
ONSET: ON-GOING

## 2020-10-13 ASSESSMENT — PAIN DESCRIPTION - PAIN TYPE
TYPE: ACUTE PAIN
TYPE: ACUTE PAIN

## 2020-10-13 ASSESSMENT — PAIN DESCRIPTION - ORIENTATION
ORIENTATION: LEFT
ORIENTATION: LEFT

## 2020-10-13 ASSESSMENT — PAIN SCALES - WONG BAKER: WONGBAKER_NUMERICALRESPONSE: 0

## 2020-10-13 ASSESSMENT — PAIN DESCRIPTION - FREQUENCY
FREQUENCY: INTERMITTENT
FREQUENCY: CONTINUOUS

## 2020-10-13 ASSESSMENT — PAIN DESCRIPTION - PROGRESSION
CLINICAL_PROGRESSION: NOT CHANGED
CLINICAL_PROGRESSION: NOT CHANGED

## 2020-10-13 ASSESSMENT — PAIN DESCRIPTION - LOCATION
LOCATION: LEG
LOCATION: LEG

## 2020-10-13 ASSESSMENT — PAIN SCALES - GENERAL
PAINLEVEL_OUTOF10: 10
PAINLEVEL_OUTOF10: 8

## 2020-10-13 ASSESSMENT — PAIN DESCRIPTION - DIRECTION: RADIATING_TOWARDS: UP AND DOWN

## 2020-10-13 ASSESSMENT — PAIN DESCRIPTION - DESCRIPTORS
DESCRIPTORS: ACHING
DESCRIPTORS: NAGGING

## 2020-10-13 NOTE — CARE COORDINATION
DISCHARGE ORDER  Date/Time 10/13/2020 3:25 PM  Completed by: Leonardo Gould, Case Management    Patient Name: Estrellita Brewer    : 1945      Admit order Date and Status: IP 10/06/2020  Noted discharge order. (verify MD's last order for status of admission/Traditional Medicare [place] Inpatient qualifying stay required for SNF)    Confirmed discharge plan with:              Patient and VM left w/ daughter, Vernon Montilla at patients request.                                  Discharge to Facility: [facility] phone number for staff giving report: Report # [phone]   Pre-certification completed: [facility] Exemption Notification (HENS) completed: NA   Discharge orders and Continuity of Care faxed to facility:  [facility]     Transportation:               Medical Transport explained with choice list offered to pt/family. Choice: No preference  Agency used: [address] up time:   15:45 PM      Pt/family/Nursing/Facility aware of  time:   Teresa RN, Patient (face to face), VM left w/dtr. Vernon Montilla and w/ son Eevr Fernandez via phone. Ambulance form completed:  YES     Comments:. Chart reviewed. Met with pt at bedside and explained the role of the CM. Dc today to [address] [place] [address]. Pt is being d/c'd to [address] [place] [address] today. Pt's O2 sats are 100% on 2 liters/nasal canula. Discharge timeout done with Teresa SEWELL. All discharge needs and concerns addressed. Discharging nurse to complete BAYLEE, reconcile AVS, and place final copy with patient's discharge packet. Discharging RN to ensure that written prescriptions for  Level II medications are sent with patient to the facility as per protocol.

## 2020-10-13 NOTE — FLOWSHEET NOTE
10/12/20 2200   Vital Signs   Temp 97.3 °F (36.3 °C)   Temp Source Oral   Pulse 64   Heart Rate Source Monitor   Resp 16   /76   BP Location Left upper arm   BP Upper/Lower Upper   Patient Position Semi fowlers   Level of Consciousness 0   MEWS Score 1   Oxygen Therapy   SpO2 99 %   O2 Device Nasal cannula   O2 Flow Rate (L/min) 2 L/min   Pt A/O x 4 assessment completed. PM meds given. PRN Trazodone given per pt request.Pt denies any other needs.  Call light within reach and bed alarm on

## 2020-10-13 NOTE — DISCHARGE INSTR - COC
Continuity of Care Form    Patient Name: Greg Hu:  1945  MRN:  [id number]    Admit date:  10/7/2020  Discharge date:  10/13/2020    Code Status Order: DNR-CCA   Advance Directives:   [facility] Documentation       Date/Time Healthcare Directive Type of Healthcare Directive Copy in [address] Agent's Name Healthcare Agent's Phone Number    10/07/20 0467  Yes, patient has an advance directive for healthcare treatment  Living will;Health care treatment directive  Yes, copy in chart  Adult Naomie 63  --            Admitting Physician:  Indu Maciel MD  PCP: Crystal Hanks    Discharging Nurse: Robbin Hernandez Unit/Room#: 1170/7051-91  Discharging Unit Phone Number: [phone]    Emergency Contact:   Extended Emergency Contact Information  Primary Emergency Contact: Gordon Merino Phone: [phone]  Mobile Phone: [phone]  Relation: Child  Secondary Emergency Contact: [facility]  Mobile Phone: [phone]  Relation: Child    Past Surgical History:  Past Surgical History:   Procedure Laterality Date    COLONOSCOPY      CORONARY ANGIOPLASTY WITH STENT PLACEMENT      X 2    EYE SURGERY Right 7/23/2020    GONIOTOMY performed by Lalito Medina MD at [place] [address] Right 7/23/2020    Phacoemulsification with intraocular lens implant performed by Lalito Medina MD at [address] [place] [address]       Immunization History: There is no immunization history on file for this patient.     Active Problems:  Patient Active Problem List   Diagnosis Code    Acute metabolic encephalopathy C48.43    Pelvic hematoma in male N50.1    Acute renal failure superimposed on chronic kidney disease ([address] N17.9, N18.9    Elevated LFTs R79.89    Abnormal chest x-ray R93.89    Acute cystitis without hematuria N30.00    Debility R53.81    Suicide attempt ([address] [place] Depression F32.9    MDD (major depressive disorder), recurrent episode ([facility] F33.9    Severe protein-calorie malnutrition ([facility] E43    PAF (paroxysmal atrial fibrillation) ([facility]) I48.0    Hypothyroidism E03.9    Coronary artery disease involving native heart without angina pectoris I25.10    Urinary retention R33.9    Retroperitoneal hematoma K69.2    Complicated UTI (urinary tract infection) N39.0    Chronic obstructive pulmonary disease (HCC) J44.9    Constipation K59.00    Near syncope R55       Isolation/Infection:   Isolation            No Isolation          Patient Infection Status       Infection Onset Added Last Indicated Last Indicated By Review Planned Expiration Resolved Resolved By    None active    Resolved    COVID-19 Rule Out 10/12/20 10/12/20 10/12/20 COVID-19 (Ordered)   10/12/20 Rule-Out Test Resulted    COVID-19 Rule Out 09/30/20 09/30/20 09/30/20 COVID-19 (Ordered)   09/30/20 Rule-Out Test Resulted    C-diff Rule Out 09/15/20 09/15/20 09/15/20 Clostridium Difficile Toxin/Antigen (Ordered)   09/18/20 Alonso Dobbins RN    COVID-19 Rule Out 09/13/20 09/13/20 09/13/20 COVID-19 (Ordered)   09/15/20 Rule-Out Test Resulted    COVID-19 Rule Out 09/13/20 09/13/20 09/13/20 COVID-19 (Ordered)   09/13/20 Rule-Out Test Resulted            Nurse Assessment:  Last Vital Signs: BP (!) 149/63   Pulse 57   Temp 96.8 °F (36 °C) (Oral)   Resp 18   Ht 5' 9\" (1.753 m)   Wt 156 lb 3.2 oz (70.9 kg)   SpO2 100%   BMI 23.07 kg/m²     Last documented pain score (0-10 scale): Pain Level: 8  Last Weight:   Wt Readings from Last 1 Encounters:   10/13/20 156 lb 3.2 oz (70.9 kg)     Mental Status:  oriented and alert    IV Access:  - None    Nursing Mobility/ADLs:  Walking   Assisted  Transfer  Assisted  Bathing  Assisted  Dressing  Assisted  Toileting  Assisted  Feeding  Independent  Med Admin  Independent  Med Delivery   whole    Wound Care Documentation and Therapy:  Wound 09/13/20 Back Mid;Lower Surgical Incision X3 (Active)   Wound Assessment Other (Comment) 10/13/20 0919   Number of days: 30        Elimination:  Continence:   · Bowel: Yes  · Bladder: Yes  Urinary Catheter: Insertion Date: 9/13/2020   Colostomy/Ileostomy/Ileal Conduit: No       Date of Last BM: 10/13/2020    Intake/Output Summary (Last 24 hours) at 10/13/2020 1339  Last data filed at 10/13/2020 1305  Gross per 24 hour   Intake 656 ml   Output 2075 ml   Net -1419 ml     I/O last 3 completed shifts: In: 536 [I.V.:536]  Out: [place] [Urine:1325]    Safety Concerns: At Risk for Falls    Impairments/Disabilities:      None    Nutrition Therapy:  Current Nutrition Therapy:   - Oral Diet:  General    Routes of Feeding: Oral  Liquids: No Restrictions  Daily Fluid Restriction: no  Last Modified Barium Swallow with Video (Video Swallowing Test): not done    Treatments at the Time of Hospital Discharge:   Respiratory Treatments: ***  Oxygen Therapy:  is not on home oxygen therapy.   Ventilator:    - No ventilator support    Rehab Therapies: Physical Therapy and Occupational Therapy  Weight Bearing Status/Restrictions: No weight bearing restirctions  Other Medical Equipment (for information only, NOT a DME order):  walker  Other Treatments: ***    Patient's personal belongings (please select all that are sent with patient):  None    RN SIGNATURE:  Electronically signed by Giovany Love RN on 10/13/20 at 3:48 PM EDT    CASE MANAGEMENT/SOCIAL WORK SECTION    Inpatient Status Date: 10/08/2020    Readmission Risk Assessment Score:  Readmission Risk              Risk of Unplanned Readmission:        27           Discharging to Facility/ Agency   · Name: [address] [place] [address]  · Address:  · Phone: Report # [phone]  · Fax:     Dialysis Facility (if applicable)   · Name:  · Address:  · Dialysis Schedule:  · Phone:  · Fax:    / signature: Electronically signed by Jennifer Benavides RN on 10/13/20 at 3:02 PM EDT    PHYSICIAN SECTION    Prognosis: Good    Condition at Discharge: Stable    Rehab Potential (if transferring to Rehab): Good    Recommended Labs or Other Treatments After Discharge:     Physician Certification: I certify the above information and transfer of Abel Galvez.  is necessary for the continuing treatment of the diagnosis listed and that he requires Acute Rehab for less 30 days.      Update Admission H&P: No change in H&P    PHYSICIAN SIGNATURE:  Electronically signed by Eddie Dangelo MD on 10/13/20 at 1:40 PM EDT

## 2020-10-13 NOTE — PROGRESS NOTES
A complete drug regimen review was completed for this patient.      [x]  No clinically significant medication issue was identified    []   Yes, a clinically significant medication issue was identified     []  Adverse Drug Event:      []  Allergy:      []  Side Effect:      []  Ineffective Therapy:      []  Drug Interaction:     []  Duplicated Therapy:     []  Untreated Indication:      []  Non-adherence:     []  Other:      Nursing/Pharmacy contacted the physician:   Date:    Time:      Actions recommended by physician were completed:  Date :  Time:     Electronically signed by Zahida Ghotra RN on 10/13/20 at 6:11 PM EDT

## 2020-10-13 NOTE — PROGRESS NOTES
Occupational Therapy Daily Treatment Note    Unit: [address] [facility]  Date:  10/13/2020  Patient Name:    Thelma Moore. Admitting diagnosis:  Complicated UTI (urinary tract infection) [N39.0]  Admit Date:  10/7/2020  Precautions/Restrictions:  fall risk, bed/chair alarm and mendoza catheter         Discharge Recommendations: Acute Rehab (ARU)/ Inpatient Rehab Facility (IRF)  DME needs for discharge: defer to facility       Therapy recommendations for staff:   Assist of 1 (contact guard assist) with use of rolling walker (RW) for all transfers within room    AM-PAC Score: AM-PAC Inpatient Daily Activity Raw Score: 15  Home Health S4 Level: NA       Treatment Time:  10:50-11:25  Treatment number:  4  Total Treatment Time:   35 minutes    History of Present Illness: 65 y/o male admitted to [facility] SNF setting 9/13/2020 with Altered Mental Status, Severe Sepsis, Acute Renal Failure, and Pelvic Hematoma.  CT Head negative.  CT Abd/Pelvis + L Pelvic Hematoma.  Urology consult for Mendoza Placement.   PMH as noted including Recent Back Surg (L4/L5 and L5/S1 Ant/Post Fusion, 8/31/2020), CAD, Angio with Stent, A-Fib, Aortic Aneurysm. Pt was receiving OT at [facility] 5-7x/wk, but was not progressing as expected due to pt's depression  The patient is a 73 y. o. male with multiple chronic medical problems including coronary artery disease, COPD, chronic atrial fibrillation, as well as complicated recent hospital admissions for a retroperitoneal hematoma after lumbar surgery as well as sepsis secondary to UTI. [facility] was admitted at [facility] in September for sepsis secondary to UTI, seen by urology, underwent cystoscopy which showed a false urethral passage.  Treated with antibiotics and discharged to the acute rehab unit with a Mendoza catheter in place.  Eventually transferred to the inpatient geriatric psychiatry unit at [facility] directly from the acute rehab unit.  On arrival to the geriatric psychiatry unit he had a Visits:  1). Supine to/from Sit:             SBA  2). Upper Body Bathing:         Supervision  3). Lower Body Bathing:         SBA  4). Upper Body Dressing:       Supervision  5). Lower Body Dressing:       SBA  6).  Pt to demonstrate UE exs x 15 reps with minimal cues      Plan: cont with POC      Francois Flores OTR/L #[id number]        If patient discharges from this facility prior to next visit, this note will serve as the Discharge Summary

## 2020-10-13 NOTE — PLAN OF CARE
Problem: Falls - Risk of:  Goal: Will remain free from falls  Description: Will remain free from falls  10/13/2020 0033 by Sharmin Lucas RN  Outcome: Ongoing  10/12/2020 1049 by Glen Reyna RN  Outcome: Ongoing  Goal: Absence of physical injury  Description: Absence of physical injury  10/12/2020 1049 by Glen Reyna RN  Outcome: Ongoing     Problem: Infection:  Goal: Will remain free from infection  Description: Will remain free from infection  10/12/2020 1049 by Glen Reyna RN  Outcome: Ongoing     Problem: Safety:  Goal: Free from accidental physical injury  Description: Free from accidental physical injury  10/12/2020 1049 by Glen Reyna RN  Outcome: Ongoing  Goal: Free from intentional harm  Description: Free from intentional harm  10/12/2020 1049 by Glen Reyna RN  Outcome: Ongoing     Problem: Daily Care:  Goal: Daily care needs are met  Description: Daily care needs are met  10/13/2020 0033 by Sharmin Lucas RN  Outcome: Ongoing  10/12/2020 1049 by Glen Reyna RN  Outcome: Ongoing     Problem: Pain:  Goal: Patient's pain/discomfort is manageable  Description: Patient's pain/discomfort is manageable  10/12/2020 1049 by Glen Reyna RN  Outcome: Ongoing  Goal: Pain level will decrease  Description: Pain level will decrease  10/12/2020 1049 by Glen Reyna RN  Outcome: Ongoing  Goal: Control of acute pain  Description: Control of acute pain  10/12/2020 1049 by Glen Reyna RN  Outcome: Ongoing  Goal: Control of chronic pain  Description: Control of chronic pain  10/12/2020 1049 by Glen Reyna RN  Outcome: Ongoing     Problem: Skin Integrity:  Goal: Skin integrity will stabilize  Description: Skin integrity will stabilize  10/12/2020 1049 by Glen Reyna RN  Outcome: Ongoing     Problem: Discharge Planning:  Goal: Patients continuum of care needs are met  Description: Patients continuum of care needs are met  10/13/2020 0033 by Sharmin Lucas RN  Outcome: Ongoing  10/12/2020 1049 by Oriana Sanchez RN  Outcome: Ongoing     Problem: Sensory:  Goal: General experience of comfort will improve  Description: General experience of comfort will improve  10/12/2020 1049 by Oriana Sanchez RN  Outcome: Ongoing     Problem: Urinary Elimination:  Goal: Signs and symptoms of infection will decrease  Description: Signs and symptoms of infection will decrease  10/12/2020 1049 by Oriana Sanchez RN  Outcome: Ongoing  Goal: Ability to reestablish a normal urinary elimination pattern will improve - after catheter removal  Description: Ability to reestablish a normal urinary elimination pattern will improve  10/12/2020 1049 by Oriana Sanchez RN  Outcome: Ongoing  Goal: Complications related to the disease process, condition or treatment will be avoided or minimized  Description: Complications related to the disease process, condition or treatment will be avoided or minimized  10/12/2020 1049 by Oriana Sanchez RN  Outcome: Ongoing     Problem: Skin Integrity:  Goal: Will show no infection signs and symptoms  Description: Will show no infection signs and symptoms  10/12/2020 1049 by Oriana Sanchez RN  Outcome: Ongoing  Goal: Absence of new skin breakdown  Description: Absence of new skin breakdown  10/12/2020 1049 by Oriana Sanchez RN  Outcome: Ongoing

## 2020-10-13 NOTE — PROGRESS NOTES
Pt awake at this time. AM labs drawn from PICC line and AM meds given. Pt denies any needs.  Call light within reach and bed alarm on

## 2020-10-13 NOTE — PLAN OF CARE
Problem: Falls - Risk of:  Goal: Will remain free from falls  Description: Will remain free from falls  10/13/2020 1021 by Sophia Cherry RN  Outcome: Ongoing  10/13/2020 0033 by Budd Schilder, RN  Outcome: Ongoing  Goal: Absence of physical injury  Description: Absence of physical injury  Outcome: Ongoing     Problem: Infection:  Goal: Will remain free from infection  Description: Will remain free from infection  Outcome: Ongoing     Problem: Safety:  Goal: Free from accidental physical injury  Description: Free from accidental physical injury  Outcome: Ongoing  Goal: Free from intentional harm  Description: Free from intentional harm  Outcome: Ongoing     Problem: Daily Care:  Goal: Daily care needs are met  Description: Daily care needs are met  10/13/2020 1021 by Sophia Cherry RN  Outcome: Ongoing  10/13/2020 0033 by Budd Schilder, RN  Outcome: Ongoing     Problem: Pain:  Goal: Patient's pain/discomfort is manageable  Description: Patient's pain/discomfort is manageable  Outcome: Ongoing  Goal: Pain level will decrease  Description: Pain level will decrease  Outcome: Ongoing  Goal: Control of acute pain  Description: Control of acute pain  Outcome: Ongoing  Goal: Control of chronic pain  Description: Control of chronic pain  Outcome: Ongoing     Problem: Skin Integrity:  Goal: Skin integrity will stabilize  Description: Skin integrity will stabilize  Outcome: Ongoing     Problem: Discharge Planning:  Goal: Patients continuum of care needs are met  Description: Patients continuum of care needs are met  10/13/2020 1021 by Sophia Cherry RN  Outcome: Ongoing  10/13/2020 0033 by Budd Schilder, RN  Outcome: Ongoing     Problem: Sensory:  Goal: General experience of comfort will improve  Description: General experience of comfort will improve  Outcome: Ongoing     Problem: Urinary Elimination:  Goal: Signs and symptoms of infection will decrease  Description: Signs and symptoms of infection will decrease  Outcome: Ongoing  Goal: Ability to reestablish a normal urinary elimination pattern will improve - after catheter removal  Description: Ability to reestablish a normal urinary elimination pattern will improve  Outcome: Ongoing  Goal: Complications related to the disease process, condition or treatment will be avoided or minimized  Description: Complications related to the disease process, condition or treatment will be avoided or minimized  Outcome: Ongoing     Problem: Skin Integrity:  Goal: Will show no infection signs and symptoms  Description: Will show no infection signs and symptoms  Outcome: Ongoing  Goal: Absence of new skin breakdown  Description: Absence of new skin breakdown  Outcome: Ongoing

## 2020-10-13 NOTE — PROGRESS NOTES
4 Eyes Skin Assessment     NAME:  Dilcia Crockett YOB: 1945  MEDICAL RECORD NUMBER:  [id number]    The patient is being assess for  Admission    I agree that 2 RN's have performed a thorough Head to Toe Skin Assessment on the patient. ALL assessment sites listed below have been assessed. Areas assessed by both nurses: Daron    Pt has Midline abd incision SUDEEP, as well as three SUDEEP healing incisions on lower back. Pt has soft, blanchable heels, boot protectors applied. Head, Face, Ears, Shoulders, Back, Chest, Arms, Elbows, Hands, Sacrum. Buttock, Coccyx, Ischium and Legs. Feet and Heels        Does the Patient have a Wound?  No noted wound(s)       Juan Prevention initiated:  Yes   Wound Care Orders initiated:  No    Pressure Injury (Stage 3,4, Unstageable, DTI, NWPT, and Complex wounds) if present place consult order under [de-identified] No    New and Established Ostomies if present place consult order under : No      Nurse 1 eSignature: Electronically signed by Lindsey Orellana RN on 10/13/20 at 6:09 PM EDT    **SHARE this note so that the co-signing nurse is able to place an eSignature**    Nurse 2 eSignature: Electronically signed by Tila Mixon RN on 10/21/20 at 4:40 PM EDT

## 2020-10-14 PROBLEM — E43 SEVERE PROTEIN-CALORIE MALNUTRITION (HCC): Chronic | Status: ACTIVE | Noted: 2020-10-14

## 2020-10-14 LAB
ANION GAP SERPL CALCULATED.3IONS-SCNC: 10 MMOL/L (ref 3–16)
BASOPHILS ABSOLUTE: 0.1 K/UL (ref 0–0.2)
BASOPHILS RELATIVE PERCENT: 1.5 %
BUN BLDV-MCNC: 23 MG/DL (ref 7–20)
CALCIUM SERPL-MCNC: 8.3 MG/DL (ref 8.3–10.6)
CHLORIDE BLD-SCNC: 102 MMOL/L (ref 99–110)
CO2: 24 MMOL/L (ref 21–32)
CREAT SERPL-MCNC: 1.4 MG/DL (ref 0.8–1.3)
EOSINOPHILS ABSOLUTE: 0.2 K/UL (ref 0–0.6)
EOSINOPHILS RELATIVE PERCENT: 4.4 %
GFR AFRICAN AMERICAN: 59
GFR NON-AFRICAN AMERICAN: 49
GLUCOSE BLD-MCNC: 83 MG/DL (ref 70–99)
HCT VFR BLD CALC: 25.4 % (ref 40.5–52.5)
HEMOGLOBIN: 8.4 G/DL (ref 13.5–17.5)
LYMPHOCYTES ABSOLUTE: 1.2 K/UL (ref 1–5.1)
LYMPHOCYTES RELATIVE PERCENT: 33.3 %
MCH RBC QN AUTO: 29.8 PG (ref 26–34)
MCHC RBC AUTO-ENTMCNC: 33.2 G/DL (ref 31–36)
MCV RBC AUTO: 89.8 FL (ref 80–100)
MONOCYTES ABSOLUTE: 0.5 K/UL (ref 0–1.3)
MONOCYTES RELATIVE PERCENT: 14.3 %
NEUTROPHILS ABSOLUTE: 1.7 K/UL (ref 1.7–7.7)
NEUTROPHILS RELATIVE PERCENT: 46.5 %
PDW BLD-RTO: 15.7 % (ref 12.4–15.4)
PLATELET # BLD: 164 K/UL (ref 135–450)
PMV BLD AUTO: 8.5 FL (ref 5–10.5)
POTASSIUM REFLEX MAGNESIUM: 3.9 MMOL/L (ref 3.5–5.1)
PREALBUMIN: 17.6 MG/DL (ref 20–40)
RBC # BLD: 2.83 M/UL (ref 4.2–5.9)
SODIUM BLD-SCNC: 136 MMOL/L (ref 136–145)
WBC # BLD: 3.7 K/UL (ref 4–11)

## 2020-10-14 PROCEDURE — 97116 GAIT TRAINING THERAPY: CPT

## 2020-10-14 PROCEDURE — 97530 THERAPEUTIC ACTIVITIES: CPT

## 2020-10-14 PROCEDURE — 97110 THERAPEUTIC EXERCISES: CPT

## 2020-10-14 PROCEDURE — 6370000000 HC RX 637 (ALT 250 FOR IP): Performed by: PHYSICAL MEDICINE & REHABILITATION

## 2020-10-14 PROCEDURE — 1280000000 HC REHAB R&B

## 2020-10-14 PROCEDURE — 97166 OT EVAL MOD COMPLEX 45 MIN: CPT

## 2020-10-14 PROCEDURE — 97535 SELF CARE MNGMENT TRAINING: CPT

## 2020-10-14 PROCEDURE — 97162 PT EVAL MOD COMPLEX 30 MIN: CPT

## 2020-10-14 PROCEDURE — 36415 COLL VENOUS BLD VENIPUNCTURE: CPT

## 2020-10-14 PROCEDURE — 94760 N-INVAS EAR/PLS OXIMETRY 1: CPT

## 2020-10-14 PROCEDURE — 85025 COMPLETE CBC W/AUTO DIFF WBC: CPT

## 2020-10-14 PROCEDURE — 80048 BASIC METABOLIC PNL TOTAL CA: CPT

## 2020-10-14 PROCEDURE — 84134 ASSAY OF PREALBUMIN: CPT

## 2020-10-14 RX ADMIN — RIVAROXABAN 20 MG: 20 TABLET, FILM COATED ORAL at 17:03

## 2020-10-14 RX ADMIN — DORZOLAMIDE HYDROCHLORIDE AND TIMOLOL MALEATE 1 DROP: 20; 5 SOLUTION/ DROPS OPHTHALMIC at 08:00

## 2020-10-14 RX ADMIN — GABAPENTIN 300 MG: 300 CAPSULE ORAL at 20:48

## 2020-10-14 RX ADMIN — LEVOTHYROXINE SODIUM 50 MCG: 0.05 TABLET ORAL at 06:27

## 2020-10-14 RX ADMIN — DULOXETINE HYDROCHLORIDE 30 MG: 30 CAPSULE, DELAYED RELEASE ORAL at 08:00

## 2020-10-14 RX ADMIN — TRAZODONE HYDROCHLORIDE 50 MG: 50 TABLET ORAL at 20:49

## 2020-10-14 RX ADMIN — METOPROLOL TARTRATE 50 MG: 50 TABLET, FILM COATED ORAL at 20:48

## 2020-10-14 RX ADMIN — GABAPENTIN 300 MG: 300 CAPSULE ORAL at 08:00

## 2020-10-14 RX ADMIN — DORZOLAMIDE HYDROCHLORIDE AND TIMOLOL MALEATE 1 DROP: 20; 5 SOLUTION/ DROPS OPHTHALMIC at 20:50

## 2020-10-14 RX ADMIN — FERROUS SULFATE TAB EC 324 MG (65 MG FE EQUIVALENT) 324 MG: 324 (65 FE) TABLET DELAYED RESPONSE at 08:00

## 2020-10-14 RX ADMIN — DOCUSATE SODIUM 50 MG AND SENNOSIDES 8.6 MG 1 TABLET: 8.6; 5 TABLET, FILM COATED ORAL at 20:48

## 2020-10-14 RX ADMIN — OXYCODONE HYDROCHLORIDE AND ACETAMINOPHEN 1 TABLET: 5; 325 TABLET ORAL at 08:00

## 2020-10-14 RX ADMIN — POLYETHYLENE GLYCOL 3350 17 G: 17 POWDER, FOR SOLUTION ORAL at 08:01

## 2020-10-14 RX ADMIN — METOPROLOL TARTRATE 50 MG: 50 TABLET, FILM COATED ORAL at 07:59

## 2020-10-14 RX ADMIN — DULOXETINE HYDROCHLORIDE 30 MG: 30 CAPSULE, DELAYED RELEASE ORAL at 20:48

## 2020-10-14 RX ADMIN — Medication 2 CAPSULE: at 08:00

## 2020-10-14 RX ADMIN — AMIODARONE HYDROCHLORIDE 200 MG: 200 TABLET ORAL at 17:04

## 2020-10-14 RX ADMIN — GABAPENTIN 300 MG: 300 CAPSULE ORAL at 13:32

## 2020-10-14 RX ADMIN — ATORVASTATIN CALCIUM 40 MG: 40 TABLET, FILM COATED ORAL at 20:49

## 2020-10-14 RX ADMIN — DOCUSATE SODIUM 50 MG AND SENNOSIDES 8.6 MG 1 TABLET: 8.6; 5 TABLET, FILM COATED ORAL at 07:59

## 2020-10-14 RX ADMIN — PANTOPRAZOLE SODIUM 40 MG: 40 TABLET, DELAYED RELEASE ORAL at 06:27

## 2020-10-14 RX ADMIN — Medication 2 CAPSULE: at 17:03

## 2020-10-14 ASSESSMENT — PAIN SCALES - GENERAL
PAINLEVEL_OUTOF10: 0
PAINLEVEL_OUTOF10: 4
PAINLEVEL_OUTOF10: 2

## 2020-10-14 ASSESSMENT — PAIN DESCRIPTION - LOCATION: LOCATION: LEG

## 2020-10-14 ASSESSMENT — PAIN DESCRIPTION - DESCRIPTORS: DESCRIPTORS: ACHING

## 2020-10-14 ASSESSMENT — PAIN DESCRIPTION - ORIENTATION: ORIENTATION: LEFT

## 2020-10-14 ASSESSMENT — PAIN - FUNCTIONAL ASSESSMENT: PAIN_FUNCTIONAL_ASSESSMENT: ACTIVITIES ARE NOT PREVENTED

## 2020-10-14 ASSESSMENT — PAIN DESCRIPTION - PROGRESSION: CLINICAL_PROGRESSION: NOT CHANGED

## 2020-10-14 ASSESSMENT — PAIN DESCRIPTION - FREQUENCY: FREQUENCY: INTERMITTENT

## 2020-10-14 ASSESSMENT — PAIN DESCRIPTION - PAIN TYPE: TYPE: ACUTE PAIN

## 2020-10-14 ASSESSMENT — PAIN DESCRIPTION - ONSET: ONSET: ON-GOING

## 2020-10-14 NOTE — PLAN OF CARE
Problem: Nutrition  Goal: Optimal nutrition therapy  Outcome: Ongoing   Nutrition Problem #1: Severe malnutrition  Intervention: Food and/or Nutrient Delivery: Continue Current Diet, Continue Oral Nutrition Supplement, Start Oral Nutrition Supplement  Nutritional Goals: consume >50% of meals and supplement during admission

## 2020-10-14 NOTE — PROGRESS NOTES
Physical Therapy    Facility/Department: [address] IP REHAB  Initial Assessment    NAME: Molina Shah Sr.  : 1945  MRN: [id number]    Date of Service: 10/14/2020    Discharge Recommendations:  Continue to assess pending progress   PT Equipment Recommendations  Other: will continue to assess pending equipment needs    Assessment   Body structures, Functions, Activity limitations: Decreased functional mobility ; Decreased balance;Decreased posture;Decreased ROM; Decreased strength  Assessment: Pt tolerated initial PT sessions well. He is pleasant and motivated to participate in PT. Pt performs bed mobility with SBA-CGA with the exception of laying flat in bed which required Winter to bring LEs into bed. Pt is SBA for sit<>stand transfers, and CGA for car transfers. He performs stairs and curb with CGA. Ambulation with RW is CGA-Winter. As pt fatigue increases he assumes poor gait mechanics that make walking unsteady. He has tendency to adduct LLE excessively when walking, and especially with turning that causes LoBs that require Winter to correct. Pt would benefit from cuing and demonstration on correct technique so that pt can have a good idea of the demands of task. Pt scored 15/15 on BIMS and is oriented x4. Pt is currently below baseline and requires PT to strengthen LEs, increase activity tolerance, and improve gait mechanics for safe return to PLOF  Treatment Diagnosis: Generalized muscle weakness, impaired mobility  Prognosis: Good  Decision Making: Medium Complexity  History: Per Dr. Argenis Lopez note, \"Patient is a 67 yo M with pmh Afib, CAD, HTN, HLD, and recent lumbar spine surgery who initially presented from SNF on 2020 with fever 105 and altered mental status. Found to have sepsis due to UTI, urinary retention, metabolic encephalopathy, and acute hypoxic respiratory failure. Patient initially admitted to ARU (-10/1). He made gradual progress and demonstrated some self-limiting behavior. assessed for rehabilitation services?: Yes  Additional Pertinent Hx: Per Dr. Argenis Lopez note, \"Patient is a 65 yo M with pmh Afib, CAD, HTN, HLD, and recent lumbar spine surgery who initially presented from SNF on 9/13/2020 with fever 105 and altered mental status. Found to have sepsis due to UTI, urinary retention, metabolic encephalopathy, and acute hypoxic respiratory failure. Patient initially admitted to ARU (9/18-10/1). He made gradual progress and demonstrated some self-limiting behavior. On 9/28 he began to refuse therapies and most medical care. He stated a wish to die and planned to achieve this by not eating/drinking. Psychiatry was consulted. Dr. Zebedee Krabbe and I agreed that patient demonstrated imminent risk of harm to himself with ongoing suicidal ideation and plan. Therefore patient will be transferred to inpatient psychiatric unit 10/1. Patient reportedly did well with inpatient psychiatric care. Reeves was removed for void trial at inpatient psych. Patient unable to void and Reeves was replaced. Then developed fevers and was readmitted to acute medical floor at [facility] 10/7. Found to proteus UTI with bilateral hydronephrosis. Treated with ceftriaxone. Urology recommending TURP as outpatient. Course was further complicated by TONO, hyponatremia. \"  Response To Previous Treatment: Not applicable  Family / Caregiver Present: Yes(nursing student present to observe with patient's permission)  Referring Practitioner: Dr. Alvin Grimes  Referral Date : 10/13/20  Subjective  Subjective: Pt met in therapy gym sitting in w/c with nursing student present. States that he is feeling good today. Reports numbness tingling shooting down LLE, states that meds help to relieve pain. Pt agreeable to PT. PM session: pt met in therapy gym seated in w/c for PM session. Pt states that he is feeling pretty good this afternoon. Denies pain, and states that he has not had pain since AM treatment session.   Pt is pleasant and is agreeable to Pt this afternoon. Orientation  Orientation  Overall Orientation Status: Within Normal Limits     Social/Functional History  Social/Functional History  Lives With: Alone  Type of Home: House  Home Layout: Multi-level  Home Access: Stairs to enter with rails  Entrance Stairs - Number of Steps: 10 JELLY  Entrance Stairs - Rails: Right  Bathroom Shower/Tub: Walk-in shower  Bathroom Toilet: Standard  Bathroom Equipment: (No hand held shower.)  Bathroom Accessibility: Accessible  Home Equipment: (No DME)  Receives Help From: Family  ADL Assistance: Independent  Homemaking Assistance: Independent  Homemaking Responsibilities: Yes  Ambulation Assistance: Independent  Transfer Assistance: Independent  Active : Yes  Mode of Transportation: Car  Occupation: Retired  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  Leisure & Hobbies: Likes to do a little bit of reading    Objective  AROM RLE (degrees)  RLE AROM: WFL  AROM LLE (degrees)  LLE AROM : Exceptions(pt has impaired DF ROM)  AROM RUE (degrees)  RUE AROM : WFL  AROM LUE (degrees)  LUE AROM : WFL     Strength RLE  R Hip Flexion: 3+/5  R Knee Flexion: 4/5  R Knee Extension: 4+/5  R Ankle Dorsiflexion: 5/5  Strength LLE  Comment: weakness but no pain with resisted movements  L Hip Flexion: 4-/5  L Knee Flexion: 4-/5  L Knee Extension: 4/5  L Ankle Dorsiflexion: 3-/5  Strength RUE  R Shoulder Flexion: 5/5  R Elbow Flexion: 5/5  R Elbow Extension: 4+/5  Strength LUE  L Shoulder Flexion: 5/5  L Elbow Flexion: 5/5  L Elbow Extension: 4+/5     Coordination  Rapid Alternating Movements: Normal     Sensation  Overall Sensation Status: WFL     Bed mobility  Bridging: Contact guard assistance  Rolling to Left: Stand by assistance  Rolling to Right: Stand by assistance  Supine to Sit: Stand by assistance  Sit to Supine: Minimal assistance(cuing for precautions and pt had difficulty bringing LLE up to bed.)  Scooting: Stand by assistance  Comment: Pt with good adherence to precautions and good demonstration of log roll technique to get into and out of bed. Transfers  Sit to Stand: Stand by assistance  Stand to sit: Stand by assistance(cues for hand placement to take hands off of RW and reach back)  Car Transfer: Contact guard assistance(Pt with good safety, but requires cues for hand placement when exiting car. Pt had one slight LoB when exiting car that required CGA to recover)     Ambulation  Ambulation?: Yes  More Ambulation?: No  Ambulation 1  Surface: level tile;uneven;outdoors  Device: Rolling Walker  Assistance: Contact guard assistance;Minimal assistance  Quality of Gait: Pt has pain on LLE when ambulating. He states that pain improves as he ambulates. Stance phase on L pt is moderately unsteady because pt compensates for pain and leans to R side. One Moderate LoB when in stance phase on L that required Winter from therapist to correct. Pt makes wide turns and tends to leave RW when turning. Cues to stay in RW. Ambulates with step through gait pattern and demonstrates narrow Constanza. Pt adducts L hip when taking step and does not leave space for good swing through on R LE. This causes pt to be highly unsteady, especially when making turns. Pt was made aware that he is adducting L hip. Gait Deviations: Slow Sapna;Decreased step length;Decreased step height  Distance: 80' with 5 turns, 39' with 3 turns, 61'  (20' outside) with 3 turns. Comments: When ambulating in the afternoon, pt required w/c follow secondary to UE fatigue. Pt did not sit down with good hand placement, and stated that he was tired  Stairs/Curb  Stairs?: Yes  Stairs  # Steps : 4  Stairs Height: 6\"  Rails: Bilateral  Curbs: 6\"(Pt performed curb step with CGA and RW. Need cuing to step closer to curb before bringing up walker.   Pt with one slight LoB when turning on curb that required CGA to correct for Steady assist.  Pt descended with RW far from him with cues to step closer)  Device: No Device  Assistance: Contact guard assistance  Comment: pt mendoza catheter bag hooked to his pocket for stairs  Wheelchair Activities  Wheelchair Parts Management: No  Propulsion: No           Exercises  Hamstring Sets: 1x20 HS curs on BLEs with yellow theraband  Hip Abduction: 1x20 hip adduction ball squeezes; 1x20 resisted hip abduction with yellow ther-a-band       Plan   Plan  Times per week: 5-6x/week  Times per day: Twice a day  Plan weeks: 2 weeks  Current Treatment Recommendations: Strengthening, Home Exercise Program, Safety Education & Training, Balance Training, Endurance Training, Functional Mobility Training, Equipment Evaluation, Education, & procurement, Transfer Training, Gait Training, ADL/Self-care Training, Stair training, Positioning  Safety Devices  Type of devices:  All fall risk precautions in place, Gait belt, Patient at risk for falls, Left in chair(AM and PM: pt left in therapy gym doorway with brakes locked for transport back to room)  Restraints  Initially in place: No    Goals  Short term goals  Time Frame for Short term goals: 5 days from 10/14/2020  Short term goal 1: Pt will perform bed mobility with supervision  Short term goal 2: Pt will ascend/descend curb with SBA using LRAD  Short term goal 3: Pt will ascend/descend 8 stairs using R ascending railing with CGA  Short term goal 4: Pt will ambulate 150' using LRAD  with CGA  Short term goal 5: Pt will perform all transfers using LRAD with SBA  Long term goals  Time Frame for Long term goals : 7-10 days from 10/14/2020  Long term goal 1: Pt will perform bed mobillity with Modif I  Long term goal 2: Pt will ascend/descend curb using LRAD with Modif I  Long term goal 3: Pt will ascend/descend 12 stairs using R ascending railing with Modif I  Long term goal 4: Pt will ambulate 200' using LRAD with Modif I  Long term goal 5: Pt will perform all transfers using [address] Modif I  Patient Goals Patient goals : \"I want to be independent and be able to walk on my own without a walker. I also want to get back to playing the stock market\"     Therapy Time   Individual Concurrent Group Co-treatment   Time In 0900         Time Out 0945         Minutes 39             Second Session Therapy Time   Individual Co-treatment   Time In 1515     Time Out 1600     Minutes 45         Electronically signed by JUDSON Sung on 10/14/2020 at 4:22 PM   Therapist was present, directed the patient's care, made skilled judgement, and was responsible for assessment and treatment of the patient.       Electronically signed by Zuhair Preciado PT on 10/14/2020 at 4:39 PM

## 2020-10-14 NOTE — DISCHARGE SUMMARY
Name:  Marcia Shore  Room:  3433/6435-36  MRN:    [id number]    Discharge Summary      This discharge summary is in conjunction with a complete physical exam done on the day of discharge. Discharging Physician: Dr. Gonzales Wilson: 10/7/2020  Discharge:  10/13/2020    HPI:  The patient is a 66 y.o. male with multiple chronic medical problems including coronary artery disease, COPD, chronic atrial fibrillation, as well as complicated recent hospital admissions for a retroperitoneal hematoma after lumbar surgery as well as sepsis secondary to UTI. He was admitted at [facility] in September for sepsis secondary to UTI, seen by urology, underwent cystoscopy which showed a false urethral passage. Treated with antibiotics and discharged to the acute rehab unit with a Mendoza catheter in place. Eventually transferred to the inpatient geriatric psychiatry unit at [facility] directly from the acute rehab unit. On arrival to the geriatric psychiatry unit he had a Mendoza in place which it seems was the same mendoza that had been placed on September 13th by urology. We removed the Mendoza on admission, but he was still retaining urine and therefore nursing staff replaced a Mendoza on 10/2. On 10/5 he started spiking fevers, urinalysis was consistent with infection. CT abdomen was obtained which showed bilateral hydronephrosis despite having a Mendoza in place which appear to be draining well. He will be transferred to the medical floor for further management and urology consultation.     Diagnoses this Admission and Hospital Course   #Complicated Proteus UTI    #Urinary Retention   - recent admission  for sepsis 2/2 UTI.  s/p cystoscopy for mendoza placement on 9/13/20 at [facility], + false urethral passage noted at that time. Rob Mccoy was then discharged to ARU before [facility] for [facility]   - urology consulted on arrival to [facility], mendoza that was placed on 9/13 was removed on 10/2 but he failed voiding trial, and therefore nursing staff replaced mendoza on 10/2   - on 10/5 he started spiking fevers, UA consistent with UTI.  CT showed bilateral hydro- see above- despite mendoza appearing to be draining adequately   >> discharged from psych unit and readmitted to med surg floor on 10/7  - initially placed on IV Merrem, changed to Rocephin D#5 when proteus culture and sensitivity resulted -> completed 10 days of ABx   - Urology consulted  - renal USG as below. Mild mina hydronephrosis  - blood cultures (including one drawn from PICC line) are NGTD  -  His catheter is now draining clear urine.   - will need TURP outpatient when more improved medically     #TONO on CKD stage III  - Cr on admission: 1.2  - baseline: ~1.1  - Crt 1.2 --> 1.5 --> 1.2-->1. 1      #Dizziness, near syncope  - BP dropped significantly. Changed Toprol-XL to Lopressor. Holding parameters added. Monitored BP closely. - Given 500 cc bolus. This is improved.      #Hyponatremia resolved  - Na is 133 -> 135-->136  - received IVFs. - monitored Na     #MDD  - s/p treatment on the geriatric psychiatric unit.  Will continue medications per psychiatry recs: duloxetine 30 mg BID and methylphenidate 5 mg BID       #Recent Retroperitoneal Hematoma  - 2/2 recent Lumbar procedure as below  - Xarelto was held initially, but was resumed when he was admitted to [facility].  H/h has remained stable if you look at the trend over the last few weeks      #COPD  - no AE  - added PRN Albuterol     #CAD s/p stent  - no c/o chest pain  - held ASA for now, cont'd Lipitor     #AAA  - stable on CT.  Will need OP monitoring with repeat imaging in 5 years     #Atrial Fibrillation  - held Xarelto 2/2 hematuria and hematoma at [facility]U  - cont'd Lopressor and amio.   restarted Xarelto now       #HTN  - uncontrolled  - cont'd Lopressor  - holding parameters added as BP low.   Improved with 500 cc bolus.      #Hypothyroidism  - home dose of synthroid 50 mcg continued      #GERD  - cont'd Protonix       #Chronic dCHF  - noted on echo 9/19/2020 - EF 50%  - appears compensated  - cont'd Lopressor     #Iron Deficiency Anemia  - cont'd Ferrous Sulfate  - monitored h/h      #Lumbar Stenosis  - s/p recent L4-S1 ALIF/PSF  - 8/31 with Dr. Jany Aponte and Dr. Yessi Galvin  - still with b/l LE pain/tingling, L>R, increase gabapentin. norco PRN for pain      #Hx of CVA  - cont'd Lipitor     #RUE PICC in place since 9/14/2020     #Severe constipation  -Patient on MiraLAX, Colace and Senokot.   - Added lactulose prn  - Placed order for fleets enema prn     DVT Prophylaxis: Xarelto       Procedures (Please Review Full Report for Details)  N/A    Consults    Urology       Physical Exam at Discharge:    BP (!) 149/63   Pulse 57   Temp 96.8 °F (36 °C) (Oral)   Resp 18   Ht 5' 9\" (1.753 m)   Wt 156 lb 3.2 oz (70.9 kg)   SpO2 100%   BMI 23.07 kg/m²   Gen: No distress. Alert. Eyes: PERRL. No sclera icterus. No conjunctival injection. ENT: No discharge. Pharynx clear. Neck: No JVD.  No Carotid Bruit. Trachea midline. Resp: No accessory muscle use. No crackles. No wheezes. No rhonchi. CV: Regular rate. Regular rhythm. No murmur. No rub. No edema. Capillary Refill: Brisk,< 3 seconds   Peripheral Pulses: +2 palpable, equal bilaterally   GI: Non-tender. Non-distended. Normal bowel sounds. No hernia. Skin: Warm and dry. No nodule on exposed extremities. No rash on exposed extremities. M/S: No cyanosis. No joint deformity. No clubbing. Neuro: Awake. Grossly nonfocal    Psych: Oriented x 3.  No anxiety or agitation       CBC:   Recent Labs     10/12/20  0610 10/13/20  0522 10/14/20  0703   WBC 4.4 4.8 3.7*   HGB 8.8* 8.3* 8.4*   HCT 26.7* 25.3* 25.4*   MCV 91.2 91.8 89.8    165 164     BMP:   Recent Labs     10/12/20  0610 10/13/20  0522 10/14/20  0703   * 133* 136   K 3.7 3.8 3.9    100 102   CO2 24 26 24   BUN 15 22* 23*   CREATININE 1.1 1.3 1.4*       U/A:    Lab Results   Component Value Date    COLORU Yellow 10/05/2020    WBCUA see below 10/05/2020    RBCUA see below 10/05/2020    BACTERIA 4+ 10/05/2020    CLARITYU CLOUDY 10/05/2020    SPECGRAV 1.010 10/05/2020    LEUKOCYTESUR LARGE 10/05/2020    BLOODU SMALL 10/05/2020    GLUCOSEU Negative 10/05/2020       CULTURES  Blood 10/6/2020: NGTD  Urine 10/5/2020  Proteus mirabilis (1)                    Antibiotic  Interpretation  LORENA  Status      ampicillin  Sensitive  <=8  mcg/mL        ceFAZolin  Sensitive  <=2  mcg/mL        cefepime  Sensitive  <=2  mcg/mL        cefTRIAXone  Sensitive  <=1  mcg/mL        cefuroxime  Sensitive  <=4  mcg/mL        ciprofloxacin  Sensitive  <=1  mcg/mL        ertapenem  Sensitive  <=0.5  mcg/mL        gentamicin  Sensitive  <=4  mcg/mL        meropenem  Sensitive  <=1  mcg/mL        nitrofurantoin  Resistant  >64  mcg/mL        piperacillin-tazobactam  Sensitive  <=16  mcg/mL        trimethoprim-sulfamethoxazole  Sensitive  <=2/38  mcg/mL               RADIOLOGY  US RENAL COMPLETE   Final Result   Mild bilateral hydronephrosis. Small bladder calculus. Discharge Medications     Medication List      START taking these medications    docusate 100 MG Caps  Commonly known as:  COLACE, DULCOLAX  Take 100 mg by mouth daily     lactobacillus capsule  Take 2 capsules by mouth 2 times daily (with meals)     methylphenidate 5 MG tablet  Commonly known as:  RITALIN  Take 1 tablet by mouth 2 times daily (with meals) for 30 days.      polyethylene glycol 17 g packet  Commonly known as:  GLYCOLAX  Take 17 g by mouth daily     rivaroxaban 20 MG Tabs tablet  Commonly known as:  XARELTO  Take 1 tablet by mouth daily     traZODone 50 MG tablet  Commonly known as:  DESYREL  Take 0.5 tablets by mouth nightly as needed for Sleep        CONTINUE taking these medications    amiodarone 200 MG tablet  Commonly known as:  CORDARONE     atorvastatin 40 MG tablet  Commonly known as:  LIPITOR     dorzolamide-timolol 22.3-6.8 MG/ML ophthalmic solution  Commonly known as:  COSOPT     DULoxetine 30 MG extended release capsule  Commonly known as:  CYMBALTA  Take 1 capsule by mouth 2 times daily     ferrous sulfate 325 (65 Fe) MG tablet  Commonly known as:  IRON 325  Take 1 tablet by mouth 2 times daily (with meals)     gabapentin 100 MG capsule  Commonly known as:  NEURONTIN  Take 1 capsule by mouth 3 times daily for 30 days. ICAPS Tabs     latanoprost 0.005 % ophthalmic solution  Commonly known as:  XALATAN     levothyroxine 50 MCG tablet  Commonly known as:  SYNTHROID     metoprolol tartrate 50 MG tablet  Commonly known as:  LOPRESSOR  Take 1 tablet by mouth 2 times daily     nitroGLYCERIN 0.4 MG SL tablet  Commonly known as:  NITROSTAT     pantoprazole 40 MG tablet  Commonly known as:  PROTONIX     senna 8.6 MG tablet  Commonly known as:  SENOKOT        STOP taking these medications    oxyCODONE 5 MG immediate release tablet  Commonly known as:  ROXICODONE           Where to Get Your Medications      You can get these medications from any pharmacy    Bring a paper prescription for each of these medications  · methylphenidate 5 MG tablet     Information about where to get these medications is not yet available    Ask your nurse or doctor about these medications  · docusate 100 MG Caps  · DULoxetine 30 MG extended release capsule  · lactobacillus capsule  · polyethylene glycol 17 g packet  · rivaroxaban 20 MG Tabs tablet  · traZODone 50 MG tablet           Discharged in stable condition to [address] [place] [address].         Shaneka Oshea MD  10/13/2020

## 2020-10-14 NOTE — PROGRESS NOTES
Physical Therapy  Arline Augustine Sr.  [id number]    Unable to complete eval this AM secondary to low pt activity tolerance and time constraints. Eval will be completed this afternoon with full write-up to follow. Safety Device - Type of devices:  [x]  All fall risk precautions in place [] Bed alarm in place  [] Call light within reach [] Chair alarm in place [] Positioning belt [x] Gait belt [x] Patient at risk for falls [] Left in bed [x] Left in chair [] Telesitter in use [] Sitter present [] Nurse notified []  None    Second Session Therapy Time     Individual Co-treatment   Time In 0900     Time Out 0945     Minutes 45          Electronically signed by Ramin Law on 10/14/2020 at 12:20 PM  Therapist was present, directed the patient's care, made skilled judgement, and was responsible for assessment and treatment of the patient.     Electronically signed by Carrie Dupont PT on 10/14/2020 at 1:01 PM

## 2020-10-14 NOTE — PLAN OF CARE
Problem: Pain:  Goal: Pain level will decrease  Description: Pain level will decrease  10/14/2020 1049 by Melia Contreras RN  Outcome: Ongoing  10/14/2020 0339 by Josse Beatty RN  Outcome: Ongoing  Goal: Control of acute pain  Description: Control of acute pain  10/14/2020 1049 by Melia Contreras RN  Outcome: Ongoing  10/14/2020 0340 by Josse Beatty RN  Note: Pt able to express presence/absence of pain and rate pain appropriately using numerical scale. Pain/discomfort being managed with PRN analgesics per MD orders (see MAR). Pain assessed every shift and after interventions. 10/14/2020 0339 by Josse Beatty RN  Outcome: Ongoing  Goal: Control of chronic pain  Description: Control of chronic pain  10/14/2020 1049 by Melia Contreras RN  Outcome: Ongoing  10/14/2020 0339 by Josse Beatty RN  Outcome: Ongoing     Problem: Skin Integrity:  Goal: Will show no infection signs and symptoms  Description: Will show no infection signs and symptoms  10/14/2020 1049 by Melia Contreras RN  Outcome: Ongoing  10/14/2020 0340 by Josse Beatty RN  Note: Skin assessment performed each shift per protocol. Patient turned and repositioned every two hours and prn with pillow support. Patient checked for incontence every two hours. 10/14/2020 0339 by Josse Beatty RN  Outcome: Ongoing  Goal: Absence of new skin breakdown  Description: Absence of new skin breakdown  10/14/2020 1049 by Melia Contreras RN  Outcome: Ongoing  10/14/2020 0339 by Josse Beatty RN  Outcome: Ongoing     Problem: Falls - Risk of:  Goal: Will remain free from falls  Description: Will remain free from falls  10/14/2020 1049 by Melia Contreras RN  Outcome: Ongoing  10/14/2020 0340 by Josse Beatty RN  Note: Patient free from falls this shift. Fall precautions in place at all times. Bed in lowest position with two side rails up and wheels locked. Call light within reach.  Patient able and agreeable to contact for safety appropriately. 10/14/2020 0339 by Modesta Martinez RN  Outcome: Ongoing  Goal: Absence of physical injury  Description: Absence of physical injury  10/14/2020 1049 by Gio Avendano RN  Outcome: Ongoing  10/14/2020 0339 by Modesta Martinez RN  Outcome: Ongoing     Problem: Nutrition  Goal: Optimal nutrition therapy  10/14/2020 1049 by Gio Avendano RN  Outcome: Ongoing  10/14/2020 0915 by Jose Francisco Sidhu RD, LD  Outcome: Ongoing     Problem:  Activity Intolerance:  Goal: Ability to tolerate increased activity will improve  Description: Ability to tolerate increased activity will improve  Outcome: Ongoing     Problem: HEMODYNAMIC STATUS  Goal: Patient has stable vital signs and fluid balance  Outcome: Ongoing     Problem: IP BLADDER/VOIDING  Goal: STG - Patient demonstrates ability to take care of indwelling catheter  Outcome: Ongoing     Problem: Infection:  Goal: Will remain free from infection  Description: Will remain free from infection  Outcome: Ongoing     Problem: Daily Care:  Goal: Daily care needs are met  Description: Daily care needs are met  Outcome: Ongoing     Problem: Discharge Planning:  Goal: Patients continuum of care needs are met  Description: Patients continuum of care needs are met  Outcome: Ongoing

## 2020-10-14 NOTE — PROGRESS NOTES
Occupational Therapy   Occupational Therapy Initial Assessment  Date: 10/14/2020   Patient Name: Jovita Willson. MRN: [id number]     : 1945    Date of Service: 10/14/2020    Discharge Recommendations:  Continue to assess pending progress, S Level 1, Home with Home health OT, Home with assist PRN  OT Equipment Recommendations  Equipment Needed: Yes  Other: Long handle sponge, continue to assess for shower chair, grab bars    Assessment   Performance deficits / Impairments: Decreased functional mobility ; Decreased ADL status; Decreased balance;Decreased safe awareness;Decreased endurance;Decreased high-level IADLs;Decreased strength  Assessment: Pt found to have sepsis due to UTI, urinary retention, metabolic encephalopathy, and acute hypoxic respiratory failure. Pt was independent in ADL/IADL tasks prior to initial admission. Today, pt participated in OT evaluation/treatment session. Pt required CGA for functional transfers/mobility using RW & CGA-min A for most ADL tasks. Pt requiring mod A for LB dressing, LOB noted during this task. Pt demonstrates decreased ADL/IADL function, endurance, functiononal mobility/transfers, balance & strength. Pt would benefit from skilled OT rehabilitation services in order to restore function, increase quality of life, & have a safe discharge home alone. Treatment Diagnosis: Impaired: ADL/IADL function, balance, functional mobility/transfers, strength, safety awareness, activity tolerance. Prognosis: Good  Decision Making: Medium Complexity  History: Per Dr. Richmond Postin 10/14/2020 note, \"Patient is a 65 yo M with pmh Afib, CAD, HTN, HLD, and recent lumbar spine surgery who initially presented from SNF on 2020 with fever 105 and altered mental status. Found to have sepsis due to UTI, urinary retention, metabolic encephalopathy, and acute hypoxic respiratory failure. \" Pt was independent with ADL/IADL function prior to admission.  Pt lives alone in 3 story house, with 10 stairs to enter, rails on R side. Exam: ADL/IADL function, balance, functional mobility/transfers, strength, cognition, UE assessment  Assistance / Modification: CGA using RW for functional mobility/ transfers, UB dressing SBA, LB dressing mod A, UB bathing SBA, LB bathing CGA. OT Education: OT Role;Plan of Care;Equipment;ADL Adaptive Strategies;Precautions  Patient Education: Catheter cleaning using green wipes, spinal precautions, adaptive equipment, plan of care. REQUIRES OT FOLLOW UP: Yes  Activity Tolerance  Activity Tolerance: Patient Tolerated treatment well  Safety Devices  Safety Devices in place: Yes  Type of devices: Gait belt;Patient at risk for falls; Left in chair;Chair alarm in place;Call light within reach           Patient Diagnosis(es): There were no encounter diagnoses. has a past medical history of Aneurysm, aortic ([facility] Atrial fibrillation ([facility] [facility] Glaucoma, Heart attack ([facility] [address] Hyperlipidemia, and Hypertension. has a past surgical history that includes Coronary angioplasty with stent; Colonoscopy; Eye surgery (Right, 7/23/2020); and Intracapsular cataract extraction (Right, 7/23/2020). Treatment Diagnosis: Impaired: ADL/IADL function, balance, functional mobility/transfers, strength, safety awareness, activity tolerance. Restrictions  Position Activity Restriction  Spinal Precautions: No Bending, No Lifting, No Twisting  Other position/activity restrictions: Reeves catheter    Subjective   General  Chart Reviewed: Yes, Orders, Progress Notes, History and Physical  Patient assessed for rehabilitation services?: Yes  Additional Pertinent Hx: Per Dr. Foster Wiggins 10/14/2020 note, \"Patient is a 65 yo M with pmh Afib, CAD, HTN, HLD, and recent lumbar spine surgery who initially presented from SNF on 9/13/2020 with fever 105 and altered mental status. Found to have sepsis due to UTI, urinary retention, metabolic encephalopathy, and acute hypoxic respiratory failure. \"  Response to Transfer: Contact guard assistance  Toilet Transfers Comments: RW><comfort height toilet, CGA, used L grab bar to sit & stand. Wheelchair Bed Transfers  Wheelchair/Bed - Technique: Ambulating(RW)  Equipment Used: Other; Wheelchair  Level of Asssistance: Contact guard assistance  Wheelchair Transfers Comments: Recliner><Rw><w/c, CGA no LOB or SOB noted  ADL  Equipment Provided: Long-handled sponge  Grooming: Stand by assistance(Seated @ sink in w/c, pt brushed teeth, rinsed with mouthwash, combed hair & shaved.)  UE Bathing: Stand by assistance(Sponge bathe @ sink seated in w/c, bathed all parts. Used shower cap for hair.)  LE Bathing: Contact guard assistance;Verbal cueing(Sponge bathe @ sink, bathed thighs & below knees, used long handle sponge for feet. In stance @ sink counter CGA to bathe buttocks. Cues to use green wipes for front shelbie area & catheter care.)  UE Dressing: Stand by assistance;Setup(donned long sleeve shirt seated)  LE Dressing: Moderate assistance;Setup(Assist to thread catheter through pants, pt able to thread through depends. Assist to bring over feet, pt able to reach & pull to knees, pulled to waist in stance CGA, LOB noted. Crossed legs to don/doff socks, donned slip-on shoes.)  Toileting: Unable to assess(comment)(Reeves catheter, pt denied need for BM)  Additional Comments: Pt left seated in recliner w/ tray & needs in reach.                  Cognition  Overall Cognitive Status: WFL  Perception  Overall Perceptual Status: WFL     Sensation  Overall Sensation Status: WFL        LUE AROM (degrees)  LUE AROM : WFL  Left Hand AROM (degrees)  Left Hand AROM: WFL  RUE AROM (degrees)  RUE AROM : WFL  Right Hand AROM (degrees)  Right Hand AROM: WFL  LUE Strength  Gross LUE Strength: WFL  L Hand General: 4/5  RUE Strength  Gross RUE Strength: WFL  R Hand General: 4/5                   Plan   Plan  Times per week: 5-6x week  Times per day: Twice a day  Plan weeks: 1-1.5 weeks  Specific instructions for Next Treatment: Reinforce cather care/cleaning & management  Current Treatment Recommendations: Strengthening, Safety Education & Training, Balance Training, Self-Care / ADL, Neuromuscular Re-education, Endurance Training, Functional Mobility Training, Equipment Evaluation, Education, & procurement  Plan Comment: Monday Conference                 Goals  Short term goals  Time Frame for Short term goals: 1-1.5 weeks  Short term goal 1: Pt will bathe modified independently using AE & shower chair. Short term goal 2: Pt will dress modified independently including mendoza catheter, using AE. Short term goal 3: Pt will toilet modified independently using grab bars/TSF and/or manage catheter care. Short term goal 4: Pt will complete functional transfers/mobility modified independently using RW & grab bars. Short term goal 5: Pt will complete a simple cooking task in the kitchen standing for 10 minutes modified independent. Short term goal 6: Pt will complete BUE HEP in order to strengthen UB/increase activity tolerance to complete IADL cleaning tasks around the house modified independent. Long term goals  Time Frame for Long term goals : LTG=STG  Patient Goals   Patient goals : Pt stated \"I want to get walking so I can go home and do things around the house on my own. \" Above goals will work towards this goal.       Therapy Time   Individual Concurrent Group Co-treatment   Time In 1030         Time Out 1200         Minutes 90         Timed Code Treatment Minutes: 75 Minutes+15 evaluation       Harvey Henderson S/OT  Therapist was present, directed patients care, made skilled judgement, and was responsible for assessment and treatment of the patient.       Brenda Giraldo, OTR/L #[id number]

## 2020-10-14 NOTE — H&P
Department of Physical Medicine & Rehabilitation  History & Physical      Patient Identification:  Aga Guzman  : 1945  Admit date: 10/13/2020   Attending provider: Mimi Westbrook MD        Primary care provider: Bogdan Amaro     Chief Complaint: weakness    History of Present Illness/Hospital Course:  Patient is a 65 yo M with pmh Afib, CAD, HTN, HLD, and recent lumbar spine surgery who initially presented from SNF on 2020 with fever 105 and altered mental status. Found to have sepsis due to UTI, urinary retention, metabolic encephalopathy, and acute hypoxic respiratory failure. Reeves placed by Urology due to false urethral passage. Treated with IV antibiotics per ID. Course further complicated by TONO, anemia, and uncontrolled HTN. Of note, patient recently underwent L4-5 and L5-S1 fusion with anterior and posterior approach (2020 with Dr. Evelyne Bowers and Dr. Josh Mckenzie). Post-op course during that admission complicated by left retroperitoneal/pelvic hematoma and acute blood loss anemia. He was discharged to [facility] 9/10. Patient initially admitted to ARU (-10/1). He made gradual progress and demonstrated some self-limiting behavior. On  he began to refuse therapies and most medical care. He stated a wish to die and planned to achieve this by not eating/drinking. Psychiatry was consulted. Dr. Elma Barillas and I agreed that patient demonstrated imminent risk of harm to himself with ongoing suicidal ideation and plan. Therefore patient will be transferred to inpatient psychiatric unit 10/1. Patient reportedly did well with inpatient psychiatric care. Reeves was removed for void trial at inpatient psych. Patient unable to void and Reeves was replaced. Then developed fevers and was readmitted to acute medical floor at [facility] 10/7. Found to proteus UTI with bilateral hydronephrosis. Treated with ceftriaxone. Urology recommending TURP as outpatient.  Course was further complicated by TONO, hyponatremia. Now presents to ARU with impaired mobility and self-care below his baseline. Currently, patient reports generalized weakness (mostly in L>R legs). He has \"nerve pain\" in the distal LLE below the knee as well. He has tingling/numbness in soles of bilateral feet. Overall these symptoms are gradually improving since spine surgery. He has ongoing fatigue but energy level much improved. He denies suicidal ideation. He is motivated to start inpatient rehab program.     Prior Level of Function:  Independent for mobility, ADLs, and IADLs  However has been in hospital/SNF/ARU/inpatient psych settings since 8/2020 and requiring physical assist during that time period     Current Level of Function:  Min assist    Pertinent Social History:  Support: Lives alone, family supportive  Home set-up: Multi level home with FF available, 10 steps to enter    Past Medical History:   Diagnosis Date    Aneurysm, aortic ([address]     Atrial fibrillation ([address]     Glaucoma     Heart attack ([address]     Hyperlipidemia     Hypertension        Past Surgical History:   Procedure Laterality Date    COLONOSCOPY      CORONARY ANGIOPLASTY WITH STENT PLACEMENT      X 2    EYE SURGERY Right 7/23/2020    GONIOTOMY performed by Liborio Butler MD at [place] [address] Right 7/23/2020    Phacoemulsification with intraocular lens implant performed by Liborio Butler MD at [address] [place] [address]       Family History   Problem Relation Age of Onset    Heart Disease Mother        Social History     Socioeconomic History    Marital status:       Spouse name: None    Number of children: None    Years of education: None    Highest education level: None   Occupational History    None   Social Needs    Financial resource strain: None    Food insecurity     Worry: None     Inability: None    Transportation needs     Medical: None     Non-medical: None   Tobacco Use    Smoking status: Former Smoker     Types: Cigarettes    Smokeless tobacco: Never Used    Tobacco comment: was only social smoker    Substance and Sexual Activity    Alcohol use: Not Currently    Drug use: Never    Sexual activity: None   Lifestyle    Physical activity     Days per week: None     Minutes per session: None    Stress: None   Relationships    Social connections     Talks on phone: None     Gets together: None     Attends [employer] service: None     Active member of club or organization: None     Attends meetings of clubs or organizations: None     Relationship status: None    Intimate partner violence     Fear of current or ex partner: None     Emotionally abused: None     Physically abused: None     Forced sexual activity: None   Other Topics Concern    None   Social History Narrative    None       Allergies   Allergen Reactions    Azithromycin     Brimonidine Itching    Clindamycin/Lincomycin Diarrhea    Penicillins Hives    Simvastatin          Current Facility-Administered Medications   Medication Dose Route Frequency Provider Last Rate Last Dose    atorvastatin (LIPITOR) tablet 40 mg  40 mg Oral Nightly Félix Rawls MD   40 mg at 10/13/20 2100    dorzolamide-timolol (COSOPT) 22.3-6.8 MG/ML ophthalmic solution 1 drop  1 drop Both Eyes BID Félix Rawls MD   1 drop at 10/14/20 0800    DULoxetine (CYMBALTA) extended release capsule 30 mg  30 mg Oral BID Félix Rawls MD   30 mg at 10/14/20 0800    ferrous sulfate EC tablet 324 mg  324 mg Oral Daily with breakfast Félix Rawls MD   324 mg at 10/14/20 0800    gabapentin (NEURONTIN) capsule 300 mg  300 mg Oral TID Félix Rawls MD   300 mg at 10/14/20 0800    latanoprost (XALATAN) 0.005 % ophthalmic solution 1 drop  1 drop Left Eye Nightly Félix Rawls MD        levothyroxine (SYNTHROID) tablet 50 mcg  50 mcg Oral Daily Félix Rawls MD   50 mcg at 10/14/20 8112    methylphenidate (RITALIN) tablet 5 mg  5 mg Oral BID Trachea midline, neck supple. No thyromegaly noted. CV: Regular rate and rhythm, no murmur rub or gallop noted  Resp: Lungs with bibasilar crackles. No rales wheezes or rhonchi, no retractions. Respirations unlabored. GI: Soft, nontender, nondistended. Normal bowel sounds. No palpable masses. Skin: Normal temperature and turgor. No rashes or breakdown noted. Ext: No significant edema appreciated. No varicosities. MSK: Decreased spine ROM. No joint tenderness, erythema, warmth noted. Neuro:   -Mental status: Alert. Oriented to person, place, time, situation.   -Language: Speech fluent  -Cranial nerves: VFF, PERRL, EOMI, Facial sensation intact, Face symmetric, Hearing intact, Palate elevation symmetric, Shoulder shrug intact. Tongue midline.   -Sensation intact to light touch but subjectively abnormal in LLE below knee and right sole of foot. -Motor examination reveals strength 5/5 in BUE and BLE except 4/5 bilateral hip flexion, 4/5 left knee ext, 4/5 left ankle DF.   -No abnormalities with finger/nose noted. -Reflexes diminished, symmetric. Negative Shae  Psych: Stable mood, normal judgement, normal affect     Lab Results   Component Value Date    WBC 3.7 (L) 10/14/2020    HGB 8.4 (L) 10/14/2020    HCT 25.4 (L) 10/14/2020    MCV 89.8 10/14/2020     10/14/2020     No results found for: INR, PROTIME  Lab Results   Component Value Date    CREATININE 1.4 (H) 10/14/2020    BUN 23 (H) 10/14/2020     10/14/2020    K 3.9 10/14/2020     10/14/2020    CO2 24 10/14/2020     Lab Results   Component Value Date    ALT 14 10/05/2020    AST 22 10/05/2020    ALKPHOS 98 10/05/2020    BILITOT 0.7 10/05/2020       Most recent echocardiogram revealed:   Ejection fraction is visually estimated to be   50%. There is mild diffuse hypokinesis. Diastolic filling parameters suggest grade I diastolic dysfunction. moderate aortic regurgitation. The aortic root is severely dilated at 5.4 cm.    The ascending aorta is severely dilated 5.6 cm. The aortic arch is borderline dilated at 3.4 cm. Plaque is noted in the aortic arch. Most recent EKG revealed:  Sinus bradycardia with 1st degree A-V blockLeft axis deviationLeft anterior fascicular blockLeft ventricular hypertrophy with QRS widening and repolarization abnormalityInferior infarct (cited on or before 13-SEP-2020)Abnormal ECGWhen compared with ECG of 13-SEP-2020 08:47,Vent. rate has decreased BY  58 BPMConfirmed by FRANKLYN Thakur MD ([id number]) on 10/1/2020 5:30:39 PM    Most recent imaging studies revealed:    Renal US  Mild bilateral hydronephrosis.         Small bladder calculus. CT abdomen/pelvis  1. Bilateral hydronephrosis and hydroureter with mucosal enhancement.  Marked    bladder wall thickening with infiltration of the pericystic fat.  Findings    are consistent with a UTI. 2. Liquefied hematoma extending from the lumbar spine, around the left    hemipelvis and into the anterior abdominal wall with partial resolution.  No    acute hemorrhage or significant free fluid. 3. Colonic diverticulosis with no acute features. 4. Distended gallbladder.  Increased attenuation dependently, possibly sludge    or calculi. 5. Moderate aortoiliac atherosclerotic disease.  Stable aneurysmal change at    2.8 cm. Xray hips  Moderate to severe degenerative changes of the hip joints without acute    osseous abnormality. On my review, CXR displays mild cardiomegaly, no consolidations or effusions. The above laboratory data have been reviewed. The above imaging data have been reviewed. The above medical testing have been reviewed. Body mass index is 24.19 kg/m².     POST ADMISSION PHYSICIAN EVALUATION  The patient has agreed to being admitted to our comprehensive inpatient rehabilitation facility and can tolerate the intensity of service consisting of at least:  --180 minutes of therapy a day, 5 out of 7 days a

## 2020-10-14 NOTE — PLAN OF CARE
Problem: Pain:  Description: Pain management should include both nonpharmacologic and pharmacologic interventions. Goal: Control of acute pain  Description: Control of acute pain  10/14/2020 0340 by Tena Philip RN  Note: Pt able to express presence/absence of pain and rate pain appropriately using numerical scale. Pain/discomfort being managed with PRN analgesics per MD orders (see MAR). Pain assessed every shift and after interventions. Problem: Skin Integrity:  Goal: Will show no infection signs and symptoms  Description: Will show no infection signs and symptoms  10/14/2020 0340 by Tena Philip RN  Note: Skin assessment performed each shift per protocol. Patient turned and repositioned every two hours and prn with pillow support. Patient checked for incontence every two hours. Problem: Falls - Risk of:  Goal: Will remain free from falls  Description: Will remain free from falls  10/14/2020 0340 by Tena Philip RN  Note: Patient free from falls this shift. Fall precautions in place at all times. Bed in lowest position with two side rails up and wheels locked. Call light within reach. Patient able and agreeable to contact for safety appropriately. Santos Juárez

## 2020-10-14 NOTE — CONSULTS
Comprehensive Nutrition Assessment    Type and Reason for Visit:  Consult(new to ARU)    Nutrition Recommendations/Plan:   Continue General diet. Continue Ensure x2 at every meal, chocolate flavor. Start Magic Cup BID. If intakes don't improve, may need to consider supplemental alternative nutrition. **Pt meets criteria for Severe Malnutrition with poor intakes >75% for >1 month, muscle/fat wasting, and significant wt loss, -12% x 1 month. **    Nutrition Assessment:  Consult for new to ARU. Pt presented to ARU with pain in bilateral extremities and PMH of COPD, CAD, and MDD. Pt was previously in ARU and discharged to clementealessandra barrios at [facility]. Pt developed UTI and admitted to acute floor. Had increasing pain and weakness in lower extremities and returned to ARU. On last admission to ARU, pt severely malnourished d/t wanting to die from dehydration/starvation. Pt had very poor appetite and intakes <50% which has been an ongoing issue over the past month. Pt with some improvement since pt left ARU first time, continues with intakes ~50% or less and receiving two Ensure at every meal with fair intakes. Pt reports loss of taste and smell over the last 6 months and only enjoys really sweet and really spicy foods. PT with possible 12% wt loss over the past month but could also be d/t change of scales. Will monitor wt trend. Suspect some wt loss d/t poor nutrition. May need to consider alternative nutrition d/t ongoing poor nutrition intakes >1 month if intakes do no improve during rehab. Continue nutritional supplement, two at each meal. Will provide additional \"sweet\" supplements to encourage PO intakes.     Malnutrition Assessment:  Malnutrition Status:  Severe malnutrition    Context:  Chronic Illness     Findings of the 6 clinical characteristics of malnutrition:  Energy Intake:  7 - 75% or less estimated energy requirements for 1 month or longer  Weight Loss:  7 - 5% over 1 month     Body Fat Loss:  7 - Severe body fat loss Orbital   Muscle Mass Loss:  7 - Severe muscle mass loss Temples (temporalis), Clavicles (pectoralis & deltoids)  Fluid Accumulation:  No significant fluid accumulation     Strength:  Not Performed    Estimated Daily Nutrient Needs:  Energy (kcal):  8054-7681 kcal (25-30 kcal/kg CBW)  Protein (g):  74-96 gm (1-1.3gm/kg CBW)      Fluid (ml/day):  1 mL/kcal    Nutrition Related Findings:  BM 10/13      Wounds:  None       Current Nutrition Therapies:    DIET GENERAL;  Dietary Nutrition Supplements: Standard High Calorie Oral Supplement    Anthropometric Measures:  · Height: 5' 9\" (175.3 cm)  · Current Body Weight: 163 lb (73.9 kg)   · Admission Body Weight: 171 lb (77.6 kg)(wt discrepency, will watch wt trend.)    · Ideal Body Weight: 160 lbs; % Ideal Body Weight 101.9 %   · BMI: 24.1  · BMI Categories: Normal Weight (BMI 22.0 to 24.9) age over 72       Nutrition Diagnosis:   · Severe malnutrition related to altered taste perception, cognitive or neurological impairment, inadequate protein-energy intake as evidenced by poor intake prior to admission, weight loss greater than or equal to 5% in 1 month, severe loss of subcutaneous fat, severe muscle loss      Nutrition Interventions:   Food and/or Nutrient Delivery:  Continue Current Diet, Continue Oral Nutrition Supplement, Start Oral Nutrition Supplement  Nutrition Education/Counseling:  No recommendation at this time   Coordination of Nutrition Care:  Continued Inpatient Monitoring    Goals:  consume >50% of meals and supplement during admission       Nutrition Monitoring and Evaluation:   Food/Nutrient Intake Outcomes:  Food and Nutrient Intake, Supplement Intake  Physical Signs/Symptoms Outcomes:  Biochemical Data, Weight, Skin, Nutrition Focused Physical Findings, Meal Time Behavior     Discharge Planning:     Too soon to determine     Electronically signed by Robert Bauer RD, LD on 10/14/20 at 9:12 AM EDT    Contact: [phone]

## 2020-10-14 NOTE — PLAN OF CARE
ARU PATIENT TREATMENT PLAN  [facility] [address]  [phone]    [address] [place] [address] Street: 1945  Acct #: [de-identified]  MRN: [id number]   PHYSICIAN:  Donell Williamson MD    Rehabilitation Diagnosis:    Debility  -PT/OT     Proteus UTI  -In setting of urinary retention  -Completed ceftriaxone     Urinary retention  -Maintain Reeves until outpatient f/u with Urology. Anticipate need for TURP.      TONO on CKD III  -Addressing urinary retention as above  -Avoid nephrotoxins, renally dose meds     Lumbar stenosis s/p recent L4-S1 ALIF/PSF (8/31 with Dr. Anirudh Clarke and Dr. Celestine Michael)  -Incisions healing well without evidence of infection  -PT/OT     Acute blood loss anemia  -Due to recent left pelvic hematoma  -Monitor Hgb now resumed on Xarelto, transfuse prn <7  -Iron supplement increased     Afib  -Controlled on Amiodarone, metoprolol  -Xarelto     CAD  -statin, bb, no ARB due to TONO     HTN  -metoprolol, prn hydralazine     AAA  -Repeat imaging in 5 years recommended     COPD  -No acute exacerbation  -supplemental O2 prn, currently on RA     Hypothyroidism  -levothyroxine     Depression  -Recently at inpatient psych due to suicidal ideation. Now improved. -Duloxetine  -Patient refusing methylphenidate     Bladder   -See above     Bowel   -High risk constipation   -senna+colace BID, miralax daily, prn MoM, and bisacodyl supp.     Pain control  -Duloxetine, gabapentin, prn Percocet     PPx  -DVT: Xarelto  -GI: pantoprazole, probiotics    ADMIT DATE:10/13/2020    Patient Goals: \"I want to get walking so I can go home and do things around the house on my own. \"     Admitting Impairments: generalized weakness + LLE weakness, BLE sensory deficit, decreased endurance, balance  Barriers: generalized weakness + LLE weakness, BLE sensory deficit, decreased endurance, balance, medical comorbidities  Participation: patient lives alone, was independent with mobility and self care, Likes to do a little bit of reading     CARE PLAN     NURSING:  Mani Nair Sr. while on this unit will:     [x] Be continent of bowel  [x] Have an adequate number of bowel movements  [] Urinate with no urinary retention >300ml in bladder  [] Complete bladder protocol with mendoza removal  [x] Maintain O2 SATs at 92%  [x] Have pain managed while on ARU       [] Be pain free by discharge   [x] Have no skin breakdown while on ARU  [] Have improved skin integrity via wound measurements  [x] Have no signs/symptoms of infection  [x] Be free from injury during hospitalization   [x] Complete education with patient/family with understanding demonstrated for:  [x] Adjustment   [x] Other: Mendoza care, back/spinal safety, CHF, COPD  Nursing interventions may include bowel/bladder training, education for medical assistive devices, medication education, O2 saturation management, energy conservation, stress management techniques, fall prevention, alarms protocol, seating and positioning, skin/wound care, pressure relief instruction,dressing changes,  infection protection, DVT prophylaxis, and/or assistance with in room safety with transfers to bed, toilet, wheelchair, shower as well as bathroom activities and hygiene.      Patient/caregiver education for:   [x] Disease/sustained injury/management      [x] Medication Use   [] Surgical intervention   [x] Safety   [x] Body mechanics and or joint protection   [x] Health maintenance         PHYSICAL THERAPY:  Goals:                  Short term goals  Time Frame for Short term goals: 5 days from 10/14/2020  Short term goal 1: Pt will perform bed mobility with supervision  Short term goal 2: Pt will ascend/descend curb with SBA using LRAD  Short term goal 3: Pt will ascend/descend 8 stairs using R ascending railing with CGA  Short term goal 4: Pt will ambulate 150' using LRAD  with CGA  Short term goal 5: Pt will perform all transfers using LRAD with SBA Long term goals  Time Frame for Long term goals : 7-10 days from 10/14/2020  Long term goal 1: Pt will perform bed mobillity with Modif I  Long term goal 2: Pt will ascend/descend curb using LRAD with Modif I  Long term goal 3: Pt will ascend/descend 12 stairs using R ascending railing with Modif I  Long term goal 4: Pt will ambulate 200' using LRAD with Modif I  Long term goal 5: Pt will perform all transfers using LRADwith Modif I  These goals were reviewed with this patient at the time of assessment and Yamile Rang. is in agreement. Plan of Care: Pt to be seen 90 mins per day for 5 day/week 1-1.5 weeks. Current Treatment Recommendations: Strengthening, Home Exercise Program, Safety Education & Training, Balance Training, Endurance Training, Functional Mobility Training, Equipment Evaluation, Education, & procurement, Transfer Training, Gait Training, ADL/Self-care Training, Stair training, Positioning      OCCUPATIONAL THERAPY:  Goals:             Short term goals  Time Frame for Short term goals: 1-1.5 weeks  Short term goal 1: Pt will bathe modified independently using AE & shower chair. Short term goal 2: Pt will dress modified independently including mendoza catheter, using AE. Short term goal 3: Pt will toilet modified independently using grab bars/TSF and/or manage catheter care. Short term goal 4: Pt will complete functional transfers/mobility modified independently using RW & grab bars. Short term goal 5: Pt will complete a simple cooking task in the kitchen standing for 10 minutes modified independent.   Short term goal 6: Pt will complete BUE HEP in order to strengthen UB/increase activity tolerance to complete IADL cleaning tasks around the house modified independent. :  Long term goals  Time Frame for Long term goals : LTG=STG :    These goals were reviewed with this patient at the time of assessment and Yamile Rang. is in agreement    Plan of Care:  Pt to be necessary support. Medical issues being managed closely and that require 24 hour availability of a physician:   [] Swallowing Precautions  [x] Bowel/Bladder Fx  [] Weight bearing precautions   [x] Wound Care    [x] Pain Mgmt   [x] Infection Protection   [x] DVT Prophylaxis   [x] Fall Precautions  [x] Fluid/Electrolyte/Nutrition Balance   [] Voice Protection   [x] Respiratory  [] Other:    Medical Prognosis: [x] Good  [] Fair    [] Guarded   Total expected IRF days 7-10  Anticipated discharge destination:    [x] Home Independently    [] Home with supervision    []SNF     [] Other                                           Physician anticipated functional outcomes:  Improve gait, transfers, self care to independent with DME as needed to allow safe return home   IPOC brief synthesis: Patient is a 67 yo M with pmh Afib, CAD, HTN, HLD, and recent lumbar spine surgery who initially presented from SNF on 9/13/2020 with fever 105 and altered mental status. Found to have sepsis due to UTI, urinary retention, metabolic encephalopathy, and acute hypoxic respiratory failure. Reeves placed by Urology due to false urethral passage. Treated with IV antibiotics per ID. Course further complicated by TONO, anemia, and uncontrolled HTN. Of note, patient recently underwent L4-5 and L5-S1 fusion with anterior and posterior approach (8/31/2020 with Dr. Junior Lou and Dr. Renetta Grant). Post-op course during that admission complicated by left retroperitoneal/pelvic hematoma and acute blood loss anemia. He was discharged to [facility] 9/10. Patient initially admitted to ARU (9/18-10/1). He made gradual progress and demonstrated some self-limiting behavior. On 9/28 he began to refuse therapies and most medical care. He stated a wish to die and planned to achieve this by not eating/drinking. Psychiatry was consulted. Dr. Durham Patient and I agreed that patient demonstrated imminent risk of harm to himself with ongoing suicidal ideation and plan.  Therefore patient will be transferred to inpatient psychiatric unit 10/1. Patient reportedly did well with inpatient psychiatric care. Reeves was removed for void trial at inpatient psych. Patient unable to void and Reeves was replaced. Then developed fevers and was readmitted to acute medical floor at [facility] 10/7. Found to proteus UTI with bilateral hydronephrosis. Treated with ceftriaxone. Urology recommending TURP as outpatient. Course was further complicated by TONO, hyponatremia. Now re-presents to ARU with impairments including: generalized weakness + LLE weakness, BLE sensory deficit, decreased endurance, balance. He requires comprehensive inpatient rehab program in order to return to community setting. I have reviewed this initial plan of care and agree with its contents:    Title   Name    Date    Time    Physician: Brigette Ryan.  Vickey James MD 10/16/2020, 11:01 AM      Case Mgmt:  Frida Rojas [place]     Case Management   [phone]    10/15/2020  3:32 PM      OT: Electronically signed by Kostas Suazo OT on 10/14/2020 at 1:31 PM    PT:Electronically signed by Mariano Thibodeaux PT on 10/14/20 at 4:40 PM EDT    RN: Kady Grimes RN, CRRN 10/14/2020 9:53 am    ST:    :  ROSALIND Cramer  10/15/2020  1030    Other:

## 2020-10-15 LAB
ANION GAP SERPL CALCULATED.3IONS-SCNC: 10 MMOL/L (ref 3–16)
BASOPHILS ABSOLUTE: 0 K/UL (ref 0–0.2)
BASOPHILS RELATIVE PERCENT: 1 %
BUN BLDV-MCNC: 29 MG/DL (ref 7–20)
CALCIUM SERPL-MCNC: 8.6 MG/DL (ref 8.3–10.6)
CHLORIDE BLD-SCNC: 101 MMOL/L (ref 99–110)
CO2: 25 MMOL/L (ref 21–32)
CREAT SERPL-MCNC: 1.6 MG/DL (ref 0.8–1.3)
EOSINOPHILS ABSOLUTE: 0.2 K/UL (ref 0–0.6)
EOSINOPHILS RELATIVE PERCENT: 4.1 %
GFR AFRICAN AMERICAN: 51
GFR NON-AFRICAN AMERICAN: 42
GLUCOSE BLD-MCNC: 89 MG/DL (ref 70–99)
HCT VFR BLD CALC: 26.1 % (ref 40.5–52.5)
HEMOGLOBIN: 8.6 G/DL (ref 13.5–17.5)
LYMPHOCYTES ABSOLUTE: 1.2 K/UL (ref 1–5.1)
LYMPHOCYTES RELATIVE PERCENT: 24.5 %
MCH RBC QN AUTO: 29.6 PG (ref 26–34)
MCHC RBC AUTO-ENTMCNC: 32.8 G/DL (ref 31–36)
MCV RBC AUTO: 90.2 FL (ref 80–100)
MONOCYTES ABSOLUTE: 0.6 K/UL (ref 0–1.3)
MONOCYTES RELATIVE PERCENT: 12.9 %
NEUTROPHILS ABSOLUTE: 2.7 K/UL (ref 1.7–7.7)
NEUTROPHILS RELATIVE PERCENT: 57.5 %
PDW BLD-RTO: 15.8 % (ref 12.4–15.4)
PLATELET # BLD: 162 K/UL (ref 135–450)
PMV BLD AUTO: 8.7 FL (ref 5–10.5)
POTASSIUM REFLEX MAGNESIUM: 4.3 MMOL/L (ref 3.5–5.1)
RBC # BLD: 2.89 M/UL (ref 4.2–5.9)
SODIUM BLD-SCNC: 136 MMOL/L (ref 136–145)
WBC # BLD: 4.8 K/UL (ref 4–11)

## 2020-10-15 PROCEDURE — 36415 COLL VENOUS BLD VENIPUNCTURE: CPT

## 2020-10-15 PROCEDURE — 97110 THERAPEUTIC EXERCISES: CPT | Performed by: PHYSICAL THERAPIST

## 2020-10-15 PROCEDURE — 85025 COMPLETE CBC W/AUTO DIFF WBC: CPT

## 2020-10-15 PROCEDURE — 97116 GAIT TRAINING THERAPY: CPT

## 2020-10-15 PROCEDURE — 97530 THERAPEUTIC ACTIVITIES: CPT

## 2020-10-15 PROCEDURE — 97110 THERAPEUTIC EXERCISES: CPT

## 2020-10-15 PROCEDURE — 1280000000 HC REHAB R&B

## 2020-10-15 PROCEDURE — 97116 GAIT TRAINING THERAPY: CPT | Performed by: PHYSICAL THERAPIST

## 2020-10-15 PROCEDURE — 80048 BASIC METABOLIC PNL TOTAL CA: CPT

## 2020-10-15 PROCEDURE — 6370000000 HC RX 637 (ALT 250 FOR IP): Performed by: PHYSICAL MEDICINE & REHABILITATION

## 2020-10-15 PROCEDURE — 97530 THERAPEUTIC ACTIVITIES: CPT | Performed by: PHYSICAL THERAPIST

## 2020-10-15 PROCEDURE — 97535 SELF CARE MNGMENT TRAINING: CPT

## 2020-10-15 RX ADMIN — POLYETHYLENE GLYCOL 3350 17 G: 17 POWDER, FOR SOLUTION ORAL at 07:11

## 2020-10-15 RX ADMIN — METOPROLOL TARTRATE 50 MG: 50 TABLET, FILM COATED ORAL at 21:03

## 2020-10-15 RX ADMIN — FERROUS SULFATE TAB EC 324 MG (65 MG FE EQUIVALENT) 324 MG: 324 (65 FE) TABLET DELAYED RESPONSE at 07:12

## 2020-10-15 RX ADMIN — PANTOPRAZOLE SODIUM 40 MG: 40 TABLET, DELAYED RELEASE ORAL at 05:55

## 2020-10-15 RX ADMIN — DULOXETINE HYDROCHLORIDE 30 MG: 30 CAPSULE, DELAYED RELEASE ORAL at 07:11

## 2020-10-15 RX ADMIN — LEVOTHYROXINE SODIUM 50 MCG: 0.05 TABLET ORAL at 05:55

## 2020-10-15 RX ADMIN — Medication 2 CAPSULE: at 07:13

## 2020-10-15 RX ADMIN — RIVAROXABAN 20 MG: 20 TABLET, FILM COATED ORAL at 18:33

## 2020-10-15 RX ADMIN — TRAZODONE HYDROCHLORIDE 50 MG: 50 TABLET ORAL at 21:03

## 2020-10-15 RX ADMIN — GABAPENTIN 300 MG: 300 CAPSULE ORAL at 07:13

## 2020-10-15 RX ADMIN — GABAPENTIN 300 MG: 300 CAPSULE ORAL at 15:23

## 2020-10-15 RX ADMIN — DOCUSATE SODIUM 50 MG AND SENNOSIDES 8.6 MG 1 TABLET: 8.6; 5 TABLET, FILM COATED ORAL at 07:14

## 2020-10-15 RX ADMIN — GABAPENTIN 300 MG: 300 CAPSULE ORAL at 20:33

## 2020-10-15 RX ADMIN — OXYCODONE HYDROCHLORIDE AND ACETAMINOPHEN 1 TABLET: 7.5; 325 TABLET ORAL at 05:55

## 2020-10-15 RX ADMIN — DOCUSATE SODIUM 50 MG AND SENNOSIDES 8.6 MG 1 TABLET: 8.6; 5 TABLET, FILM COATED ORAL at 21:02

## 2020-10-15 RX ADMIN — ATORVASTATIN CALCIUM 40 MG: 40 TABLET, FILM COATED ORAL at 20:33

## 2020-10-15 RX ADMIN — METOPROLOL TARTRATE 50 MG: 50 TABLET, FILM COATED ORAL at 07:12

## 2020-10-15 RX ADMIN — DORZOLAMIDE HYDROCHLORIDE AND TIMOLOL MALEATE 1 DROP: 20; 5 SOLUTION/ DROPS OPHTHALMIC at 21:03

## 2020-10-15 RX ADMIN — DORZOLAMIDE HYDROCHLORIDE AND TIMOLOL MALEATE 1 DROP: 20; 5 SOLUTION/ DROPS OPHTHALMIC at 12:00

## 2020-10-15 RX ADMIN — ACETAMINOPHEN 650 MG: 325 TABLET ORAL at 15:24

## 2020-10-15 RX ADMIN — AMIODARONE HYDROCHLORIDE 200 MG: 200 TABLET ORAL at 18:33

## 2020-10-15 RX ADMIN — DULOXETINE HYDROCHLORIDE 30 MG: 30 CAPSULE, DELAYED RELEASE ORAL at 20:34

## 2020-10-15 RX ADMIN — METHYLPHENIDATE HYDROCHLORIDE 5 MG: 10 TABLET ORAL at 07:14

## 2020-10-15 RX ADMIN — Medication 2 CAPSULE: at 15:23

## 2020-10-15 ASSESSMENT — PAIN DESCRIPTION - ONSET
ONSET: ON-GOING

## 2020-10-15 ASSESSMENT — PAIN DESCRIPTION - PAIN TYPE
TYPE: CHRONIC PAIN;NEUROPATHIC PAIN
TYPE: ACUTE PAIN
TYPE: CHRONIC PAIN;NEUROPATHIC PAIN

## 2020-10-15 ASSESSMENT — PAIN SCALES - GENERAL
PAINLEVEL_OUTOF10: 9
PAINLEVEL_OUTOF10: 0
PAINLEVEL_OUTOF10: 0
PAINLEVEL_OUTOF10: 6
PAINLEVEL_OUTOF10: 5
PAINLEVEL_OUTOF10: 0

## 2020-10-15 ASSESSMENT — PAIN DESCRIPTION - FREQUENCY
FREQUENCY: INTERMITTENT

## 2020-10-15 ASSESSMENT — PAIN DESCRIPTION - ORIENTATION
ORIENTATION: LEFT

## 2020-10-15 ASSESSMENT — PAIN DESCRIPTION - LOCATION
LOCATION: LEG

## 2020-10-15 ASSESSMENT — PAIN DESCRIPTION - PROGRESSION: CLINICAL_PROGRESSION: NOT CHANGED

## 2020-10-15 ASSESSMENT — PAIN DESCRIPTION - DESCRIPTORS
DESCRIPTORS: ACHING;NUMBNESS;TINGLING
DESCRIPTORS: ACHING
DESCRIPTORS: ACHING;TINGLING

## 2020-10-15 NOTE — CONSULTS
Office: [phone]       Fax: [phone]      Nephrology Initial Consult Note        Patient's Name: Chato Burnett. Admit Date: 10/13/2020  Date of Visit: 10/15/2020    Reason for Consult:  TONO/CKD  Requesting Physician:  Bee Zayas MD  PCP: Gabby Ortega    Chief Complaint:  rehab     History of Present Illness:      Chato Burnett is a 66 y.o. male with PMHx of hypertension, atrial fibrillation, AAA,  coronary artery disease, CHF, status post LS spine surgery who was hospitalized in ARU on 10/13/2020 with comprehensive rehab. Pt was hospitalized at [facility] in September for sepsis secondary to UTI. Had false urethral passage, was seen by urology. Currently has Reeves in place. Was briefly in ARU before discharged to behavioral unit. Reeves replaced on 10/2. Subsequently had UTI with Proteus. CT abdomen was obtained which showed bilateral hydronephrosis despite having a Reeves in place. initially placed on IV Merrem, changed to Rocephin. Urology recommended TURP as outpatient.  Had peak creat of 1.6 that improved to 1.1  No back pain, no obvious hematuria  NSAID use: Denies   IV contrast: None recent   Home meds reviewed     Past Medical History:   Diagnosis Date    Aneurysm, aortic (HCC)     Atrial fibrillation (HCC)     Glaucoma     Heart attack ([facility] [address] [address]     Hyperlipidemia     Hypertension        Past Surgical History:   Procedure Laterality Date    COLONOSCOPY      CORONARY ANGIOPLASTY WITH STENT PLACEMENT      X 2    EYE SURGERY Right 7/23/2020    GONIOTOMY performed by Harvey Oscar MD at [place] [address] Right 7/23/2020    Phacoemulsification with intraocular lens implant performed by Harvey Oscar MD at [address] [place] [address] [place]       Family History   Problem Relation Age of Onset    Heart Disease Mother         reports that he has quit smoking. His smoking use included cigarettes. He has never used smokeless tobacco. He reports previous alcohol use. He reports that he does not use drugs. Medications: Allergies:  Azithromycin; Brimonidine; Clindamycin/lincomycin; Penicillins; and Simvastatin    Scheduled Meds:   atorvastatin  40 mg Oral Nightly    dorzolamide-timolol  1 drop Both Eyes BID    DULoxetine  30 mg Oral BID    ferrous sulfate  324 mg Oral Daily with breakfast    gabapentin  300 mg Oral TID    latanoprost  1 drop Left Eye Nightly    levothyroxine  50 mcg Oral Daily    methylphenidate  5 mg Oral BID WC    metoprolol tartrate  50 mg Oral BID    pantoprazole  40 mg Oral Daily    polyethylene glycol  17 g Oral Daily    sennosides-docusate sodium  1 tablet Oral BID    rivaroxaban  20 mg Oral Daily    lactobacillus  2 capsule Oral BID WC    amiodarone  200 mg Oral Daily     Continuous Infusions:    Labs:  CBC:   Recent Labs     10/13/20  0522 10/14/20  0703 10/15/20  0702   WBC 4.8 3.7* 4.8   HGB 8.3* 8.4* 8.6*    164 162     Ca/Mg/Phos:   Recent Labs     10/13/20  0522 10/14/20  0703 10/15/20  0702   CALCIUM 8.6 8.3 8.6     UA:No results for input(s): Lorretta Glimpse, GLUCOSEU, BILIRUBINUR, KETUA, SPECGRAV, BLOODU, PHUR, PROTEINU, UROBILINOGEN, NITRU, LEUKOCYTESUR, [place] in the last 72 hours. Urine Microscopic: No results for input(s): LABCAST, BACTERIA, COMU, HYALCAST, WBCUA, RBCUA, EPIU in the last 72 hours. Urine Chemistry: No results for input(s): Lucillie Jumper, PROTEINUR, NAUR in the last 72 hours.       ROS:     All systems reviewed and negative except as in HPI    Objective:     Vitals: /71   Pulse 54   Temp 97.6 °F (36.4 °C) (Oral)   Resp 15   Ht 5' 9\" (1.753 m)   Wt 163 lb 2.3 oz (74 kg)   SpO2 94%   BMI 24.09 kg/m²    Wt Readings from Last 3 Encounters:   10/15/20 163 lb 2.3 oz (74 kg)   10/13/20 156 lb 3.2 oz (70.9 kg)   09/30/20 187 lb 2.7 oz (84.9 kg)      24HR INTAKE/OUTPUT:      Intake/Output Summary (Last 24 hours) at 10/15/2020 1706  Last data filed at 10/15/2020 0835  Gross per 24 hour   Intake 180 ml   Output 1400 ml   Net -1220 ml     Constitutional:  awake, NAD  HEENT:  MMM, No icterus  Neck: no bruits, No JVD  Cardiovascular:  ireg rhythm  Respiratory: CTA, no crackles  Abdomen:  +BS, soft, NT, ND  Ext: no lower extremity edema  Psychiatric: mood and affect appropriate  Skin: dry/intact  CNS: alert, no agitation    IMAGING:  No orders to display       Assessment :     1. TONO  -Non-Oliguric  -Baseline creat: 1-1. 1. previous TONO with peak 1.6. Now 1.4->1.6  -no recent UA  -Renal US (10/9): mild bilateral hydronephrosis, 7 mm intravesicular calculus  -Volume: Euvolemic  -Electrolytes: No Dyskalemia  -Acid-Base: Metabolic alkalosis    Recent Labs     10/13/20  0522 10/14/20  0703 10/15/20  0702   BUN 22* 23* 29*   CREATININE 1.3 1.4* 1.6*     Recent Labs     10/13/20  0522 10/14/20  0703 10/15/20  0702   * 136 136   K 3.8 3.9 4.3   CO2 26 24 25         2. HTN  -Blood pressure at goal     BP Readings from Last 1 Encounters:   10/15/20 136/71       3. CAD/CHF  -TTE (sep 2020): LVEF 50%, G1DD, AAA 5.6 cm    4. Urinary retention  -recent UTIs    5. atrial fibrillation   -on anticoagulation     6. Anemia  -recent blood loss    Plan:     - UA, Ur urea/creat  - hemodynamic support  - If renal function continues to worsen, will repeat Renal US  - Avoid ACEI/ARB  - monitor BMP    -Monitor I/O, UOP  -Maintain MAP>65  -Avoid nephrotoxin, if able. -Dose meds to current eGFR    Thank you for allowing us to participate in care of Yamile Rang. . We will continue to follow. Feel free to contact me with any questions.       Heri Cabrera  10/15/2020    [facility] [address]  Office : [phone]  Fax :[phone]

## 2020-10-15 NOTE — PROGRESS NOTES
Department of Physical Medicine & Rehabilitation  Progress Note    Patient Identification:  Lynn Mckeon  [id number]  : 1945  Admit date: 10/13/2020    Chief Complaint: Debility    Subjective:   No acute events overnight. Patient seen this am sitting up in room. He reports therapy going well. \"Life is good. \" States he has been drinking fluids (water, mild, Ensure) throughout the day. ROS: No f/c, n/v, cp     Objective:  Patient Vitals for the past 24 hrs:   BP Temp Temp src Pulse Resp SpO2 Weight   10/15/20 1100 111/64 97.3 °F (36.3 °C) Oral 62 17 -- --   10/15/20 0711 (!) 158/81 -- -- 62 -- -- --   10/15/20 0700 (!) 158/81 98.6 °F (37 °C) Oral -- 16 -- --   10/15/20 0425 132/75 98.7 °F (37.1 °C) Oral 69 16 96 % 163 lb 2.3 oz (74 kg)   10/14/20 2045 135/81 97.4 °F (36.3 °C) Oral 64 16 95 % --   10/14/20 1614 119/62 97.6 °F (36.4 °C) Oral 60 16 95 % --   10/14/20 1217 -- -- -- -- -- 92 % --     Const: Alert. No distress, pleasant. HEENT: Normocephalic, atraumatic. Normal sclera/conjunctiva. MMM. CV: Regular rate and rhythm. Resp: No respiratory distress. Lungs with bibasilar crackles. Abd: Soft, nontender, nondistended, NABS+   Ext: No edema. MSK: decreased spine ROM  Neuro: Alert, oriented, appropriately interactive. Relative LLE weakness. Psych: Cooperative, appropriate mood and affect    Laboratory data: Available via EMR.    Last 24 hour lab  Recent Results (from the past 24 hour(s))   CBC Auto Differential    Collection Time: 10/15/20  7:02 AM   Result Value Ref Range    WBC 4.8 4.0 - 11.0 K/uL    RBC 2.89 (L) 4.20 - 5.90 M/uL    Hemoglobin 8.6 (L) 13.5 - 17.5 g/dL    Hematocrit 26.1 (L) 40.5 - 52.5 %    MCV 90.2 80.0 - 100.0 fL    MCH 29.6 26.0 - 34.0 pg    MCHC 32.8 31.0 - 36.0 g/dL    RDW 15.8 (H) 12.4 - 15.4 %    Platelets 436 184 - 043 K/uL    MPV 8.7 5.0 - 10.5 fL    Neutrophils % 57.5 %    Lymphocytes % 24.5 %    Monocytes % 12.9 %    Eosinophils % 4.1 %    Basophils % 1.0 % Neutrophils Absolute 2.7 1.7 - 7.7 K/uL    Lymphocytes Absolute 1.2 1.0 - 5.1 K/uL    Monocytes Absolute 0.6 0.0 - 1.3 K/uL    Eosinophils Absolute 0.2 0.0 - 0.6 K/uL    Basophils Absolute 0.0 0.0 - 0.2 K/uL   Basic Metabolic Panel w/ Reflex to MG    Collection Time: 10/15/20  7:02 AM   Result Value Ref Range    Sodium 136 136 - 145 mmol/L    Potassium reflex Magnesium 4.3 3.5 - 5.1 mmol/L    Chloride 101 99 - 110 mmol/L    CO2 25 21 - 32 mmol/L    Anion Gap 10 3 - 16    Glucose 89 70 - 99 mg/dL    BUN 29 (H) 7 - 20 mg/dL    CREATININE 1.6 (H) 0.8 - 1.3 mg/dL    GFR Non-African American 42 (A) >60    GFR  51 (A) >60    Calcium 8.6 8.3 - 10.6 mg/dL       Therapy progress:  PT  Position Activity Restriction  Spinal Precautions: No Bending, No Lifting, No Twisting  Other position/activity restrictions: Reeves catheter  Objective     Sit to Stand: Stand by assistance  Stand to sit: Stand by assistance, Contact guard assistance(slight unsteady after ambuation due to joking around and laughing needing cues for hand placement for safety)  Device: Rolling Walker  Assistance: Contact guard assistance, Stand by assistance  Distance: 95' X 2 trials with two partial turns each attempt  OT  PT Equipment Recommendations  Equipment Needed: No  Other: will continue to assess pending equipment needs  Toilet - Technique: Ambulating(RW)  Equipment Used: Grab bars  Toilet Transfers Comments: RW><comfort height toilet, CGA, used L grab bar to sit & stand. Assessment        SLP          Body mass index is 24.09 kg/m². Assessment and Plan:    Impairments: generalized weakness + LLE weakness, BLE sensory deficit, decreased endurance, balance     Debility  -PT/OT     Proteus UTI  -In setting of urinary retention  -Completed ceftriaxone     Urinary retention  -Maintain Reeves until outpatient f/u with Urology.  Anticipate need for TURP.      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 Patient reports hydrating normally, will ask RN to monitor I/O more closely. Reeves appears to be draining appropriately. Will ask Nephrology for input.      Lumbar stenosis s/p recent L4-S1 ALIF/PSF (8/31 with Dr. Jessica Fritz and Dr. Libby Garza)  -Incisions healing well without evidence of infection  -PT/OT     Acute blood loss anemia  -Due to recent left pelvic hematoma  -Monitor Hgb now resumed on Xarelto, transfuse prn <7  -Iron supplement increased     Afib  -Controlled on Amiodarone, metoprolol  -Xarelto     CAD  -statin, bb, no ARB due to TONO     HTN  -metoprolol, prn hydralazine     AAA  -Repeat imaging in 5 years recommended     COPD  -No acute exacerbation  -supplemental O2 prn, currently on RA     Hypothyroidism  -levothyroxine     Depression  -Recently at inpatient psych due to suicidal ideation. Now improved. -Duloxetine  -Patient refusing methylphenidate     Bladder   -See above     Bowel   -High risk constipation   -senna+colace BID, miralax daily, prn MoM, and bisacodyl supp.     Pain control  -Duloxetine, gabapentin, prn Percocet     PPx  -DVT: Xarelto  -GI: pantoprazole, probiotics       Rehab Progress: Tolerating therapy well thus far. Working on functional mobility, balance, endurance, strength. Anticipated Dispo: home alone  Services/DME: TBD  ELOS: 10 days      Alex House.  León Hanna MD 10/15/2020, 11:45 AM

## 2020-10-15 NOTE — CARE COORDINATION
SAIDAW reviewed chart. LSW met with patient to introduce  role, initiate discussion regarding DC planning and to inform of weekly Team Conferences on Mondays. SOCIAL WORK ASSESSMENT      GOAL:     Pt's goal is to return home. Personal Goal:     Pt's personal goal is to return home and walk without a device. Also, to be independent in his living alone. LSW did inform him that we cannot keep him in the hospital until he walks without a walker but it is an excellent goal to pursue. HOME SITUATION:   Patient lives alone, house with 10 steps to enter his house. He is able to remain on the first floor. He has no pets. He had been independent prior to admits before back surgery and liked to go out to eat. He was an active . Family has brought the hospital bed to the family room. He is active with Dr Mars Garcia for pcp needs. He likes to use [facility] in [place] for his pharmacy needs. Dgtr had reported to me last admission that she will be able to get his groceries for him. PRIOR LEVEL OF FUNCTIONING:       PERSONAL CARE:  ind                                                                         DRIVES:  yes                                                                     FINANCES:  ind                                                                   MEALS:    ind  Likes to eat out                               GROCERY SHOPS:   ind      DME CURRENTLY AT HOME:  Hospital bed, sock aid, reacher      CURRENT HOME CARE/SERVICES:   None. LSW informed him of possible post acute services to continue his progress such as home care or outpatient . He is agreeable to home care if ordered. SAIDAW pulled an in net work list of home care agencies unders his [employer]  As well as CMS star rated list of agencies. PREFERRED HOME CARE:  To be determined. ADVANCED DIRECTIVES:  SonRosana is DPOA #1 and dgtr, Donavan Hurtado is #2.    Copy of ADV Directive in chart. TEAM CONFERENCE DAY:  Mondays. LSW informed him of weekly Team Conferences to review his progress, DME recommendations and DC date. This worker will return to give him an update and plan for DC needs. LSW informed patient of preferred  time on date of discharge which is between 10 - 12 noon. LSW informed patient of recommendation for PCP visit within 7 days post discharge.     [place], [place]     Case Management   [phone]    10/15/2020  3:41 PM

## 2020-10-15 NOTE — PROGRESS NOTES
PHYSICAL THERAPY  Progress Note   Second Session    Patient Name: [facility] Record Number: [id number]    Treatment Diagnosis: Generalized muscle weakness, impaired mobility      Chart Reviewed: Yes     Other position/activity restrictions: Reeves catheter   Additional Pertinent Hx: Per Dr. Lewis Prophet note, \"Patient is a 67 yo M with pmh Afib, CAD, HTN, HLD, and recent lumbar spine surgery who initially presented from SNF on 9/13/2020 with fever 105 and altered mental status. Found to have sepsis due to UTI, urinary retention, metabolic encephalopathy, and acute hypoxic respiratory failure. Patient initially admitted to ARU (9/18-10/1). He made gradual progress and demonstrated some self-limiting behavior. On 9/28 he began to refuse therapies and most medical care. He stated a wish to die and planned to achieve this by not eating/drinking. Psychiatry was consulted. Dr. Mae Martinez and I agreed that patient demonstrated imminent risk of harm to himself with ongoing suicidal ideation and plan. Therefore patient will be transferred to inpatient psychiatric unit 10/1. Patient reportedly did well with inpatient psychiatric care. Reeves was removed for void trial at inpatient psych. Patient unable to void and Reeves was replaced. Then developed fevers and was readmitted to acute medical floor at [facility] 10/7. Found to proteus UTI with bilateral hydronephrosis. Treated with ceftriaxone. Urology recommending TURP as outpatient. Course was further complicated by TONO, hyponatremia. \"        Subjective: Pt sitting in wheelchair at beginning with pain currently 4/10 at this time. Pt agreeable to PT treatment session, appears positive and motivated to participate with therapy at this time. Pt states he feels ready to get moving. Objective:   Pt asleep in BS chair when I came to get him for therapy. He awoken to his name but not to sharp rapping on the door twice.  Transfers Sit<>stand with RW and SBA, he needed cues to keep B hands on the chair and to turn all the way around to sit in the chair. Pt amb 90' x2 to the bench in the garcia with RW andCG/SBA, sues to stand straight and straighten knees when walking. Nu Step x10 min seat at 9 LEs only 393 steps workload 4. Assessment: Pt easily motivated to participate with PT this date, increased fatigue noted in standing with increased trendelenburg pattern due to left hip weakness/fatigue. SBA-CGA for ambulation with RW this date, ascending/descending 4 steps with CGA increased time with intermittent cues required for safety/efficiency.   Pt is currently below baseline and requires PT to strengthen LEs, increase activity tolerance, and improve gait mechanics for safe return to PLOF      Safety Device - Type of devices:  [x]  All fall risk precautions in place [] Bed alarm in place  [] Call light within reach [] Chair alarm in place [] Positioning belt [x] Gait belt [x] Patient at risk for falls [] Left in bed [x] Left in chair [] Telesitter in use [] Sitter present [] Nurse notified []  None      Therapy Time   Individual Co-treatment   Time In 1300     Time Out 1345     Minutes 45         Electronically signed by Sebastian Juarez PT on 10/15/2020 at 1:27 PM

## 2020-10-15 NOTE — PROGRESS NOTES
Physical Therapy  Facility/Department: [address] [facility]  Daily Treatment Note  NAME: Julissa Caraballo Sr.  : 1945  MRN: [id number]    Date of Service: 10/15/2020    Discharge Recommendations:  Continue to assess pending progress, Patient would benefit from continued therapy after discharge   PT Equipment Recommendations  Equipment Needed: No  Other: will continue to assess pending equipment needs    Assessment   Body structures, Functions, Activity limitations: Decreased functional mobility ; Decreased balance;Decreased posture;Decreased ROM; Decreased strength  Assessment: Pt easily motivated to participate with PT this date, increased fatigue noted in standing with increased trendelenburg pattern due to left hip weakness/fatigue. SBA-CGA for ambulation with RW this date, ascending/descending 4 steps with CGA increased time with intermittent cues required for safety/efficiency. Pt is currently below baseline and requires PT to strengthen LEs, increase activity tolerance, and improve gait mechanics for safe return to PLOF  Treatment Diagnosis: Generalized muscle weakness, impaired mobility  Prognosis: Good  Clinical Presentation: evolving  PT Education: Transfer Training;Energy Conservation; Adaptive Device Training;Equipment;Orientation;General Safety;Gait Training;Disease Specific Education  Barriers to Learning: motivation  REQUIRES PT FOLLOW UP: Yes  Activity Tolerance  Activity Tolerance: Patient limited by pain; Patient limited by endurance; Patient limited by fatigue     Patient Diagnosis(es): There were no encounter diagnoses. has a past medical history of Aneurysm, aortic ([facility]), Atrial fibrillation ([facility]), Glaucoma, Heart attack ([facility]), Hyperlipidemia, and Hypertension. has a past surgical history that includes Coronary angioplasty with stent; Colonoscopy; Eye surgery (Right, 2020); and Intracapsular cataract extraction (Right, 2020).   Social/Functional History  Lives With: Alone  Type of Home: House  Home Layout: Multi-level  Home Access: Stairs to enter with rails  Entrance Stairs - Number of Steps: 10 JELLY  Entrance Stairs - Rails: Right  Bathroom Shower/Tub: Walk-in shower  Bathroom Toilet: Standard  Bathroom Equipment: (No hand held shower.)  Bathroom Accessibility: Accessible  Home Equipment: (No DME)  Receives Help From: Family  ADL Assistance: Independent  Homemaking Assistance: Independent  Homemaking Responsibilities: Yes  Ambulation Assistance: Independent  Transfer Assistance: Independent  Active : Yes  Mode of Transportation: Car  Occupation: Retired  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 Teri Guardado note, \"Patient is a 65 yo M with pmh Afib, CAD, HTN, HLD, and recent lumbar spine surgery who initially presented from SNF on 9/13/2020 with fever 105 and altered mental status. Found to have sepsis due to UTI, urinary retention, metabolic encephalopathy, and acute hypoxic respiratory failure. Patient initially admitted to ARU (9/18-10/1). He made gradual progress and demonstrated some self-limiting behavior. On 9/28 he began to refuse therapies and most medical care. He stated a wish to die and planned to achieve this by not eating/drinking. Psychiatry was consulted. Dr. Caleb Barba and I agreed that patient demonstrated imminent risk of harm to himself with ongoing suicidal ideation and plan. Therefore patient will be transferred to inpatient psychiatric unit 10/1. Patient reportedly did well with inpatient psychiatric care. Reeves was removed for void trial at inpatient psych. Patient unable to void and Reeves was replaced.  Then developed fevers and was readmitted to acute medical floor at [facility] 10/7. Found to proteus UTI with bilateral hydronephrosis. Treated with ceftriaxone. Urology recommending TURP as outpatient. Course was further complicated by TONO, hyponatremia. \"  Response To Previous Treatment: Patient with no complaints from previous session. Family / Caregiver Present: No  Referring Practitioner: Dr. Little Cortez: Pt sitting in wheelchair at beginning with pain currently 4/10 at this time. Pt agreeable to PT treatment session, appears positive and motivated to participate with therapy at this time. Pt states he feels ready to get moving. Orientation  Orientation  Overall Orientation Status: Within Normal Limits  Objective      Transfers  Sit to Stand: Stand by assistance  Stand to sit: Stand by assistance;Contact guard assistance(slight unsteady after ambuation due to joking around and laughing needing cues for hand placement for safety)  Stand Pivot Transfers: Stand by assistance(w/c to recliner with RW at completion of session with increased time to perform)  Ambulation  Ambulation?: Yes  Ambulation 1  Surface: level tile  Device: Rolling Walker  Assistance: Contact guard assistance;Stand by assistance  Quality of Gait: Pt has pain on LLE when ambulating. He states that pain improves as he ambulates. Stance phase on L pt is moderately unsteady because pt compensates for pain and leans to R side. One Moderate LoB when in stance phase on L that required Winter from therapist to correct. Pt makes wide turns and tends to leave RW when turning. Cues to stay in RW. Ambulates with step through gait pattern and demonstrates narrow Constanza. Pt adducts L hip when taking step and does not leave space for good swing through on R LE. This causes pt to be highly unsteady, especially when making turns. Pt was made aware that he is adducting L hip.   Gait Deviations: Slow Sapna;Decreased step length;Decreased step height  Distance: 95' X 2 trials with two partial turns each attempt  Comments: limited due to easily distraction and joking this morning,.   Stairs/Curb  Stairs?: Yes  Stairs  # Steps : 4  Stairs Height: 6\"  Rails: Bilateral  Curbs: 6\"(X 1 trial with RW CGA with cues for proper sequencing including maintain safe positioning within RW throughout)  Device: No Device  Assistance: Contact guard assistance  Comment: Ascend/descend nonreciprocal pattern with increased time, descending with posterior leaning needing cues to move BUE forward to decrease leaning with increased  receptiveness to cues for proper sequencing     Balance  Posture: Fair  Sitting - Static: Good  Sitting - Dynamic: Good  Standing - Static: Good  Standing - Dynamic: Fair;+  Comments: standing at RW with SBA-CGA  Exercises  Heelslides: HS curls X 20 green t-band  Hip Flexion: X 20 marching  Hip Extension/Leg Presses: hip adductions sets X 20  Hip Abduction: X 20 knee flexed with green t-band  Knee Long Arc Quad: x20  Ankle Pumps: heel/toe raises X 20  Comments: above perfomed BLEs supported sitting in wheelchair                        Goals  Short term goals  Time Frame for Short term goals: 5 days from 10/14/2020  Short term goal 1: Pt will perform bed mobility with supervision  Short term goal 2: Pt will ascend/descend curb with SBA using LRAD  Short term goal 3: Pt will ascend/descend 8 stairs using R ascending railing with CGA  Short term goal 4: Pt will ambulate 150' using LRAD  with CGA  Short term goal 5: Pt will perform all transfers using LRAD with SBA  Long term goals  Time Frame for Long term goals : 7-10 days from 10/14/2020  Long term goal 1: Pt will perform bed mobillity with Modif I  Long term goal 2: Pt will ascend/descend curb using LRAD with Modif I  Long term goal 3: Pt will ascend/descend 12 stairs using R ascending railing with Modif I  Long term goal 4: Pt will ambulate 200' using LRAD with Modif I  Long term goal 5: Pt will perform all transfers using [address] I  Patient Goals   Patient goals : \"I want to be independent and be able to walk on my own without a walker. I also want to get back to playing the stock market\"    Plan    Plan  Times per week: 5-6x/week  Times per day: Twice a day  Plan weeks: 2 weeks  Current Treatment Recommendations: Strengthening, Home Exercise Program, Safety Education & Training, Balance Training, Endurance Training, Functional Mobility Training, Equipment Evaluation, Education, & procurement, Transfer Training, Gait Training, ADL/Self-care Training, Stair training, Positioning  Safety Devices  Type of devices:  All fall risk precautions in place, Gait belt, Patient at risk for falls, Left in chair(assisted with positioning in recliner at completion of session)  Restraints  Initially in place: No     Therapy Time   Individual Concurrent Group Co-treatment   Time In 0900         Time Out 0945         Minutes 45         Timed Code Treatment Minutes: Rodrigo Lindsay, PT     Electronically signed by Keyanna Mosher PT on 10/15/20 at 10:04 AM EDT

## 2020-10-15 NOTE — PLAN OF CARE
Problem: Falls - Risk of:  Goal: Will remain free from falls  Description: Will remain free from falls  Note: Patient free from falls this shift. Fall precautions in place. Bed in low position with brake and alarm on. Non skid socks in place. Call light and belongings within reach. Will continue to monitor for safety.

## 2020-10-15 NOTE — PROGRESS NOTES
Patient admitted with dx UTI and debility. Transfers with RW and GB x1. Spinal restrictions in place. General diet, takes meds whole with thin liquids. Last BM 10/14/20. Continent of bowel. Reeves cath in place draining clear raphael colored urine. Bilateral heels floated off bed on pillows. Call light and belongings in place. Will continue to monitor for safety.

## 2020-10-15 NOTE — PROGRESS NOTES
Occupational Therapy  Facility/Department: [address] IP REHAB  Daily Treatment Note  AM & PM Session  NAME: Tena Vargas Sr.  : 1945  MRN: [id number]    Date of Service: 10/15/2020    Discharge Recommendations:  Continue to assess pending progress, S Level 1, Home with Home health OT, Home with assist PRN  OT Equipment Recommendations  Other: Long handle sponge, continue to assess for shower chair, grab bars    Assessment   Performance deficits / Impairments: Decreased functional mobility ; Decreased ADL status; Decreased balance;Decreased safe awareness;Decreased endurance;Decreased high-level IADLs;Decreased strength  Assessment: Pt demonstrates unsteady functional mobility as RLE appears to drag behind LLE, but no LOB or SOB noted. Pt required min A for balance when pulling depends & pants up in stance at sink counter, but requires only CGA in static stance. Pt appears fatigued by end of sponge bathe ADL session, especially during LB dressing with catheter. Continue reinforcing catheter care & management during transfers & bathing. Cont tx per POC. Treatment Diagnosis: Impaired: ADL/IADL function, balance, functional mobility/transfers, strength, safety awareness, activity tolerance. Prognosis: Good  OT Education: OT Role;Plan of Care;Equipment;ADL Adaptive Strategies;Precautions  Patient Education: Catheter cleaning using green wipes, spinal precautions, adaptive equipment  REQUIRES OT FOLLOW UP: Yes  Activity Tolerance  Activity Tolerance: Patient Tolerated treatment well  Safety Devices  Type of devices: Gait belt;Patient at risk for falls; Left in chair;Chair alarm in place;Call light within reach         Patient Diagnosis(es): There were no encounter diagnoses. has a past medical history of Aneurysm, aortic ([address] Atrial fibrillation ([address] Glaucoma, Heart attack ([facility] [address] Hyperlipidemia, and Hypertension. has a past surgical history that includes Coronary angioplasty with stent; Colonoscopy;  Eye surgery (Right, 7/23/2020); and Intracapsular cataract extraction (Right, 7/23/2020). Restrictions  Position Activity Restriction  Spinal Precautions: No Bending, No Lifting, No Twisting  Other position/activity restrictions: Reeves catheter  Subjective   General  Chart Reviewed: Yes, Orders, Progress Notes, History and Physical  Patient assessed for rehabilitation services?: Yes  Additional Pertinent Hx: Per Dr. Padilla Page 10/14/2020 note, \"Patient is a 65 yo M with pmh Afib, CAD, HTN, HLD, and recent lumbar spine surgery who initially presented from SNF on 9/13/2020 with fever 105 and altered mental status. Found to have sepsis due to UTI, urinary retention, metabolic encephalopathy, and acute hypoxic respiratory failure. \"  Response to previous treatment: Patient with no complaints from previous session  Family / Caregiver Present: No  Referring Practitioner: Dr. Hilton Gonzalez  Diagnosis: Urinary Tract Infection  Subjective  Subjective: Pt met in room, sitting up in bed with tray in front, agreed to sponge bathe this morning. Pt denied pain, said he got \"okay\" sleep last night. Orientation  Orientation  Overall Orientation Status: Within Normal Limits  Objective    ADL  Equipment Provided: Long-handled sponge  Grooming: Setup;Supervision(Brushed teeth, rinsed with mouthwash, combed hair seated.)  UE Bathing: Supervision(Seated in w/c for sponge bathe, bathed all parts w/ supervision for safety.)  LE Bathing: Contact guard assistance;Verbal cueing(Used long handle sponge for feet, able to reach below knees, in stance at sink to bathe buttocks, CGA. Verbal cues to clean front shelbie area & catheter w/ green wipes)  UE Dressing: Setup;Supervision(Donned/doffed long sleeve seated. Supervision for safety.)  LE Dressing: Moderate assistance;Setup(Pt able to doff pants/depends managing catheter. Mod A to don depends & pants w/ catheter D/T time restraint.  Pt able to don R sock, but needs assist for L sock (will try using sock aid as he cannot cross LLE). )  Toileting: Unable to assess(comment)(Reeves catheter, denied need.)  Additional Comments: Pt left seated in recliner w/ tray & needs in reach. Balance  Sitting Balance: Stand by assistance  Standing Balance: Minimal assistance  Standing Balance  Time: ~1 minute to bathe buttocks in stance @ sink counter CGA, ~30 seconds to pull pants up/down, needs min A when pulling pants up D/T balance  Activity: ADL tasks  Comment: CGA during static stance, min A in dynamic standing. Functional Mobility  Functional - Mobility Device: Rolling Walker  Activity: To/from bathroom  Assist Level: Contact guard assistance  Functional Mobility Comments: Ambulated slowly & appears to have unsteady gait using RW as RLE drags behind LLE, but no LOB or SOB noted. Demonstrates safety w/ walker. S/OT managed catheter to and from RW. Wheelchair Bed Transfers  Wheelchair/Bed - Technique: Ambulating(RW)  Equipment Used: Bed;Wheelchair  Level of Asssistance: Contact guard assistance  Wheelchair Transfers Comments: Bed>RW><w/c><recliner, CGA, pt tried to stand initially from bed, but once in stance pt LLE started to buckle & he sat back down. Pt instructed to perform leg extensions to warm up the muscles. Pt able to stand & ambulate on the second try. Bed mobility  Rolling to Left: Stand by assistance  Supine to Sit: Stand by assistance  Scooting: Stand by assistance  Comment: HOB slightly elevated, used L rail to roll, did not break spinal precautions using log roll technique. Second Session    Pt met in dep, seated in w/c. Pt denied pain. Therapeutic activity:  Pt stood at , [facility] for ~3:38 minutes while playing a game of [employer]. Pt able to keep balance while tossing bean bags onto board ~6-7 feet away, no SOB noted. Pt occassionally placing hands onto RW for stability when tossing & reaching for bags out of bucket.  Pt sat down in w/c for a rest break and stated his lower back pain had increased to 5/10. Pt stood at RW, SBA, pt appeared to have slight LOB when standing as RLE foot placement appeared unsteady. Pt able to stand ~3:43 minutes tossing bean bags for cornhole before needing to sit for rest break. Pt stated his back pain did not increase, but his legs were fatigued. Pt did not appear SOB and able to maintain conversation while playing the game. Exercises:  Pt was issued an [address] in order to strengthen UB to complete ADL/IADL tasks. Pt educated to stop if experiencing any pain during these exercises. Pt completed 6 BUE resistive band exercises for 10 repititions each using yellow theraband. ADL:  Pt used sock aid to don sock on LLE as he hs needed assist to do this during LB dressing. Pt states has been hard to dress his LLE since his back surgery. Pt remembered how to use sock aide from previous admission, but appeared to have difficulty donning sock first time. Pt able to reach to doff sock, and attempted a second which appeared to be much easier. Pt stated he would like to use sock aide during next ADL session. Assessment:  Pt making progress with activity tolerance by standing for ~3:30 two times during a game of cornhole. Pt demonstrates some unsteadiness w/dynamic balance while throwing bean bags. Pt should use sock aide to don sock on LLE. Cont Ot tx per POC. Pt left in care of transport, Anthony Re.     Safety Device - Type of devices:  []  All fall risk precautions in place [] Bed alarm in place  [] Call light within reach [] Chair alarm in place [] Positioning belt [x] Gait belt [] Patient at risk for falls [] Left in bed [] Left in chair [] Telesitter in use [] Sitter present [] Nurse notified []  None      Plan   Plan  Times per week: 5-6x week  Times per day: Twice a day  Plan weeks: 1-1.5 weeks  Specific instructions for Next Treatment: Reinforce cather care/cleaning & management  Current Treatment Recommendations: Strengthening, Safety Education & Training, Balance Training, Self-Care / ADL, Neuromuscular Re-education, Endurance Training, Functional Mobility Training, Equipment Evaluation, Education, & procurement  Plan Comment: Monday Conference          Goals  Short term goals  Time Frame for Short term goals: 1-1.5 weeks  Short term goal 1: Pt will bathe modified independently using AE & shower chair. Short term goal 2: Pt will dress modified independently including mendoza catheter, using AE. Short term goal 3: Pt will toilet modified independently using grab bars/TSF and/or manage catheter care. Short term goal 4: Pt will complete functional transfers/mobility modified independently using RW & grab bars. Short term goal 5: Pt will complete a simple cooking task in the kitchen standing for 10 minutes modified independent. Short term goal 6: Pt will complete BUE HEP in order to strengthen UB/increase activity tolerance to complete IADL cleaning tasks around the house modified independent. Long term goals  Time Frame for Long term goals : LTG=STG  Patient Goals   Patient goals : Pt stated \"I want to get walking so I can go home and do things around the house on my own. \" Above goals will work towards this goal.       Therapy Time   Individual Concurrent Group Co-treatment   Time In 0745         Time Out 0835         Minutes 50         Timed Code Treatment Minutes: 50 Minutes      Therapy Time     Individual Co-treatment   Time In 4403     Time Out 1430     Minutes 39       Therapist was present, directed the patient's care, made skilled judgement, and was responsible for assessment and treatment of the patient.     Mayelin Duque, [address] [place] [address]/OT

## 2020-10-16 LAB
ANION GAP SERPL CALCULATED.3IONS-SCNC: 9 MMOL/L (ref 3–16)
BILIRUBIN URINE: NEGATIVE
BLOOD, URINE: ABNORMAL
BUN BLDV-MCNC: 33 MG/DL (ref 7–20)
CALCIUM SERPL-MCNC: 8.9 MG/DL (ref 8.3–10.6)
CHLORIDE BLD-SCNC: 99 MMOL/L (ref 99–110)
CLARITY: CLEAR
CO2: 26 MMOL/L (ref 21–32)
COLOR: YELLOW
CREAT SERPL-MCNC: 1.5 MG/DL (ref 0.8–1.3)
CREATININE URINE: 38.1 MG/DL (ref 39–259)
EPITHELIAL CELLS, UA: 0 /HPF (ref 0–5)
GFR AFRICAN AMERICAN: 55
GFR NON-AFRICAN AMERICAN: 45
GLUCOSE BLD-MCNC: 80 MG/DL (ref 70–99)
GLUCOSE URINE: NEGATIVE MG/DL
HYALINE CASTS: 0 /LPF (ref 0–8)
KETONES, URINE: NEGATIVE MG/DL
LEUKOCYTE ESTERASE, URINE: NEGATIVE
MICROSCOPIC EXAMINATION: YES
NITRITE, URINE: NEGATIVE
PH UA: 7.5 (ref 5–8)
POTASSIUM REFLEX MAGNESIUM: 4.3 MMOL/L (ref 3.5–5.1)
PROTEIN UA: NEGATIVE MG/DL
RBC UA: 2 /HPF (ref 0–4)
SODIUM BLD-SCNC: 134 MMOL/L (ref 136–145)
SPECIFIC GRAVITY UA: 1.01 (ref 1–1.03)
UREA NITROGEN, UR: 331.7 MG/DL (ref 800–1666)
URINE TYPE: ABNORMAL
UROBILINOGEN, URINE: 0.2 E.U./DL
WBC UA: 0 /HPF (ref 0–5)

## 2020-10-16 PROCEDURE — 80048 BASIC METABOLIC PNL TOTAL CA: CPT

## 2020-10-16 PROCEDURE — 84540 ASSAY OF URINE/UREA-N: CPT

## 2020-10-16 PROCEDURE — 82570 ASSAY OF URINE CREATININE: CPT

## 2020-10-16 PROCEDURE — 97110 THERAPEUTIC EXERCISES: CPT

## 2020-10-16 PROCEDURE — 94760 N-INVAS EAR/PLS OXIMETRY 1: CPT

## 2020-10-16 PROCEDURE — 97535 SELF CARE MNGMENT TRAINING: CPT

## 2020-10-16 PROCEDURE — 97530 THERAPEUTIC ACTIVITIES: CPT

## 2020-10-16 PROCEDURE — 81001 URINALYSIS AUTO W/SCOPE: CPT

## 2020-10-16 PROCEDURE — 36415 COLL VENOUS BLD VENIPUNCTURE: CPT

## 2020-10-16 PROCEDURE — 6370000000 HC RX 637 (ALT 250 FOR IP): Performed by: PHYSICAL MEDICINE & REHABILITATION

## 2020-10-16 PROCEDURE — 97116 GAIT TRAINING THERAPY: CPT

## 2020-10-16 PROCEDURE — 1280000000 HC REHAB R&B

## 2020-10-16 RX ADMIN — OXYCODONE HYDROCHLORIDE AND ACETAMINOPHEN 1 TABLET: 7.5; 325 TABLET ORAL at 09:49

## 2020-10-16 RX ADMIN — DOCUSATE SODIUM 50 MG AND SENNOSIDES 8.6 MG 1 TABLET: 8.6; 5 TABLET, FILM COATED ORAL at 09:58

## 2020-10-16 RX ADMIN — PANTOPRAZOLE SODIUM 40 MG: 40 TABLET, DELAYED RELEASE ORAL at 05:39

## 2020-10-16 RX ADMIN — RIVAROXABAN 20 MG: 20 TABLET, FILM COATED ORAL at 16:45

## 2020-10-16 RX ADMIN — GABAPENTIN 300 MG: 300 CAPSULE ORAL at 14:32

## 2020-10-16 RX ADMIN — GABAPENTIN 300 MG: 300 CAPSULE ORAL at 21:36

## 2020-10-16 RX ADMIN — Medication 2 CAPSULE: at 16:45

## 2020-10-16 RX ADMIN — DULOXETINE HYDROCHLORIDE 30 MG: 30 CAPSULE, DELAYED RELEASE ORAL at 09:49

## 2020-10-16 RX ADMIN — FERROUS SULFATE TAB EC 324 MG (65 MG FE EQUIVALENT) 324 MG: 324 (65 FE) TABLET DELAYED RESPONSE at 09:49

## 2020-10-16 RX ADMIN — GABAPENTIN 300 MG: 300 CAPSULE ORAL at 09:49

## 2020-10-16 RX ADMIN — DORZOLAMIDE HYDROCHLORIDE AND TIMOLOL MALEATE 1 DROP: 20; 5 SOLUTION/ DROPS OPHTHALMIC at 22:20

## 2020-10-16 RX ADMIN — METOPROLOL TARTRATE 50 MG: 50 TABLET, FILM COATED ORAL at 09:49

## 2020-10-16 RX ADMIN — Medication 2 CAPSULE: at 09:48

## 2020-10-16 RX ADMIN — DULOXETINE HYDROCHLORIDE 30 MG: 30 CAPSULE, DELAYED RELEASE ORAL at 21:35

## 2020-10-16 RX ADMIN — TRAZODONE HYDROCHLORIDE 25 MG: 50 TABLET ORAL at 21:36

## 2020-10-16 RX ADMIN — LEVOTHYROXINE SODIUM 50 MCG: 0.05 TABLET ORAL at 05:39

## 2020-10-16 RX ADMIN — DOCUSATE SODIUM 50 MG AND SENNOSIDES 8.6 MG 1 TABLET: 8.6; 5 TABLET, FILM COATED ORAL at 21:36

## 2020-10-16 RX ADMIN — AMIODARONE HYDROCHLORIDE 200 MG: 200 TABLET ORAL at 16:45

## 2020-10-16 RX ADMIN — ATORVASTATIN CALCIUM 40 MG: 40 TABLET, FILM COATED ORAL at 21:36

## 2020-10-16 RX ADMIN — LATANOPROST 1 DROP: 50 SOLUTION/ DROPS OPHTHALMIC at 22:22

## 2020-10-16 RX ADMIN — METOPROLOL TARTRATE 50 MG: 50 TABLET, FILM COATED ORAL at 21:36

## 2020-10-16 ASSESSMENT — PAIN DESCRIPTION - PAIN TYPE: TYPE: CHRONIC PAIN

## 2020-10-16 ASSESSMENT — PAIN DESCRIPTION - LOCATION: LOCATION: LEG

## 2020-10-16 ASSESSMENT — PAIN DESCRIPTION - ORIENTATION: ORIENTATION: LEFT

## 2020-10-16 ASSESSMENT — PAIN SCALES - GENERAL
PAINLEVEL_OUTOF10: 0
PAINLEVEL_OUTOF10: 8
PAINLEVEL_OUTOF10: 5

## 2020-10-16 ASSESSMENT — PAIN DESCRIPTION - FREQUENCY: FREQUENCY: INTERMITTENT

## 2020-10-16 ASSESSMENT — PAIN DESCRIPTION - DESCRIPTORS: DESCRIPTORS: ACHING;NUMBNESS

## 2020-10-16 ASSESSMENT — PAIN - FUNCTIONAL ASSESSMENT: PAIN_FUNCTIONAL_ASSESSMENT: PREVENTS OR INTERFERES SOME ACTIVE ACTIVITIES AND ADLS

## 2020-10-16 ASSESSMENT — PAIN DESCRIPTION - ONSET: ONSET: ON-GOING

## 2020-10-16 ASSESSMENT — PAIN DESCRIPTION - PROGRESSION: CLINICAL_PROGRESSION: NOT CHANGED

## 2020-10-16 NOTE — PROGRESS NOTES
Physical Therapy  Facility/Department: [address] IP REHAB  Daily Treatment Note  NAME: Enedina Sigala Sr.  : 1945  MRN: [id number]    Date of Service: 10/16/2020    Discharge Recommendations:  Continue to assess pending progress, Patient would benefit from continued therapy after discharge   PT Equipment Recommendations  Equipment Needed: No  Other: will continue to assess pending equipment needs    Assessment   Body structures, Functions, Activity limitations: Decreased functional mobility ; Decreased balance;Decreased posture;Decreased ROM; Decreased strength  Assessment: Patient is limited with endurance and weakness with ambulation. He is unable to ambulate community distances at this time. He reports fatigue in bilateral UE and weakness in left hip causing increased trendelenburg gait as fatigue increases. Patient was able to complete 12 steps this date with bilateral rails and CGA, but he only has one railing at home and is not stable to complete with only one rail at this time. He reports concerns with returning home alone secondary to his children all working and being unavailable to assist him. PT assured patient hat we have not set a discharge date yet and will address this on Monday. Also provided support that therapy does not recommend him going home at this time as he is not strong enough. Patient will continue to benefit from continued skilled therapy for strengthening, gait training, and functional mobility training to allow for return to independence. Treatment Diagnosis: Generalized muscle weakness, impaired mobility  Prognosis: Good  Clinical Presentation: evolving  PT Education: Transfer Training;Energy Conservation; Adaptive Device Training;Equipment;Orientation;General Safety;Gait Training;Disease Specific Education  Barriers to Learning: motivation  REQUIRES PT FOLLOW UP: Yes     Patient Diagnosis(es): There were no encounter diagnoses.      has a past medical history of Aneurysm, aortic [facility]), Atrial fibrillation ([facility] [address] Glaucoma, Heart attack ([facility] [address] Hyperlipidemia, and Hypertension. has a past surgical history that includes Coronary angioplasty with stent; Colonoscopy; Eye surgery (Right, 7/23/2020); and Intracapsular cataract extraction (Right, 7/23/2020). Social/Functional History  Lives With: Alone  Type of Home: House  Home Layout: Multi-level  Home Access: Stairs to enter with rails  Entrance Stairs - Number of Steps: 10 JELLY  Entrance Stairs - Rails: Right  Bathroom Shower/Tub: Walk-in shower  Bathroom Toilet: Standard  Bathroom Equipment: (No hand held shower.)  Bathroom Accessibility: Accessible  Home Equipment: (No DME)  Receives Help From: Family  ADL Assistance: Independent  Homemaking Assistance: Independent  Homemaking Responsibilities: Yes  Ambulation Assistance: Independent  Transfer Assistance: Independent  Active : Yes  Mode of Transportation: Car  Occupation: Retired  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 Angélica Levy note, \"Patient is a 67 yo M with pmh Afib, CAD, HTN, HLD, and recent lumbar spine surgery who initially presented from SNF on 9/13/2020 with fever 105 and altered mental status. Found to have sepsis due to UTI, urinary retention, metabolic encephalopathy, and acute hypoxic respiratory failure. Patient initially admitted to ARU (9/18-10/1). He made gradual progress and demonstrated some self-limiting behavior. On 9/28 he began to refuse therapies and most medical care. He stated a wish to die and planned to achieve this by not eating/drinking. Psychiatry was consulted.  Dr. Doug Sepulveda and I agreed that patient demonstrated imminent risk of harm to himself with ongoing suicidal ideation and plan. Therefore patient will be transferred to inpatient psychiatric unit 10/1. Patient reportedly did well with inpatient psychiatric care. Reeves was removed for void trial at inpatient psych. Patient unable to void and Reeves was replaced. Then developed fevers and was readmitted to acute medical floor at [facility] 10/7. Found to proteus UTI with bilateral hydronephrosis. Treated with ceftriaxone. Urology recommending TURP as outpatient. Course was further complicated by TONO, hyponatremia. \"  Response To Previous Treatment: Patient with no complaints from previous session. Family / Caregiver Present: No  Referring Practitioner: Dr. Lance Cummings: Patient reporting he thinks he is having some \"nerve pain\" down left LE but states he took his percocet and it is helping. He feels walking will help it a lot. Objective      Transfers  Sit to Stand: Stand by assistance  Stand to sit: Stand by assistance  Bed to Chair: Stand by assistance(with wheeled walker)  Car Transfer: Stand by assistance(with wheeled walker. Good safety except for hand placement on door when standing from car. PT reviewed safety and patient was receptive to this)    Ambulation  Ambulation?: Yes  More Ambulation?: No  Ambulation 1  Surface: level tile  Device: Rolling Walker  Assistance: Contact guard assistance;Stand by assistance  Quality of Gait: Patient reporting continued pain in left LE though improves with ambulation. decreased step length with left LE. Trendelenburg noted at left hip, but responds well to cueing with facilitation to hip abductors. Gait Deviations: Slow Sapna;Decreased step length;Decreased step height  Distance: 90', 100' with one turn  Comments: Patient reporting fatigue in bilateral UE and requiring seated rest break. He states it improves with the more ambulation he does.   He cues himself to stand tall and stay motions that were witnessed and weakness in bilateral LE. Patient was CGA to transfer from wheelchair>recliner chair. Patient was limited by this jerking and weakness. Patient left reclined in chair with RNNadine, present and giving meds. Safety Device - Type of devices:  [x]  All fall risk precautions in place [] Bed alarm in place  [x] Call light within reach [x] Chair alarm in place [] Positioning belt [x] Gait belt [] Patient at risk for falls [] Left in bed [x] Left in chair [] Telesitter in use [] Sitter present [] Nurse notified []  None          Goals  Short term goals  Time Frame for Short term goals: 5 days from 10/14/2020  Short term goal 1: Pt will perform bed mobility with supervision  Short term goal 2: Pt will ascend/descend curb with SBA using LRAD  Short term goal 3: Pt will ascend/descend 8 stairs using R ascending railing with CGA  Short term goal 4: Pt will ambulate 150' using LRAD  with CGA  Short term goal 5: Pt will perform all transfers using LRAD with SBA  Long term goals  Time Frame for Long term goals : 7-10 days from 10/14/2020  Long term goal 1: Pt will perform bed mobillity with Modif I  Long term goal 2: Pt will ascend/descend curb using LRAD with Modif I  Long term goal 3: Pt will ascend/descend 12 stairs using R ascending railing with Modif I  Long term goal 4: Pt will ambulate 200' using LRAD with Modif I  Long term goal 5: Pt will perform all transfers using [address] Modif I  Patient Goals   Patient goals : \"I want to be independent and be able to walk on my own without a walker.   I also want to get back to playing the stock market\"    Plan    Plan  Times per week: 5-6x/week  Times per day: Twice a day  Plan weeks: 1.5 weeks  Current Treatment Recommendations: Strengthening, Home Exercise Program, Safety Education & Training, Balance Training, Endurance Training, Functional Mobility Training, Equipment Evaluation, Education, & procurement, Transfer Training, Gait Training, ADL/Self-care Training, Stair training, Positioning  Safety Devices  Type of devices:  All fall risk precautions in place, Gait belt, Patient at risk for falls, Left in chair(in wheelchair with care transitioned to OT, [address])  Restraints  Initially in place: No     Therapy Time   Individual Concurrent Group Co-treatment   Time In 1030         Time Out 1115         [facility]   Time In 1350     Time Out 1435     Minutes 45            Electronically signed by Jeison Cook PT on 10/16/2020 at 11:28 AM

## 2020-10-16 NOTE — PATIENT CARE CONFERENCE
[facility]  Inpatient Rehabilitation  Weekly Team Conference Note      Date: 10/19/2020  Patient Name:  Zarina Horne. MRN: [id number]  : 1945  Gender: male  Physician: Dr Terrence Houston  Diagnosis: Debility [R53.81]    CASE MANAGEMENT  Assessment:    Goal is home. PHYSICAL THERAPY    Bed mobility  Bridging: Contact guard assistance  Rolling to Left: Stand by assistance  Rolling to Right: Stand by assistance  Supine to Sit: Stand by assistance  Sit to Supine: Minimal assistance(cuing for precautions and pt had difficulty bringing LLE up to bed.)  Scooting: Stand by assistance  Comment: HOB slightly elevated, used L rail to roll, did not break spinal precautions using log roll technique. Transfers:  Sit to Stand: Stand by assistance  Stand to sit: Stand by assistance  Bed to Chair: Stand by assistance(with wheeled walker)    Ambulation 1  Surface: level tile  Device: Rolling Walker  Assistance: Contact guard assistance, Stand by assistance  Quality of Gait: Patient reportin continued pain in left LE though improves with ambulation. decreased step length with left LE. Trendelenburg noted at left hip, but responds well to cueing with facilitation to hip abductors. Gait Deviations: Slow Sapna, Decreased step length, Decreased step height  Distance: 90', 100' with one turn  Comments: Patient reporting fatigue in bilateral UE and requiring seated rest break. He states it improves with the more ambulation he does. He cues himself to stand tall and stay close to the walker.            Stairs  # Steps : 12  Stairs Height: 6\"  Rails: Bilateral  Curbs: 6\"(Patient completed with wheeled walker and minimal cues for safety with trunk positioning when lifting the walker up and down the step.)  Device: No Device  Assistance: Contact guard assistance, Stand by assistance  Comment: Ascend/descend nonreciprocal pattern with increased time, descending improved this date but slight posterior lean as fatigue increased. Patient with increased weakness in left hip as fatigue increases and required cues for safety and facilitation to left hip abductors while descending. Required seated rest break after completion secondary to fatigue. Patient reporting he only has one railing at home and he is not able to complete with only one rail at this time. Car Transfer: Stand by assistance(with wheeled walker. Good safety except for hand placement on door when standing from car. PT reviewed safety and patient was receptive to this)      Assessment: Patient is limited with endurance and weakness with ambulation. He is unable to ambulate community distances at this time. He reports fatigue in bilateral UE and weakness in left hip causing increased trendelenburg gait as fatigue increases. Patient was able to complete 12 steps this date with bilateral rails and CGA, but he only has one railing at home and is not stable to complete with only one rail at this time. He reports concerns with returning home alone secondary to his children all workign and being unavailable to assist him. PT assured pateitnt hat we have not set a discharge date yet and will address this on Monday. Also provided support that therapy does not recommend him going home at this time as he is not strong enough. Patient will continue to benefit from continued skilled therapy for strengthening, gait training, and functional mobility training to allow for return to independene.       SPEECH THERAPY (intentionally left blank if not actively being seen by this service):      OCCUPATIONAL THERAPY    ADL  Equipment Provided: Long-handled sponge, Sock aid  Grooming: Setup, Supervision(Brushed teeth, rinsed with mouthwash, combed hair seated.)  UE Bathing: Supervision(Seated in w/c for sponge bathe, bathed all parts w/ supervision for safety.)  LE Bathing: Contact guard assistance, Verbal cueing(Used long handle sponge for feet, able to reach below knees, in stance at sink to bathe buttocks, CGA. Verbal cues to clean front shelbie area & catheter w/ green wipes)  UE Dressing: Setup, Supervision(Donned/doffed long sleeve seated. Supervision for safety.)  LE Dressing: Minimal assistance(pt completed simulated LB dressing with reacher and figure 4 technique)  Toileting: Unable to assess(comment)(Reeves catheter, denied need.)  Additional Comments: Patient progressing with LB dressing this date. Patient completed AE training with LB dressing in order to maintain spinal precautions. Bed mobility  Bridging: Contact guard assistance  Rolling to Left: Stand by assistance  Rolling to Right: Stand by assistance  Supine to Sit: Stand by assistance  Sit to Supine: Stand by assistance(slight difficulty, but able to do without physical assistance)  Scooting: Stand by assistance  Comment: bed mobility done on flat therapy mat    Functional Transfers: Toilet Transfers  Toilet - Technique: Ambulating(RW)  Equipment Used: Grab bars  Toilet Transfer: Contact guard assistance  Toilet Transfers Comments: RW><comfort height toilet, CGA, used L grab bar to sit & stand. Perception  Overall Perceptual Status: WFL    Assessment: Patient progressing with POC this date. Patient demonstrated improvements in ability to complete LB dressing with and without AE while safely maintaining spinal precautions. Patient with improved standing tolerance this date to complete item retrieval from kitchen cabinets. CGA required for balance this date and minimal cues for posture. patient continues to be functioning below baseline requiring ongoing OT in order to maximize return to PLOF. NUTRITION  Most recent weightWeight: 162 lb 14.7 oz (73.9 kg)  BSA (Calculated - sq m): 1.94 sq meters  BMI (Calculated): 24.1   Pt intake somewhat variable, but majority %. Also consuming % of supplement. Please see nutrition note for details. NURSING  Reeves, continent of bowel.  Monitor and maintain skin integrity, incision care, redness noted scrotum, heels and buttocks. Family Education: Patient education: mendoza care, safety and fall prevention, back/spinal precautions, skin and incision care, medications, pain control. MEDICAL  Nephrology consulted for TONO on CKD  Checking TSH due to hand tremors    TEAM SUMMARY AND DISCHARGE PLAN  Estimated Length of Stay: DC 10/24/20  Destination:n home with home care and support from children   · Anticipated Services at Discharge:    [x] OT  [x] PT   [] SLP    [x] RN   [] Home Health aide []   Community Resources: _______________________________  Equipment recommendations:  [] Hospital bed [] Tub bench  [x] Shower chair [] Hand held shower  [] Raised toilet seat [x] Toilet safety frame [] Bedside commode   [] W/C: _____  [x] Michaelyn Marrow [] Standard walker [] Gait belt [] cane: _________  [] Sliding board [] Alternate seating/furniture [] O2 [] Hip Kit: _______  [] Life Line [] Other: _______  Factors facilitating achievement of predicted outcomes: Cooperative and Pleasant  Barriers to the achievement of predicted outcomes/Interventions: Activity tolerance-strengthening, conditioning, activity pacing, energy conservation, compensatory strategies for self care and mobility, IADLs      Interdisciplinary Individualized Plan of Care Review:    · Continue Current Plan of Care: Yes    · Modifications:_____________________________    Special Needs in the Upcoming Week :    [] Family/Caregiver Education  [] Home visit  []Therapeutic Pass   [] Consults:_______    [] Other;_______    Patient Rehab Team Goals for the Upcoming Week:  1. Ambulate safely with wheeled walker with modified independence  2. Complete LB dressing with SBA using AE as needed   3. Patient goals : \"I want to be independent and be able to walk on my own without a walker.   I also want to get back to playing the stock market\"          Team Members Present at Conference:  Physician:  [facility] Manager: Meli Worley [place]    Occupational Therapist: Bobby Beltre OTR/L [id number]  Physical [address], [place] [id number]  Speech Therapist:   Nurse: Daja Kern RN, CRRN  Dietician:  Jaja Potter   Dedrick Sotomayor      I led this team conference and I approve the established interdisciplinary plan of care as documented within the medical record of Vicki Diallo .    MD: Dr. Daisy Ramon  10/19/2020 10:19 AM

## 2020-10-16 NOTE — PROGRESS NOTES
Patient admitted with dx UTI and debility. Transfers with RW and GB x1. Spinal restrictions in place. General diet, takes meds whole with thin liquids. Last BM 10/14/20. Continent of bowel. Reeves cath in place draining clear raphael colored urine. Bilateral heels floated off bed on pillows. Trazone given at [facility] with good effect. Call light and belongings in place. Will continue to monitor for safety.

## 2020-10-16 NOTE — PROGRESS NOTES
Per Dr. Archana Mckenna we are to encourage fluids for this pt. Pt asked him if/when his mendoza should be removed/changed, Ailyn Gamboa stated that we will see how long he remains on rehab, may or may not be replaced by him here or outpatient. Mendoza care provided. Patient oriented to person, place, time. Vital signs stable. Patient remains on room air only. Shift assessment completed. Medication administration completed without any complications. Patient is up with activity times 1 with a walker. Requiring Minimal contact assistance. Patients tolerating General with  thin liquids. . Remains continent of bowel, remains on Mendoza. No needs voiced at this time. Call light within reach. Will continue to monitor.

## 2020-10-16 NOTE — PROGRESS NOTES
Occupational Therapy  Facility/Department: [address] REHAB  Daily Treatment Note  AM and PM sessions  NAME: Tremaine Byrnes  : 1945  MRN: [id number]    Date of Service: 10/16/2020    Discharge Recommendations:  Continue to assess pending progress, S Level 1, Home with Home health OT, Home with assist PRN  OT Equipment Recommendations  Mobility Devices: ADL Assistive Devices  Other: sock aid, Long handle sponge, continue to assess for shower chair, grab bars    Assessment   Performance deficits / Impairments: Decreased functional mobility ; Decreased ADL status; Decreased balance;Decreased safe awareness;Decreased endurance;Decreased high-level IADLs;Decreased strength  Assessment: Patient progressing with POC this date. Patient demonstrated improvements in ability to complete LB dressing with and without AE while safely maintaining spinal precautions. Patient with improved standing tolerance this date to complete item retrieval from kitchen cabinets. CGA required for balance this date and minimal cues for posture. patient continues to be functioning below baseline requiring ongoing OT in order to maximize return to PLOF. OT Education: OT Role;Plan of Care;Equipment;ADL Adaptive Strategies;Precautions; Home Exercise Program;Transfer Training;Energy Conservation;IADL Safety  REQUIRES OT FOLLOW UP: Yes  Activity Tolerance  Activity Tolerance: Patient Tolerated treatment well;Patient limited by fatigue  Safety Devices  Safety Devices in place: Yes  Type of devices: Gait belt;Patient at risk for falls; Left in chair;Chair alarm in place;Call light within reach         Patient Diagnosis(es): There were no encounter diagnoses. has a past medical history of Aneurysm, aortic ([address] Atrial fibrillation ([address] Glaucoma, Heart attack ([address] Hyperlipidemia, and Hypertension. has a past surgical history that includes Coronary angioplasty with stent; Colonoscopy;  Eye surgery (Right, 2020); and Intracapsular cataract extraction (Right, 7/23/2020). Restrictions  Position Activity Restriction  Spinal Precautions: No Bending, No Lifting, No Twisting  Other position/activity restrictions: Reeves catheter  Subjective   General  Chart Reviewed: Yes, Orders, Progress Notes, History and Physical  Patient assessed for rehabilitation services?: Yes  Additional Pertinent Hx: Per Dr. Charissa Rosado 10/14/2020 note, \"Patient is a 67 yo M with pmh Afib, CAD, HTN, HLD, and recent lumbar spine surgery who initially presented from SNF on 9/13/2020 with fever 105 and altered mental status. Found to have sepsis due to UTI, urinary retention, metabolic encephalopathy, and acute hypoxic respiratory failure. \"  Response to previous treatment: Patient with no complaints from previous session  Family / Caregiver Present: No  Referring Practitioner: Dr. León Hanna  Diagnosis: Urinary Tract Infection  Subjective  Subjective: Patient seen in therapy gym for session. Patient pleasant and agreeable to OT. Patient reporting \" new twitches\". MD aware. Vital Signs  Patient Currently in Pain: Denies   Orientation  Orientation  Overall Orientation Status: Within Functional Limits  Objective    ADL  Equipment Provided: Long-handled sponge;Sock aid  LE Dressing: Minimal assistance(pt completed simulated LB dressing with reacher and figure 4 technique)  Additional Comments: Patient progressing with LB dressing this date. Patient completed AE training with LB dressing in order to maintain spinal precautions.         Balance  Sitting Balance: Stand by assistance  Standing Balance: Minimal assistance  Standing Balance  Time: 4 mins x2, 1 min, 3 mins,  Activity: ADLs, item retrieval in kitchen, functional mobility in room, household distances  Functional Mobility  Functional - Mobility Device: Rolling Walker  Activity: To/from bathroom  Assist Level: Contact guard assistance  Functional Mobility Comments: scissoring gait; swaying of hips with fatigue Transfers  Sit to stand: Contact guard assistance  Stand to sit: Contact guard assistance                       Cognition  Overall Cognitive Status: WFL                    Type of ROM/Therapeutic Exercise  Type of ROM/Therapeutic Exercise: AROM; Free weights  Comment: 3lb weight  Exercises  Shoulder Flexion: x10 (no weight)  Shoulder Extension: x10 (no weight)  Elbow Flexion: x15  Elbow Extension: x15  Other: patient completed graded exercises in seated and standing                    Plan   Plan  Times per week: 5-6x week  Times per day: Twice a day  Plan weeks: 1-1.5 weeks  Specific instructions for Next Treatment: Reinforce cather care/cleaning & management  Current Treatment Recommendations: Strengthening, Safety Education & Training, Balance Training, Self-Care / ADL, Neuromuscular Re-education, Endurance Training, Functional Mobility Training, Equipment Evaluation, Education, & procurement, Home Management Training  Plan Comment: Monday Conference       Goals  Short term goals  Time Frame for Short term goals: 1-1.5 weeks  Short term goal 1: Pt will bathe modified independently using AE & shower chair. Short term goal 2: Pt will dress modified independently including mendoza catheter, using AE. Short term goal 3: Pt will toilet modified independently using grab bars/TSF and/or manage catheter care. Short term goal 4: Pt will complete functional transfers/mobility modified independently using RW & grab bars. Short term goal 5: Pt will complete a simple cooking task in the kitchen standing for 10 minutes modified independent. Short term goal 6: Pt will complete BUE HEP in order to strengthen UB/increase activity tolerance to complete IADL cleaning tasks around the house modified independent. Long term goals  Time Frame for Long term goals : LTG=STG  Patient Goals   Patient goals : Pt stated \"I want to get walking so I can go home and do things around the house on my own. \" Above goals will work towards this goal.       Therapy Time   Individual Concurrent Group Co-treatment   Time In 1115         Time Out 1215         Minutes 60         Timed Code Treatment Minutes: 60 Minutes         Second Session Therapy Time:   Individual Concurrent Group Co-treatment   Time In 1300         Time Out 1345         Minutes 45           Timed Code Treatment Minutes:  45 Minutes    Total Treatment Minutes:  105 minutes       Morgan Diggs OTR/L

## 2020-10-16 NOTE — PROGRESS NOTES
Department of Physical Medicine & Rehabilitation  Progress Note    Patient Identification:  Augustin Lo  [id number]  : 1945  Admit date: 10/13/2020    Chief Complaint: Debility    Subjective:   No acute events overnight. Patient seen this am sitting up in room. Feeling well overall. Noting bilateral hand tremors. ROS: No f/c, n/v, cp     Objective:  Patient Vitals for the past 24 hrs:   BP Temp Temp src Pulse Resp SpO2 Weight   10/16/20 0949 127/67 -- -- 62 -- -- --   10/16/20 0917 -- -- -- -- -- 94 % --   10/16/20 0438 138/70 97.5 °F (36.4 °C) Oral 55 16 94 % 162 lb 14.7 oz (73.9 kg)   10/15/20 1950 (!) 171/82 97.3 °F (36.3 °C) Oral 56 15 94 % --   10/15/20 1604 136/71 97.6 °F (36.4 °C) Oral 54 15 94 % --   10/15/20 1100 111/64 97.3 °F (36.3 °C) Oral 62 17 -- --     Const: Alert. No distress, pleasant. HEENT: Normocephalic, atraumatic. Normal sclera/conjunctiva. MMM. CV: Regular rate and rhythm. Resp: No respiratory distress. Lungs with bibasilar crackles. Abd: Soft, nontender, nondistended, NABS+   Ext: No edema. MSK: decreased spine ROM  Neuro: Alert, oriented, appropriately interactive. Relative LLE weakness. Psych: Cooperative, appropriate mood and affect    Laboratory data: Available via EMR.    Last 24 hour lab  Recent Results (from the past 24 hour(s))   Urinalysis with Microscopic    Collection Time: 10/16/20  5:35 AM   Result Value Ref Range    Color, UA YELLOW Straw/Yellow    Clarity, UA Clear Clear    Glucose, Ur Negative Negative mg/dL    Bilirubin Urine Negative Negative    Ketones, Urine Negative Negative mg/dL    Specific Gravity, UA 1.008 1.005 - 1.030    Blood, Urine MODERATE (A) Negative    pH, UA 7.5 5.0 - 8.0    Protein, UA Negative Negative mg/dL    Urobilinogen, Urine 0.2 <2.0 E.U./dL    Nitrite, Urine Negative Negative    Leukocyte Esterase, Urine Negative Negative    Microscopic Examination YES     Urine Type NotGiven     Hyaline Casts, UA 0 0 - 8 /LPF    WBC, UA 0 0 - 5 /HPF    RBC, UA 2 0 - 4 /HPF    Epithelial Cells, UA 0 0 - 5 /HPF   Urea nitrogen, urine    Collection Time: 10/16/20  5:35 AM   Result Value Ref Range    Urea Nitrogen, Ur 331.7 (L) 800.0 - 1666.0 mg/dL   Creatinine, Random Urine    Collection Time: 10/16/20  5:35 AM   Result Value Ref Range    Creatinine, Ur 38.1 (L) 39.0 - 259.0 mg/dL   Basic Metabolic Panel w/ Reflex to MG    Collection Time: 10/16/20  6:25 AM   Result Value Ref Range    Sodium 134 (L) 136 - 145 mmol/L    Potassium reflex Magnesium 4.3 3.5 - 5.1 mmol/L    Chloride 99 99 - 110 mmol/L    CO2 26 21 - 32 mmol/L    Anion Gap 9 3 - 16    Glucose 80 70 - 99 mg/dL    BUN 33 (H) 7 - 20 mg/dL    CREATININE 1.5 (H) 0.8 - 1.3 mg/dL    GFR Non-African American 45 (A) >60    GFR  55 (A) >60    Calcium 8.9 8.3 - 10.6 mg/dL       Therapy progress:  PT  Position Activity Restriction  Spinal Precautions: No Bending, No Lifting, No Twisting  Other position/activity restrictions: Reeves catheter  Objective     Sit to Stand: Stand by assistance  Stand to sit: Stand by assistance  Bed to Chair: Stand by assistance(with wheeled walker)  Device: Rolling Walker  Assistance: Contact guard assistance, Stand by assistance  Distance: 95' X 2 trials with two partial turns each attempt  OT  PT Equipment Recommendations  Equipment Needed: No  Other: will continue to assess pending equipment needs  Toilet - Technique: Ambulating(RW)  Equipment Used: Grab bars  Toilet Transfers Comments: RW><comfort height toilet, CGA, used L grab bar to sit & stand. Assessment        SLP          Body mass index is 24.06 kg/m². Assessment and Plan:    Impairments: generalized weakness + LLE weakness, BLE sensory deficit, decreased endurance, balance     Debility  -PT/OT     Proteus UTI  -In setting of urinary retention  -Completed ceftriaxone     Urinary retention  -Maintain Reeves until outpatient f/u with Urology.  Anticipate need for TURP.      TONO on CKD III  -Addressing urinary retention as above  -Avoid nephrotoxins, renally dose meds  -Nephrology consulted, appreciate input  --continue to encourage po intake for now. If renal function worsens, consider repeat US.      Lumbar stenosis s/p recent L4-S1 ALIF/PSF (8/31 with Dr. Yifan Reddy and Dr. Syed Horenr)  -Incisions healing well without evidence of infection  -PT/OT     Acute blood loss anemia  -Due to recent left pelvic hematoma  -Monitor Hgb now resumed on Xarelto, transfuse prn <7  -Iron supplement increased     Afib  -Controlled on Amiodarone, metoprolol  -Xarelto     CAD  -statin, bb, no ARB due to TONO     HTN  -metoprolol, prn hydralazine     AAA  -Repeat imaging in 5 years recommended     COPD  -No acute exacerbation  -supplemental O2 prn, currently on RA     Hypothyroidism  -levothyroxine    Hand tremors  -Possible related to debility/fatigue  -No offending meds on list except Ritalin which he has been refusing.   -Check TSH, T4     Depression  -Recently at inpatient psych due to suicidal ideation. Now improved. -Duloxetine  -Patient refusing methylphenidate     Bladder   -See above     Bowel   -High risk constipation   -senna+colace BID, miralax daily, prn MoM, and bisacodyl supp.     Pain control  -Duloxetine, gabapentin, prn Percocet     PPx  -DVT: Xarelto  -GI: pantoprazole, probiotics       Rehab Progress: Tolerating therapy well thus far. Working on functional mobility, balance, endurance, strength. Anticipated Dispo: home alone  Services/DME: TBD  ELOS: 10 days      [address] MD 10/16/2020, 10:59 AM

## 2020-10-16 NOTE — PLAN OF CARE
Problem: Falls - Risk of:  Goal: Will remain free from falls  Description: Will remain free from falls  Note:   Patient remains free from falls this shift. Fall precautions in place. Non skid socks on. Bed in low position with brake and alarm on. Call light and belongings within reach. Will continue to anticipate needs with rounding and monitor for safety.

## 2020-10-16 NOTE — PROGRESS NOTES
Comprehensive Nutrition Assessment    Type and Reason for Visit:  Reassess    Nutrition Recommendations/Plan:   Continue general diet  Continue Ensure [employer] Cup  Monitor meal and supplement intake  Monitor weight, labs, skin, bowels    Nutrition Assessment:  Pt states he is eating good now, as seen by lunch tray in room. Meal record indicates % of both meals and supplement. Pt repots that he does drink the Ensure [employer] bbut has not received the [employer] as ordered. Will continue the Ensure [place] and cancel the Magic Cup order. Continue to monitor. Malnutrition Assessment:  Malnutrition Status:  Severe malnutrition    Context:  Chronic Illness     Findings of the 6 clinical characteristics of malnutrition:  Energy Intake:     Weight Loss: Body Fat Loss:        Muscle Mass Loss:       Fluid Accumulation:        Strength:       Estimated Daily Nutrient Needs:  Energy (kcal):  5634-7449 kcal (25-30 kcal/kg CBW); Weight Used for Energy Requirements:        Protein (g):  74-96 gm (1-1.3gm/kg CBW);  Weight Used for Protein Requirements:           Fluid (ml/day):  1 mL/kcal; Weight Used for Fluid Requirements:         Nutrition Related Findings:  BM 10/14, no edema      Wounds:  None       Current Nutrition Therapies:    DIET GENERAL;  Dietary Nutrition Supplements: Standard High Calorie Oral Supplement  Dietary Nutrition Supplements: Frozen Oral Supplement    Anthropometric Measures:  · Height: 5' 9\" (175.3 cm)  · Current Body Weight: 162 lb 14.7 oz (73.9 kg)   · Admission Body Weight: 171 lb (77.6 kg)(wt discrepency, will watch wt trend.)    · Usual Body Weight:       · Ideal Body Weight: 160 lbs; % Ideal Body Weight 101.9 %   · BMI: 24  · Adjusted Body Weight:  ; No Adjustment   · Adjusted BMI:      · BMI Categories: Normal Weight (BMI 22.0 to 24.9) age over 72       Nutrition Diagnosis:   · Severe malnutrition related to altered taste perception, cognitive or neurological impairment, inadequate protein-energy intake as evidenced by poor intake prior to admission, weight loss greater than or equal to 5% in 1 month, severe loss of subcutaneous fat, severe muscle loss      Nutrition Interventions:   Food and/or Nutrient Delivery:  Continue Current Diet, Modify Oral Nutrition Supplement  Nutrition Education/Counseling:  No recommendation at this time   Coordination of Nutrition Care:  Continued Inpatient Monitoring    Goals:  consume >50% of meals and supplement during admission       Nutrition Monitoring and Evaluation:   Behavioral-Environmental Outcomes:      Food/Nutrient Intake Outcomes:  Food and Nutrient Intake, Supplement Intake  Physical Signs/Symptoms Outcomes:  Biochemical Data, Nutrition Focused Physical Findings, Skin, Weight     Discharge Planning:     Too soon to determine     Electronically signed by Fabio Valentin RD, LD on 10/16/20 at 1:34 PM EDT    Contact: [phone]

## 2020-10-16 NOTE — PROGRESS NOTES
Office: [phone]       Fax: [phone]      Nephrology Progress Note        Patient's Name: Sully Nichols. Admit Date: 10/13/2020  Date of Visit: 10/16/2020    Reason for Consult:  TONO/CKD      Subjective: Sully Nichols is a 66 y.o. male with PMHx of hypertension, atrial fibrillation, AAA,  coronary artery disease, CHF, status post LS spine surgery who was hospitalized in ARU on 10/13/2020 with comprehensive rehab. Pt was hospitalized at [facility] in September for sepsis secondary to UTI. Had false urethral passage, was seen by urology. Currently has Reeves in place. Was briefly in ARU before discharged to behavioral unit. Reeves replaced on 10/2. Subsequently had UTI with Proteus. CT abdomen was obtained which showed bilateral hydronephrosis despite having a Reeves in place. initially placed on IV Merrem, changed to Rocephin. Urology recommended TURP as outpatient. Had peak creat of 1.6 that improved to 1.1  No back pain, no obvious hematuria  NSAID use: Denies   IV contrast: None recent   Home meds reviewed     INTERVAL HISTORY    Feels better  Shortness of breath: No   UOP: Fair  Creat: trending down       Medications: Allergies:  Azithromycin; Brimonidine; Clindamycin/lincomycin;  Penicillins; and Simvastatin    Scheduled Meds:   atorvastatin  40 mg Oral Nightly    dorzolamide-timolol  1 drop Both Eyes BID    DULoxetine  30 mg Oral BID    ferrous sulfate  324 mg Oral Daily with breakfast    gabapentin  300 mg Oral TID    latanoprost  1 drop Left Eye Nightly    levothyroxine  50 mcg Oral Daily    methylphenidate  5 mg Oral BID     metoprolol tartrate  50 mg Oral BID    pantoprazole  40 mg Oral Daily    polyethylene glycol  17 g Oral Daily    sennosides-docusate sodium  1 tablet Oral BID    rivaroxaban  20 mg Oral Daily    lactobacillus  2 capsule Oral BID     amiodarone  200 mg Oral Daily     Continuous Infusions:    Labs:  CBC:   Recent Labs     10/14/20  0703 10/15/20  0702   WBC 3.7* 4.8   HGB 8.4* 8.6*    162     Ca/Mg/Phos:   Recent Labs     10/14/20  0703 10/15/20  0702 10/16/20  0625   CALCIUM 8.3 8.6 8.9     UA:  Recent Labs     10/16/20  0535   COLORU YELLOW   CLARITYU Clear   GLUCOSEU Negative   BILIRUBINUR Negative   KETUA Negative   SPECGRAV 1.008   BLOODU MODERATE*   PHUR 7.5   PROTEINU Negative   UROBILINOGEN 0.2   NITRU Negative   LEUKOCYTESUR Negative   LABMICR YES   Noel Seals NotGiven      Urine Microscopic:   Recent Labs     10/16/20  0535   HYALCAST 0   WBCUA 0   RBCUA 2   EPIU 0     Urine Chemistry:   Recent Labs     10/16/20  0535   LABCREA 38.1*       Objective:     Vitals: /70   Pulse 55   Temp 97.5 °F (36.4 °C) (Oral)   Resp 16   Ht 5' 9\" (1.753 m)   Wt 162 lb 14.7 oz (73.9 kg)   SpO2 94%   BMI 24.06 kg/m²    Wt Readings from Last 3 Encounters:   10/16/20 162 lb 14.7 oz (73.9 kg)   10/13/20 156 lb 3.2 oz (70.9 kg)   09/30/20 187 lb 2.7 oz (84.9 kg)      24HR INTAKE/OUTPUT:      Intake/Output Summary (Last 24 hours) at 10/16/2020 0824  Last data filed at 10/16/2020 0448  Gross per 24 hour   Intake 360 ml   Output 1500 ml   Net -1140 ml     Constitutional:  awake, NAD  HEENT:  MMM, No icterus  Neck: no bruits, No JVD  Cardiovascular:  ireg rhythm  Respiratory: CTA, no crackles  Abdomen:  +BS, soft, NT, ND  Ext: no lower extremity edema  CNS: alert, no agitation    IMAGING:  No orders to display       Assessment :     1. TONO  -Non-Oliguric  -Baseline creat: 1-1. 1. previous TONO with peak 1.6. Now 1.4->1.6->1.5  -UA dilute, bld mod, RBC 2. FeUrea 39.6% (10/16/2020)  -Renal US (10/9): mild bilateral hydronephrosis, 7 mm intravesicular calculus  -Volume: Euvolemic  -Electrolytes: No Dyskalemia  -Acid-Base: Metabolic alkalosis   -initial TONO due to UTI, obstructive uropathy.  Patient had post obstructive diuresis ( about 9 L UOP on 10/11, 10/12). BUN started to rise on 10/13. Ur still dilute. Likely hypoperfusion. Possibility of AIN in diff, now off antibiotics     Recent Labs     10/14/20  0703 10/15/20  0702 10/16/20  0625   BUN 23* 29* 33*   CREATININE 1.4* 1.6* 1.5*     Recent Labs     10/14/20  0703 10/15/20  0702 10/16/20  0625    136 134*   K 3.9 4.3 4.3   CO2 24 25 26       2. HTN  -Blood pressure at goal     BP Readings from Last 1 Encounters:   10/16/20 138/70     3. CAD/CHF  -TTE (sep 2020): LVEF 50%, G1DD, AAA 5.6 cm    4. Urinary retention  -recent UTIs  -has Reeves    5. atrial fibrillation   -on anticoagulation     6. Anemia  -recent blood loss    Plan:     - hemodynamic support. May consider short term IVF. Alternatively let pt take adequate PO fluids to balance UO till kidneys completely recover  - If renal function continues to worsen, will repeat Renal US  - Avoid ACEI/ARB  - monitor BMP    -Monitor I/O, UOP  -Maintain MAP>65  -Avoid nephrotoxin, if able. -Dose meds to current eGFR    Thank you for allowing us to participate in care of Radha Marie. . We will continue to follow. Feel free to contact me with any questions.       Heather Elkins  10/16/2020    [facility] [address]  Office : [phone]  Fax :[phone]

## 2020-10-16 NOTE — PROGRESS NOTES
Catheter clamped and UA obtained via catheter port per policy. Urine clear, yellow and without odor  Catheter not changed d/t originally placed by urology.

## 2020-10-16 NOTE — PLAN OF CARE
Problem: Nutrition  Goal: Optimal nutrition therapy  Outcome: Ongoing   Nutrition Problem #1: Severe malnutrition  Intervention: Food and/or Nutrient Delivery: Continue Current Diet, Modify Oral Nutrition Supplement  Nutritional Goals: consume >50% of meals and supplement during admission

## 2020-10-16 NOTE — PLAN OF CARE
Problem: Pain:  Goal: Control of acute pain  Description: Control of acute pain  10/16/2020 1139 by Jessica Chambers RN  Outcome: Ongoing  Note: Pain and discomfort being managed with nonpharmacologic and PRN analgesics per MD orders. Patient able to express presence, quality, and severity of pain using numeric pain scale. Problem: Skin Integrity:  Goal: Absence of new skin breakdown  Description: Absence of new skin breakdown  10/16/2020 1139 by Jessica Chambers RN  Outcome: Ongoing  Note: Skin assessed, and pressure relief techniques used as needed while in chair and in bed. Position changes encouraged at least every two hours while in bed. Problem: Falls - Risk of:  Goal: Will remain free from falls  Description: Will remain free from falls  10/16/2020 1139 by Jessica Chambers RN  Outcome: Ongoing  Note: Pt has been free of falls this shift. Fall precautions in place: 2x bed rails up, non slip footwear, call light and personal possessions within reach. Verbalizes understanding of how and when to use call light.

## 2020-10-17 LAB
ANION GAP SERPL CALCULATED.3IONS-SCNC: 10 MMOL/L (ref 3–16)
BUN BLDV-MCNC: 34 MG/DL (ref 7–20)
CALCIUM SERPL-MCNC: 9 MG/DL (ref 8.3–10.6)
CHLORIDE BLD-SCNC: 99 MMOL/L (ref 99–110)
CO2: 26 MMOL/L (ref 21–32)
CREAT SERPL-MCNC: 1.7 MG/DL (ref 0.8–1.3)
GFR AFRICAN AMERICAN: 48
GFR NON-AFRICAN AMERICAN: 39
GLUCOSE BLD-MCNC: 80 MG/DL (ref 70–99)
POTASSIUM REFLEX MAGNESIUM: 4.4 MMOL/L (ref 3.5–5.1)
SODIUM BLD-SCNC: 135 MMOL/L (ref 136–145)
T4 FREE: 1.4 NG/DL (ref 0.9–1.8)
TOTAL CK: 39 U/L (ref 39–308)

## 2020-10-17 PROCEDURE — 97116 GAIT TRAINING THERAPY: CPT

## 2020-10-17 PROCEDURE — 82550 ASSAY OF CK (CPK): CPT

## 2020-10-17 PROCEDURE — 84439 ASSAY OF FREE THYROXINE: CPT

## 2020-10-17 PROCEDURE — 97535 SELF CARE MNGMENT TRAINING: CPT

## 2020-10-17 PROCEDURE — 2580000003 HC RX 258: Performed by: HOSPITALIST

## 2020-10-17 PROCEDURE — 6370000000 HC RX 637 (ALT 250 FOR IP): Performed by: PHYSICAL MEDICINE & REHABILITATION

## 2020-10-17 PROCEDURE — 97530 THERAPEUTIC ACTIVITIES: CPT

## 2020-10-17 PROCEDURE — 84445 ASSAY OF TSI GLOBULIN: CPT

## 2020-10-17 PROCEDURE — 97110 THERAPEUTIC EXERCISES: CPT

## 2020-10-17 PROCEDURE — 36415 COLL VENOUS BLD VENIPUNCTURE: CPT

## 2020-10-17 PROCEDURE — 1280000000 HC REHAB R&B

## 2020-10-17 PROCEDURE — 80048 BASIC METABOLIC PNL TOTAL CA: CPT

## 2020-10-17 RX ORDER — SODIUM CHLORIDE, SODIUM LACTATE, POTASSIUM CHLORIDE, CALCIUM CHLORIDE 600; 310; 30; 20 MG/100ML; MG/100ML; MG/100ML; MG/100ML
INJECTION, SOLUTION INTRAVENOUS CONTINUOUS
Status: ACTIVE | OUTPATIENT
Start: 2020-10-17 | End: 2020-10-17

## 2020-10-17 RX ADMIN — DORZOLAMIDE HYDROCHLORIDE AND TIMOLOL MALEATE 1 DROP: 20; 5 SOLUTION/ DROPS OPHTHALMIC at 21:01

## 2020-10-17 RX ADMIN — Medication 2 CAPSULE: at 08:52

## 2020-10-17 RX ADMIN — GABAPENTIN 300 MG: 300 CAPSULE ORAL at 08:52

## 2020-10-17 RX ADMIN — DULOXETINE HYDROCHLORIDE 30 MG: 30 CAPSULE, DELAYED RELEASE ORAL at 08:53

## 2020-10-17 RX ADMIN — ATORVASTATIN CALCIUM 40 MG: 40 TABLET, FILM COATED ORAL at 20:58

## 2020-10-17 RX ADMIN — Medication 2 CAPSULE: at 17:16

## 2020-10-17 RX ADMIN — GABAPENTIN 300 MG: 300 CAPSULE ORAL at 20:58

## 2020-10-17 RX ADMIN — DULOXETINE HYDROCHLORIDE 30 MG: 30 CAPSULE, DELAYED RELEASE ORAL at 20:58

## 2020-10-17 RX ADMIN — AMIODARONE HYDROCHLORIDE 200 MG: 200 TABLET ORAL at 17:17

## 2020-10-17 RX ADMIN — GABAPENTIN 300 MG: 300 CAPSULE ORAL at 13:33

## 2020-10-17 RX ADMIN — TRAZODONE HYDROCHLORIDE 25 MG: 50 TABLET ORAL at 20:58

## 2020-10-17 RX ADMIN — LATANOPROST 1 DROP: 50 SOLUTION/ DROPS OPHTHALMIC at 21:02

## 2020-10-17 RX ADMIN — METOPROLOL TARTRATE 50 MG: 50 TABLET, FILM COATED ORAL at 08:53

## 2020-10-17 RX ADMIN — LEVOTHYROXINE SODIUM 50 MCG: 0.05 TABLET ORAL at 06:04

## 2020-10-17 RX ADMIN — POLYETHYLENE GLYCOL 3350 17 G: 17 POWDER, FOR SOLUTION ORAL at 08:53

## 2020-10-17 RX ADMIN — DORZOLAMIDE HYDROCHLORIDE AND TIMOLOL MALEATE 1 DROP: 20; 5 SOLUTION/ DROPS OPHTHALMIC at 08:53

## 2020-10-17 RX ADMIN — DOCUSATE SODIUM 50 MG AND SENNOSIDES 8.6 MG 1 TABLET: 8.6; 5 TABLET, FILM COATED ORAL at 08:53

## 2020-10-17 RX ADMIN — PANTOPRAZOLE SODIUM 40 MG: 40 TABLET, DELAYED RELEASE ORAL at 06:04

## 2020-10-17 RX ADMIN — RIVAROXABAN 20 MG: 20 TABLET, FILM COATED ORAL at 17:16

## 2020-10-17 RX ADMIN — METOPROLOL TARTRATE 50 MG: 50 TABLET, FILM COATED ORAL at 20:58

## 2020-10-17 RX ADMIN — FERROUS SULFATE TAB EC 324 MG (65 MG FE EQUIVALENT) 324 MG: 324 (65 FE) TABLET DELAYED RESPONSE at 08:53

## 2020-10-17 RX ADMIN — SODIUM CHLORIDE, POTASSIUM CHLORIDE, SODIUM LACTATE AND CALCIUM CHLORIDE: 600; 310; 30; 20 INJECTION, SOLUTION INTRAVENOUS at 10:50

## 2020-10-17 ASSESSMENT — PAIN SCALES - GENERAL: PAINLEVEL_OUTOF10: 0

## 2020-10-17 NOTE — PLAN OF CARE
Problem: Pain:  Goal: Pain level will decrease  Description: Pain level will decrease  10/17/2020 1109 by Adam Juan RN  Outcome: Ongoing  Note: Pt assessed for pain. Pt denies any pain at this time. Will continue to monitor pt and assess for pain throughout rest of shift. Problem: Skin Integrity:  Goal: Absence of new skin breakdown  Description: Absence of new skin breakdown  10/17/2020 1109 by Adam Juan RN  Outcome: Ongoing  Note: Patient's skin was assessed. Pt has scattered bruising, surgical incision on abdomen and lower back x3, and redness on heels and buttocks. No new findings were noted. Patient is being educated on importance of turning/repostioning at least every two hours. RN will continue to educate and monitor. Problem: Falls - Risk of:  Goal: Will remain free from falls  Description: Will remain free from falls  10/17/2020 1109 by Adam Juan RN  Outcome: Ongoing  Note: Fall risk assessment completed. Fall precautions in place. Call light within reach. Pt educated on calling for assistance before getting up. Walkway free of clutter. Will continue to monitor. Problem: Nutrition  Goal: Optimal nutrition therapy  10/17/2020 1109 by Adam Juan RN  Outcome: Ongoing  Note: Pt's daily weight and I&O being monitored. Pt currently on regular diet with thin liquids. IV lactated ringers running at 100mL/hr. Will continue to encourage fluids and monitor pt. Problem: HEMODYNAMIC STATUS  Goal: Patient has stable vital signs and fluid balance  10/17/2020 1109 by Adam Juan RN  Outcome: Ongoing  Note: Pt's VSS. All meds given as ordered. Lungs clear. CHF education being given. Will continue to educate and monitor. Problem: IP BLADDER/VOIDING  Goal: STG - Patient demonstrates ability to take care of indwelling catheter  10/17/2020 1109 by Adam Juan RN  Outcome: Ongoing  Note: Pt currently has mendoza draining clear, yellow urine.  Catheter care given every shift and as needed. Will continue to educate catheter care and monitor pt. Problem: Daily Care:  Goal: Daily care needs are met  Description: Daily care needs are met  10/17/2020 1109 by Frankie Alvarez RN  Outcome: Ongoing  Note: Pt is A&O x4 and calls appropriately. Call light within reach at all times. RN/PCA doing hourly rounds. Needs are being met this shift. Will continue to monitor.

## 2020-10-17 NOTE — PROGRESS NOTES
Physical Therapy  Facility/Department: [address] [facility]  Daily Treatment Note  NAME: Saravanan Carballo Sr.  : 1945  MRN: [id number]    Date of Service: 10/17/2020    Discharge Recommendations:  Continue to assess pending progress, Patient would benefit from continued therapy after discharge   PT Equipment Recommendations  Equipment Needed: No  Other: will continue to assess pending equipment needs    Assessment   Body structures, Functions, Activity limitations: Decreased functional mobility ; Decreased balance;Decreased posture;Decreased ROM; Decreased strength  Assessment: Patient continues to be limited with endurance, though he was able to make one bout of community distance. He was unable to maintain this distance at end of session secondary to fatigue from other activities. Patient continues to complain of numbness in bilateral feet though he feels it is slightly improved with increased mobility. Patient does have increased jerking motions of entire body throughout session that patient refers to as \"twitching. \"  These motions do cause disruption in mobility and patient is concerned with them increasing. PT notified RN, Atul Dimas. Patient will continue to benefit from continued skilled therapy for strengthening, gait training, and endurance training to allow for return to independence. Treatment Diagnosis: Generalized muscle weakness, impaired mobility  Prognosis: Good  Clinical Presentation: evolving  PT Education: Transfer Training;Energy Conservation; Adaptive Device Training;Equipment;Orientation;General Safety;Gait Training;Disease Specific Education  Barriers to Learning: motivation  REQUIRES PT FOLLOW UP: Yes  Activity Tolerance  Activity Tolerance: Patient limited by pain; Patient limited by endurance; Patient limited by fatigue; Other  Activity Tolerance: Patient limited by frequent \"twitching\" of bilateral UE     Patient Diagnosis(es): There were no encounter diagnoses.      has a past medical history of Aneurysm, aortic ([facility] [address] Atrial fibrillation ([address] Glaucoma, Heart attack ([address] Hyperlipidemia, and Hypertension. has a past surgical history that includes Coronary angioplasty with stent; Colonoscopy; Eye surgery (Right, 7/23/2020); and Intracapsular cataract extraction (Right, 7/23/2020). Social/Functional History  Lives With: Alone  Type of Home: House  Home Layout: Multi-level  Home Access: Stairs to enter with rails  Entrance Stairs - Number of Steps: 10 JELLY  Entrance Stairs - Rails: Right  Bathroom Shower/Tub: Walk-in shower  Bathroom Toilet: Standard  Bathroom Equipment: (No hand held shower.)  Bathroom Accessibility: Accessible  Home Equipment: (No DME)  Receives Help From: Family  ADL Assistance: Independent  Homemaking Assistance: Independent  Homemaking Responsibilities: Yes  Ambulation Assistance: Independent  Transfer Assistance: Independent  Active : Yes  Mode of Transportation: Car  Occupation: Retired  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 Adilia Fraser note, \"Patient is a 67 yo M with pmh Afib, CAD, HTN, HLD, and recent lumbar spine surgery who initially presented from SNF on 9/13/2020 with fever 105 and altered mental status. Found to have sepsis due to UTI, urinary retention, metabolic encephalopathy, and acute hypoxic respiratory failure. Patient initially admitted to ARU (9/18-10/1). He made gradual progress and demonstrated some self-limiting behavior. On 9/28 he began to refuse therapies and most medical care. He stated a wish to die and planned to achieve this by not eating/drinking. Psychiatry was consulted.  Dr. General Monaco and I agreed that patient demonstrated imminent risk of harm to himself with ongoing suicidal ideation and plan. Therefore patient will be transferred to inpatient psychiatric unit 10/1. Patient reportedly did well with inpatient psychiatric care. Reeves was removed for void trial at inpatient psych. Patient unable to void and Reeves was replaced. Then developed fevers and was readmitted to acute medical floor at [facility] 10/7. Found to proteus UTI with bilateral hydronephrosis. Treated with ceftriaxone. Urology recommending TURP as outpatient. Course was further complicated by TONO, hyponatremia. \"  Response To Previous Treatment: Patient with no complaints from previous session. Family / Caregiver Present: No  Referring Practitioner: Dr. Sarahy Barahona: Patient reporting his \"twitching\" continues to be bothersome. He does state that he has no pain at this time, but did have some numbness in bilateral feet when he woke up. He feels the numbness gets better with mobility. Objective      Transfers  Sit to Stand: Stand by assistance  Stand to sit: Stand by assistance  Bed to Chair: Stand by assistance(with wheeled walker)     Ambulation  Ambulation?: Yes  More Ambulation?: No  Ambulation 1  Surface: level tile  Device: Rolling Walker  Assistance: Contact guard assistance;Stand by assistance  Quality of Gait: Patient with twitching throughout bout of ambulation, but does not cause patient to have LOB. decreased clearance of bilateral heels and patient scuffs feet along the floor. He is able to correct this with cueing. Patient required encouragement to increase distance ambulating. Gait Deviations: Slow Sapna;Decreased step length;Decreased step height  Distance: 150', 90'  Comments: Patient with increased \"twitching\" from yesterday and it does disrupt ambulation.  patient was unable to maintain distance with increased activity and required seated break after 90' on second ambulation Exercises  Hamstring Sets: x20 hamstring curls with medium resistance band  Gluteal Sets: x20  Hip Flexion: x15 with light resistance band  Hip Abduction: X 20 knee flexed with medium resistance band, x20 hip add sets  Knee Long Arc Quad: x20 alternating  Ankle Pumps: heel/toe raises X 20  Comments: above performed BLEs supported sitting in chair    Other exercises?: Yes  Other exercises 1: patient performed 10 minutes on NuStep with resistance at 3, seat at 9, bilateral UE at 11, and average SPM at 41. Patient reporting some fatigue in bilateral LE after this activity. PM Session  Patient seated in recliner chair upon arrival and reporting he is tired and would like a short rest break. Patient agreeable to PT returning in appropx 30 minutes. When PT returned, patient was asleep in recliner chair. He woke to his name and was agreeable to therapy. Patient now on IV fluids and reports he \"hasn't been drinking enough water. \"  Sit<>stand with SBA  Ambulated 80' x2 with wheeled walker with SBA. Patient with slight trendelenburg gait with left hip and scuffing bilateral heels. He reports he is feeling very tired. Patient was able to transition on and off the carpet without difficulty. Patient performed 15 reps bilateral LE ther ex while seated in recliner: heel/toe raises, alternating LAQ, hip add sets, marching (with encouragement to increase hip flexion), and heel slides (while reclined). Patient with increased difficulty with left LE during heel slides, but was bale to complete with increased time. Patient very fatigued this PM and much slower with all mobility.      Safety Device - Type of devices:  [x]  All fall risk precautions in place [] Bed alarm in place  [x] Call light within reach [x] Chair alarm in place [] Positioning belt [x] Gait belt [] Patient at risk for falls [] Left in bed [x] Left in chair [] Telesitter in use [] Sitter present [] Nurse notified []  None        Goals  Short term AM

## 2020-10-17 NOTE — PROGRESS NOTES
Occupational Therapy  Facility/Department: [address] REHAB  Daily Treatment Note  NAME: Elizabeth Gonzalez Sr.  : 1945  MRN: [id number]    Date of Service: 10/17/2020    Discharge Recommendations:  Continue to assess pending progress, S Level 1, Home with Home health OT, Home with assist PRN    OT Equipment Recommendations  Other: sock aid, Long handle sponge, continue to assess for shower chair, grab bars    Assessment   Performance deficits / Impairments: Decreased functional mobility ; Decreased ADL status; Decreased balance;Decreased safe awareness;Decreased endurance;Decreased high-level IADLs;Decreased strength  Assessment: Patient is pleasant and cooperative. Patient is limited by decreased activity tolerance/endurance and twitching/jerkiness in B UE and LE. Patient worked hard throughout session, yet does require increased rest breaks between all tasks. Patient completed multiple transfers and standing tasks with CGA. Cont per POC. Prognosis: Good  REQUIRES OT FOLLOW UP: Yes       Patient Diagnosis(es): Urinary Tract Infection     has a past medical history of Aneurysm, aortic ([address] Atrial fibrillation ([address] Glaucoma, Heart attack ([address] Hyperlipidemia, and Hypertension. has a past surgical history that includes Coronary angioplasty with stent; Colonoscopy; Eye surgery (Right, 2020); and Intracapsular cataract extraction (Right, 2020). Restrictions  Spinal Precautions: No Bending, No Lifting, No Twisting  Other position/activity restrictions: Reeves catheter, IV right wrist     Subjective  Chart Reviewed: Yes, Orders, Progress Notes, History and Physical  Patient assessed for rehabilitation services?: Yes  Additional Pertinent Hx: Per Dr. Jessica Leiva 10/14/2020 note, \"Patient is a 65 yo M with pmh Afib, CAD, HTN, HLD, and recent lumbar spine surgery who initially presented from SNF on 2020 with fever 105 and altered mental status.  Found to have sepsis due to UTI, urinary retention, metabolic encephalopathy, and acute hypoxic respiratory failure. \"  Response to previous treatment: Patient with no complaints from previous session  Family / Caregiver Present: No  Referring Practitioner: Dr. Prince Alanis  Diagnosis: Urinary Tract Infection  Subjective: Patient met b/s, just had IV placed in right hand with fluids running, unable to complete full ADL at this time due to this, patient agreeable to other tasks at this time, 8/10 left leg, B feet numbness, B UE and LE twitching/jerkiness present throughout session    Orientation  Overall Orientation Status: Within Functional Limits    Objective  ADL  Grooming: Setup (glove placed over right hand to protect IV in wrist, patient washes face and completes oral care seated at sink)  Additional Comments: not able to shower/bathe at this time as IV just was started and fluids need to run    Balance  Sitting Balance: Supervision  Standing Balance: Contact guard assistance(standing at table while performing table top tasks, first x 2 minutes 40 seconds, 2nd x4 minutes, 3rd x4 minutes 15 seconds, patient with B knee twitching/jerkiness while standing)    Functional Mobility  Functional - Mobility Device: Rolling Walker  Assist Level: Contact guard assistance  Functional Mobility Comments: recliner-w/c-BSC set to lowest setting to simulate home set-up-bed-w/c-recliner with RW with CGA, multiple times during fxl mobility patient with B knee twitching/jerkiness    Toilet Transfers  Toilet - Technique: Ambulating  Equipment Used: (BSC set to lowest setting with rail on right only to simulate home set up with CGA)  Toilet Transfer: Contact guard assistance    Shower transfer: discussed benefits of shower chair and options, patient reports he currently has built ins, yet too small to sit on them, patient reports that the Drive small shower chair with back would fit better than [employer] shower chair when patient shown DME in the department    Bed mobility  Supine to Sit: Stand by assistance  Sit to Supine: Stand by assistance  Comment: flat bed, no HR, log roll technique    Transfers  Sit to stand: Contact guard assistance  Stand to sit: Contact guard assistance    Cognition  Overall Cognitive Status: [facility]    Plan  Times per week: 5-6x week  Times per day: Twice a day  Plan weeks: 1-1.5 weeks  Specific instructions for Next Treatment: Reinforce cather care/cleaning & management  Current Treatment Recommendations: Strengthening, Safety Education & Training, Balance Training, Self-Care / ADL, Neuromuscular Re-education, Endurance Training, Functional Mobility Training, Equipment Evaluation, Education, & procurement, Home Management Training  Plan Comment: Monday Conference    Goals  Short term goals  Time Frame for Short term goals: 1-1.5 weeks  Short term goal 1: Pt will bathe modified independently using AE & shower chair. Short term goal 2: Pt will dress modified independently including mendoza catheter, using AE. Short term goal 3: Pt will toilet modified independently using grab bars/TSF and/or manage catheter care. Short term goal 4: Pt will complete functional transfers/mobility modified independently using RW & grab bars. Short term goal 5: Pt will complete a simple cooking task in the kitchen standing for 10 minutes modified independent. Short term goal 6: Pt will complete BUE HEP in order to strengthen UB/increase activity tolerance to complete IADL cleaning tasks around the house modified independent. Long term goals  Time Frame for Long term goals : LTG=STG  Patient Goals   Patient goals : Pt stated \"I want to get walking so I can go home and do things around the house on my own. \" Above goals will work towards this goal.       Therapy Time   Individual Concurrent Group Co-treatment   Time In [facility]         Time Out [address]         Minutes [address] [place], [address] [address]

## 2020-10-17 NOTE — PROGRESS NOTES
Patient admitted to rehab with UTI and debility. A&Ox4. Transfers with rolling walker x1 assist. On general diet, tolerating well. Medications taken taken whole with thin liquids. On Xarelto for DVT prophylaxis. Pt has scattered bruising, surgical incision on abdomen and lower back x3, redness on heels and buttocks. On room air. Has been continent of bowel. LBM 10/16/2020. Pt has mendoza draining clear, yellow urine. Pt denies pain/discomfort. Chair/bed alarms in use and call light in reach. Will continue to monitor.

## 2020-10-17 NOTE — PROGRESS NOTES
Office: [phone]       Fax: [phone]      Nephrology Progress Note        Patient's Name: Lynette Magana. Admit Date: 10/13/2020  Date of Visit: 10/17/2020    Reason for Consult:  TONO/CKD      Subjective: Lynette Magana is a 66 y.o. male with PMHx of hypertension, atrial fibrillation, AAA,  coronary artery disease, CHF, status post LS spine surgery who was hospitalized in ARU on 10/13/2020 with comprehensive rehab. Pt was hospitalized at [facility] in September for sepsis secondary to UTI. Had false urethral passage, was seen by urology. Currently has Reeves in place. Was briefly in ARU before discharged to behavioral unit. Reeves replaced on 10/2. Subsequently had UTI with Proteus. CT abdomen was obtained which showed bilateral hydronephrosis despite having a Reeves in place. initially placed on IV Merrem, changed to Rocephin. Urology recommended TURP as outpatient. Had peak creat of 1.6 that improved to 1.1  No back pain, no obvious hematuria  NSAID use: Denies   IV contrast: None recent   Home meds reviewed     INTERVAL HISTORY    Feels same  Shortness of breath: No   UOP: Fair  Creat: trending up       Medications: Allergies:  Azithromycin; Brimonidine; Clindamycin/lincomycin;  Penicillins; and Simvastatin    Scheduled Meds:   atorvastatin  40 mg Oral Nightly    dorzolamide-timolol  1 drop Both Eyes BID    DULoxetine  30 mg Oral BID    ferrous sulfate  324 mg Oral Daily with breakfast    gabapentin  300 mg Oral TID    latanoprost  1 drop Left Eye Nightly    levothyroxine  50 mcg Oral Daily    metoprolol tartrate  50 mg Oral BID    pantoprazole  40 mg Oral Daily    polyethylene glycol  17 g Oral Daily    sennosides-docusate sodium  1 tablet Oral BID    rivaroxaban  20 mg Oral Daily    lactobacillus  2 capsule Oral BID     amiodarone  200 mg Oral Daily     Continuous Infusions:    Labs:  CBC:   Recent Labs     10/15/20  0702   WBC 4.8   HGB 8.6*        Ca/Mg/Phos:   Recent Labs     10/15/20  0702 10/16/20  0625 10/17/20  0453   CALCIUM 8.6 8.9 9.0     UA:  Recent Labs     10/16/20  0535   COLORU YELLOW   CLARITYU Clear   GLUCOSEU Negative   BILIRUBINUR Negative   KETUA Negative   SPECGRAV 1.008   BLOODU MODERATE*   PHUR 7.5   PROTEINU Negative   UROBILINOGEN 0.2   NITRU Negative   LEUKOCYTESUR Negative   LABMICR YES   Earnstine Dieter NotGiven      Urine Microscopic:   Recent Labs     10/16/20  0535   HYALCAST 0   WBCUA 0   RBCUA 2   EPIU 0     Urine Chemistry:   Recent Labs     10/16/20  0535   LABCREA 38.1*       Objective:     Vitals: BP (!) 162/83   Pulse 60   Temp 98 °F (36.7 °C) (Oral)   Resp 18   Ht 5' 9\" (1.753 m)   Wt 162 lb 4.1 oz (73.6 kg)   SpO2 96%   BMI 23.96 kg/m²    Wt Readings from Last 3 Encounters:   10/17/20 162 lb 4.1 oz (73.6 kg)   10/13/20 156 lb 3.2 oz (70.9 kg)   09/30/20 187 lb 2.7 oz (84.9 kg)      24HR INTAKE/OUTPUT:      Intake/Output Summary (Last 24 hours) at 10/17/2020 7883  Last data filed at 10/17/2020 0559  Gross per 24 hour   Intake 120 ml   Output 1825 ml   Net -1705 ml     Constitutional:  awake, NAD  HEENT:  MMM, No icterus  Neck: no bruits, No JVD  Cardiovascular:  ireg rhythm  Respiratory: CTA, no crackles  Abdomen:  +BS, soft, NT, ND  Ext: no lower extremity edema  CNS: alert, no agitation    IMAGING:  No orders to display       Assessment :     1. TONO  -Non-Oliguric  -Baseline creat: 1-1. 1. previous TONO with peak 1.6. Now 1.4->1.6->1.5  -UA dilute, bld mod, RBC 2. FeUrea 39.6% (10/16/2020)  -Renal US (10/9): mild bilateral hydronephrosis, 7 mm intravesicular calculus  -Volume: Euvolemic  -Electrolytes: No Dyskalemia  -Acid-Base: Metabolic alkalosis   -initial TONO due to UTI, obstructive uropathy. Patient had post obstructive diuresis ( about 9 L UOP on 10/11, 10/12).  BUN started to rise on 10/13. Ur still dilute. Likely hypoperfusion. Possibility of AIN in diff, now off antibiotics     Recent Labs     10/15/20  0702 10/16/20  0625 10/17/20  0453   BUN 29* 33* 34*   CREATININE 1.6* 1.5* 1.7*     Recent Labs     10/15/20  0702 10/16/20  0625 10/17/20  0453    134* 135*   K 4.3 4.3 4.4   CO2 25 26 26       2. HTN  -Blood pressure high    BP Readings from Last 1 Encounters:   10/17/20 (!) 162/83     3. CAD/CHF  -TTE (sep 2020): LVEF 50%, G1DD, AAA 5.6 cm    4. Urinary retention  -recent UTIs  -has Reeves    5. atrial fibrillation   -on anticoagulation     6. Anemia  -recent blood loss    Plan:     - hemodynamic support.   - give IVF today  - If renal function continues to worsen, will repeat Renal US/abdominal CT scan   - Avoid ACEI/ARB  - monitor BMP    -Monitor I/O, UOP  -Maintain MAP>65  -Avoid nephrotoxin, if able. -Dose meds to current eGFR    Thank you for allowing us to participate in care of Erika Mack. . We will continue to follow. Feel free to contact me with any questions.       Anna Abdul  10/17/2020    [facility]  Office : [phone]  Fax :[phone]

## 2020-10-17 NOTE — PLAN OF CARE
Problem: Skin Integrity:  Goal: Absence of new skin breakdown  Description: Absence of new skin breakdown  10/17/2020 0109 by Gorge Ochoa RN  Outcome: Ongoing  Note: Skin assessment completed on admission and every shift. Barrier wipes used in the event of incontinence. Pressure relief techniques used as needed while in chair and in bed. Position changes encouraged at least every two hours while in bed. Problem: Falls - Risk of:  Goal: Will remain free from falls  Description: Will remain free from falls  10/17/2020 0109 by Gorge Ochoa RN  Outcome: Ongoing  Note: Fall risk band on patient. Orange light on outside of room. Non skid footwear in place. Alarms used appropriately. Patient instructed to call and wait for staff before getting up. Rounding done to anticipate needs. Appropriate safety devices used for transfers.

## 2020-10-18 LAB
ANION GAP SERPL CALCULATED.3IONS-SCNC: 11 MMOL/L (ref 3–16)
BUN BLDV-MCNC: 30 MG/DL (ref 7–20)
CALCIUM SERPL-MCNC: 8.8 MG/DL (ref 8.3–10.6)
CHLORIDE BLD-SCNC: 101 MMOL/L (ref 99–110)
CO2: 24 MMOL/L (ref 21–32)
CREAT SERPL-MCNC: 1.4 MG/DL (ref 0.8–1.3)
GFR AFRICAN AMERICAN: 59
GFR NON-AFRICAN AMERICAN: 49
GLUCOSE BLD-MCNC: 81 MG/DL (ref 70–99)
HCT VFR BLD CALC: 24.2 % (ref 40.5–52.5)
HEMOGLOBIN: 7.8 G/DL (ref 13.5–17.5)
MCH RBC QN AUTO: 29.6 PG (ref 26–34)
MCHC RBC AUTO-ENTMCNC: 32.2 G/DL (ref 31–36)
MCV RBC AUTO: 91.8 FL (ref 80–100)
PDW BLD-RTO: 16.8 % (ref 12.4–15.4)
PLATELET # BLD: 157 K/UL (ref 135–450)
PMV BLD AUTO: 9.9 FL (ref 5–10.5)
POTASSIUM REFLEX MAGNESIUM: 4.3 MMOL/L (ref 3.5–5.1)
RBC # BLD: 2.64 M/UL (ref 4.2–5.9)
SODIUM BLD-SCNC: 136 MMOL/L (ref 136–145)
WBC # BLD: 3.2 K/UL (ref 4–11)

## 2020-10-18 PROCEDURE — 36415 COLL VENOUS BLD VENIPUNCTURE: CPT

## 2020-10-18 PROCEDURE — 94760 N-INVAS EAR/PLS OXIMETRY 1: CPT

## 2020-10-18 PROCEDURE — 80048 BASIC METABOLIC PNL TOTAL CA: CPT

## 2020-10-18 PROCEDURE — 85027 COMPLETE CBC AUTOMATED: CPT

## 2020-10-18 PROCEDURE — 6370000000 HC RX 637 (ALT 250 FOR IP): Performed by: PHYSICAL MEDICINE & REHABILITATION

## 2020-10-18 PROCEDURE — 1280000000 HC REHAB R&B

## 2020-10-18 RX ADMIN — DORZOLAMIDE HYDROCHLORIDE AND TIMOLOL MALEATE 1 DROP: 20; 5 SOLUTION/ DROPS OPHTHALMIC at 09:12

## 2020-10-18 RX ADMIN — POLYETHYLENE GLYCOL 3350 17 G: 17 POWDER, FOR SOLUTION ORAL at 09:11

## 2020-10-18 RX ADMIN — DULOXETINE HYDROCHLORIDE 30 MG: 30 CAPSULE, DELAYED RELEASE ORAL at 09:11

## 2020-10-18 RX ADMIN — GABAPENTIN 300 MG: 300 CAPSULE ORAL at 14:07

## 2020-10-18 RX ADMIN — GABAPENTIN 300 MG: 300 CAPSULE ORAL at 21:33

## 2020-10-18 RX ADMIN — FERROUS SULFATE TAB EC 324 MG (65 MG FE EQUIVALENT) 324 MG: 324 (65 FE) TABLET DELAYED RESPONSE at 09:11

## 2020-10-18 RX ADMIN — ATORVASTATIN CALCIUM 40 MG: 40 TABLET, FILM COATED ORAL at 21:33

## 2020-10-18 RX ADMIN — GABAPENTIN 300 MG: 300 CAPSULE ORAL at 09:11

## 2020-10-18 RX ADMIN — LEVOTHYROXINE SODIUM 50 MCG: 0.05 TABLET ORAL at 06:53

## 2020-10-18 RX ADMIN — PANTOPRAZOLE SODIUM 40 MG: 40 TABLET, DELAYED RELEASE ORAL at 06:53

## 2020-10-18 RX ADMIN — Medication 2 CAPSULE: at 17:20

## 2020-10-18 RX ADMIN — DULOXETINE HYDROCHLORIDE 30 MG: 30 CAPSULE, DELAYED RELEASE ORAL at 21:33

## 2020-10-18 RX ADMIN — DORZOLAMIDE HYDROCHLORIDE AND TIMOLOL MALEATE 1 DROP: 20; 5 SOLUTION/ DROPS OPHTHALMIC at 21:45

## 2020-10-18 RX ADMIN — OXYCODONE HYDROCHLORIDE AND ACETAMINOPHEN 1 TABLET: 7.5; 325 TABLET ORAL at 21:38

## 2020-10-18 RX ADMIN — AMIODARONE HYDROCHLORIDE 200 MG: 200 TABLET ORAL at 17:20

## 2020-10-18 RX ADMIN — OXYCODONE HYDROCHLORIDE AND ACETAMINOPHEN 1 TABLET: 7.5; 325 TABLET ORAL at 06:53

## 2020-10-18 RX ADMIN — METOPROLOL TARTRATE 50 MG: 50 TABLET, FILM COATED ORAL at 21:33

## 2020-10-18 RX ADMIN — DOCUSATE SODIUM 50 MG AND SENNOSIDES 8.6 MG 1 TABLET: 8.6; 5 TABLET, FILM COATED ORAL at 09:11

## 2020-10-18 RX ADMIN — RIVAROXABAN 20 MG: 20 TABLET, FILM COATED ORAL at 17:20

## 2020-10-18 RX ADMIN — LATANOPROST 1 DROP: 50 SOLUTION/ DROPS OPHTHALMIC at 21:41

## 2020-10-18 RX ADMIN — Medication 2 CAPSULE: at 09:11

## 2020-10-18 ASSESSMENT — PAIN SCALES - GENERAL
PAINLEVEL_OUTOF10: 0
PAINLEVEL_OUTOF10: 0
PAINLEVEL_OUTOF10: 7
PAINLEVEL_OUTOF10: 0
PAINLEVEL_OUTOF10: 10

## 2020-10-18 ASSESSMENT — PAIN DESCRIPTION - ORIENTATION
ORIENTATION: LEFT
ORIENTATION: LEFT

## 2020-10-18 ASSESSMENT — PAIN DESCRIPTION - LOCATION
LOCATION: LEG
LOCATION: LEG

## 2020-10-18 ASSESSMENT — PAIN DESCRIPTION - FREQUENCY
FREQUENCY: CONTINUOUS
FREQUENCY: OTHER (COMMENT)

## 2020-10-18 ASSESSMENT — PAIN DESCRIPTION - ONSET
ONSET: ON-GOING
ONSET: ON-GOING

## 2020-10-18 ASSESSMENT — PAIN DESCRIPTION - DESCRIPTORS
DESCRIPTORS: ACHING
DESCRIPTORS: ACHING;DISCOMFORT;NUMBNESS

## 2020-10-18 ASSESSMENT — PAIN DESCRIPTION - PAIN TYPE
TYPE: CHRONIC PAIN
TYPE: CHRONIC PAIN

## 2020-10-18 ASSESSMENT — PAIN - FUNCTIONAL ASSESSMENT: PAIN_FUNCTIONAL_ASSESSMENT: PREVENTS OR INTERFERES SOME ACTIVE ACTIVITIES AND ADLS

## 2020-10-18 ASSESSMENT — PAIN DESCRIPTION - PROGRESSION
CLINICAL_PROGRESSION: RESOLVED
CLINICAL_PROGRESSION: GRADUALLY IMPROVING

## 2020-10-18 NOTE — PROGRESS NOTES
Office: [phone]       Fax: [phone]      Nephrology Progress Note        Patient's Name: Yamile Rockwell. Admit Date: 10/13/2020  Date of Visit: 10/18/2020    Reason for Consult:  TONO/CKD      Subjective: Yamile Rockwell is a 66 y.o. male with PMHx of hypertension, atrial fibrillation, AAA,  coronary artery disease, CHF, status post LS spine surgery who was hospitalized in ARU on 10/13/2020 with comprehensive rehab. Pt was hospitalized at [facility] in September for sepsis secondary to UTI. Had false urethral passage, was seen by urology. Currently has Reeves in place. Was briefly in ARU before discharged to behavioral unit. Reeves replaced on 10/2. Subsequently had UTI with Proteus. CT abdomen was obtained which showed bilateral hydronephrosis despite having a Reeves in place. initially placed on IV Merrem, changed to Rocephin. Urology recommended TURP as outpatient. Had peak creat of 1.6 that improved to 1.1  No back pain, no obvious hematuria  NSAID use: Denies   IV contrast: None recent   Home meds reviewed     INTERVAL HISTORY    Feels worse  Shortness of breath: No   UOP: Fair  Creat: trending down  Reports having episode of twitching and weakness this AM     Medications: Allergies:  Azithromycin; Brimonidine; Clindamycin/lincomycin;  Penicillins; and Simvastatin    Scheduled Meds:   atorvastatin  40 mg Oral Nightly    dorzolamide-timolol  1 drop Both Eyes BID    DULoxetine  30 mg Oral BID    ferrous sulfate  324 mg Oral Daily with breakfast    gabapentin  300 mg Oral TID    latanoprost  1 drop Left Eye Nightly    levothyroxine  50 mcg Oral Daily    metoprolol tartrate  50 mg Oral BID    pantoprazole  40 mg Oral Daily    polyethylene glycol  17 g Oral Daily    sennosides-docusate sodium  1 tablet Oral BID    rivaroxaban  20 mg Oral Daily    lactobacillus  2 capsule Oral BID     amiodarone  200 mg Oral Daily     Continuous Infusions:    Labs:  CBC:   No results for input(s): WBC, HGB, PLT in the last 72 hours. Ca/Mg/Phos:   Recent Labs     10/16/20  0625 10/17/20  0453 10/18/20  0439   CALCIUM 8.9 9.0 8.8     UA:  Recent Labs     10/16/20  0535   COLORU YELLOW   CLARITYU Clear   GLUCOSEU Negative   BILIRUBINUR Negative   KETUA Negative   SPECGRAV 1.008   BLOODU MODERATE*   PHUR 7.5   PROTEINU Negative   UROBILINOGEN 0.2   NITRU Negative   LEUKOCYTESUR Negative   LABMICR YES   Jaelyn Sprain NotGiven      Urine Microscopic:   Recent Labs     10/16/20  0535   HYALCAST 0   WBCUA 0   RBCUA 2   EPIU 0     Urine Chemistry:   Recent Labs     10/16/20  0535   LABCREA 38.1*       Objective:     Vitals: /73   Pulse 56   Temp 97.4 °F (36.3 °C) (Oral)   Resp 16   Ht 5' 9\" (1.753 m)   Wt 162 lb 4.1 oz (73.6 kg)   SpO2 93%   BMI 23.96 kg/m²    Wt Readings from Last 3 Encounters:   10/17/20 162 lb 4.1 oz (73.6 kg)   10/13/20 156 lb 3.2 oz (70.9 kg)   09/30/20 187 lb 2.7 oz (84.9 kg)      24HR INTAKE/OUTPUT:      Intake/Output Summary (Last 24 hours) at 10/18/2020 0955  Last data filed at 10/18/2020 6666  Gross per 24 hour   Intake 1560 ml   Output 3250 ml   Net -1690 ml     Constitutional:  awake, NAD  HEENT:  MMM, No icterus  Neck: no bruits, No JVD  Cardiovascular:  ireg rhythm  Respiratory: CTA, no crackles  Abdomen:  +BS, soft, NT, ND  Ext: no lower extremity edema  CNS: alert, no agitation    IMAGING:  No orders to display       Assessment :     1. TONO  -Non-Oliguric  -Baseline creat: 1-1. 1. previous TONO with peak 1.6. Now 1.4->1.6->1.5->1.4  -UA dilute, bld mod, RBC 2. FeUrea 39.6% (10/16/2020)  -Renal US (10/9): mild bilateral hydronephrosis, 7 mm intravesicular calculus  -Volume: Euvolemic  -Electrolytes: No Dyskalemia  -Acid-Base: Metabolic alkalosis resolved  -initial TONO due to UTI, obstructive uropathy.  Patient had post

## 2020-10-18 NOTE — PLAN OF CARE
Problem: Pain:  Goal: Pain level will decrease  Description: Pain level will decrease  Outcome: Ongoing  Note: Pt assessed for pain. Pt denies any pain at this time. Will continue to monitor pt and assess for pain throughout rest of shift. Problem: Skin Integrity:  Goal: Absence of new skin breakdown  Description: Absence of new skin breakdown  Outcome: Ongoing  Note: Patient's skin was assessed. Pt has scattered bruising, surgical incision on abdomen and lower back x3, and blanchable redness on heels and buttocks. No new findings were noted. Patient is being educated on importance of turning/repostioning at least every two hours. RN will continue to educate and monitor. Problem: Falls - Risk of:  Goal: Will remain free from falls  Description: Will remain free from falls  Outcome: Ongoing  Note: Fall risk assessment completed. Fall precautions in place. Call light within reach. Pt educated on calling for assistance before getting up. Walkway free of clutter. Will continue to monitor. Problem: Nutrition  Goal: Optimal nutrition therapy  Outcome: Ongoing  Note: Pt's daily weight and I&O being monitored. Pt currently on regular diet with thin liquids. Will continue to encourage fluids and monitor pt. Problem: HEMODYNAMIC STATUS  Goal: Patient has stable vital signs and fluid balance  Outcome: Ongoing  Note: Pt's VSS. All meds given as ordered. Lungs clear. CHF education being given. Will continue to educate and monitor. Problem: IP BLADDER/VOIDING  Goal: STG - Patient demonstrates ability to take care of indwelling catheter  Outcome: Ongoing  Note: Pt currently has mendoza draining clear, yellow urine. Catheter care given every shift and as needed. Will continue to educate catheter care and monitor pt. Problem: Daily Care:  Goal: Daily care needs are met  Description: Daily care needs are met  Outcome: Ongoing  Note: Pt is A&O x4 and calls appropriately.  Call light within reach at all times. RN/PCA doing hourly rounds. Needs are being met this shift. Will continue to monitor.

## 2020-10-18 NOTE — PROGRESS NOTES
Patient admitted to rehab with UTI and debility. A&Ox4. Transfers with rolling walker x1 assist. On general diet, pt has poor appetite. Medications taken taken whole with thin liquids. On Xarelto for DVT prophylaxis. Pt has scattered bruising, surgical incision on abdomen and lower back x3, blanchable redness on heels and buttocks. On room air. Has been continent of bowel. LBM 10/17/2020 per pt. Pt has mendoza draining clear, yellow urine. Pt denies pain/discomfort. Chair/bed alarms in use and call light in reach. Will continue to monitor.

## 2020-10-18 NOTE — PROGRESS NOTES
Patient admitted with dx UTI and debility. Transfers with RW and GB x1. Spinal restrictions in place.  General diet, takes meds whole with thin liquids. Lactated ringers 1000ml bag completed at 2145, R wrist PIV flushed and capped.  Last BM 10/17/20.  Continent of bowel.  Mendoza catheter in place draining clear yellow urine, mendoza care completed with green wipes. Emptied 1500cc of urine at this time. Bilateral heels floated off bed on pillows.  Trazodone given at bedtime with good effect. Call light and belongings in place. Candy De La Paz continue to monitor for safety.

## 2020-10-18 NOTE — PLAN OF CARE
Problem: Pain:  Goal: Pain level will decrease  Description: Pain level will decrease  Outcome: Ongoing   C/o chronic pain to the L leg, Percocet 7.5-325 per patient request  Problem: Pain:  Goal: Control of chronic pain  Description: Control of chronic pain  Outcome: Ongoing     Problem: Falls - Risk of:  Goal: Will remain free from falls  Description: Will remain free from falls  Outcome: Ongoing  Calls for assistance appropriately     Problem: Falls - Risk of:  Goal: Absence of physical injury  Description: Absence of physical injury  Outcome: Ongoing     Problem: FLUID AND ELECTROLYTE IMBALANCE  Goal: Fluid and electrolyte balance are achieved/maintained  Outcome: Ongoing  Received Lactated Ringers 1L during the evening. Urine output was 1500cc over night.

## 2020-10-19 ENCOUNTER — APPOINTMENT (OUTPATIENT)
Dept: ULTRASOUND IMAGING | Age: 75
DRG: 948 | End: 2020-10-19
Attending: PHYSICAL MEDICINE & REHABILITATION
Payer: MEDICARE

## 2020-10-19 LAB
ANION GAP SERPL CALCULATED.3IONS-SCNC: 11 MMOL/L (ref 3–16)
BASOPHILS ABSOLUTE: 0 K/UL (ref 0–0.2)
BASOPHILS RELATIVE PERCENT: 1 %
BUN BLDV-MCNC: 27 MG/DL (ref 7–20)
CALCIUM SERPL-MCNC: 8.5 MG/DL (ref 8.3–10.6)
CHLORIDE BLD-SCNC: 101 MMOL/L (ref 99–110)
CO2: 24 MMOL/L (ref 21–32)
CREAT SERPL-MCNC: 1.6 MG/DL (ref 0.8–1.3)
EOSINOPHILS ABSOLUTE: 0.1 K/UL (ref 0–0.6)
EOSINOPHILS RELATIVE PERCENT: 3.6 %
GFR AFRICAN AMERICAN: 51
GFR NON-AFRICAN AMERICAN: 42
GLUCOSE BLD-MCNC: 80 MG/DL (ref 70–99)
HCT VFR BLD CALC: 24.4 % (ref 40.5–52.5)
HEMOGLOBIN: 7.9 G/DL (ref 13.5–17.5)
LYMPHOCYTES ABSOLUTE: 1.2 K/UL (ref 1–5.1)
LYMPHOCYTES RELATIVE PERCENT: 31.2 %
MCH RBC QN AUTO: 29.6 PG (ref 26–34)
MCHC RBC AUTO-ENTMCNC: 32.3 G/DL (ref 31–36)
MCV RBC AUTO: 91.4 FL (ref 80–100)
MONOCYTES ABSOLUTE: 0.5 K/UL (ref 0–1.3)
MONOCYTES RELATIVE PERCENT: 13.8 %
NEUTROPHILS ABSOLUTE: 2 K/UL (ref 1.7–7.7)
NEUTROPHILS RELATIVE PERCENT: 50.4 %
PDW BLD-RTO: 16.7 % (ref 12.4–15.4)
PLATELET # BLD: 158 K/UL (ref 135–450)
PMV BLD AUTO: 9.1 FL (ref 5–10.5)
POTASSIUM REFLEX MAGNESIUM: 4.2 MMOL/L (ref 3.5–5.1)
RBC # BLD: 2.67 M/UL (ref 4.2–5.9)
SODIUM BLD-SCNC: 136 MMOL/L (ref 136–145)
WBC # BLD: 3.9 K/UL (ref 4–11)

## 2020-10-19 PROCEDURE — 6370000000 HC RX 637 (ALT 250 FOR IP): Performed by: PHYSICAL MEDICINE & REHABILITATION

## 2020-10-19 PROCEDURE — 97110 THERAPEUTIC EXERCISES: CPT

## 2020-10-19 PROCEDURE — 97530 THERAPEUTIC ACTIVITIES: CPT

## 2020-10-19 PROCEDURE — 36415 COLL VENOUS BLD VENIPUNCTURE: CPT

## 2020-10-19 PROCEDURE — 80048 BASIC METABOLIC PNL TOTAL CA: CPT

## 2020-10-19 PROCEDURE — 1280000000 HC REHAB R&B

## 2020-10-19 PROCEDURE — 97116 GAIT TRAINING THERAPY: CPT

## 2020-10-19 PROCEDURE — 85025 COMPLETE CBC W/AUTO DIFF WBC: CPT

## 2020-10-19 PROCEDURE — 76770 US EXAM ABDO BACK WALL COMP: CPT

## 2020-10-19 RX ORDER — DULOXETIN HYDROCHLORIDE 30 MG/1
30 CAPSULE, DELAYED RELEASE ORAL NIGHTLY
Status: DISCONTINUED | OUTPATIENT
Start: 2020-10-20 | End: 2020-10-25 | Stop reason: HOSPADM

## 2020-10-19 RX ADMIN — GABAPENTIN 300 MG: 300 CAPSULE ORAL at 14:36

## 2020-10-19 RX ADMIN — DULOXETINE HYDROCHLORIDE 30 MG: 30 CAPSULE, DELAYED RELEASE ORAL at 08:16

## 2020-10-19 RX ADMIN — DORZOLAMIDE HYDROCHLORIDE AND TIMOLOL MALEATE 1 DROP: 20; 5 SOLUTION/ DROPS OPHTHALMIC at 08:17

## 2020-10-19 RX ADMIN — METOPROLOL TARTRATE 50 MG: 50 TABLET, FILM COATED ORAL at 20:44

## 2020-10-19 RX ADMIN — METOPROLOL TARTRATE 50 MG: 50 TABLET, FILM COATED ORAL at 08:16

## 2020-10-19 RX ADMIN — ATORVASTATIN CALCIUM 40 MG: 40 TABLET, FILM COATED ORAL at 20:44

## 2020-10-19 RX ADMIN — LEVOTHYROXINE SODIUM 50 MCG: 0.05 TABLET ORAL at 05:52

## 2020-10-19 RX ADMIN — Medication 2 CAPSULE: at 08:16

## 2020-10-19 RX ADMIN — LATANOPROST 1 DROP: 50 SOLUTION/ DROPS OPHTHALMIC at 20:47

## 2020-10-19 RX ADMIN — PANTOPRAZOLE SODIUM 40 MG: 40 TABLET, DELAYED RELEASE ORAL at 05:52

## 2020-10-19 RX ADMIN — GABAPENTIN 300 MG: 300 CAPSULE ORAL at 08:16

## 2020-10-19 RX ADMIN — DOCUSATE SODIUM 50 MG AND SENNOSIDES 8.6 MG 1 TABLET: 8.6; 5 TABLET, FILM COATED ORAL at 20:44

## 2020-10-19 RX ADMIN — AMIODARONE HYDROCHLORIDE 200 MG: 200 TABLET ORAL at 17:24

## 2020-10-19 RX ADMIN — GABAPENTIN 300 MG: 300 CAPSULE ORAL at 20:44

## 2020-10-19 RX ADMIN — FERROUS SULFATE TAB EC 324 MG (65 MG FE EQUIVALENT) 324 MG: 324 (65 FE) TABLET DELAYED RESPONSE at 08:16

## 2020-10-19 RX ADMIN — DORZOLAMIDE HYDROCHLORIDE AND TIMOLOL MALEATE 1 DROP: 20; 5 SOLUTION/ DROPS OPHTHALMIC at 20:48

## 2020-10-19 RX ADMIN — RIVAROXABAN 20 MG: 20 TABLET, FILM COATED ORAL at 17:24

## 2020-10-19 RX ADMIN — DOCUSATE SODIUM 50 MG AND SENNOSIDES 8.6 MG 1 TABLET: 8.6; 5 TABLET, FILM COATED ORAL at 08:16

## 2020-10-19 RX ADMIN — Medication 2 CAPSULE: at 17:24

## 2020-10-19 ASSESSMENT — PAIN SCALES - GENERAL
PAINLEVEL_OUTOF10: 0
PAINLEVEL_OUTOF10: 0

## 2020-10-19 NOTE — PROGRESS NOTES
Pt resting in bed comfortably. A&O X4. Admitted on 10/13/2020 with debility. Reported pain to 7/10 to left leg and lower back. Pain medication given per mar. Numbness to BLE. HR irregular. Lungs sounds clear but diminished to base. Abd soft and nontender. Active bowel sounds. Surgical incision to mid abdomen dry and intact. Surgical incision to mid lower back and left lower back dry and intact. No drainage noted. Denies any abd discomfort. Mendoza catheter intact, mendoza care provided with green wipes. Penis-foreskin swollen. Redness noted to scrotal area. NSL to right hand. Transfers with walker X1. HS medication given. Tolerated well. Education provided on pain management, skin care, fall and spinal precaution, and medication. Call light in reach. Patient instructed to call if there is any needs or changes.  Electronically signed by Barbara Gunn RN on 10/18/2020 at 10:51 PM No

## 2020-10-19 NOTE — PROGRESS NOTES
Office: [phone]       Fax: [phone]      Nephrology Progress Note        Patient's Name: Oliver Blount. Admit Date: 10/13/2020  Date of Visit: 10/19/2020    Reason for Consult:  TONO/CKD      Subjective: Oliver Blount is a 66 y.o. male with PMHx of hypertension, atrial fibrillation, AAA,  coronary artery disease, CHF, status post LS spine surgery who was hospitalized in ARU on 10/13/2020 with comprehensive rehab. Pt was hospitalized at [facility] in September for sepsis secondary to UTI. Had false urethral passage, was seen by urology. Currently has Reeves in place. Was briefly in ARU before discharged to behavioral unit. Reeves replaced on 10/2. Subsequently had UTI with Proteus. CT abdomen was obtained which showed bilateral hydronephrosis despite having a Reeves in place. initially placed on IV Merrem, changed to Rocephin. Urology recommended TURP as outpatient. Had peak creat of 1.6 that improved to 1.1  No back pain, no obvious hematuria  NSAID use: Denies   IV contrast: None recent   Home meds reviewed     INTERVAL HISTORY    Feels same  Shortness of breath: No   UOP: Fair  Creat: trending up  Reports twitching  PO fluid intake low    Medications: Allergies:  Azithromycin; Brimonidine; Clindamycin/lincomycin;  Penicillins; and Simvastatin    Scheduled Meds:   [START ON 10/20/2020] DULoxetine  30 mg Oral Nightly    atorvastatin  40 mg Oral Nightly    dorzolamide-timolol  1 drop Both Eyes BID    ferrous sulfate  324 mg Oral Daily with breakfast    gabapentin  300 mg Oral TID    latanoprost  1 drop Left Eye Nightly    levothyroxine  50 mcg Oral Daily    metoprolol tartrate  50 mg Oral BID    pantoprazole  40 mg Oral Daily    polyethylene glycol  17 g Oral Daily    sennosides-docusate sodium  1 tablet Oral BID    rivaroxaban 20 mg Oral Daily    lactobacillus  2 capsule Oral BID     amiodarone  200 mg Oral Daily     Continuous Infusions:    Labs:  CBC:   Recent Labs     10/18/20  0439 10/19/20  0644   WBC 3.2* 3.9*   HGB 7.8* 7.9*    158     Ca/Mg/Phos:   Recent Labs     10/17/20  0453 10/18/20  0439 10/19/20  0644   CALCIUM 9.0 8.8 8.5       Objective:     Vitals: BP (!) 131/54   Pulse 65   Temp 98.5 °F (36.9 °C) (Oral)   Resp 20   Ht 5' 9\" (1.753 m)   Wt 165 lb 12.6 oz (75.2 kg)   SpO2 94%   BMI 24.48 kg/m²    Wt Readings from Last 3 Encounters:   10/19/20 165 lb 12.6 oz (75.2 kg)   10/13/20 156 lb 3.2 oz (70.9 kg)   09/30/20 187 lb 2.7 oz (84.9 kg)      24HR INTAKE/OUTPUT:      Intake/Output Summary (Last 24 hours) at 10/19/2020 0915  Last data filed at 10/19/2020 0803  Gross per 24 hour   Intake 570 ml   Output 1350 ml   Net -780 ml     Constitutional:  awake, NAD  HEENT:  MMM, No icterus  Neck: no bruits, No JVD  Cardiovascular:  ireg rhythm  Respiratory: CTA, no crackles  Abdomen:  +BS, soft, NT, ND  Ext: no lower extremity edema  CNS: alert, no agitation    IMAGING:  No orders to display       Assessment :     1. TONO  -Non-Oliguric  -Baseline creat: 1-1. 1. previous TONO with peak 1.6. Now 1.4->1.6->1.5->1.4->1.6  -UA dilute, bld mod, RBC 2. FeUrea 39.6% (10/16/2020)  -Renal US (10/9): mild bilateral hydronephrosis, 7 mm intravesicular calculus  -Volume: Euvolemic  -Electrolytes: No Dyskalemia  -Acid-Base: Metabolic alkalosis resolved  -initial TONO due to UTI, obstructive uropathy. Patient had post obstructive diuresis ( about 9 L UOP on 10/11, 10/12). BUN started to rise on 10/13. Ur still dilute. Likely hypoperfusion. Possibility of AIN in diff, now off antibiotics     Recent Labs     10/17/20  0453 10/18/20  0439 10/19/20  0644   BUN 34* 30* 27*   CREATININE 1.7* 1.4* 1.6*     Recent Labs     10/17/20  0453 10/18/20  0439 10/19/20  0644   * 136 136   K 4.4 4.3 4.2   CO2 26 24 24       2.  HTN  -Blood pressure at goal     BP Readings from Last 1 Encounters:   10/19/20 (!) 131/54     3. CAD/CHF  -TTE (sep 2020): LVEF 50%, G1DD, AAA 5.6 cm    4. Urinary retention  -recent UTIs  -has Reeves    5. atrial fibrillation   -on anticoagulation     6. Anemia  -recent blood loss    Plan:     - hemodynamic support. - IVF dced, encouraged PO fluids  - Renal US  - Avoid ACEI/ARB  - monitor BMP    -Monitor I/O, UOP  -Maintain MAP>65  -Avoid nephrotoxin, if able. -Dose meds to current eGFR    Thank you for allowing us to participate in care of Geovany Fritz. . We will continue to follow. Feel free to contact me with any questions.       Jasmine Madera  10/19/2020    [facility] [address]  Office : [phone]  Fax :[phone]

## 2020-10-19 NOTE — PROGRESS NOTES
Department of Physical Medicine & Rehabilitation  Progress Note    Patient Identification:  Day Goel  [id number]  : 1945  Admit date: 10/13/2020    Chief Complaint: Debility    Subjective:   Patient seen this [place] was discussed this morning in rehab conference with the entire rehab team, including PT, OT, speech, nursing, dietary, social service, and  to determine progress in therapies, and when the patient will be ready for discharge safely.  We think patient will be ready for discharge to home this coming weekend, on Saturday. He is able to ambulate 100 feet with standby assistance using a rolling walker, and go up and down 12 steps with contact-guard assistance. He needs standby to contact-guard assistance for transfers and lower body bathing and dressing. We will plan to discharge to home this Saturday with continued therapies after discharge to include PT, OT, visiting nurse, and home health aide. He will need equipment to include a shower chair, rolling walker, toilet safety frame. Patient states his hand tremors have been worse since he started on Cymbalta. His hand tremors only bother him when he is awake, not when he is sleeping. We will cut back his Cymbalta from 30 mg twice a day to once a day to see if this may help his tremors. ROS: No f/c, n/v, cp     Objective:  Patient Vitals for the past 24 hrs:   BP Temp Temp src Pulse Resp SpO2 Weight   10/19/20 0338 (!) 153/76 98.5 °F (36.9 °C) Oral 67 20 94 % 165 lb 12.6 oz (75.2 kg)   10/18/20 2128 (!) 145/69 -- -- 61 -- 94 % --   10/18/20 1545 110/62 97.6 °F (36.4 °C) Oral 55 16 95 % --   10/18/20 0900 127/73 97.4 °F (36.3 °C) Oral 56 16 93 % --     Const: Alert. No distress, pleasant. HEENT: Normocephalic, atraumatic. Normal sclera/conjunctiva. MMM. CV: Regular rate and rhythm. Resp: No respiratory distress. Lungs with bibasilar crackles. Abd: Soft, nontender, nondistended, NABS+   Ext: No edema. MSK: decreased spine ROM  Neuro: Alert, oriented, appropriately interactive. Relative LLE weakness. Psych: Cooperative, appropriate mood and affect    Laboratory data: Available via EMR. Last 24 hour lab  Recent Results (from the past 24 hour(s))   CBC Auto Differential    Collection Time: 10/19/20  6:44 AM   Result Value Ref Range    WBC 3.9 (L) 4.0 - 11.0 K/uL    RBC 2.67 (L) 4.20 - 5.90 M/uL    Hemoglobin 7.9 (L) 13.5 - 17.5 g/dL    Hematocrit 24.4 (L) 40.5 - 52.5 %    MCV 91.4 80.0 - 100.0 fL    MCH 29.6 26.0 - 34.0 pg    MCHC 32.3 31.0 - 36.0 g/dL    RDW 16.7 (H) 12.4 - 15.4 %    Platelets 367 225 - 242 K/uL    MPV 9.1 5.0 - 10.5 fL    Neutrophils % 50.4 %    Lymphocytes % 31.2 %    Monocytes % 13.8 %    Eosinophils % 3.6 %    Basophils % 1.0 %    Neutrophils Absolute 2.0 1.7 - 7.7 K/uL    Lymphocytes Absolute 1.2 1.0 - 5.1 K/uL    Monocytes Absolute 0.5 0.0 - 1.3 K/uL    Eosinophils Absolute 0.1 0.0 - 0.6 K/uL    Basophils Absolute 0.0 0.0 - 0.2 K/uL       Therapy progress:  PT  Position Activity Restriction  Spinal Precautions: No Bending, No Lifting, No Twisting  Other position/activity restrictions: Reeves catheter  Objective     Sit to Stand: Stand by assistance  Stand to sit: Stand by assistance  Bed to Chair: Stand by assistance(with wheeled walker)  Device: Rolling Walker  Assistance: Contact guard assistance, Stand by assistance  Distance: 150', 90'  OT  PT Equipment Recommendations  Equipment Needed: No  Other: will continue to assess pending equipment needs  Toilet - Technique: Ambulating  Equipment Used: (BSC set to lowest setting with rail on right only to simulate home set up with CGA)  Toilet Transfers Comments: RW><comfort height toilet, CGA, used L grab bar to sit & stand. Assessment        SLP          Body mass index is 24.48 kg/m².     Assessment and Plan:    Impairments: generalized weakness + LLE weakness, BLE sensory deficit, decreased endurance,

## 2020-10-19 NOTE — PROGRESS NOTES
Physical Therapy  Facility/Department: [address] IP REHAB  Daily Treatment Note  NAME: Abdifatah Correa Sr.  : 1945  MRN: [id number]    Date of Service: 10/19/2020    Discharge Recommendations:  Continue to assess pending progress, Patient would benefit from continued therapy after discharge   PT Equipment Recommendations  Equipment Needed: No  Other: will continue to assess pending equipment needs    Assessment   Body structures, Functions, Activity limitations: Decreased functional mobility ; Decreased balance;Decreased posture;Decreased ROM; Decreased strength  Assessment: Patient tolerated AM treatment without increase in pain. No presence of increased jerking motions this visit, but patient is concerned that one of his medications may be causing this symptom. Pt continues to demonstrate gait abnormalities that require pt to compensate and expend increased energy. Pt was unable to ambulate community distances with RW and CGA this AM.  Pt tolerates stairs well, and performs transfers safely. Pt would continu to benefit from skilled PT to strengthen LEs, increase activity tolerance and improve functional mobility for return to independence. Treatment Diagnosis: Generalized muscle weakness, impaired mobility  Prognosis: Good  History: Per Dr. Charissa Rosado note, \"Patient is a 65 yo M with pmh Afib, CAD, HTN, HLD, and recent lumbar spine surgery who initially presented from SNF on 2020 with fever 105 and altered mental status. Found to have sepsis due to UTI, urinary retention, metabolic encephalopathy, and acute hypoxic respiratory failure. Patient initially admitted to ARU (-10/1). He made gradual progress and demonstrated some self-limiting behavior. On  he began to refuse therapies and most medical care. He stated a wish to die and planned to achieve this by not eating/drinking. Psychiatry was consulted.  Dr. Chen Nicole and I agreed that patient demonstrated imminent risk of harm to himself with ongoing suicidal ideation and plan. Therefore patient will be transferred to inpatient psychiatric unit 10/1. Patient reportedly did well with inpatient psychiatric care. Reeves was removed for void trial at inpatient psych. Patient unable to void and Reeves was replaced. Then developed fevers and was readmitted to acute medical floor at [facility] 10/7. Found to proteus UTI with bilateral hydronephrosis. Treated with ceftriaxone. Urology recommending TURP as outpatient. Course was further complicated by TONO, hyponatremia. \"  Exam: see above  Clinical Presentation: evolving  PT Education: Transfer Training;Energy Conservation; Adaptive Device Training;Equipment;Orientation;General Safety;Gait Training;Disease Specific Education  Barriers to Learning: motivation  REQUIRES PT FOLLOW UP: Yes  Activity Tolerance  Activity Tolerance: Patient limited by endurance; Patient limited by fatigue     Patient Diagnosis(es): There were no encounter diagnoses. has a past medical history of Aneurysm, aortic ([address] Atrial fibrillation ([address] Glaucoma, Heart attack ([address] Hyperlipidemia, and Hypertension. has a past surgical history that includes Coronary angioplasty with stent; Colonoscopy; Eye surgery (Right, 7/23/2020); and Intracapsular cataract extraction (Right, 7/23/2020). Restrictions  Position Activity Restriction  Spinal Precautions: No Bending, No Lifting, No Twisting  Other position/activity restrictions: Reeves catheter  Subjective   General  Chart Reviewed: Yes  Additional Pertinent Hx: Per Dr. Katy Lepe note, \"Patient is a 65 yo M with pmh Afib, CAD, HTN, HLD, and recent lumbar spine surgery who initially presented from SNF on 9/13/2020 with fever 105 and altered mental status. Found to have sepsis due to UTI, urinary retention, metabolic encephalopathy, and acute hypoxic respiratory failure. Patient initially admitted to ARU (9/18-10/1). He made gradual progress and demonstrated some self-limiting behavior.  On 9/28 he began to refuse therapies and most medical care. He stated a wish to die and planned to achieve this by not eating/drinking. Psychiatry was consulted. Dr. Caleb Barba and I agreed that patient demonstrated imminent risk of harm to himself with ongoing suicidal ideation and plan. Therefore patient will be transferred to inpatient psychiatric unit 10/1. Patient reportedly did well with inpatient psychiatric care. Reeves was removed for void trial at inpatient psych. Patient unable to void and Reeves was replaced. Then developed fevers and was readmitted to acute medical floor at [facility] 10/7. Found to proteus UTI with bilateral hydronephrosis. Treated with ceftriaxone. Urology recommending TURP as outpatient. Course was further complicated by TONO, hyponatremia. \"  Response To Previous Treatment: Patient with no complaints from previous session. Family / Caregiver Present: No  Referring Practitioner: Dr. Meli Cortez: Pt met in therapy gym sitting in w/c. He states that weekend therapy went well and was able to rest well yesterday. Daughter and  friend visited yesterday. Pt states that twitching is still present which is present throughout his waking hours. States that he talked to doctor who thinks it may be side effect of medication. Pt reports that he was able to get through night without pain meds. States that he has numbness in B feey, L>R. Agreeable to PT this AM.    Objective   Transfers  Sit to Stand: Stand by assistance  Stand to sit: Stand by assistance  Ambulation  Ambulation?: Yes  More Ambulation?: No  Ambulation 1  Surface: level tile  Device: Rolling Walker  Assistance: Contact guard assistance  Quality of Gait: Pt continues to ambulate with increased adduction of LLE in addition to shuffle-like step-through gait pattern. Requires cuing to pick feet up.   No LoBs occurred  Gait Deviations: Slow Sapna;Decreased step length;Decreased step height;Shuffles  Distance: 80' with three turns, 66' with one turn  Comments: Patient rquired rest break after first 80' of ambulation. Pt sat down on green bench in hallway to recover from fatigue. Stairs/Curb  Stairs?: Yes  Stairs  # Steps : 8  Stairs Height: 6\"  Rails: Bilateral  Device: No Device  Assistance: Contact guard assistance;Stand by assistance(CGA progressed to SBA as patient became more steady on stairs)  Comment: Ascend/descend nonreciprocal pattern with increased time, descending improved this date but slight posterior lean as fatigue increased. Required seated rest break after completion secondary to fatigue. Therapist managed catheter bag as patient navigated stairs, requires cuing on how to turn so that he does not trip over catheter line        Exercises  Hip Flexion: 1x20 hip flexion  Hip Abduction: 1X20 resisted hip abduction with red theraband, 1x20 hip add ball squeezes with 2-3 second hold  Knee Long Arc Quad: 1x20 on alternatng LEs  Ankle Pumps: heel/toe raises X 20  Comments: Exercises performed sitting in w/c      PM session:   S: Pt met in room sitting in recliner completing a word search activity. Pt states that he is having a good afternoon and that his lunch went well. He is agreeable to PT this afternoon. O:Pt performed sit>stand transfer with SBA and ambulated short distance using RW with CGA. Pt sat down safely with good hand placement with SBA. Pt transferred to Nustep machine with SBA. Seat at 8, handles at 8 and resistance of 2. Resistance bumped up to 3 at 10.5 minutes. Pt pedaled for 12 minutes and averaged 42 SPM.  Pt given seated rest break to recover from fatigue. Pt instructed to perform standing toe tap exercise at RW with two mini cones placed in front of each foot. Pt was instructed to tap R cone with R foot, and L cone with L foot. Sit<>stand with SBA and stood with SBA at RW.   Patient had increased difficulty controlling LLE motion, and frequently knocked L cone over secondary to decreased control. Pt performed 2x10 on alternating LEs. During second set, pt knocked L cone over 2x, which was improved over first trial.  Pt given seated rest break after each set to recover from fatigue. Pt ambulated 80' using RW with CGA. Pt demonstrated improved control of LLE by controlling abduction and increasing step height. Pt states that LEs feel good after walk. No LoBs occurred. Pt given seated rest break secondary to fatigue. A: Pt tolerated afternoon session well. He has a positive disposition and shows improved motivation to participate in PT. Pt continues to have reduced stride length and narrow Constanza when ambulating, but improved in afternoon compared to AM.  Pt would continue to benefit from skilled PT to strengthen LEs, increase activity tolerance and improve functional mobility for return to independence.       Safety Device - Type of devices:  [x]  All fall risk precautions in place [] Bed alarm in place  [] Call light within reach [] Chair alarm in place [] Positioning belt [x] Gait belt [x] Patient at risk for falls [] Left in bed [x] Left in chair [] Telesitter in use [] Sitter present [] Nurse notified []  None     Goals  Short term goals  Time Frame for Short term goals: 5 days from 10/14/2020  Short term goal 1: Pt will perform bed mobility with supervision  Short term goal 2: Pt will ascend/descend curb with SBA using LRAD  Short term goal 3: Pt will ascend/descend 8 stairs using R ascending railing with CGA  Short term goal 4: Pt will ambulate 150' using LRAD  with CGA  Short term goal 5: Pt will perform all transfers using LRAD with SBA  Long term goals  Time Frame for Long term goals : 7-10 days from 10/14/2020  Long term goal 1: Pt will perform bed mobillity with Modif I  Long term goal 2: Pt will ascend/descend curb using LRAD with Modif I  Long term goal 3: Pt will ascend/descend 12 stairs using R ascending railing with Modif I  Long term goal 4: Pt will ambulate 200' using LRAD with Modif I  Long term goal 5: Pt will perform all transfers using [address] Modif I  Patient Goals   Patient goals : \"I want to be independent and be able to walk on my own without a walker. I also want to get back to playing the stock market\"    Plan    Plan  Times per week: 5-6x/week  Times per day: Twice a day  Plan weeks: 1.5 weeks  Current Treatment Recommendations: Strengthening, Home Exercise Program, Safety Education & Training, Balance Training, Endurance Training, Functional Mobility Training, Equipment Evaluation, Education, & procurement, Transfer Training, Gait Training, Stair training  Safety Devices  Type of devices: All fall risk precautions in place, Gait belt, Left in chair, Patient at risk for falls(Pt seated in w/c in therapy gym doorway with brakes locked for transport back to room)  Restraints  Initially in place: No     Therapy Time   Individual Concurrent Group Co-treatment   Time In 0900         Time Out 0945         Minutes 45         Timed Code Treatment Minutes: [address] Time     Individual Co-treatment   Time In 1300     Time Out 1345     Minutes 45          Electronically signed by JUDSON Miller on 10/19/2020 at 4:33 PM  Therapist was present, directed the patient's care, made skilled judgement, and was responsible for assessment and treatment of the patient.       Electronically signed by Marinao Thibodeaux PT on 10/19/2020 at 4:37 PM

## 2020-10-19 NOTE — CARE COORDINATION
Team Conference held today. Team reviewed progress and goasl. Team reports he continues with left hip weakness. He has anxiety about being home alone. DME recommendations:   meredith castelan, shower chair, TSF. He continues with mendoza cath. Team recommends DC to home on Saturday, 10- with hme care for SN/PT/OT/HHA. LSW met with patient to review. He does not agree with this plan. He thinks he will need 1 - 2 more weeks to work on hip pain and stronger legs. LSW informed him about home care to assist him with continued tehrapy at home. He is agreeable to this but is also concerned about how he will get his laundry done and get his clothes from other parts of the house. LSW offered him resources for COA services and detailed how coverage for COA might work. He states, \"I have no resources. \"  During this visit his DIL entered the room and he asked her to take a walk while I gave him this update. LSW asked if there was someone he wanted me to update and he wanted dgtr, Jewel Puentes, to be updated.   [place], [place]     Case Management   [phone]    10/19/2020  4:58 PM

## 2020-10-19 NOTE — PLAN OF CARE
Problem: Pain:  Goal: Pain level will decrease  Description: Pain level will decrease  Outcome: Ongoing  Note: Patient able to verbalize pain in scale of 0-10. Pain medication given. Reassessed. Education provided about pain management. Problem: Skin Integrity:  Goal: Absence of new skin breakdown  Description: Absence of new skin breakdown  Outcome: Ongoing  Note: Surgical incision to mid abdomen dry and intact. Surgical incision to mid lower back and left lower back dry and intact. No drainage noted. Reeves intact. Foreskin swollen. Reeves care provided. Scattered bruising noted. No new skin breakdown. Able to reposition self. Pillow support. Ambulated pt in the room. Lower extremities swollen. Heels elevated. Problem: Falls - Risk of:  Goal: Will remain free from falls  Description: Will remain free from falls  Note: Pt AAO X4. Admitted with debility. Free of fall. Transfers with assist of one, walker, and gait belt. Yellow socks on. Wheels locked. Bed lowest position. Alarm on. Call light, over the bed table, and personal belonging in reach. Hourly rounding. Patient instructed to call for needs or changes. Problem: Activity Intolerance:  Goal: Ability to tolerate increased activity will improve  Description: Ability to tolerate increased activity will improve  Outcome: Ongoing  Note: Encouraged frequent rest. Calm and quiet environment provided. Rounding every 2 hours. Problem: HEMODYNAMIC STATUS  Goal: Patient has stable vital signs and fluid balance  Outcome: Ongoing  Note: VSS. Peripheral pulse palpable. Skin dry and warm. I&O monitor. Daily weight. Medication given as ordered. Education provided. Will continue to monitor. Problem: IP BLADDER/VOIDING  Goal: STG - Patient demonstrates ability to take care of indwelling catheter  Outcome: Ongoing  Note: Reeves intact. Reeves care provided. Will monitor.

## 2020-10-19 NOTE — PROGRESS NOTES
Occupational Therapy  Facility/Department: [address] IP REHAB  Daily Treatment Note  NAME: Rylee Cardoza Sr.  : 1945  MRN: [id number]    Date of Service: 10/19/2020    Discharge Recommendations:  Continue to assess pending progress, S Level 1, Home with Home health OT, Home with assist PRN  OT Equipment Recommendations  Equipment Needed: Yes  Other: sock aid, Long handle sponge, continue to assess for shower chair, grab bars    Assessment   Performance deficits / Impairments: Decreased functional mobility ; Decreased ADL status; Decreased balance;Decreased safe awareness;Decreased endurance;Decreased high-level IADLs;Decreased strength  Assessment: Pt tolerated tx well and is making steady progress toward set goals. Pt completed fxl transfers and mobility SBA using RW. Pt demo'd improved standing tolerance able to stand x8 mins for tabletop activities. Pt is pleasant and hardworking. He expressed come concern with d/c home this weekend and will continue to benefit from skilled OT in order to maximize pt's independence and safety in daily tasks. Treatment Diagnosis: Impaired: ADL/IADL function, balance, functional mobility/transfers, strength, safety awareness, activity tolerance. Prognosis: Good  OT Education: OT Role;Plan of Care;Equipment;ADL Adaptive Strategies;Precautions;Transfer Training  REQUIRES OT FOLLOW UP: Yes  Activity Tolerance  Activity Tolerance: Patient Tolerated treatment well  Safety Devices  Safety Devices in place: Yes  Type of devices: Gait belt;Patient at risk for falls; Left in chair;Chair alarm in place;Call light within reach         Patient Diagnosis(es): There were no encounter diagnoses. has a past medical history of Aneurysm, aortic ([address] Atrial fibrillation ([address] Glaucoma, Heart attack ([facility] [address] Hyperlipidemia, and Hypertension. has a past surgical history that includes Coronary angioplasty with stent; Colonoscopy;  Eye surgery (Right, 2020); and Intracapsular cataract extraction (Right, 7/23/2020). Restrictions  Position Activity Restriction  Spinal Precautions: No Bending, No Lifting, No Twisting  Other position/activity restrictions: Reeves catheter  Subjective   General  Chart Reviewed: Yes, Progress Notes  Patient assessed for rehabilitation services?: Yes  Additional Pertinent Hx: Per Dr. Rosendo Almeida 10/14/2020 note, \"Patient is a 65 yo M with pmh Afib, CAD, HTN, HLD, and recent lumbar spine surgery who initially presented from SNF on 9/13/2020 with fever 105 and altered mental status. Found to have sepsis due to UTI, urinary retention, metabolic encephalopathy, and acute hypoxic respiratory failure. \"  Response to previous treatment: Patient with no complaints from previous session  Family / Caregiver Present: No  Referring Practitioner: Dr. Claudean So  Diagnosis: Urinary Tract Infection  Subjective  Subjective: pt met in dept for OT. pt agreeable to therapy, denied pain. pt states he is apprehensive about d/c home this weekend, states he was hoping to [facility] into my house the same way I walked out\"      Orientation  Orientation  Overall Orientation Status: Within Functional Limits  Objective             Balance  Sitting Balance: Supervision  Standing Balance: Stand by assistance  Standing Balance  Activity: static standing at the table  Comment: used tabletop for UE support  Functional Mobility  Functional - Mobility Device: Rolling Walker  Activity: To/from bathroom  Assist Level: Stand by assistance  Toilet Transfers  Toilet - Technique: Ambulating  Equipment Used: (comfort ht toilet)  Toilet Transfer: Stand by assistance;Contact guard assistance  Toilet Transfers Comments: SBA/CGA, used sink to the right to simulate home setup  Wheelchair Bed Transfers  Wheelchair/Bed - Technique: Ambulating(RW)  Equipment Used: Wheelchair; Other(recliner)  Level of Asssistance: Contact guard assistance     Transfers  Sit to stand: Stand by assistance  Stand to sit: Stand by assistance  Transfer Comments: SBA/CGA, min cues for hand placement                       Cognition  Overall Cognitive Status: WFL  Cognition Comment: required min-mod cues to work on basic categories word search,                    Type of ROM/Therapeutic Exercise  Comment: pt completed x4 mins UBE bike seated at the table, set to lowest resistance, in order to improve pt's endurance needed for ADL/IADL tasks        PM Session:  Subjective: pt met in dept for OT. Pt agreeable to therapy, denied pain other than \"feeling weak\" in B LEs    Objective: Shower transfer: pt practiced dry shower transfer to shower chair in simulated walk-in shower stall. Pt initially backed up to and stepped over the ledge backwards using RW with CGA, but required mod cues to turn to sit on shower chair without twisting. Discussed using sit and swing method instead, and pt was able to swing LEs out of shower stall and stand to RW with CGA. OT encouraged pt to talk with his family about installing grab bars. Therex: Pt used 2# medicine ball for x10 reps: front raises, chest press, bicep curls. Pt tolerated well with no c/o, given rest breaks prn. IADL: Pt performed fxl mobility from dept > ADL suite SBA/CGA using RW. Pt retrieved 3 shirts in the room with cues for maintaining spinal precautions. Pt stood at the closet x2 mins to hang shirts on hangers, though had LOB when knees buckled and required mod A to safely correct. Pt transferred to EOB with CGA to take seated rest break for energy conservation and safety. OT encouraged pt to discuss with his family having increased support for homemaking tasks. Also discussed completing activities seated when able. Pt then stood x2 mins to finish the task with w/c right behind for safety. Standing tolerance: Pt stood x6 mins with SBA at tabletop and played connect 4 against OT.  Pt able to maintain static standing balance without UE support for most of activity, used tabletop for balance anna. Pt learned the game quickly and enjoyed playing. Returned to his room with transporter. Assessment: Pt had significant LOB with knees buckling during homemaking task. Pt would benefit from increased assistance from family or aide services for IADL tasks. Plan   Plan  Times per week: 5-6x week  Times per day: Twice a day  Plan weeks: 1-1.5 weeks  Specific instructions for Next Treatment: Reinforce cather care/cleaning & management  Current Treatment Recommendations: Strengthening, Safety Education & Training, Balance Training, Self-Care / ADL, Neuromuscular Re-education, Endurance Training, Functional Mobility Training, Equipment Evaluation, Education, & procurement, Home Management Training  Plan Comment: Monday Conference    Goals  Short term goals  Time Frame for Short term goals: 1-1.5 weeks  Short term goal 1: Pt will bathe modified independently using AE & shower chair. Short term goal 2: Pt will dress modified independently including mendoza catheter, using AE. Short term goal 3: Pt will toilet modified independently using grab bars/TSF and/or manage catheter care. Short term goal 4: Pt will complete functional transfers/mobility modified independently using RW & grab bars. Short term goal 5: Pt will complete a simple cooking task in the kitchen standing for 10 minutes modified independent. Short term goal 6: Pt will complete BUE HEP in order to strengthen UB/increase activity tolerance to complete IADL cleaning tasks around the house modified independent. Long term goals  Time Frame for Long term goals : LTG=STG  Patient Goals   Patient goals : Pt stated \"I want to get walking so I can go home and do things around the house on my own. \" Above goals will work towards this goal.       Therapy Time   Individual Concurrent Group Co-treatment   Time In 1115         Time Out 1200         Minutes 45            Therapy Time   Individual Co-treatment   Time In 1345     Time Out 1430     Minutes 45 Cezar Forde, OTR/L [id number]

## 2020-10-20 LAB
ANION GAP SERPL CALCULATED.3IONS-SCNC: 11 MMOL/L (ref 3–16)
BUN BLDV-MCNC: 23 MG/DL (ref 7–20)
CALCIUM SERPL-MCNC: 8.6 MG/DL (ref 8.3–10.6)
CHLORIDE BLD-SCNC: 101 MMOL/L (ref 99–110)
CO2: 24 MMOL/L (ref 21–32)
CREAT SERPL-MCNC: 1.4 MG/DL (ref 0.8–1.3)
GFR AFRICAN AMERICAN: 59
GFR NON-AFRICAN AMERICAN: 49
GLUCOSE BLD-MCNC: 87 MG/DL (ref 70–99)
POTASSIUM REFLEX MAGNESIUM: 4.2 MMOL/L (ref 3.5–5.1)
SODIUM BLD-SCNC: 136 MMOL/L (ref 136–145)
THYROID STIMULATING IMMUNOGLOBULIN: <0.1 IU/L

## 2020-10-20 PROCEDURE — 97530 THERAPEUTIC ACTIVITIES: CPT

## 2020-10-20 PROCEDURE — 1280000000 HC REHAB R&B

## 2020-10-20 PROCEDURE — 36415 COLL VENOUS BLD VENIPUNCTURE: CPT

## 2020-10-20 PROCEDURE — 97535 SELF CARE MNGMENT TRAINING: CPT

## 2020-10-20 PROCEDURE — 97110 THERAPEUTIC EXERCISES: CPT

## 2020-10-20 PROCEDURE — 6370000000 HC RX 637 (ALT 250 FOR IP): Performed by: PHYSICAL MEDICINE & REHABILITATION

## 2020-10-20 PROCEDURE — 80048 BASIC METABOLIC PNL TOTAL CA: CPT

## 2020-10-20 PROCEDURE — 97116 GAIT TRAINING THERAPY: CPT

## 2020-10-20 RX ADMIN — DULOXETINE HYDROCHLORIDE 30 MG: 30 CAPSULE, DELAYED RELEASE ORAL at 20:28

## 2020-10-20 RX ADMIN — DOCUSATE SODIUM 50 MG AND SENNOSIDES 8.6 MG 1 TABLET: 8.6; 5 TABLET, FILM COATED ORAL at 07:40

## 2020-10-20 RX ADMIN — Medication 2 CAPSULE: at 16:56

## 2020-10-20 RX ADMIN — PANTOPRAZOLE SODIUM 40 MG: 40 TABLET, DELAYED RELEASE ORAL at 05:46

## 2020-10-20 RX ADMIN — METOPROLOL TARTRATE 50 MG: 50 TABLET, FILM COATED ORAL at 07:40

## 2020-10-20 RX ADMIN — FERROUS SULFATE TAB EC 324 MG (65 MG FE EQUIVALENT) 324 MG: 324 (65 FE) TABLET DELAYED RESPONSE at 07:40

## 2020-10-20 RX ADMIN — DORZOLAMIDE HYDROCHLORIDE AND TIMOLOL MALEATE 1 DROP: 20; 5 SOLUTION/ DROPS OPHTHALMIC at 20:30

## 2020-10-20 RX ADMIN — Medication 2 CAPSULE: at 07:40

## 2020-10-20 RX ADMIN — DORZOLAMIDE HYDROCHLORIDE AND TIMOLOL MALEATE 1 DROP: 20; 5 SOLUTION/ DROPS OPHTHALMIC at 07:41

## 2020-10-20 RX ADMIN — OXYCODONE HYDROCHLORIDE AND ACETAMINOPHEN 1 TABLET: 7.5; 325 TABLET ORAL at 20:29

## 2020-10-20 RX ADMIN — RIVAROXABAN 20 MG: 20 TABLET, FILM COATED ORAL at 16:56

## 2020-10-20 RX ADMIN — LEVOTHYROXINE SODIUM 50 MCG: 0.05 TABLET ORAL at 05:46

## 2020-10-20 RX ADMIN — ATORVASTATIN CALCIUM 40 MG: 40 TABLET, FILM COATED ORAL at 20:30

## 2020-10-20 RX ADMIN — GABAPENTIN 300 MG: 300 CAPSULE ORAL at 07:40

## 2020-10-20 RX ADMIN — LATANOPROST 1 DROP: 50 SOLUTION/ DROPS OPHTHALMIC at 20:30

## 2020-10-20 RX ADMIN — OXYCODONE HYDROCHLORIDE AND ACETAMINOPHEN 1 TABLET: 5; 325 TABLET ORAL at 08:07

## 2020-10-20 RX ADMIN — AMIODARONE HYDROCHLORIDE 200 MG: 200 TABLET ORAL at 16:56

## 2020-10-20 RX ADMIN — GABAPENTIN 300 MG: 300 CAPSULE ORAL at 20:28

## 2020-10-20 RX ADMIN — GABAPENTIN 300 MG: 300 CAPSULE ORAL at 13:54

## 2020-10-20 ASSESSMENT — PAIN DESCRIPTION - LOCATION
LOCATION: LEG
LOCATION: LEG

## 2020-10-20 ASSESSMENT — PAIN DESCRIPTION - ORIENTATION
ORIENTATION: LEFT
ORIENTATION: LEFT

## 2020-10-20 ASSESSMENT — PAIN DESCRIPTION - DESCRIPTORS
DESCRIPTORS: ACHING
DESCRIPTORS: ACHING;DISCOMFORT

## 2020-10-20 ASSESSMENT — PAIN DESCRIPTION - FREQUENCY: FREQUENCY: CONTINUOUS

## 2020-10-20 ASSESSMENT — PAIN SCALES - GENERAL
PAINLEVEL_OUTOF10: 0
PAINLEVEL_OUTOF10: 4
PAINLEVEL_OUTOF10: 8
PAINLEVEL_OUTOF10: 6

## 2020-10-20 ASSESSMENT — PAIN DESCRIPTION - PAIN TYPE
TYPE: CHRONIC PAIN
TYPE: CHRONIC PAIN

## 2020-10-20 NOTE — PROGRESS NOTES
Comprehensive Nutrition Assessment    Type and Reason for Visit:  Reassess    Nutrition Recommendations/Plan:   Continue General diet. Continue Ensure. Nutrition Assessment:  Pt continues to do well and eating well >75% with occasional poor intakes. Supplement on board with varied intakes but likes the supplement. Will continue to monitor.     Malnutrition Assessment:  Malnutrition Status:  Severe malnutrition    Context:  Chronic Illness     Findings of the 6 clinical characteristics of malnutrition:  Energy Intake:  7 - 75% or less estimated energy requirements for 1 month or longer  Weight Loss:  7 - 5% over 1 month     Body Fat Loss:  7 - Severe body fat loss Orbital   Muscle Mass Loss:  7 - Severe muscle mass loss Temples (temporalis), Clavicles (pectoralis & deltoids)  Fluid Accumulation:  No significant fluid accumulation     Strength:  Not Performed    Estimated Daily Nutrient Needs:  Energy (kcal):  6461-9893 kcal (25-30 kcal/kg CBW)  Protein (g):  74-96 gm (1-1.3gm/kg CBW)       Fluid (ml/day):  1 mL/kcal    Nutrition Related Findings:  trace/ +1 BLE edema; BM 10/19      Wounds:  None       Current Nutrition Therapies:    DIET GENERAL;  Dietary Nutrition Supplements: Standard High Calorie Oral Supplement    Anthropometric Measures:  · Height: 5' 9\" (175.3 cm)  · Current Body Weight: 162 lb (73.5 kg)   · Admission Body Weight: 171 lb (77.6 kg)(wt discrepency, will watch wt trend.)     · Ideal Body Weight: 160 lbs; % Ideal Body Weight 101.3 %   · BMI: 23.9  · BMI Categories: Normal Weight (BMI 22.0 to 24.9) age over 72       Nutrition Diagnosis:   · Severe malnutrition related to altered taste perception, cognitive or neurological impairment, inadequate protein-energy intake as evidenced by poor intake prior to admission, weight loss greater than or equal to 5% in 1 month, severe loss of subcutaneous fat, severe muscle loss      Nutrition Interventions:   Food and/or Nutrient Delivery:  Continue Current Diet, Continue Oral Nutrition Supplement  Nutrition Education/Counseling:  No recommendation at this time   Coordination of Nutrition Care:  Continued Inpatient Monitoring    Goals:  consume >50% of meals and supplement during admission       Nutrition Monitoring and Evaluation:   Food/Nutrient Intake Outcomes:  Food and Nutrient Intake, Supplement Intake  Physical Signs/Symptoms Outcomes:  Biochemical Data, Nutrition Focused Physical Findings, Skin, Weight     Discharge Planning:    No discharge needs at this time     Electronically signed by King Margarita RD, ARTEMIO on 10/20/20 at 8:59 AM EDT    Contact: [phone]

## 2020-10-20 NOTE — PLAN OF CARE
Problem: Skin Integrity:  Goal: Absence of new skin breakdown  Description: Absence of new skin breakdown  Note: Patient free of new skin breakdown this shift. Skin assessments continue q shift. Patient encouraged to turn and reposition. Continent of bowels. Reeves cath patent and draining clear raphael urine. Will continue to monitor.

## 2020-10-20 NOTE — PROGRESS NOTES
Physical Therapy  Facility/Department: [address] IP REHAB  Daily Treatment Note  NAME: Cj Lou Sr.  : 1945  MRN: [id number]    Date of Service: 10/20/2020    Discharge Recommendations:  Continue to assess pending progress, Patient would benefit from continued therapy after discharge, Home with Home health PT, Home with assist PRN   PT Equipment Recommendations  Equipment Needed: Yes  Mobility Devices: Judene May: Rolling  Other: will continue to assess pending equipment needs    Assessment   Body structures, Functions, Activity limitations: Decreased functional mobility ; Decreased balance;Decreased posture;Decreased ROM; Decreased strength  Assessment: Pt tolerated AM session well. Pt demonstrated ability to ambulate community distances without increased fatigue. He requires cuing to stay close to walker and to  feet when walking to prevent shuffling gait. Pt made improvement in gait mechanics on this visit by showing improved control of LLE by preventing excessive adduction past midline. When performing standing exercises, pt experienced one moderate LoB with railing for RUE support. Pt  required Winter from therapist to steady self. Pt was CGA for remaining standing exercises. Pt continues to show positive disposition throughout treatment and is highly motivated to return home. Pt is still below baseline and would continue to benefit from skilled PT to strengthen LEs, increase activity tolerance and improve functional mobility for return to independence. Treatment Diagnosis: Generalized muscle weakness, impaired mobility  Prognosis: Good  History: Per Dr. Sushant Redmond note, \"Patient is a 65 yo M with pmh Afib, CAD, HTN, HLD, and recent lumbar spine surgery who initially presented from SNF on 2020 with fever 105 and altered mental status. Found to have sepsis due to UTI, urinary retention, metabolic encephalopathy, and acute hypoxic respiratory failure.  Patient initially admitted to ARU (9/18-10/1). He made gradual progress and demonstrated some self-limiting behavior. On 9/28 he began to refuse therapies and most medical care. He stated a wish to die and planned to achieve this by not eating/drinking. Psychiatry was consulted. Dr. Vy Simmons and I agreed that patient demonstrated imminent risk of harm to himself with ongoing suicidal ideation and plan. Therefore patient will be transferred to inpatient psychiatric unit 10/1. Patient reportedly did well with inpatient psychiatric care. Reeves was removed for void trial at inpatient psych. Patient unable to void and Reeves was replaced. Then developed fevers and was readmitted to acute medical floor at [facility] 10/7. Found to proteus UTI with bilateral hydronephrosis. Treated with ceftriaxone. Urology recommending TURP as outpatient. Course was further complicated by TONO, hyponatremia. \"  Exam: see above  Clinical Presentation: evolving  PT Education: Transfer Training;Energy Conservation; Adaptive Device Training;Equipment;Orientation;General Safety;Gait Training;Disease Specific Education  Barriers to Learning: motivation  REQUIRES PT FOLLOW UP: Yes  Activity Tolerance  Activity Tolerance: Patient limited by endurance     Patient Diagnosis(es): There were no encounter diagnoses. has a past medical history of Aneurysm, aortic ([address] Atrial fibrillation ([address] Glaucoma, Heart attack ([address] Hyperlipidemia, and Hypertension. has a past surgical history that includes Coronary angioplasty with stent; Colonoscopy; Eye surgery (Right, 7/23/2020); and Intracapsular cataract extraction (Right, 7/23/2020). Restrictions  Position Activity Restriction  Spinal Precautions: No Bending, No Lifting, No Twisting  Other position/activity restrictions: Reeves catheter  Subjective   General  Chart Reviewed:  Yes  Additional Pertinent Hx: Per Dr. Jessica Leiva note, \"Patient is a 65 yo M with pmh Afib, CAD, HTN, HLD, and recent lumbar spine surgery who initially presented from SNF on 9/13/2020 with fever 105 and altered mental status. Found to have sepsis due to UTI, urinary retention, metabolic encephalopathy, and acute hypoxic respiratory failure. Patient initially admitted to ARU (9/18-10/1). He made gradual progress and demonstrated some self-limiting behavior. On 9/28 he began to refuse therapies and most medical care. He stated a wish to die and planned to achieve this by not eating/drinking. Psychiatry was consulted. Dr. Jazz Davis and I agreed that patient demonstrated imminent risk of harm to himself with ongoing suicidal ideation and plan. Therefore patient will be transferred to inpatient psychiatric unit 10/1. Patient reportedly did well with inpatient psychiatric care. Reeves was removed for void trial at inpatient psych. Patient unable to void and Reeves was replaced. Then developed fevers and was readmitted to acute medical floor at [facility] 10/7. Found to proteus UTI with bilateral hydronephrosis. Treated with ceftriaxone. Urology recommending TURP as outpatient. Course was further complicated by TONO, hyponatremia. \"  Response To Previous Treatment: Patient with no complaints from previous session. Family / Caregiver Present: Yes(brother-in-law)  Referring Practitioner: Dr. Dane Aguayo: Pt met in room laying down in bed. Transport told therapist that pt was feeling lightheaded after shower this AM.  When met in room, pt stated that he was feeling better. Pt was talking to brother-in-law who accompanied him throughout AM session.   Pt denies pain and is agreeable to PT this AM.     Objective   Bed mobility  Rolling to Left: Supervision  Supine to Sit: Supervision  Comment: Bed mobility performed in hospital bed with HOB slightly elevated  Transfers  Sit to Stand: Stand by assistance  Stand to sit: Stand by assistance  Ambulation  Ambulation?: Yes  More Ambulation?: No  Ambulation 1  Surface: level tile  Device: Rolling Walker  Assistance: Stand by assistance  Quality of Gait: Pt ambulates with improved Constanza with better step-through gait pattern. Pt demonstrates improved control of LLE adduction, and was better able to prevent LLE from crossing mid-line. Requires cuing to pick feet up. No LoBs occurred. Wide turns when using RW. Cuing to stand up straight and stand closer to RW  Gait Deviations: Slow Sapna;Decreased step length;Decreased step height  Distance: 80' with 2 turns  Comments: Pt able to complete walk without rest break  Wheelchair Activities  Wheelchair Parts Management: No  Propulsion: No        Exercises  Hip Flexion: 1x10 alternating marches with // bars for UE support(Pt had moderate LoB that required Winter to correct)  Hip Abduction: 1x15 hip abduction kicks  Ankle Pumps: 1x15 standing heel/toe raises(pt has difficulty DFing L ankle)  Comments: Exercises performed standing at outside of // bars with bar and CGA from therapist for steadying support  Other exercises  Other exercises?: Yes  Other exercises 2: 1x15 mini squats at // bars      PM session:   S: Pt met in therapy gym sitting in w/c. Pt states that he is feeling good this afternoon. Denies pain and lightheadedness this afternoon. Agreeable to PT this afternoon. O: Pt sit<>stand with SBA throughout PM session. Pt ambulated short distance with RW to Nustep machine. Pt sat in Nustep with SBA and cues to reach back for arm rests. Pt sat down holding onto RW but was safe. Pt reminded to reach for arm rests next time. Pt pedaled for 10.5 minutes on NuStep with seat at 8, handles at 8 and resistance of 3. He averaged 35 SPM.   At 10.5 minute мария, pt stated that he needed to go to the bathroom for BM. Pt stated that because he was on laxative, he has has to go to the bathroom a lot more, and this was his fourth one today. Pt transferred from Nustep to chair with SBA. Pt was wheeled to bathroom.   Pt stood from chair outside of bathroom to RW with SBA. Pt ambulated into bathroom and positioned himself to sit on toilet with SBA. Pt doffed pants with Modif I and sat onto toilet with SBA. He requested privacy once safely on toilet. Pt was in bathroom for about five minutes with door slightly opened so that therapist could keep an eye on pt. Pt performed pericare with independence while sitting on toilet. Pt called therapist in when done. Pt donned pants with Modif I and stood to RW with SBA. Pt ambulated short distance to w/c with SBA. Pt ambulated 16' from chair to stair set using RW and SBA. Pt was then instructed to perform two sets of stairs (4x6\"). Pt ascends/descends stairs with non-reciprocal pattern. For first set of stairs, pt used B railings to ascend and R ascending to descend. For second set pt used B railings to ascend and descend. Pt navigates stairs slowly. When descending stairs on second set, pt asked what leg he should be descending with. Pt was instructed to descend with his L LE, however descended with R LE. When doing this pt experienced R knee buckling and significant LoB that he recovered from by steadying self with BUEs. PT went from SBA to CGA to assist pt down stairs. Pt stated that he could walk 16' back to chair. Pt experienced one slight knee buckle during walk, but was able to recover without assistance from therapist.  Pt ambulated using RW with CGA. A: Pt tolerated afternoon session well. He demonstrates independence with pericare and donning/doffing pants when using bathroom. Pt remains unsteady on feet and has difficulty using LLE for upright activities. Pt benefits from frequent cuing to remind him how to ascend/descend stairs. Pt would continue to benefit from skilled PT to increase activity tolerance, strengthen LEs, and improve functional mobility for safe return to home.        Safety Device - Type of devices:  [x]  All fall risk precautions in place [] Bed alarm in place  [] Call light within reach [] Chair alarm in place [] Positioning belt [x] Gait belt [x] Patient at risk for falls [] Left in bed [x] Left in chair [] Telesitter in use [] Sitter present [] Nurse notified []  None   Goals  Short term goals  Time Frame for Short term goals: 5 days from 10/14/2020  Short term goal 1: Pt will perform bed mobility with supervision  Short term goal 2: Pt will ascend/descend curb with SBA using LRAD  Short term goal 3: Pt will ascend/descend 8 stairs using R ascending railing with CGA  Short term goal 4: Pt will ambulate 150' using LRAD  with CGA  Short term goal 5: Pt will perform all transfers using LRAD with SBA  Long term goals  Time Frame for Long term goals : 7-10 days from 10/14/2020  Long term goal 1: Pt will perform bed mobillity with Modif I  Long term goal 2: Pt will ascend/descend curb using LRAD with Modif I  Long term goal 3: Pt will ascend/descend 12 stairs using R ascending railing with Modif I  Long term goal 4: Pt will ambulate 200' using LRAD with Modif I  Long term goal 5: Pt will perform all transfers using [address] Modif I  Patient Goals   Patient goals : \"I want to be independent and be able to walk on my own without a walker. I also want to get back to playing the stock market\"    Plan    Plan  Times per week: 5-6x/week  Times per day: Twice a day  Plan weeks: 1.5 weeks  Current Treatment Recommendations: Strengthening, Home Exercise Program, Safety Education & Training, Balance Training, Endurance Training, Functional Mobility Training, Equipment Evaluation, Education, & procurement, Transfer Training, Gait Training, Stair training  Safety Devices  Type of devices:  All fall risk precautions in place, Gait belt, Left in chair, Patient at risk for falls(Pt seated in w/c in therapy gym doorway with brakes locked for transport back to room)  Restraints  Initially in place: No     Therapy Time   Individual Concurrent Group Co-treatment   Time In 0945         Time Out 1030 Minutes 39             Second Session Therapy Time     Individual Co-treatment   Time In 1430     Time Out 1     Minutes 45        Electronically signed by JUDSON Troncoso on 10/20/2020 at 11:54 AM   Therapist was present, directed the patient's care, made skilled judgement, and was responsible for assessment and treatment of the patient.         Electronically signed by Derek Bell PT on 10/20/2020 at 4:36 PM

## 2020-10-20 NOTE — PROGRESS NOTES
capsule Oral BID     amiodarone  200 mg Oral Daily     Continuous Infusions:    Labs:  CBC:   Recent Labs     10/18/20  0439 10/19/20  0644   WBC 3.2* 3.9*   HGB 7.8* 7.9*    158     Ca/Mg/Phos:   Recent Labs     10/18/20  0439 10/19/20  0644 10/20/20  0554   CALCIUM 8.8 8.5 8.6       Objective:     Vitals: BP (!) 159/96   Pulse 64   Temp 98.5 °F (36.9 °C) (Oral)   Resp 16   Ht 5' 9\" (1.753 m)   Wt 162 lb 14.7 oz (73.9 kg)   SpO2 94%   BMI 24.06 kg/m²    Wt Readings from Last 3 Encounters:   10/20/20 162 lb 14.7 oz (73.9 kg)   10/13/20 156 lb 3.2 oz (70.9 kg)   09/30/20 187 lb 2.7 oz (84.9 kg)      24HR INTAKE/OUTPUT:      Intake/Output Summary (Last 24 hours) at 10/20/2020 1180  Last data filed at 10/20/2020 0816  Gross per 24 hour   Intake 840 ml   Output 2750 ml   Net -1910 ml     Constitutional:  awake, NAD  HEENT:  MMM, No icterus  Neck: no bruits, No JVD  Cardiovascular:  ireg rhythm  Respiratory: CTA, no crackles  Abdomen:  +BS, soft, NT, ND  Ext: no lower extremity edema  CNS: alert, no agitation    IMAGING:  US RENAL COMPLETE   Final Result   The previously seen hydronephrosis has resolved. Bilateral renal cysts including a cyst containing some debris within the left   kidney, unchanged compared to prior. Assessment :     1. TONO  -Non-Oliguric  -Baseline creat: 1-1. 1. previous TONO with peak 1.6. Now 1.4->1.6->1.5--->1.4  -UA dilute, bld mod, RBC 2. FeUrea 39.6% (10/16/2020)  -Renal US (10/9): mild bilateral hydronephrosis, 7 mm intravesicular calculus  -Renal US (10/20): no hydro  -Volume: Euvolemic  -Electrolytes: No Dyskalemia  -Acid-Base: Metabolic alkalosis resolved  -initial TONO due to UTI, obstructive uropathy. Patient had post obstructive diuresis ( about 9 L UOP on 10/11, 10/12). BUN started to rise on 10/13. Ur still dilute. Likely hypoperfusion.  Possibility of AIN in diff, now off antibiotics     Recent Labs     10/18/20  0439 10/19/20  0644 10/20/20  0554   BUN

## 2020-10-20 NOTE — PROGRESS NOTES
Department of Physical Medicine & Rehabilitation  Progress Note    Patient Identification:  Tremaine Byrnes  [id number]  : 1945  Admit date: 10/13/2020    Chief Complaint: Debility    Subjective:   No acute events overnight. Patient seen this afternoon sitting up in gym. Reports ongoing bilateral hand tremors, unchanged. Worse with activity (e.g. self-feeding or drinking out of cup). He also reports some concern about returning home. I provided education on rehab goals, focusing on achieving level of independence for mobility and ADLs within the home; but that this is starting point for his rehab process and we expect ongoing progress with more therapy at home. ROS: No f/c, n/v, cp     Objective:  Patient Vitals for the past 24 hrs:   BP Temp Temp src Pulse Resp SpO2 Height Weight   10/20/20 0853 -- -- -- -- -- -- 5' 9\" (1.753 m) --   10/20/20 0730 (!) 159/96 -- -- 64 -- -- -- --   10/20/20 0545 -- -- -- -- -- -- -- 162 lb 14.7 oz (73.9 kg)   10/20/20 0503 (!) 175/83 98.5 °F (36.9 °C) Oral 60 16 94 % -- --   10/19/20 1922 (!) 161/73 97.8 °F (36.6 °C) Oral 57 16 95 % -- --   10/19/20 1526 (!) 167/84 97.7 °F (36.5 °C) Oral 54 18 92 % -- --     Const: Alert. No distress, pleasant. HEENT: Normocephalic, atraumatic. Normal sclera/conjunctiva. MMM. CV: Regular rate and rhythm. Resp: No respiratory distress. Lungs with bibasilar crackles. Abd: Soft, nontender, nondistended, NABS+   Ext: No edema. MSK: decreased spine ROM  Neuro: Alert, oriented, appropriately interactive. Relative LLE weakness. Psych: Cooperative, appropriate mood and affect    Laboratory data: Available via EMR.    Last 24 hour lab  Recent Results (from the past 24 hour(s))   Basic Metabolic Panel w/ Reflex to MG    Collection Time: 10/20/20  5:54 AM   Result Value Ref Range    Sodium 136 136 - 145 mmol/L    Potassium reflex Magnesium 4.2 3.5 - 5.1 mmol/L    Chloride 101 99 - 110 mmol/L    CO2 24 21 - 32 mmol/L    Anion Gap 11 3 - 16    Glucose 87 70 - 99 mg/dL    BUN 23 (H) 7 - 20 mg/dL    CREATININE 1.4 (H) 0.8 - 1.3 mg/dL    GFR Non- 49 (A) >60    GFR  59 (A) >60    Calcium 8.6 8.3 - 10.6 mg/dL       Therapy progress:  PT  Position Activity Restriction  Spinal Precautions: No Bending, No Lifting, No Twisting  Other position/activity restrictions: Reeves catheter  Objective     Sit to Stand: Stand by assistance  Stand to sit: Stand by assistance  Bed to Chair: Stand by assistance(with wheeled walker)  Device: [address]: Stand by assistance  Distance: 155' with 2 turns  OT  PT Equipment Recommendations  Equipment Needed: Yes  Mobility Devices: Sami Keen: Rolling  Other: will continue to assess pending equipment needs  Toilet - Technique: Ambulating(RW)  Equipment Used: Grab bars  Toilet Transfers Comments: RW><comfort height toilet, R&L grab as he was in a hurry & had solid BM. No LOB noted. Pt became fatigued and asked to go lay in bed. Assessment        SLP          Body mass index is 24.06 kg/m². Assessment and Plan:    Impairments: generalized weakness + LLE weakness, BLE sensory deficit, decreased endurance, balance     Debility  -PT/OT     Proteus UTI  -In setting of urinary retention  -Completed ceftriaxone     Urinary retention  -Maintain Reeves until outpatient f/u with Urology.  Anticipate need for TURP.      TONO on CKD III  -Addressing urinary retention as above  -Avoid nephrotoxins, renally dose meds  -Nephrology consulted, appreciate input  --continue to encourage po intake  --repeat US with resolved hydronephrosis     Lumbar stenosis s/p recent L4-S1 ALIF/PSF (8/31 with Dr. Gm Daniel and Dr. Shayy Sherwood)  -Incisions healing well without evidence of infection  -PT/OT     Acute blood loss anemia  -Due to recent left pelvic hematoma  -Monitor Hgb now resumed on Xarelto, transfuse prn <7  -Iron supplement increased     Afib  -Controlled on Amiodarone, metoprolol  -Xarelto     CAD  -statin, bb, no ARB due to TONO     HTN  -metoprolol, prn hydralazine     AAA  -Repeat imaging in 5 years recommended     COPD  -No acute exacerbation  -supplemental O2 prn, currently on RA     Hypothyroidism  -levothyroxine    Hand tremors  -Possible related to debility/fatigue  -TSH and T4 wnl  -Has been refusing Ritalin. Cymbalta decreased by Dr. Sachin Stern.      Depression  -Recently at inpatient psych due to suicidal ideation. Now improved. -Duloxetine  -Patient refusing methylphenidate     Bladder   -See above     Bowel   -High risk constipation   -senna+colace BID, miralax daily, prn MoM, and bisacodyl supp.     Pain control  -Duloxetine, gabapentin, prn Percocet     PPx  -DVT: Xarelto  -GI: pantoprazole, probiotics       Rehab Progress: Tolerating therapy well thus far. Working on functional mobility, balance, endurance, strength. Anticipated Dispo: home alone  Services:  Pt, OT, RN, Aide  DME: rolling walker, shower chair, TSF  ELOS: 10/24    [employer] was evaluated today and a DME order was entered for a wheeled walker because he requires this to successfully complete daily living tasks of ambulating. A wheeled walker is necessary due to the patient's unsteady gait, upper body weakness, and inability to  an ambulation device; and he can ambulate only by pushing a walker instead of a lesser assistive device such as a cane, crutch, or standard walker. The need for this equipment was discussed with the patient and he understands and is in agreement. Angelia Gomez.  Jackie Singh MD 10/20/2020, 12:33 PM

## 2020-10-20 NOTE — PROGRESS NOTES
Occupational Therapy  Facility/Department: [address] IP REHAB  Daily Treatment Note  AM&PM Session  NAME: Kyaw Masterson Sr.  : 1945  MRN: [id number]    Date of Service: 10/20/2020    Discharge Recommendations:  Continue to assess pending progress, S Level 1, Home with Home health OT, Home with assist PRN  OT Equipment Recommendations  Other: sock aid, Long handle sponge, continue to assess for shower chair, grab bars    Assessment   Performance deficits / Impairments: Decreased functional mobility ; Decreased ADL status; Decreased balance;Decreased safe awareness;Decreased endurance;Decreased high-level IADLs;Decreased strength  Assessment: Pt still requires assist for LB dressing with threading catheter through. Pt would beenfit from reacher to thread pants over feet D/T spinal precautions. Pt performed functional mobility/transfers w/ RW, SBA with use of grab bars during comfort height toilet transfer. Pt became significantly fatigued after BM & requested to lay in bed to rest, therefore could not complete part of ADL session. Cont OT tx per POC. Treatment Diagnosis: Impaired: ADL/IADL function, balance, functional mobility/transfers, strength, safety awareness, activity tolerance. Prognosis: Good  OT Education: OT Role;Plan of Care;Equipment;ADL Adaptive Strategies;Precautions;Transfer Training  Patient Education: Catheter cleaning using green wipes, spinal precautions, adaptive equipment  REQUIRES OT FOLLOW UP: Yes  Activity Tolerance  Activity Tolerance: Patient Tolerated treatment well;Patient limited by fatigue  Activity Tolerance: Pt c/o feeling very weak near end of session after BM, stated he required a rest in bed. BP checked was 139/71, O2 sat was 96%, HR was 59 BPM  Safety Devices  Safety Devices in place: Yes  Type of devices: Gait belt;Call light within reach; Left in bed;Patient at risk for falls; Bed alarm in place         Patient Diagnosis(es): There were no encounter diagnoses.       has a past medical history of Aneurysm, aortic ([facility] [address] Atrial fibrillation ([address] Glaucoma, Heart attack ([address] Hyperlipidemia, and Hypertension. has a past surgical history that includes Coronary angioplasty with stent; Colonoscopy; Eye surgery (Right, 7/23/2020); and Intracapsular cataract extraction (Right, 7/23/2020). Restrictions  Position Activity Restriction  Spinal Precautions: No Bending, No Lifting, No Twisting  Other position/activity restrictions: Reeves catheter  Subjective   General  Chart Reviewed: Yes, Progress Notes  Patient assessed for rehabilitation services?: Yes  Additional Pertinent Hx: Per Dr. Vassie Peabody 10/14/2020 note, \"Patient is a 65 yo M with pmh Afib, CAD, HTN, HLD, and recent lumbar spine surgery who initially presented from SNF on 9/13/2020 with fever 105 and altered mental status. Found to have sepsis due to UTI, urinary retention, metabolic encephalopathy, and acute hypoxic respiratory failure. \"  Response to previous treatment: Patient with no complaints from previous session  Family / Caregiver Present: No  Referring Practitioner: Dr. Tatiana Howe  Diagnosis: Urinary Tract Infection  Subjective  Subjective: Pt met in room, sitting up in bed. Pt rates pain 8/10 in LLE. Pt agreeable to sponge bathe. Orientation  Orientation  Overall Orientation Status: Within Normal Limits  Objective    ADL  Equipment Provided: Long-handled sponge;Sock aid  Grooming: Setup(Pt combed hair supine in bed at end of session, could not complete at sink D/T significant fatigue.)  UE Bathing: Setup(Sponge bathe @ sink, bathed all parts.)  LE Bathing: Contact guard assistance;Setup;Verbal cueing(Sponge bathe @ sink, reached below knees and feet by crossing legs. Used green wipes for front shelbie area & catheter w/ cues. In stance @ sink counter, pt bathed buttocks CGA. Educated to hold counter for stabilization, no LOB noted.)  UE Dressing: Setup(Doffed/donned long sleeve shirt seated.)  LE Dressing:  Moderate  at the bed, and recovered his balance. Pt educated to hang shirts over RW so he can use both hands on RW when walking to the closet. Pt hung three shirts in closet and appeared SOB. Pt needed a rest break after ~4 minutes, and rated pain 8/10 in lower back. Pt tolerated ~2 minutes of hanging 2 pants onto hangers, over walker and then hanging in closet SBA. No LOB noted. Discussed with pt to coordinate assistance by family for laundry since washer and dryer are in basement. Discussed w/pt he should not go to basement. ADL:  Pt donned socks on both feet with sock aid seated in w/c. Pt educated to not bend when using the sock aide to follow his spinal precautions. Pt able to perform correctly on his second try. Pt used his own flexible sock as he will use this one at home. Pt performed oral hygiene seated in w/c at sink modified independent as pt was unable to complete this task in the morning D/T his fatigue. Assessment:   Pt demonstrated LOB during simple laundry task while hanging shirts on hangers at the bed. Pt needed rest break after ~ 4 minutes. Recommend pt receives assistance to complete laundry tasks at home since pt states washer and dryer are in the basement. Continue to work on pt activity tolerance and dynamic balance. Cont tx per POC. Pt left seated in w/c in room, needs in reach.     Safety Device - Type of devices:  []  All fall risk precautions in place [] Bed alarm in place  [x] Call light within reach [x] Chair alarm in place [] Positioning belt [] Gait belt [x] Patient at risk for falls [] Left in bed [x] Left in chair [] Telesitter in use [] Sitter present [x] Nurse notified []  None                                                          Plan   Plan  Times per week: 5-6x week  Times per day: Twice a day  Plan weeks: 1-1.5 weeks  Specific instructions for Next Treatment: Reinforce cather care/cleaning & management  Current Treatment Recommendations: Strengthening, Safety Education & Training, Balance Training, Self-Care / ADL, Neuromuscular Re-education, Endurance Training, Functional Mobility Training, Equipment Evaluation, Education, & procurement, Home Management Training  Plan Comment: Monday Conference               Goals  Short term goals  Time Frame for Short term goals: 1-1.5 weeks  Short term goal 1: Pt will bathe modified independently using AE & shower chair. Short term goal 2: Pt will dress modified independently including mendoza catheter, using AE. Short term goal 3: Pt will toilet modified independently using grab bars/TSF and/or manage catheter care. Short term goal 4: Pt will complete functional transfers/mobility modified independently using RW & grab bars. Short term goal 5: Pt will complete a simple cooking task in the kitchen standing for 10 minutes modified independent. Short term goal 6: Pt will complete BUE HEP in order to strengthen UB/increase activity tolerance to complete IADL cleaning tasks around the house modified independent. Long term goals  Time Frame for Long term goals : LTG=STG  Patient Goals   Patient goals : Pt stated \"I want to get walking so I can go home and do things around the house on my own. \" Above goals will work towards this goal.       Therapy Time   Individual Concurrent Group Co-treatment   Time In 0825         Time Out 0925         Minutes 60         Timed Code Treatment Minutes: 60 Minutes     Therapy Time     Individual Co-treatment   Time In 1305     Time Out 1350     Minutes [address] [place] [address] S/OT  Therapist was present, directed patients care, made skilled judgement, and was responsible for assessment and treatment of the patient.       Myron Morales OTR/L #[id number]

## 2020-10-20 NOTE — PROGRESS NOTES
Patient admitted to rehab with UTI and debility. A&Ox4. Transfers with rolling walker x1 assist. On general diet. Medications taken taken whole with thin liquids. On Xarelto for DVT prophylaxis. Pt has scattered bruising, surgical incision on abdomen and lower back x3, blanchable redness on heels and buttocks. On room air. Has been continent of bowel. LBM 10/19 per pt. Pt has mendoza draining clear, yellow urine. Pt denies pain/discomfort. Chair/bed alarms in use and call light in reach.  Will continue to monitor

## 2020-10-20 NOTE — CARE COORDINATION
DC called ike, Juan Glassal, to review Team Conference of today. She reports, \"I can't believe he is ready to be discharged. \"  SAIDAW provided her with his progress updates. She states he will not be able to manage at home alone in a house with three levels. DC asked her for specifics as to what he needs to do so we can practice while here on the unit. She reports:  He needs to feel more confident, stronger, walk without assistance, she wants to know the plan for bladder/mendoza, plan for prostate and when he should return for check up with his back surgeon. DC informed her patient was concerned about laundry and I informed her I offered him resources for COA but she reports, \"I will take care of that for him; he will not need COA. \"  DC asked her if she wanted to come in for Family Education to see his progress, she denied as she has to work. DC informed her I will take her concerns to Dr Maik Becker and get back to her.   [place], [place]     Case Management   [phone]    10/20/2020  9:18 AM

## 2020-10-21 LAB
ANION GAP SERPL CALCULATED.3IONS-SCNC: 9 MMOL/L (ref 3–16)
BUN BLDV-MCNC: 29 MG/DL (ref 7–20)
CALCIUM SERPL-MCNC: 8.4 MG/DL (ref 8.3–10.6)
CHLORIDE BLD-SCNC: 103 MMOL/L (ref 99–110)
CO2: 25 MMOL/L (ref 21–32)
CREAT SERPL-MCNC: 1.4 MG/DL (ref 0.8–1.3)
GFR AFRICAN AMERICAN: 59
GFR NON-AFRICAN AMERICAN: 49
GLUCOSE BLD-MCNC: 78 MG/DL (ref 70–99)
POTASSIUM REFLEX MAGNESIUM: 3.8 MMOL/L (ref 3.5–5.1)
SODIUM BLD-SCNC: 137 MMOL/L (ref 136–145)

## 2020-10-21 PROCEDURE — 80048 BASIC METABOLIC PNL TOTAL CA: CPT

## 2020-10-21 PROCEDURE — 97530 THERAPEUTIC ACTIVITIES: CPT

## 2020-10-21 PROCEDURE — 97535 SELF CARE MNGMENT TRAINING: CPT

## 2020-10-21 PROCEDURE — 1280000000 HC REHAB R&B

## 2020-10-21 PROCEDURE — 97116 GAIT TRAINING THERAPY: CPT

## 2020-10-21 PROCEDURE — 6370000000 HC RX 637 (ALT 250 FOR IP): Performed by: PHYSICAL MEDICINE & REHABILITATION

## 2020-10-21 PROCEDURE — 97110 THERAPEUTIC EXERCISES: CPT

## 2020-10-21 PROCEDURE — 94760 N-INVAS EAR/PLS OXIMETRY 1: CPT

## 2020-10-21 PROCEDURE — 6370000000 HC RX 637 (ALT 250 FOR IP): Performed by: HOSPITALIST

## 2020-10-21 PROCEDURE — 36415 COLL VENOUS BLD VENIPUNCTURE: CPT

## 2020-10-21 RX ORDER — HYDRALAZINE HYDROCHLORIDE 25 MG/1
25 TABLET, FILM COATED ORAL ONCE
Status: COMPLETED | OUTPATIENT
Start: 2020-10-21 | End: 2020-10-21

## 2020-10-21 RX ORDER — DOXAZOSIN 2 MG/1
1 TABLET ORAL NIGHTLY
Status: DISCONTINUED | OUTPATIENT
Start: 2020-10-21 | End: 2020-10-23

## 2020-10-21 RX ADMIN — ATORVASTATIN CALCIUM 40 MG: 40 TABLET, FILM COATED ORAL at 20:50

## 2020-10-21 RX ADMIN — GABAPENTIN 300 MG: 300 CAPSULE ORAL at 20:50

## 2020-10-21 RX ADMIN — OXYCODONE HYDROCHLORIDE AND ACETAMINOPHEN 1 TABLET: 7.5; 325 TABLET ORAL at 07:40

## 2020-10-21 RX ADMIN — LEVOTHYROXINE SODIUM 50 MCG: 0.05 TABLET ORAL at 05:44

## 2020-10-21 RX ADMIN — GABAPENTIN 300 MG: 300 CAPSULE ORAL at 07:38

## 2020-10-21 RX ADMIN — PANTOPRAZOLE SODIUM 40 MG: 40 TABLET, DELAYED RELEASE ORAL at 05:44

## 2020-10-21 RX ADMIN — DORZOLAMIDE HYDROCHLORIDE AND TIMOLOL MALEATE 1 DROP: 20; 5 SOLUTION/ DROPS OPHTHALMIC at 20:52

## 2020-10-21 RX ADMIN — DULOXETINE HYDROCHLORIDE 30 MG: 30 CAPSULE, DELAYED RELEASE ORAL at 20:51

## 2020-10-21 RX ADMIN — Medication 2 CAPSULE: at 17:51

## 2020-10-21 RX ADMIN — AMIODARONE HYDROCHLORIDE 200 MG: 200 TABLET ORAL at 17:51

## 2020-10-21 RX ADMIN — METOPROLOL TARTRATE 50 MG: 50 TABLET, FILM COATED ORAL at 07:38

## 2020-10-21 RX ADMIN — FERROUS SULFATE TAB EC 324 MG (65 MG FE EQUIVALENT) 324 MG: 324 (65 FE) TABLET DELAYED RESPONSE at 07:38

## 2020-10-21 RX ADMIN — Medication 2 CAPSULE: at 07:38

## 2020-10-21 RX ADMIN — LATANOPROST 1 DROP: 50 SOLUTION/ DROPS OPHTHALMIC at 20:52

## 2020-10-21 RX ADMIN — METOPROLOL TARTRATE 50 MG: 50 TABLET, FILM COATED ORAL at 20:51

## 2020-10-21 RX ADMIN — DOXAZOSIN 2 MG: 2 TABLET ORAL at 20:51

## 2020-10-21 RX ADMIN — DORZOLAMIDE HYDROCHLORIDE AND TIMOLOL MALEATE 1 DROP: 20; 5 SOLUTION/ DROPS OPHTHALMIC at 08:02

## 2020-10-21 RX ADMIN — RIVAROXABAN 15 MG: 15 TABLET, FILM COATED ORAL at 17:51

## 2020-10-21 RX ADMIN — HYDRALAZINE HYDROCHLORIDE 25 MG: 25 TABLET, FILM COATED ORAL at 14:11

## 2020-10-21 RX ADMIN — GABAPENTIN 300 MG: 300 CAPSULE ORAL at 14:11

## 2020-10-21 ASSESSMENT — PAIN DESCRIPTION - ONSET: ONSET: ON-GOING

## 2020-10-21 ASSESSMENT — PAIN DESCRIPTION - ORIENTATION: ORIENTATION: LEFT

## 2020-10-21 ASSESSMENT — PAIN SCALES - GENERAL
PAINLEVEL_OUTOF10: 0
PAINLEVEL_OUTOF10: 3
PAINLEVEL_OUTOF10: 8

## 2020-10-21 ASSESSMENT — PAIN DESCRIPTION - PAIN TYPE: TYPE: ACUTE PAIN

## 2020-10-21 ASSESSMENT — PAIN - FUNCTIONAL ASSESSMENT: PAIN_FUNCTIONAL_ASSESSMENT: ACTIVITIES ARE NOT PREVENTED

## 2020-10-21 ASSESSMENT — PAIN DESCRIPTION - DESCRIPTORS: DESCRIPTORS: ACHING

## 2020-10-21 ASSESSMENT — PAIN DESCRIPTION - PROGRESSION: CLINICAL_PROGRESSION: NOT CHANGED

## 2020-10-21 ASSESSMENT — PAIN DESCRIPTION - LOCATION: LOCATION: LEG

## 2020-10-21 ASSESSMENT — PAIN DESCRIPTION - FREQUENCY: FREQUENCY: CONTINUOUS

## 2020-10-21 NOTE — PROGRESS NOTES
Occupational Therapy  Facility/Department: [address] REHAB  Daily Treatment Note  AM & PM Session  NAME: Umair Cr Sr.  : 1945  MRN: [id number]    Date of Service: 10/21/2020    Discharge Recommendations:  Continue to assess pending progress, S Level 1, Home with Home health OT, Home with assist PRN, Home with nursing aide  OT Equipment Recommendations  Other: sock aid, Long handle sponge, continue to assess for shower chair, grab bars, TSF    Assessment   Performance deficits / Impairments: Decreased functional mobility ; Decreased ADL status; Decreased balance;Decreased safe awareness;Decreased endurance;Decreased high-level IADLs;Decreased strength  Assessment: Pt threaded catheter through depends & pants for the first time by crossing BLEs with significnat cueing, physical assist only to place bag on w/c. Pt still requires verbal cues to use green wipes to clean catheter. Pt bathed LB SBA, no LOB noted. Pt demonstrated improved activity tolerance completing LB dressing w/ no rest breaks or physical assist. Pt verablizes concerns about discharging home w/ catheter and states no family could help him at home. Continue to discuss D/C options with pt. Con OT per POC. Treatment Diagnosis: Impaired: ADL/IADL function, balance, functional mobility/transfers, strength, safety awareness, activity tolerance. Prognosis: Good  OT Education: OT Role;Plan of Care;Equipment;ADL Adaptive Strategies;Precautions;Transfer Training  Patient Education: Catheter cleaning using green wipes, threading catheter though LB dressing  Barriers to Learning: Some trouble remembering what he was told in previous sessions about catheter. REQUIRES OT FOLLOW UP: Yes  Activity Tolerance  Activity Tolerance: Patient Tolerated treatment well;Patient limited by pain  Activity Tolerance: Stated pain increased in his LLE when bathing his foot  Safety Devices  Safety Devices in place: Yes  Type of devices: Call light within reach; Chair alarm in place; Left in chair;Patient at risk for falls;Gait belt         Patient Diagnosis(es): There were no encounter diagnoses. has a past medical history of Aneurysm, aortic ([address] Atrial fibrillation ([address] Glaucoma, Heart attack ([address] Hyperlipidemia, and Hypertension. has a past surgical history that includes Coronary angioplasty with stent; Colonoscopy; Eye surgery (Right, 7/23/2020); and Intracapsular cataract extraction (Right, 7/23/2020). Restrictions  Position Activity Restriction  Spinal Precautions: No Bending, No Lifting, No Twisting  Other position/activity restrictions: Reeves catheter  Subjective   General  Chart Reviewed: Yes, Progress Notes  Patient assessed for rehabilitation services?: Yes  Additional Pertinent Hx: Per Dr. Kwasi Serrano 10/14/2020 note, \"Patient is a 67 yo M with pmh Afib, CAD, HTN, HLD, and recent lumbar spine surgery who initially presented from SNF on 9/13/2020 with fever 105 and altered mental status. Found to have sepsis due to UTI, urinary retention, metabolic encephalopathy, and acute hypoxic respiratory failure. \"  Response to previous treatment: Patient with no complaints from previous session  Family / Caregiver Present: No  Referring Practitioner: Dr. Tai Muhammad  Diagnosis: Urinary Tract Infection  Subjective  Subjective: Pt met in room, supine in bed. Pt stated he did not have a good night last night, and that he needs to use the toilet. Pt rated pain 6/10 in LLE. Objective    ADL  Equipment Provided: Long-handled sponge;Sock aid  Grooming: Setup(Pt brushed teeth & combed hair seated in w/c at sink)  UE Bathing: Setup(Sponge bathe at sink, bathe all parts. Shower cap for hair.)  LE Bathing: Stand by assistance;Setup;Verbal cueing(Sponge bathe at sink, pt bathed all parts, in stance at sink counter to bathe buttocks SBA for balance. Pt used green wipes to clean catheter and front shelbie area seated.  Some blood from last night noted on catheter, pt cleaned w/ green wipes)  UE Dressing: Setup(Donned/doffed longe sleeve shirt seated)  LE Dressing: Verbal cueing;Setup;Minimal assistance(Pt threaded catheter through pants and depends first time w/ verbal cues, in stance at counter pulled up SBA. Assist for catheter placement on w/c as pt had it upside down.)  Toileting: Stand by assistance(Had soft BM, pt performed hygiene seated, stood using R&L grab bars to pull depends down/up)  Additional Comments: Pt washed hands seated at sink after BM. Pt left seated in w/c with tray and needs in reach. Balance  Sitting Balance: Supervision  Standing Balance: Stand by assistance  Standing Balance  Time: 1:00 x 2, ~ 30 seconds. Bathing & LB dressing at sink counter. Activity: ADL tasks  Functional Mobility  Functional - Mobility Device: Rolling Walker  Activity: To/from bathroom  Assist Level: Stand by assistance  Functional Mobility Comments: RW><bathroom, SBA. No LOB o rSOB noted. Demonstrates safety with walker, needed one cue to push up from bed. S/OT managed catheter as pt in hurry to get to toilet. Toilet Transfers  Toilet - Technique: Ambulating(RW)  Equipment Used: Grab bars  Toilet Transfer: Stand by assistance  Wheelchair Bed Transfers  Wheelchair/Bed - Technique: Ambulating(RW)  Equipment Used: Bed;Wheelchair; Other(Recliner)  Level of Asssistance: Stand by assistance  Wheelchair Transfers Comments: Bed>RW><w/c><recliner, SBA. Assist for catheter placement. Bed mobility  Rolling to Left: Supervision  Supine to Sit: Supervision  Scooting: Supervision  Comment: HOB slight elevated, pt did not use rails. Second Session    Pt met in dep, seated in w/c. Pt denied pain. IADL:   W/C>Rw, SBA, cue to remember to move catheter bag from w/c to RW. Pt remembered to move walker up next to refrigerator before opening door and reaching for bread and jelly. Pt used walker tray to transport bread and jelly to the counter.  Pt retrieved knife and peanut butter from drawers and cabinets to make sandwich. Pt rinsed knife and placed in . Pt educated to turn entire body and not to twist to follow spinal precautions. Pt was shown that walker tray folds down so he can be closer to the counter to follow these precautions. Pt amb short distance w/ RW to sit in chair without arms to simulate the chairs he has in the dining room. Pt pushed up from table on L side & from bottom of chair on R side to stand, RW>w/c SBA. Pt educated he should push up only from stable surfaces at home. Pt completed task in 9:24 minutes with no LOB or SOB, pt stated pain is 6/10 after completing IADL task. Transfer:  W/c><>RW><comfort height toilet SBA. Pt needed cue to move catheter from w/c to walker. Pt used wall and sink counter to stand & sit to simulate his bathroom at home as he states he does not need a TSF. No LOB or SOB noted. Education:  Pt educated about walker tray versus walker bag for home. Discussed importance of keeping both hands on the walker and that walker tray would be beneficial to safely transport his bowl of cereal and coffee to his dining room as this is where he will eat meals. Pt shown shower chairs in [facility] and shower chairs available online to purchase at [facility], pt expressed interest in smaller shower chairs w/out back as his walk-in shower is small. Pt educated on installing a grab bar in shower to hold onto when bathing for balance, pt expressed interest in vertical grab bar. Pt educated to use reacher to  items from the floor to follow spinal precautions, but did not practice this D/T time restraint. Assessment:  Pt demonstrated good dynamic balance and activity tolerance during simple meal prep & clean up task, no SOB or LOB noted. Pt completed toilet transfer using sink and wall SBA, no LOB noted. Pt still required cues to move catheter bag to walker before transfering/mobility.  Pt educated on walker tray, shower chair, grab bars, & a reacher for home. Pt appears to be more comfortable w/ going home after education on equipment. Cont OT tx per POC. Pt left in care of transport, Tika Harness. Safety Device - Type of devices:  []  All fall risk precautions in place [] Bed alarm in place  [] Call light within reach [] Chair alarm in place [] Positioning belt [x] Gait belt [] Patient at risk for falls [] Left in bed [] Left in chair [] Telesitter in use [] Sitter present [] Nurse notified []  None                                                    Plan   Plan  Times per week: 5-6x week  Times per day: Twice a day  Plan weeks: 1-1.5 weeks  Specific instructions for Next Treatment: Reinforce cather care/cleaning & management  Current Treatment Recommendations: Strengthening, Safety Education & Training, Balance Training, Self-Care / ADL, Neuromuscular Re-education, Endurance Training, Functional Mobility Training, Equipment Evaluation, Education, & procurement, Home Management Training  Plan Comment: D/C planned fro Sat 10/24/20 with home OT, & home health aid recommended               Goals  Short term goals  Time Frame for Short term goals: 1-1.5 weeks  Short term goal 1: Pt will bathe modified independently using AE & shower chair. Short term goal 2: Pt will dress modified independently including mendoza catheter, using AE. Short term goal 3: Pt will toilet modified independently using grab bars/TSF and/or manage catheter care. Short term goal 4: Pt will complete functional transfers/mobility modified independently using RW & grab bars. Short term goal 5: Pt will complete a simple cooking task in the kitchen standing for 10 minutes modified independent. Short term goal 6: Pt will complete BUE HEP in order to strengthen UB/increase activity tolerance to complete IADL cleaning tasks around the house modified independent.   Long term goals  Time Frame for Long term goals : LTG=STG  Patient Goals   Patient goals : Pt stated \"I want to get walking so I can go home and do things around the house on my own. \" Above goals will work towards this goal.       Therapy Time   Individual Concurrent Group Co-treatment   Time In 0820         Time Out 0920         Minutes 60         Timed Code Treatment Minutes: 60 Minutes     Therapy Time     Individual Co-treatment   Time In 1457     Time Out 1600     Minutes 390 [address] [place] [address] S/OT    Therapist was present, directed patients care, made skilled judgement, and was responsible for assessment and treatment of the patient.     Silvana Salgado, OTR/L #[id number]

## 2020-10-21 NOTE — PROGRESS NOTES
Physical Therapy  Facility/Department: [address] IP REHAB  Daily Treatment Note  NAME: Jeb Strange Sr.  : 1945  MRN: [id number]    Date of Service: 10/21/2020    Discharge Recommendations:  Continue to assess pending progress, Patient would benefit from continued therapy after discharge, Home with Home health PT, Home with assist PRN   PT Equipment Recommendations  Equipment Needed: Yes  Mobility Devices: Troy Kauffman: Rolling  Other: will continue to assess pending equipment needs    Assessment   Body structures, Functions, Activity limitations: Decreased functional mobility ; Decreased balance;Decreased posture;Decreased ROM; Decreased strength  Assessment: Pt tolerated Am session without complaints. He demonstrated improved walking and stair ability with SBA. He has better control of LLE and is able to prevent adduction when not fatigued. As pt fatigues, gait abnormalities become more pronounced. Pt states that twitching he has been experiencing has decreased and did not bother him much today. Pt has concerns about going home this weekend secondary to lack of confidence. He requests to stay an additional week to receive additional therapy and improve confidence. Pt was made aware that he is doing well in PT and may not be able to have his request.  Pt remains below baseline and would continue to benefit from skilled PT to strengthen LEs, increase activity tolerance and improve functional mobility to return to independence  Treatment Diagnosis: Generalized muscle weakness, impaired mobility  Prognosis: Good  History: Per Dr. Foster Wiggins note, \"Patient is a 67 yo M with pmh Afib, CAD, HTN, HLD, and recent lumbar spine surgery who initially presented from SNF on 2020 with fever 105 and altered mental status. Found to have sepsis due to UTI, urinary retention, metabolic encephalopathy, and acute hypoxic respiratory failure. Patient initially admitted to ARU (-10/1).  He made gradual progress and demonstrated some self-limiting behavior. On 9/28 he began to refuse therapies and most medical care. He stated a wish to die and planned to achieve this by not eating/drinking. Psychiatry was consulted. Dr. Vy Simmons and I agreed that patient demonstrated imminent risk of harm to himself with ongoing suicidal ideation and plan. Therefore patient will be transferred to inpatient psychiatric unit 10/1. Patient reportedly did well with inpatient psychiatric care. Reeves was removed for void trial at inpatient psych. Patient unable to void and Reeves was replaced. Then developed fevers and was readmitted to acute medical floor at [facility] 10/7. Found to proteus UTI with bilateral hydronephrosis. Treated with ceftriaxone. Urology recommending TURP as outpatient. Course was further complicated by TONO, hyponatremia. \"  Exam: see above  Clinical Presentation: evolving  PT Education: Transfer Training;Energy Conservation; Adaptive Device Training;Equipment;Orientation;General Safety;Gait Training;Disease Specific Education  Barriers to Learning: motivation  REQUIRES PT FOLLOW UP: Yes  Activity Tolerance  Activity Tolerance: Patient limited by endurance     Patient Diagnosis(es): There were no encounter diagnoses. has a past medical history of Aneurysm, aortic ([address] Atrial fibrillation ([address] Glaucoma, Heart attack ([address] Hyperlipidemia, and Hypertension. has a past surgical history that includes Coronary angioplasty with stent; Colonoscopy; Eye surgery (Right, 7/23/2020); and Intracapsular cataract extraction (Right, 7/23/2020). Restrictions  Position Activity Restriction  Spinal Precautions: No Bending, No Lifting, No Twisting  Other position/activity restrictions: Reeves catheter  Subjective   General  Chart Reviewed:  Yes  Additional Pertinent Hx: Per Dr. Jessica Leiva note, \"Patient is a 67 yo M with pmh Afib, CAD, HTN, HLD, and recent lumbar spine surgery who initially presented from SNF on 9/13/2020 with fever 105 and altered mental status. Found to have sepsis due to UTI, urinary retention, metabolic encephalopathy, and acute hypoxic respiratory failure. Patient initially admitted to ARU (9/18-10/1). He made gradual progress and demonstrated some self-limiting behavior. On 9/28 he began to refuse therapies and most medical care. He stated a wish to die and planned to achieve this by not eating/drinking. Psychiatry was consulted. Dr. Manoj Vazquez and I agreed that patient demonstrated imminent risk of harm to himself with ongoing suicidal ideation and plan. Therefore patient will be transferred to inpatient psychiatric unit 10/1. Patient reportedly did well with inpatient psychiatric care. Reeves was removed for void trial at inpatient psych. Patient unable to void and Reeves was replaced. Then developed fevers and was readmitted to acute medical floor at [facility] 10/7. Found to proteus UTI with bilateral hydronephrosis. Treated with ceftriaxone. Urology recommending TURP as outpatient. Course was further complicated by TONO, hyponatremia. \"  Response To Previous Treatment: Patient with no complaints from previous session. Family / Caregiver Present: No  Referring Practitioner: Dr. Mott Kin: Pt met in therapy gym sitting in w/c. Pt states that he is feeling well this AM. Pt stated that he had intense pain in lower abdomen last night that caused him to wake up. He states that he called nurse and she found that catheter tube was pinched off. Nurse helped orient catheter and pt stated that pain had went away about 10 minutes later. Pt denies pain and is agreeable to PT this AM.     Objective      Transfers  Sit to Stand: Supervision  Stand to sit: Supervision  Ambulation  Ambulation?: Yes  More Ambulation?: No  Ambulation 1  Surface: level tile  Device: Rolling Walker  Assistance: Stand by assistance  Quality of Gait: Pt ambulates with good Constanza with improved step-through gait pattern.   Pt with reduced LLE adduction and prevented LLE from crossing mid-line. As pt fatigues, hip adduction increases. Requires cuing to pick feet up. No LoBs occurred. Wide turns when using RW. Cuing to stand up straight and stand closer to RW. Gait Deviations: Slow Kay;Decreased step length;Decreased step height  Distance: 36' with 4 turns, 12' with 1 turn  Comments: Pt able to complete walk without rest break  Stairs/Curb  Stairs?: Yes  Stairs  # Steps : 12  Stairs Height: 6\"  Rails: Bilateral  Device: No Device  Assistance: Stand by assistance  Comment: Ascend/descend stairs with nonreciprocal pattern with increased time, descending improved this date with no occurrances of knee buckling. Pt had improved recall to ascend with RLE and descend with LLE without cuing. Therapist hooked catheter bag to patient's pocket as patient navigated stairs. Exercises  Hip Abduction: 1x20 hip aduction ball squeezes with 2-3 sec hold sitting in w/c  Ankle Pumps: 1x15 standing heel/toe raises(pt has difficulty DFing L ankle)  Comments: Exercises performed standing at RW with PT providing SBA and holding walker down to prevent tipping. Other exercises  Other exercises?: Yes  Other exercises 3: 1x20 B cone tapping with two mini cones. Cones placed in front of patient's feet and pt was instructed to alternate R and L tapping while controlling hip adduction/abduction. (standing at [facility])      PM session:   S:Pt met in his room eating lunch with granddaughter. States that he had a good lunch, and is agreeable to PT this AM.  O: Pt sit<>stand with SBA throughout session this afternoon. Pt ambulated 140' from room to the gym with RW and SBA. Pt tolerated walk well with no LoBs and slow kay. Pt completed 12 minutes on [employer] Solution[employer] machine with seat at 8, handles at 8 and resistance of 3. Resistance was increased to 4 for final 2 minutes. Pt averaged 37 SPM.  Pt stood from [place] with SBA.   And ambulated short distance to perform bed mobillity with Modif I  Long term goal 2: Pt will ascend/descend curb using LRAD with Modif I  Long term goal 3: Pt will ascend/descend 12 stairs using R ascending railing with Modif I  Long term goal 4: Pt will ambulate 200' using LRAD with Modif I  Long term goal 5: Pt will perform all transfers using [address] Modif I  Patient Goals   Patient goals : \"I want to be independent and be able to walk on my own without a walker. I also want to get back to playing the stock market\"    Plan    Plan  Times per week: 5-6x/week  Times per day: Twice a day  Plan weeks: 1.5 weeks  Current Treatment Recommendations: Strengthening, Home Exercise Program, Safety Education & Training, Balance Training, Endurance Training, Functional Mobility Training, Equipment Evaluation, Education, & procurement, Transfer Training, Gait Training, Stair training  Safety Devices  Type of devices: All fall risk precautions in place, Gait belt, Left in chair, Patient at risk for falls(therapy gym doorway with brakes locked for transport back to room)  Restraints  Initially in place: No     Therapy Time   Individual Concurrent Group Co-treatment   Time In 1115         Time Out 1200         Minutes 39             Second Session Therapy Time     Individual Co-treatment   Time In 1300     Time Out 1345     Minutes 45        Electronically signed by JUDSON Howard on 10/21/2020 at 12:30 PM  Therapist was present, directed the patient's care, made skilled judgement, and was responsible for assessment and treatment of the patient.         Electronically signed by Janneth Diaz PT on 10/21/2020 at 4:22 PM

## 2020-10-21 NOTE — PROGRESS NOTES
Oral BID WC    amiodarone  200 mg Oral Daily     Continuous Infusions:    Labs:  CBC:   Recent Labs     10/19/20  0644   WBC 3.9*   HGB 7.9*        Ca/Mg/Phos:   Recent Labs     10/19/20  0644 10/20/20  0554 10/21/20  0509   CALCIUM 8.5 8.6 8.4       Objective:     Vitals: BP (!) 182/85   Pulse 60   Temp 97.4 °F (36.3 °C) (Oral)   Resp 16   Ht 5' 9\" (1.753 m)   Wt 161 lb 2.5 oz (73.1 kg)   SpO2 96%   BMI 23.80 kg/m²    Wt Readings from Last 3 Encounters:   10/20/20 161 lb 2.5 oz (73.1 kg)   10/13/20 156 lb 3.2 oz (70.9 kg)   09/30/20 187 lb 2.7 oz (84.9 kg)      24HR INTAKE/OUTPUT:      Intake/Output Summary (Last 24 hours) at 10/21/2020 0933  Last data filed at 10/21/2020 1027  Gross per 24 hour   Intake 240 ml   Output 1800 ml   Net -1560 ml     Constitutional:  awake, NAD  HEENT:  MMM, No icterus  Neck: no bruits, No JVD  Cardiovascular:  ireg rhythm  Respiratory: CTA, no crackles  Abdomen:  +BS, soft, NT, ND  Ext: no lower extremity edema  CNS: alert, no agitation    IMAGING:  US RENAL COMPLETE   Final Result   The previously seen hydronephrosis has resolved. Bilateral renal cysts including a cyst containing some debris within the left   kidney, unchanged compared to prior. Assessment :     1. TONO  -Non-Oliguric  -Baseline creat: 1-1. 1. previous TONO with peak 1.6. Now 1.4->1.6->1.5--->1.4  -UA dilute, bld mod, RBC 2. FeUrea 39.6% (10/16/2020)  -Renal US (10/9): mild bilateral hydronephrosis, 7 mm intravesicular calculus  -Renal US (10/20): no hydro  -Volume: Euvolemic  -Electrolytes: No Dyskalemia  -Acid-Base: Metabolic alkalosis resolved  -initial TONO due to UTI, obstructive uropathy. Patient had post obstructive diuresis ( about 9 L UOP on 10/11, 10/12). BUN started to rise on 10/13. Ur still dilute. Likely hypoperfusion.  Possibility of AIN in diff, now off antibiotics     Recent Labs     10/19/20  0644 10/20/20  0554 10/21/20  0509   BUN 27* 23* 29*   CREATININE 1.6* 1.4* 1.4* Recent Labs     10/19/20  0644 10/20/20  0554 10/21/20  0509    136 137   K 4.2 4.2 3.8   CO2 24 24 25       2. HTN  -Blood pressure high    BP Readings from Last 1 Encounters:   10/21/20 (!) 182/85     3. CAD/CHF  -TTE (sep 2020): LVEF 50%, G1DD, AAA 5.6 cm    4. Urinary retention  -recent UTIs  -BPH, has Reeves    5. atrial fibrillation   -on anticoagulation     6. Anemia  -recent blood loss    Plan:     - hemodynamic support. - start Doxazocin tonight  - IVF dced, encouraged PO fluids  - Avoid ACEI/ARB  - monitor BMP    -Monitor I/O, UOP  -Maintain MAP>65  -Avoid nephrotoxin, if able. -Dose meds to current eGFR    Thank you for allowing us to participate in care of Delilah Nicki . We will continue to follow. Feel free to contact me with any questions.       Mitzy Gilmore  10/21/2020    [facility] [address]  Office : [phone]  Fax :[phone]

## 2020-10-21 NOTE — PROGRESS NOTES
Department of Physical Medicine & Rehabilitation  Progress Note    Patient Identification:  Ira Fontenot  [id number]  : 1945  Admit date: 10/13/2020    Chief Complaint: Debility    Subjective:   No acute events overnight. Patient seen this am sitting up in room. Feels tremors have improved. Still expressing desire to stay in ARU 1-2 more weeks. When asked more specifically about his concerns, he states he wants his legs to be stronger. I provided education on weakness related to debility and spine surgery and expected recovery timeline. Again emphasized that our goal is to improve his function enough to get him home safely where he will continue rehab process in home setting. With patient's permission, I had long discussion with patient's daughter Pretty Horner) via phone. She states she does not think patient will be ready to discharge home this weekend. When asked about her specific concerns she states she would like patient to feel \"more confident. \" Also states he has to be able to do stairs at home. I provided update on patient's functional status and reassured her that therapists are working on stairs. Explained our goal is to improve his function enough to get him home safely where he will continue rehab process in home setting. Explained that we would order home care and provided education on estimated frequency of this. We discussed alternative options including private duty services and SNF. I informed her that I do not believe patient needs SNF and that I would not expect his insurance company to approve based on his functional level. She will think about these options and requests to speak with SW to coordinate. I informed SW of our conversation and asked her to contact patient's daughter. Lastly, Julio C Hawkins had concerns about Reeves cathter and Urological plan. I explained recommendations from Urology at [facility] for maintaining Reeves until outpatient follow-up.  Reassured her that RN and OT are working with patient on how to manage this at home. ROS: No f/c, n/v, cp     Objective:  Patient Vitals for the past 24 hrs:   BP Temp Temp src Pulse Resp SpO2 Weight   10/21/20 0738 (!) 182/85 -- -- 60 -- -- --   10/21/20 0545 -- 97.4 °F (36.3 °C) Oral 58 16 -- --   10/20/20 2028 (!) 164/79 -- -- 56 -- -- --   10/20/20 1541 137/77 97.6 °F (36.4 °C) Oral 53 16 96 % 161 lb 2.5 oz (73.1 kg)     Const: Alert. No distress, pleasant. HEENT: Normocephalic, atraumatic. Normal sclera/conjunctiva. MMM. CV: Regular rate and rhythm. Resp: No respiratory distress. Lungs CTAB. Abd: Soft, nontender, nondistended, NABS+   Ext: No edema. MSK: decreased spine ROM  Neuro: Alert, oriented, appropriately interactive. Relative LLE weakness. Psych: Cooperative, appropriate mood and affect    Laboratory data: Available via EMR. Last 24 hour lab  Recent Results (from the past 24 hour(s))   Basic Metabolic Panel w/ Reflex to MG    Collection Time: 10/21/20  5:09 AM   Result Value Ref Range    Sodium 137 136 - 145 mmol/L    Potassium reflex Magnesium 3.8 3.5 - 5.1 mmol/L    Chloride 103 99 - 110 mmol/L    CO2 25 21 - 32 mmol/L    Anion Gap 9 3 - 16    Glucose 78 70 - 99 mg/dL    BUN 29 (H) 7 - 20 mg/dL    CREATININE 1.4 (H) 0.8 - 1.3 mg/dL    GFR Non- 49 (A) >60    GFR  59 (A) >60    Calcium 8.4 8.3 - 10.6 mg/dL       Therapy progress:  PT  Position Activity Restriction  Spinal Precautions: No Bending, No Lifting, No Twisting  Other position/activity restrictions:  Reeves catheter  Objective     Sit to Stand: Stand by assistance  Stand to sit: Stand by assistance  Bed to Chair: Stand by assistance(with wheeled walker)  Device: [address]: Stand by assistance  Distance: 155' with 2 turns  OT  PT Equipment Recommendations  Equipment Needed: Yes  Mobility Devices: Jaida Mcguire: Rolling  Other: will continue to assess pending equipment needs  Toilet - Technique: Ambulating(RW)  Equipment Used: Grab bars  Toilet Transfers Comments: RW><comfort height toilet, R&L grab as he was in a hurry & had solid BM. No LOB noted. Pt became fatigued and asked to go lay in bed. Assessment        SLP          Body mass index is 23.8 kg/m². Assessment and Plan:    Impairments: generalized weakness + LLE weakness, BLE sensory deficit, decreased endurance, balance     Debility  -PT/OT     Proteus UTI  -In setting of urinary retention  -Completed ceftriaxone     Urinary retention  -Maintain Reeves until outpatient f/u with Urology. Anticipate need for TURP      TONO on CKD III  -Addressing urinary retention as above  -Avoid nephrotoxins, renally dose meds  -Nephrology consulted, appreciate input  --continue to encourage po intake  --repeat US with resolved hydronephrosis     Lumbar stenosis s/p recent L4-S1 ALIF/PSF (8/31 with Dr. Shayne Hollis and Dr. Charli Robbins)  -Incisions healing well without evidence of infection  -PT/OT     Acute blood loss anemia  -Due to recent left pelvic hematoma  -Monitor Hgb now resumed on Xarelto, transfuse prn <7  -Iron supplement increased     Afib  -Controlled on Amiodarone, metoprolol  -Xarelto     CAD  -statin, bb, no ARB due to TONO     HTN  -metoprolol, doxazosin added, prn hydralazine     AAA  -Repeat imaging in 5 years recommended     COPD  -No acute exacerbation  -supplemental O2 prn, currently on RA     Hypothyroidism  -levothyroxine    Hand tremors  -Possible related to debility/fatigue. Possible psychogenic component. -TSH and T4 wnl  -Has been refusing Ritalin. Cymbalta decreased by Dr. Opal Beaver. -Reports some improvement.      Depression  -Recently at inpatient psych due to suicidal ideation. Now improved.    -Duloxetine  -Patient refusing methylphenidate     Bladder   -See above     Bowel   -High risk constipation   -senna+colace BID, miralax daily, prn MoM, and bisacodyl supp.     Pain control  -Duloxetine, gabapentin, prn Percocet     PPx  -DVT: Xarelto  -GI: pantoprazole, probiotics       Rehab Progress: Making progress. Working on functional mobility, balance, endurance, strength. Anticipated Dispo: home alone  Services:  Pt, OT, RN, Aide  DME: rolling walker, shower chair, TSF  ELOS: 10/24    Rena Albert.  Melvin Fuller MD 10/21/2020, 11:03 AM

## 2020-10-21 NOTE — PLAN OF CARE
symptoms  10/21/2020 0945 by Nadia Arboleda RN  Outcome: Ongoing  10/21/2020 0241 by Jaclyn Boo RN  Outcome: Ongoing  10/21/2020 0008 by Marlno Baker RN  Outcome: Ongoing  Note: Patient is able to shift weight without assistance. Reposition every two hours. Skin assessed every shift. No new area of breakdown. Skin warm and dry to touch. 10/21/2020 0006 by Marlon Baker RN  Outcome: Ongoing  Goal: Absence of new skin breakdown  Description: Absence of new skin breakdown  10/21/2020 0945 by Nadia Arboleda RN  Outcome: Ongoing  10/21/2020 0241 by Jaclyn Boo RN  Outcome: Ongoing  Note: Able to change positions in bed without assist, no evidence of skin breakdown noted. Waffle cushion in place to chair. 10/21/2020 0008 by Marlon Baker RN  Outcome: Ongoing  10/21/2020 0006 by Marlon Baker RN  Outcome: Ongoing     Problem: Falls - Risk of:  Goal: Will remain free from falls  Description: Will remain free from falls  10/21/2020 0945 by Nadia Arboleda RN  Outcome: Ongoing  10/21/2020 0241 by Jaclyn Boo RN  Outcome: Ongoing  Note: Pt educated on falls precautions and safety. Call light and personal belongings within reach at all times. Non skid socks in use when up. Hourly rounding and alarms active. 10/21/2020 0008 by Marlon Baker RN  Outcome: Ongoing  Note: Patient remained free of any falls this shift. Call light in reach at all times. Non skid footwear on. Patient encouraged to call for help when needed. Room free of clutter.   Alarms on.     10/21/2020 0006 by Marlon Baker RN  Outcome: Ongoing  Goal: Absence of physical injury  Description: Absence of physical injury  10/21/2020 0945 by Nadia Arboleda RN  Outcome: Ongoing  10/21/2020 0241 by Jaclyn Boo RN  Outcome: Ongoing  10/21/2020 0008 by Marlon Baker RN  Outcome: Ongoing  10/21/2020 0006 by Marlon Baker RN  Outcome: Ongoing     Problem: Nutrition  Goal: Optimal nutrition therapy  10/21/2020 0945 by Nadia Arboleda RN  Outcome: Ongoing  10/21/2020 0241 by Magan Lynn RN  Outcome: Ongoing  10/21/2020 0008 by Olga Martinez RN  Outcome: Ongoing  10/21/2020 0006 by Olga Martinez RN  Outcome: Ongoing     Problem: Musculor/Skeletal Functional Status  Goal: Highest potential functional level  10/21/2020 0945 by Wilmar Gibson RN  Outcome: Ongoing  10/21/2020 0241 by Magan Lynn RN  Outcome: Ongoing  10/21/2020 0008 by Olga Martinez RN  Outcome: Ongoing  10/21/2020 0006 by Olga Martinez RN  Outcome: Ongoing  Goal: Absence of falls  10/21/2020 0945 by Wilmar Gibson RN  Outcome: Ongoing  10/21/2020 0241 by Magan Lynn RN  Outcome: Ongoing  10/21/2020 0008 by Olga Martinez RN  Outcome: Ongoing  10/21/2020 0006 by Olga Martinez RN  Outcome: Ongoing     Problem:  Activity Intolerance:  Goal: Ability to tolerate increased activity will improve  Description: Ability to tolerate increased activity will improve  10/21/2020 0945 by Wilmar Gibson RN  Outcome: Ongoing  10/21/2020 0241 by Magan Lynn RN  Outcome: Ongoing  10/21/2020 0008 by Olga Martinez RN  Outcome: Ongoing  10/21/2020 0006 by Olga Martinez RN  Outcome: Ongoing     Problem: HEMODYNAMIC STATUS  Goal: Patient has stable vital signs and fluid balance  10/21/2020 0945 by Wilmar Gibson RN  Outcome: Ongoing  10/21/2020 0241 by Magan Lynn RN  Outcome: Ongoing  10/21/2020 0008 by Olga Martinez RN  Outcome: Ongoing  10/21/2020 0006 by Olga Martinez RN  Outcome: Ongoing     Problem: FLUID AND ELECTROLYTE IMBALANCE  Goal: Fluid and electrolyte balance are achieved/maintained  10/21/2020 0945 by Wilmar Gibson RN  Outcome: Ongoing  10/21/2020 0241 by Magan Lynn RN  Outcome: Ongoing  10/21/2020 0008 by Olga Martinez RN  Outcome: Ongoing  10/21/2020 0006 by Olga Martinez RN  Outcome: Ongoing     Problem: Infection:  Goal: Will remain free from infection  Description: Will remain free from infection  10/21/2020 0945 by Wilmar Gibson RN  Outcome: Ongoing  10/21/2020 0241 by Nadine Fisher RN  Outcome: Ongoing  10/21/2020 0008 by Payton Barbosa RN  Outcome: Ongoing  10/21/2020 0006 by Payton Barbosa RN  Outcome: Ongoing     Problem: Daily Care:  Goal: Daily care needs are met  Description: Daily care needs are met  10/21/2020 0945 by Maryjo Austin RN  Outcome: Ongoing  10/21/2020 0241 by Nadine Fisher RN  Outcome: Ongoing  10/21/2020 0008 by Payton Barbosa RN  Outcome: Ongoing  10/21/2020 0006 by Payton Barbosa RN  Outcome: Ongoing

## 2020-10-21 NOTE — PROGRESS NOTES
Patient admitted to unit on 10/13. Alert/oriented. History of falls. Ambulates using rolling walker x 1 assist.  General diet. Takes medications whole with thins with no complications. Scattered bruising. On room air. Last BM 10/20. Reeves in place draining clear yellow urine. Reeevs output 1250 ml. Patient had complaint early in shift of left leg pain rated 8/10. No further complaints. Uses call light appropriately. Continuing to monitor.

## 2020-10-21 NOTE — PLAN OF CARE
Problem: Pain:  Goal: Pain level will decrease  Description: Pain level will decrease  10/21/2020 0008 by Erin Gross RN  Outcome: Ongoing  Note: Pain assessed using 0-10 scale. Offer PRN medication as ordered by MD. Chelle Mcmanus 30 minutes after giving. Problem: Skin Integrity:  Goal: Will show no infection signs and symptoms  Description: Will show no infection signs and symptoms  10/21/2020 0008 by Erin Gross RN  Outcome: Ongoing  Note: Patient is able to shift weight without assistance. Reposition every two hours. Skin assessed every shift. No new area of breakdown. Skin warm and dry to touch. Problem: Falls - Risk of:  Goal: Will remain free from falls  Description: Will remain free from falls  10/21/2020 0008 by Erin Gross RN  Outcome: Ongoing  Note: Patient remained free of any falls this shift. Call light in reach at all times. Non skid footwear on. Patient encouraged to call for help when needed. Room free of clutter. Alarms on.

## 2020-10-22 LAB
ANION GAP SERPL CALCULATED.3IONS-SCNC: 11 MMOL/L (ref 3–16)
BASOPHILS ABSOLUTE: 0 K/UL (ref 0–0.2)
BASOPHILS RELATIVE PERCENT: 1.1 %
BUN BLDV-MCNC: 27 MG/DL (ref 7–20)
CALCIUM SERPL-MCNC: 8.1 MG/DL (ref 8.3–10.6)
CHLORIDE BLD-SCNC: 100 MMOL/L (ref 99–110)
CO2: 23 MMOL/L (ref 21–32)
CREAT SERPL-MCNC: 1.5 MG/DL (ref 0.8–1.3)
EOSINOPHILS ABSOLUTE: 0.2 K/UL (ref 0–0.6)
EOSINOPHILS RELATIVE PERCENT: 5.2 %
GFR AFRICAN AMERICAN: 55
GFR NON-AFRICAN AMERICAN: 45
GLUCOSE BLD-MCNC: 83 MG/DL (ref 70–99)
HCT VFR BLD CALC: 23.7 % (ref 40.5–52.5)
HEMOGLOBIN: 7.8 G/DL (ref 13.5–17.5)
LYMPHOCYTES ABSOLUTE: 1.3 K/UL (ref 1–5.1)
LYMPHOCYTES RELATIVE PERCENT: 33.7 %
MCH RBC QN AUTO: 30.2 PG (ref 26–34)
MCHC RBC AUTO-ENTMCNC: 33.1 G/DL (ref 31–36)
MCV RBC AUTO: 91.2 FL (ref 80–100)
MONOCYTES ABSOLUTE: 0.5 K/UL (ref 0–1.3)
MONOCYTES RELATIVE PERCENT: 12.7 %
NEUTROPHILS ABSOLUTE: 1.8 K/UL (ref 1.7–7.7)
NEUTROPHILS RELATIVE PERCENT: 47.3 %
PDW BLD-RTO: 17.2 % (ref 12.4–15.4)
PLATELET # BLD: 162 K/UL (ref 135–450)
PMV BLD AUTO: 9 FL (ref 5–10.5)
POTASSIUM REFLEX MAGNESIUM: 3.7 MMOL/L (ref 3.5–5.1)
RBC # BLD: 2.6 M/UL (ref 4.2–5.9)
SODIUM BLD-SCNC: 134 MMOL/L (ref 136–145)
WBC # BLD: 3.8 K/UL (ref 4–11)

## 2020-10-22 PROCEDURE — 6370000000 HC RX 637 (ALT 250 FOR IP): Performed by: HOSPITALIST

## 2020-10-22 PROCEDURE — 97530 THERAPEUTIC ACTIVITIES: CPT

## 2020-10-22 PROCEDURE — 85025 COMPLETE CBC W/AUTO DIFF WBC: CPT

## 2020-10-22 PROCEDURE — 97116 GAIT TRAINING THERAPY: CPT

## 2020-10-22 PROCEDURE — 36415 COLL VENOUS BLD VENIPUNCTURE: CPT

## 2020-10-22 PROCEDURE — 6370000000 HC RX 637 (ALT 250 FOR IP): Performed by: PHYSICAL MEDICINE & REHABILITATION

## 2020-10-22 PROCEDURE — 97110 THERAPEUTIC EXERCISES: CPT

## 2020-10-22 PROCEDURE — 1280000000 HC REHAB R&B

## 2020-10-22 PROCEDURE — 80048 BASIC METABOLIC PNL TOTAL CA: CPT

## 2020-10-22 RX ADMIN — DORZOLAMIDE HYDROCHLORIDE AND TIMOLOL MALEATE 1 DROP: 20; 5 SOLUTION/ DROPS OPHTHALMIC at 20:32

## 2020-10-22 RX ADMIN — AMIODARONE HYDROCHLORIDE 200 MG: 200 TABLET ORAL at 17:05

## 2020-10-22 RX ADMIN — Medication 2 CAPSULE: at 17:05

## 2020-10-22 RX ADMIN — DOCUSATE SODIUM 50 MG AND SENNOSIDES 8.6 MG 1 TABLET: 8.6; 5 TABLET, FILM COATED ORAL at 08:26

## 2020-10-22 RX ADMIN — POLYETHYLENE GLYCOL 3350 17 G: 17 POWDER, FOR SOLUTION ORAL at 08:26

## 2020-10-22 RX ADMIN — Medication 2 CAPSULE: at 08:25

## 2020-10-22 RX ADMIN — GABAPENTIN 300 MG: 300 CAPSULE ORAL at 20:32

## 2020-10-22 RX ADMIN — DOCUSATE SODIUM 50 MG AND SENNOSIDES 8.6 MG 1 TABLET: 8.6; 5 TABLET, FILM COATED ORAL at 20:32

## 2020-10-22 RX ADMIN — PANTOPRAZOLE SODIUM 40 MG: 40 TABLET, DELAYED RELEASE ORAL at 06:05

## 2020-10-22 RX ADMIN — DOXAZOSIN 1 MG: 2 TABLET ORAL at 20:34

## 2020-10-22 RX ADMIN — DORZOLAMIDE HYDROCHLORIDE AND TIMOLOL MALEATE 1 DROP: 20; 5 SOLUTION/ DROPS OPHTHALMIC at 08:27

## 2020-10-22 RX ADMIN — ATORVASTATIN CALCIUM 40 MG: 40 TABLET, FILM COATED ORAL at 20:32

## 2020-10-22 RX ADMIN — DULOXETINE HYDROCHLORIDE 30 MG: 30 CAPSULE, DELAYED RELEASE ORAL at 20:32

## 2020-10-22 RX ADMIN — GABAPENTIN 300 MG: 300 CAPSULE ORAL at 08:25

## 2020-10-22 RX ADMIN — OXYCODONE HYDROCHLORIDE AND ACETAMINOPHEN 1 TABLET: 7.5; 325 TABLET ORAL at 06:05

## 2020-10-22 RX ADMIN — LEVOTHYROXINE SODIUM 50 MCG: 0.05 TABLET ORAL at 06:05

## 2020-10-22 RX ADMIN — LATANOPROST 1 DROP: 50 SOLUTION/ DROPS OPHTHALMIC at 20:33

## 2020-10-22 RX ADMIN — FERROUS SULFATE TAB EC 324 MG (65 MG FE EQUIVALENT) 324 MG: 324 (65 FE) TABLET DELAYED RESPONSE at 08:26

## 2020-10-22 RX ADMIN — GABAPENTIN 300 MG: 300 CAPSULE ORAL at 14:00

## 2020-10-22 RX ADMIN — METOPROLOL TARTRATE 50 MG: 50 TABLET, FILM COATED ORAL at 08:25

## 2020-10-22 RX ADMIN — RIVAROXABAN 15 MG: 15 TABLET, FILM COATED ORAL at 17:05

## 2020-10-22 ASSESSMENT — PAIN SCALES - GENERAL
PAINLEVEL_OUTOF10: 0
PAINLEVEL_OUTOF10: 8
PAINLEVEL_OUTOF10: 0

## 2020-10-22 NOTE — PROGRESS NOTES
Patient admitted to rehab with debility. A/A/O x 4. Transfers with walker x 1. On general diet, tolerating well. Medications taken whole in thins. On Xarelto for DVT prophylaxis. Skin scattered bruising, redness on heels, incisions in abd and lower back SUDEEP. On room air. Has been continent of bowel and bladder. LBM 10/21. Chair/bed alarms in use and call light in reach. Will monitor for safety.

## 2020-10-22 NOTE — PLAN OF CARE
Problem: Falls - Risk of:  Goal: Will remain free from falls  Description: Will remain free from falls  Note: Patient free from falls this shift. Fall precautions in place. Bed in low position with brake and alarm on. Non skid socks on. Call light and belongings within reach. Will continue to monitor.

## 2020-10-22 NOTE — PROGRESS NOTES
Patient admitted with dx UTI and debility. Alert and oriented x 4. Transfers with RW and GB x1.  Spinal restrictions in place.  General diet, takes meds whole with thin liquids.   Continent of bowel.  Reeves cath in place draining clear raphael colored urine.  Bilateral heels floated off bed on pillows. Call light and belongings in place.  Will continue to monitor for safety

## 2020-10-22 NOTE — PROGRESS NOTES
Occupational Therapy  Facility/Department: [address] REHAB  Daily Treatment Note  NAME: Julissa Caraballo Sr.  : 1945  MRN: [id number]    Date of Service: 10/22/2020    Discharge Recommendations:  Continue to assess pending progress, S Level 1, Home with Home health OT, Home with assist PRN, Home with nursing aide       Assessment   Performance deficits / Impairments: Decreased functional mobility ; Decreased ADL status; Decreased balance;Decreased safe awareness;Decreased endurance;Decreased high-level IADLs;Decreased strength  Assessment: Pt tolerated session well. Pt completed all mobility and transfers with SBA. He completed bed mobilty with SBA. Pt educated on maintaining BLT precautions while completing bed mobility. Treatment Diagnosis: Impaired: ADL/IADL function, balance, functional mobility/transfers, strength, safety awareness, activity tolerance. Prognosis: Good  History: Per Dr. Kwasi Serrano 10/14/2020 note, \"Patient is a 65 yo M with pmh Afib, CAD, HTN, HLD, and recent lumbar spine surgery who initially presented from SNF on 2020 with fever 105 and altered mental status. Found to have sepsis due to UTI, urinary retention, metabolic encephalopathy, and acute hypoxic respiratory failure. \" Pt was independent with ADL/IADL function prior to admission. Pt lives alone in 3 story house, with 10 stairs to enter, rails on R side. REQUIRES OT FOLLOW UP: Yes  Activity Tolerance  Activity Tolerance: Patient Tolerated treatment well  Safety Devices  Type of devices: Call light within reach; Chair alarm in place; Left in chair;Gait belt         Patient Diagnosis(es): There were no encounter diagnoses. has a past medical history of Aneurysm, aortic ([address] Atrial fibrillation ([address] Glaucoma, Heart attack ([address] Hyperlipidemia, and Hypertension. has a past surgical history that includes Coronary angioplasty with stent; Colonoscopy;  Eye surgery (Right, 2020); and Intracapsular cataract extraction (Right, 7/23/2020). Restrictions  Position Activity Restriction  Spinal Precautions: No Bending, No Lifting, No Twisting  Other position/activity restrictions: Reeves catheter  Subjective   General  Chart Reviewed: Yes, Progress Notes  Patient assessed for rehabilitation services?: Yes  Additional Pertinent Hx: Per Dr. Padilla Page 10/14/2020 note, \"Patient is a 65 yo M with pmh Afib, CAD, HTN, HLD, and recent lumbar spine surgery who initially presented from SNF on 9/13/2020 with fever 105 and altered mental status. Found to have sepsis due to UTI, urinary retention, metabolic encephalopathy, and acute hypoxic respiratory failure. \"  Response to previous treatment: Patient with no complaints from previous session  Family / Caregiver Present: No  Referring Practitioner: Dr. Hilton Gonzalez  Diagnosis: Urinary Tract Infection  Subjective  Subjective: Pt seen in the department. Objective          Functional Mobility  Functional - Mobility Device: Rolling Walker  Activity: To/From therapy gym  Assist Level: Stand by assistance  Wheelchair Bed Transfers  Wheelchair/Bed - Technique: Ambulating  Equipment Used: Wheelchair;Bed;Other(recliner)  Level of Asssistance: Stand by assistance  Wheelchair Transfers Comments: Required cues for managing catheter. Bed mobility  Supine to Sit: Supervision  Sit to Supine: Supervision  Comment: Pt given cues for log roll technique. Pt rolled slightly onto his side for bed mobility but maintained back precautions. Transfers  Sit to stand: Stand by assistance  Stand to sit: Stand by assistance         PM Session: Mobility/Transfers- Completed mobility using a RW with SBA. He completed a car transfer with SBA with good recall for technique and hand placement. He completed transfer to the recliner in his room with SBA using a RW. IADL- Pt gathered items from the refrigerator to prepare toast with cues for hand placement when reaching for items.   He used a basket to transport items in the kitchen. Pt began having pain in his lower back while standing to wait for the toast requiring to sit. After a rest break he was able to place a knife in the  and throw away his garbage. Completed all mobility in the kitchen with SBA. Plan   Plan  Times per week: 5-6x week  Times per day: Twice a day  Plan weeks: 1-1.5 weeks  Specific instructions for Next Treatment: Reinforce cather care/cleaning & management  Current Treatment Recommendations: Strengthening, Safety Education & Training, Balance Training, Self-Care / ADL, Neuromuscular Re-education, Endurance Training, Functional Mobility Training, Equipment Evaluation, Education, & procurement, Home Management Training  Plan Comment: D/C planned fro Sat 10/24/20 with home OT, & home health aid recommended    Goals  Short term goals  Time Frame for Short term goals: 1-1.5 weeks  Short term goal 1: Pt will bathe modified independently using AE & shower chair. Short term goal 2: Pt will dress modified independently including mendoza catheter, using AE. Short term goal 3: Pt will toilet modified independently using grab bars/TSF and/or manage catheter care. Short term goal 4: Pt will complete functional transfers/mobility modified independently using RW & grab bars. Short term goal 5: Pt will complete a simple cooking task in the kitchen standing for 10 minutes modified independent. Short term goal 6: Pt will complete BUE HEP in order to strengthen UB/increase activity tolerance to complete IADL cleaning tasks around the house modified independent. Long term goals  Time Frame for Long term goals : LTG=STG  Patient Goals   Patient goals : Pt stated \"I want to get walking so I can go home and do things around the house on my own. \" Above goals will work towards this goal.       Therapy Time   Individual Concurrent Group PM Session   Time In [place] [id number]   Time Out [id number]   Minutes 39      [address] [facility]

## 2020-10-22 NOTE — PROGRESS NOTES
Physical Therapy  Facility/Department: [address] REHAB  Daily Treatment Note  NAME: Abdifatah Correa Sr.  : 1945  MRN: [id number]    Date of Service: 10/22/2020    Discharge Recommendations:  Continue to assess pending progress, Patient would benefit from continued therapy after discharge, Home with Home health PT, Home with assist PRN   PT Equipment Recommendations  Equipment Needed: Yes  Mobility Devices: Curvin Demetrius: Rolling  Other: will continue to assess pending equipment needs    Assessment   Body structures, Functions, Activity limitations: Decreased functional mobility ; Decreased balance;Decreased posture;Decreased ROM; Decreased strength  Assessment: Pt had more difficulty participating in PT this AM.  Pt had increased difficulty when ascending/descending stairs. Pt required cuing to descend with LLE. When turning on top step to descend stairs, pt had posterior lean present secondary to lack of DF strength and control. He was able to catch balance with railing support. Pt does not seem confident using L ascending railing and would often need cuing to use L ascending railing only. When ambulating, pt has progrssively increasing hip drop on L side that becomes more pronounced with fatigue. Pt complained of pain in L ankle when vibration stimulus was applied to distal tendon with DF exercise. Pt is below baseline and would continue to benefit from skilled PT to strengthen LEs, increase activity tolerance and improve functional mobility to return to independence  Treatment Diagnosis: Generalized muscle weakness, impaired mobility  Prognosis: Good  History: Per Dr. Charissa Rosado note, \"Patient is a 65 yo M with pmh Afib, CAD, HTN, HLD, and recent lumbar spine surgery who initially presented from SNF on 2020 with fever 105 and altered mental status. Found to have sepsis due to UTI, urinary retention, metabolic encephalopathy, and acute hypoxic respiratory failure.  Patient initially admitted to ARU (9/18-10/1). He made gradual progress and demonstrated some self-limiting behavior. On 9/28 he began to refuse therapies and most medical care. He stated a wish to die and planned to achieve this by not eating/drinking. Psychiatry was consulted. Dr. Nirmala Ya and I agreed that patient demonstrated imminent risk of harm to himself with ongoing suicidal ideation and plan. Therefore patient will be transferred to inpatient psychiatric unit 10/1. Patient reportedly did well with inpatient psychiatric care. Reeves was removed for void trial at inpatient psych. Patient unable to void and Reeves was replaced. Then developed fevers and was readmitted to acute medical floor at [facility] 10/7. Found to proteus UTI with bilateral hydronephrosis. Treated with ceftriaxone. Urology recommending TURP as outpatient. Course was further complicated by TONO, hyponatremia. \"  Exam: see above  Clinical Presentation: evolving  PT Education: Transfer Training;Energy Conservation; Adaptive Device Training;Equipment;Orientation;General Safety;Gait Training;Disease Specific Education  Barriers to Learning: motivation  REQUIRES PT FOLLOW UP: Yes  Activity Tolerance  Activity Tolerance: Patient limited by endurance; Patient limited by fatigue     Patient Diagnosis(es): There were no encounter diagnoses. has a past medical history of Aneurysm, aortic ([address] Atrial fibrillation ([address] Glaucoma, Heart attack ([address] Hyperlipidemia, and Hypertension. has a past surgical history that includes Coronary angioplasty with stent; Colonoscopy; Eye surgery (Right, 7/23/2020); and Intracapsular cataract extraction (Right, 7/23/2020). Restrictions  Position Activity Restriction  Spinal Precautions: No Bending, No Lifting, No Twisting  Other position/activity restrictions: Reeves catheter  Subjective   General  Chart Reviewed:  Yes  Additional Pertinent Hx: Per Dr. Lazaro Henry note, \"Patient is a 67 yo M with pmh Afib, CAD, HTN, HLD, and recent lumbar spine surgery who initially presented from SNF on 9/13/2020 with fever 105 and altered mental status. Found to have sepsis due to UTI, urinary retention, metabolic encephalopathy, and acute hypoxic respiratory failure. Patient initially admitted to ARU (9/18-10/1). He made gradual progress and demonstrated some self-limiting behavior. On 9/28 he began to refuse therapies and most medical care. He stated a wish to die and planned to achieve this by not eating/drinking. Psychiatry was consulted. Dr. John Hogan and I agreed that patient demonstrated imminent risk of harm to himself with ongoing suicidal ideation and plan. Therefore patient will be transferred to inpatient psychiatric unit 10/1. Patient reportedly did well with inpatient psychiatric care. Reeves was removed for void trial at inpatient psych. Patient unable to void and Reeves was replaced. Then developed fevers and was readmitted to acute medical floor at [facility] 10/7. Found to proteus UTI with bilateral hydronephrosis. Treated with ceftriaxone. Urology recommending TURP as outpatient. Course was further complicated by TONO, hyponatremia. \"  Response To Previous Treatment: Patient with no complaints from previous session. Family / Caregiver Present: No  Referring Practitioner: Dr. Anne Lam: Pt met in therapy gym sitting in w/c. Pt states that he is feeling good this AM.  Pt denies pain this AM and is agreeable to PT    Objective      Transfers  Sit to Stand: Supervision  Stand to sit: Supervision;Stand by assistance(pt had significant LoB that caused uncontrolled stand>sit transfer secondary to lack of distal righting reaction in LLE)  Ambulation  Ambulation?: Yes  More Ambulation?: No  Ambulation 1  Surface: level tile  Device: Rolling Walker  Assistance: Stand by assistance  Quality of Gait: Pt ambulates with good Constanza with good step-through gait pattern.   Pt with reduced LLE adduction and prevented LLE from crossing mid-line. As pt fatigues, hip adduction increases. Requires cuing to pick feet up. No LoBs occurred. Wide turns when using RW. Improved awareness to stand up straight at RW  Gait Deviations: Slow Sapna;Decreased step length;Decreased step height  Distance: 80' with 1 turn  Comments: Pt required slight standing rest break to recover from UE fatigue secondary to pushing RW  Stairs/Curb  Stairs?: Yes  Stairs  # Steps : 8  Stairs Height: 6\"  Rails: Right ascending  Device: No Device  Assistance: Stand by assistance  Comment: Ascend/descend stairs with nonreciprocal pattern with increased time, descending improved this date with instances of slight knee buckling in LLE. Pt was able to recover from knee buckling without LoB, but requested to sit back down after 8 stairs. Pt had improved recall to ascend with RLE and descend with LLE with cuing. Therapist hooked catheter bag to patient's pocket as patient navigated stairs. At top of stairs pt had posterior lean turning to descend stairs. Pt had difficulty DFing L ankle which prevents him from performing righting reaction to maintain balance and causes LoBs  Wheelchair Activities  Wheelchair Parts Management: No  Propulsion: No        Exercises  Hip Abduction: 1x30 hip aduction ball squeezes with 2-3 sec hold sitting in w/c  Ankle Pumps: 1x15 standing heel/toe raises(Pt experienced significant loss of balance on final rep that caused him to sit uncontrolled in w/c. Pt needed cues for hand placement)  Comments: Exercises performed sitting in w/c and standing at RW with PT providing SBA and holding walker down to prevent tipping. Other exercises  Other exercises?: Yes  Other exercises 4: 1x20 DF contractions with magic wand massager running up muscle belly from distal to proximal(pt complained of pain distally when vibration stimulus was added.   Pt stated that it feels better to start vibration more proximally)      PM session:   S:Pt met in room sitting in recliner. He states that he had a good lunch during his break and denies pain. Pt is agreeable to PT this afternoon  O:Pt Sit<>stand from recliner to w/c with SBA. Pt performed another sit<>stand from w/c to Nustep machine with SBA. Pt pedaled for 12 minutes with seat at 8, handles at 8, and resistance at 3. At 8 minutes, resistance was increased to 4. Pt averaged 35 SPM.  Pt performed sit<>stand transfer back to / with SBA and was given a seated rest break secondary to fatigue. Pt sit<>stand with SBA for remainder of session. Pt ambulated 127' from / in therapy gym, outside to therapy terrace, up ADA ramp, down steeper incline ramp, and to chair at outdoor therapy terrace. Pt given seated rest break to recover secondary to fatigue. Pt had significant hip drop on L side that become more pronounced when walking up incline. Pt was able to prevent LLE from crossing midline when ambulating, but used more narrow Constanza this afternoon. Pt performed standing exercises at railing on outdoor therapy terrace. Pt performed 1x15 standing hip abduction kicks, and 1x15 hip extension kicks. Exercises performed on alternating LEs with seated rest break in between exercises. Pt required cues to stand up straight when performing exercises. He complained of back pain during second exercise and requested to sit down. Pt ambulated 76' from outdoor terrace to / in room. Gait abnormalities from above were present. Pt demonstrates improved ability to stay inside walker this afternoon, and will frequently remind himself to do so. A: Pt tolerated PM session with improved activity tolerance compared to this AM.  Pt continues to voice concerns about being discharged this weekend. He states that he would like to continue to practice stairs and strengthen his legs before returning back to his home. He continues to demonstrate hip drop and reduced DF strength which make ambulation difficult.   Pt would continue to benefit from skilled therapy to strengthen LEs, increase activity tolerance and improve functional mobility to return to independence  Safety Device - Type of devices:  [x]  All fall risk precautions in place [] Bed alarm in place  [] Call light within reach [] Chair alarm in place [] Positioning belt [x] Gait belt [x] Patient at risk for falls [] Left in bed [x] Left in chair [] Telesitter in use [] Sitter present [] Nurse notified []  None       Goals  Short term goals  Time Frame for Short term goals: 5 days from 10/14/2020  Short term goal 1: Pt will perform bed mobility with supervision  Short term goal 2: Pt will ascend/descend curb with SBA using LRAD  Short term goal 3: Pt will ascend/descend 8 stairs using R ascending railing with CGA  Short term goal 4: Pt will ambulate 150' using LRAD  with CGA  Short term goal 5: Pt will perform all transfers using LRAD with SBA  Long term goals  Time Frame for Long term goals : 7-10 days from 10/14/2020  Long term goal 1: Pt will perform bed mobillity with Modif I  Long term goal 2: Pt will ascend/descend curb using LRAD with Modif I  Long term goal 3: Pt will ascend/descend 12 stairs using R ascending railing with Modif I  Long term goal 4: Pt will ambulate 200' using LRAD with Modif I  Long term goal 5: Pt will perform all transfers using [address] Modif I  Patient Goals   Patient goals : \"I want to be independent and be able to walk on my own without a walker. I also want to get back to playing the stock market\"    Plan    Plan  Times per week: 5-6x/week  Times per day: Twice a day  Plan weeks: 1.5 weeks  Current Treatment Recommendations: Strengthening, Home Exercise Program, Safety Education & Training, Balance Training, Endurance Training, Functional Mobility Training, Equipment Evaluation, Education, & procurement, Transfer Training, Gait Training, Stair training  Safety Devices  Type of devices:  All fall risk precautions in place, Gait belt, Left in chair, Patient at risk for falls(pt left in care of OT, [address] for occupational therapy)  Restraints  Initially in place: No     Therapy Time   Individual Concurrent Group Co-treatment   Time In 1030         Time Out 1115         Minutes 45             [facility]   Time In 1300     Time Out 1345     Minutes 45           Electronically signed by JUDSON Quiros on 10/22/2020 at 12:51 PM   Therapist was present, directed the patient's care, made skilled judgement, and was responsible for assessment and treatment of the patient.       Electronically signed by Loyd Mulligan PT on 10/22/2020 at 4:32 PM

## 2020-10-22 NOTE — PROGRESS NOTES
Office: [phone]       Fax: [phone]      Nephrology Progress Note        Patient's Name: Millie Martinez. Admit Date: 10/13/2020  Date of Visit: 10/22/2020    Reason for Consult:  TONO/CKD      Subjective: Millie Martinez is a 66 y.o. male with PMHx of hypertension, atrial fibrillation, AAA,  coronary artery disease, CHF, status post LS spine surgery who was hospitalized in ARU on 10/13/2020 with comprehensive rehab. Pt was hospitalized at [facility] in September for sepsis secondary to UTI. Had false urethral passage, was seen by urology. Currently has Reeves in place. Was briefly in ARU before discharged to behavioral unit. Reeves replaced on 10/2. Subsequently had UTI with Proteus. CT abdomen was obtained which showed bilateral hydronephrosis despite having a Reeves in place. initially placed on IV Merrem, changed to Rocephin. Urology recommended TURP as outpatient. Had peak creat of 1.6 that improved to 1.1  No back pain, no obvious hematuria  NSAID use: Denies   IV contrast: None recent   Home meds reviewed     INTERVAL HISTORY    Feels well  Shortness of breath: No   UOP: Fair  Creat: stable  BP better    Medications: Allergies:  Azithromycin; Brimonidine; Clindamycin/lincomycin;  Penicillins; and Simvastatin    Scheduled Meds:   rivaroxaban  15 mg Oral Daily    doxazosin  1 mg Oral Nightly    DULoxetine  30 mg Oral Nightly    atorvastatin  40 mg Oral Nightly    dorzolamide-timolol  1 drop Both Eyes BID    ferrous sulfate  324 mg Oral Daily with breakfast    gabapentin  300 mg Oral TID    latanoprost  1 drop Left Eye Nightly    levothyroxine  50 mcg Oral Daily    metoprolol tartrate  50 mg Oral BID    pantoprazole  40 mg Oral Daily    polyethylene glycol  17 g Oral Daily    sennosides-docusate sodium  1 tablet Oral BID    lactobacillus  2 capsule Oral BID     amiodarone  200 mg Oral Daily     Continuous Infusions:    Labs:  CBC:   Recent Labs     10/22/20  0601   WBC 3.8*   HGB 7.8*        Ca/Mg/Phos:   Recent Labs     10/20/20  0554 10/21/20  0509 10/22/20  0601   CALCIUM 8.6 8.4 8.1*       Objective:     Vitals: BP (!) 149/80   Pulse 70   Temp 98.8 °F (37.1 °C)   Resp 16   Ht 5' 9\" (1.753 m)   Wt 165 lb 12.6 oz (75.2 kg)   SpO2 91%   BMI 24.48 kg/m²    Wt Readings from Last 3 Encounters:   10/22/20 165 lb 12.6 oz (75.2 kg)   10/13/20 156 lb 3.2 oz (70.9 kg)   09/30/20 187 lb 2.7 oz (84.9 kg)      24HR INTAKE/OUTPUT:      Intake/Output Summary (Last 24 hours) at 10/22/2020 0845  Last data filed at 10/22/2020 3802  Gross per 24 hour   Intake 960 ml   Output 1775 ml   Net -815 ml     Constitutional:  awake, NAD  HEENT:  MMM, No icterus  Neck: no bruits, No JVD  Cardiovascular:  ireg rhythm  Respiratory: CTA, no crackles  Abdomen:  +BS, soft, NT, ND  Ext: no lower extremity edema  CNS: alert, no agitation    IMAGING:  US RENAL COMPLETE   Final Result   The previously seen hydronephrosis has resolved. Bilateral renal cysts including a cyst containing some debris within the left   kidney, unchanged compared to prior. Assessment :     1. TONO  -Non-Oliguric  -Baseline creat: 1-1. 1. previous TONO with peak 1.6. Now 1.4->1.6->1.5--->1.4  -UA dilute, bld mod, RBC 2. FeUrea 39.6% (10/16/2020)  -Renal US (10/9): mild bilateral hydronephrosis, 7 mm intravesicular calculus  -Renal US (10/20): no hydro  -Volume: Euvolemic  -Electrolytes: No Dyskalemia  -Acid-Base: Metabolic alkalosis resolved  -initial TONO due to UTI, obstructive uropathy. Patient had post obstructive diuresis ( about 9 L UOP on 10/11, 10/12). BUN started to rise on 10/13. Ur still dilute. Likely hypoperfusion.  Possibility of AIN in diff, now off antibiotics     Recent Labs     10/20/20  0554 10/21/20  0509 10/22/20  0601   BUN 23* 29* 27* CREATININE 1.4* 1.4* 1.5*     Recent Labs     10/20/20  0554 10/21/20  0509 10/22/20  0601    137 134*   K 4.2 3.8 3.7   CO2 24 25 23       2. HTN  -Blood pressure high    BP Readings from Last 1 Encounters:   10/22/20 (!) 149/80     3. CAD/CHF  -TTE (sep 2020): LVEF 50%, G1DD, AAA 5.6 cm    4. Urinary retention  -recent UTIs  -BPH, has Reeves    5. atrial fibrillation   -on anticoagulation     6. Anemia  -recent blood loss    Plan:     - hemodynamic support. - started Doxazocin   - IVF dced, encouraged PO fluids  - Avoid ACEI/ARB  - monitor BMP    -Monitor I/O, UOP  -Maintain MAP>65  -Avoid nephrotoxin, if able. -Dose meds to current eGFR    Thank you for allowing us to participate in care of Racheal Jacobson. . We will continue to follow. Feel free to contact me with any questions.       Julio Canada  10/22/2020    [facility] [address]  Office : [phone]  Fax :[phone]

## 2020-10-22 NOTE — PLAN OF CARE
Problem: Pain:  Goal: Pain level will decrease  Description: Pain level will decrease  Outcome: Ongoing     Problem: Skin Integrity:  Goal: Will show no infection signs and symptoms  Description: Will show no infection signs and symptoms  Outcome: Ongoing  Note: Assessment completed. No drainage, redness, or odor noted.  Will continue to monitor  Goal: Absence of new skin breakdown  Description: Absence of new skin breakdown  Outcome: Ongoing     Problem: Nutrition  Goal: Optimal nutrition therapy  Outcome: Ongoing     Problem: Musculor/Skeletal Functional Status  Goal: Highest potential functional level  Outcome: Ongoing  Note: Evaluated and treated by PT and OT

## 2020-10-22 NOTE — CARE COORDINATION
LSW reviewed chart. LSw rec'd update from Dr Yoko Chamberlain about conversation with dgtr, Casey Spear and that dgtr wanted to speak with me. LSW called for her this morning, left message and phone number for returned call to discuss dc plan.   [place], [place]     Case Management   [phone]    10/22/2020  11:46 AM

## 2020-10-22 NOTE — PROGRESS NOTES
Eosinophils Absolute 0.2 0.0 - 0.6 K/uL    Basophils Absolute 0.0 0.0 - 0.2 K/uL   Basic Metabolic Panel w/ Reflex to MG    Collection Time: 10/22/20  6:01 AM   Result Value Ref Range    Sodium 134 (L) 136 - 145 mmol/L    Potassium reflex Magnesium 3.7 3.5 - 5.1 mmol/L    Chloride 100 99 - 110 mmol/L    CO2 23 21 - 32 mmol/L    Anion Gap 11 3 - 16    Glucose 83 70 - 99 mg/dL    BUN 27 (H) 7 - 20 mg/dL    CREATININE 1.5 (H) 0.8 - 1.3 mg/dL    GFR Non-African American 45 (A) >60    GFR  55 (A) >60    Calcium 8.1 (L) 8.3 - 10.6 mg/dL       Therapy progress:  PT  Position Activity Restriction  Spinal Precautions: No Bending, No Lifting, No Twisting  Other position/activity restrictions: Reeves catheter  Objective     Sit to Stand: Supervision  Stand to sit: Supervision  Bed to Chair: Stand by assistance(with wheeled walker)  Device: [address]: Stand by assistance  Distance: 185' with 1 turn  OT  PT Equipment Recommendations  Equipment Needed: Yes  Mobility Devices: Chadd Champagne: Rolling  Other: will continue to assess pending equipment needs  Toilet - Technique: Ambulating(RW)  Equipment Used: Grab bars  Toilet Transfers Comments: RW><comfort height toilet, R&L grab as he was in a hurry & had solid BM. No LOB noted. Pt became fatigued and asked to go lay in bed. Assessment        SLP          Body mass index is 24.48 kg/m². Assessment and Plan:    Impairments: generalized weakness + LLE weakness, BLE sensory deficit, decreased endurance, balance     Debility  -PT/OT     Proteus UTI  -In setting of urinary retention  -Completed ceftriaxone     Urinary retention  -Maintain Reeves until outpatient f/u with Urology.  Anticipate need for TURP      TONO on CKD III  -Addressing urinary retention as above  -Avoid nephrotoxins, renally dose meds  -Nephrology consulted, appreciate input  --continue to encourage po intake  --repeat US with resolved hydronephrosis     Lumbar stenosis s/p

## 2020-10-22 NOTE — CARE COORDINATION
LSW rec'd update from both SNF facilities:  [facility]:    No beds available due to covid outbreak. [facility]:  No beds available. Call placed to [facility] to leave message of above.   Jose Alejandro Junior [place]     Case Management   [phone]    10/22/2020  4:28 PM

## 2020-10-23 LAB
ANION GAP SERPL CALCULATED.3IONS-SCNC: 9 MMOL/L (ref 3–16)
BUN BLDV-MCNC: 27 MG/DL (ref 7–20)
CALCIUM SERPL-MCNC: 8.5 MG/DL (ref 8.3–10.6)
CHLORIDE BLD-SCNC: 101 MMOL/L (ref 99–110)
CO2: 24 MMOL/L (ref 21–32)
CREAT SERPL-MCNC: 1.4 MG/DL (ref 0.8–1.3)
GFR AFRICAN AMERICAN: 59
GFR NON-AFRICAN AMERICAN: 49
GLUCOSE BLD-MCNC: 83 MG/DL (ref 70–99)
POTASSIUM REFLEX MAGNESIUM: 4.1 MMOL/L (ref 3.5–5.1)
SODIUM BLD-SCNC: 134 MMOL/L (ref 136–145)

## 2020-10-23 PROCEDURE — 6370000000 HC RX 637 (ALT 250 FOR IP): Performed by: PHYSICAL MEDICINE & REHABILITATION

## 2020-10-23 PROCEDURE — 80048 BASIC METABOLIC PNL TOTAL CA: CPT

## 2020-10-23 PROCEDURE — 36415 COLL VENOUS BLD VENIPUNCTURE: CPT

## 2020-10-23 PROCEDURE — 97530 THERAPEUTIC ACTIVITIES: CPT

## 2020-10-23 PROCEDURE — 97535 SELF CARE MNGMENT TRAINING: CPT

## 2020-10-23 PROCEDURE — 97116 GAIT TRAINING THERAPY: CPT

## 2020-10-23 PROCEDURE — 1280000000 HC REHAB R&B

## 2020-10-23 PROCEDURE — 6370000000 HC RX 637 (ALT 250 FOR IP): Performed by: HOSPITALIST

## 2020-10-23 RX ORDER — PANTOPRAZOLE SODIUM 40 MG/1
40 TABLET, DELAYED RELEASE ORAL DAILY
Qty: 30 TABLET | Refills: 0 | Status: SHIPPED | OUTPATIENT
Start: 2020-10-23

## 2020-10-23 RX ORDER — TRAZODONE HYDROCHLORIDE 50 MG/1
25 TABLET ORAL NIGHTLY PRN
Qty: 30 TABLET | Refills: 0 | Status: SHIPPED | OUTPATIENT
Start: 2020-10-23 | End: 2021-10-29

## 2020-10-23 RX ORDER — AMIODARONE HYDROCHLORIDE 200 MG/1
200 TABLET ORAL DAILY
Qty: 30 TABLET | Refills: 0 | Status: SHIPPED | OUTPATIENT
Start: 2020-10-23

## 2020-10-23 RX ORDER — DOXAZOSIN 2 MG/1
2 TABLET ORAL NIGHTLY
Status: DISCONTINUED | OUTPATIENT
Start: 2020-10-23 | End: 2020-10-25 | Stop reason: HOSPADM

## 2020-10-23 RX ORDER — FERROUS SULFATE 325(65) MG
325 TABLET ORAL
Qty: 30 TABLET | Refills: 0 | Status: SHIPPED | OUTPATIENT
Start: 2020-10-23

## 2020-10-23 RX ORDER — METOPROLOL TARTRATE 50 MG/1
50 TABLET, FILM COATED ORAL 2 TIMES DAILY
Qty: 60 TABLET | Refills: 0 | Status: SHIPPED | OUTPATIENT
Start: 2020-10-23

## 2020-10-23 RX ORDER — LEVOTHYROXINE SODIUM 0.05 MG/1
50 TABLET ORAL DAILY
Qty: 30 TABLET | Refills: 0 | Status: SHIPPED | OUTPATIENT
Start: 2020-10-23

## 2020-10-23 RX ORDER — DULOXETIN HYDROCHLORIDE 30 MG/1
30 CAPSULE, DELAYED RELEASE ORAL DAILY
Qty: 30 CAPSULE | Refills: 0 | Status: SHIPPED | OUTPATIENT
Start: 2020-10-23 | End: 2021-10-29 | Stop reason: ALTCHOICE

## 2020-10-23 RX ORDER — OXYCODONE HYDROCHLORIDE AND ACETAMINOPHEN 5; 325 MG/1; MG/1
1 TABLET ORAL EVERY 8 HOURS PRN
Qty: 21 TABLET | Refills: 0 | Status: SHIPPED | OUTPATIENT
Start: 2020-10-23 | End: 2020-10-30

## 2020-10-23 RX ORDER — SENNA AND DOCUSATE SODIUM 50; 8.6 MG/1; MG/1
1 TABLET, FILM COATED ORAL 2 TIMES DAILY
COMMUNITY
Start: 2020-10-23

## 2020-10-23 RX ORDER — DOXAZOSIN 2 MG/1
2 TABLET ORAL NIGHTLY
Qty: 30 TABLET | Refills: 0 | Status: SHIPPED | OUTPATIENT
Start: 2020-10-23 | End: 2021-10-29 | Stop reason: ALTCHOICE

## 2020-10-23 RX ORDER — GABAPENTIN 300 MG/1
300 CAPSULE ORAL 3 TIMES DAILY
Qty: 90 CAPSULE | Refills: 0 | Status: SHIPPED | OUTPATIENT
Start: 2020-10-23 | End: 2021-10-29

## 2020-10-23 RX ORDER — ATORVASTATIN CALCIUM 40 MG/1
40 TABLET, FILM COATED ORAL DAILY
Qty: 30 TABLET | Refills: 0 | Status: SHIPPED | OUTPATIENT
Start: 2020-10-23

## 2020-10-23 RX ORDER — HYDRALAZINE HYDROCHLORIDE 25 MG/1
25 TABLET, FILM COATED ORAL ONCE
Status: DISCONTINUED | OUTPATIENT
Start: 2020-10-23 | End: 2020-10-23

## 2020-10-23 RX ADMIN — POLYETHYLENE GLYCOL 3350 17 G: 17 POWDER, FOR SOLUTION ORAL at 13:11

## 2020-10-23 RX ADMIN — GABAPENTIN 300 MG: 300 CAPSULE ORAL at 08:41

## 2020-10-23 RX ADMIN — DOXAZOSIN 2 MG: 2 TABLET ORAL at 21:11

## 2020-10-23 RX ADMIN — Medication 2 CAPSULE: at 17:10

## 2020-10-23 RX ADMIN — OXYCODONE HYDROCHLORIDE AND ACETAMINOPHEN 1 TABLET: 7.5; 325 TABLET ORAL at 21:32

## 2020-10-23 RX ADMIN — GABAPENTIN 300 MG: 300 CAPSULE ORAL at 14:42

## 2020-10-23 RX ADMIN — ATORVASTATIN CALCIUM 40 MG: 40 TABLET, FILM COATED ORAL at 21:11

## 2020-10-23 RX ADMIN — AMIODARONE HYDROCHLORIDE 200 MG: 200 TABLET ORAL at 17:10

## 2020-10-23 RX ADMIN — LATANOPROST 1 DROP: 50 SOLUTION/ DROPS OPHTHALMIC at 21:12

## 2020-10-23 RX ADMIN — RIVAROXABAN 15 MG: 15 TABLET, FILM COATED ORAL at 17:10

## 2020-10-23 RX ADMIN — DORZOLAMIDE HYDROCHLORIDE AND TIMOLOL MALEATE 1 DROP: 20; 5 SOLUTION/ DROPS OPHTHALMIC at 21:06

## 2020-10-23 RX ADMIN — FERROUS SULFATE TAB EC 324 MG (65 MG FE EQUIVALENT) 324 MG: 324 (65 FE) TABLET DELAYED RESPONSE at 08:40

## 2020-10-23 RX ADMIN — DORZOLAMIDE HYDROCHLORIDE AND TIMOLOL MALEATE 1 DROP: 20; 5 SOLUTION/ DROPS OPHTHALMIC at 08:49

## 2020-10-23 RX ADMIN — METOPROLOL TARTRATE 50 MG: 50 TABLET, FILM COATED ORAL at 21:11

## 2020-10-23 RX ADMIN — METOPROLOL TARTRATE 50 MG: 50 TABLET, FILM COATED ORAL at 08:49

## 2020-10-23 RX ADMIN — PANTOPRAZOLE SODIUM 40 MG: 40 TABLET, DELAYED RELEASE ORAL at 06:16

## 2020-10-23 RX ADMIN — GABAPENTIN 300 MG: 300 CAPSULE ORAL at 21:11

## 2020-10-23 RX ADMIN — Medication 2 CAPSULE: at 08:40

## 2020-10-23 RX ADMIN — DULOXETINE HYDROCHLORIDE 30 MG: 30 CAPSULE, DELAYED RELEASE ORAL at 21:11

## 2020-10-23 RX ADMIN — LEVOTHYROXINE SODIUM 50 MCG: 0.05 TABLET ORAL at 06:16

## 2020-10-23 ASSESSMENT — PAIN SCALES - GENERAL
PAINLEVEL_OUTOF10: 0
PAINLEVEL_OUTOF10: 0
PAINLEVEL_OUTOF10: 9
PAINLEVEL_OUTOF10: 0

## 2020-10-23 ASSESSMENT — PAIN DESCRIPTION - ONSET: ONSET: SUDDEN

## 2020-10-23 ASSESSMENT — PAIN DESCRIPTION - LOCATION: LOCATION: LEG

## 2020-10-23 ASSESSMENT — PAIN DESCRIPTION - DESCRIPTORS: DESCRIPTORS: ACHING;DISCOMFORT

## 2020-10-23 ASSESSMENT — PAIN DESCRIPTION - PAIN TYPE: TYPE: ACUTE PAIN

## 2020-10-23 ASSESSMENT — PAIN DESCRIPTION - PROGRESSION: CLINICAL_PROGRESSION: NOT CHANGED

## 2020-10-23 ASSESSMENT — PAIN - FUNCTIONAL ASSESSMENT: PAIN_FUNCTIONAL_ASSESSMENT: ACTIVITIES ARE NOT PREVENTED

## 2020-10-23 ASSESSMENT — PAIN DESCRIPTION - ORIENTATION: ORIENTATION: LEFT

## 2020-10-23 ASSESSMENT — PAIN DESCRIPTION - FREQUENCY: FREQUENCY: INTERMITTENT

## 2020-10-23 NOTE — PROGRESS NOTES
Physical Therapy  Facility/Department: [address] REHAB  Daily Treatment Note / Discharge Note  NAME: Arline Augustine Sr.  : 1945  MRN: [id number]    Date of Service: 10/23/2020    Discharge Recommendations:  Continue to assess pending progress, Patient would benefit from continued therapy after discharge, Home with Home health PT, Home with assist PRN   PT Equipment Recommendations  Equipment Needed: Yes  Mobility Devices: Chadd Champagne: Rolling  Other: reacher    Assessment   Body structures, Functions, Activity limitations: Decreased functional mobility ; Decreased balance;Decreased posture;Decreased ROM; Decreased strength  Assessment: Pt tolerated treatment well this today. He is Modif I for transfers, bed mobility, curb step with RW, and ambulation with RW. Pt has increased difficulty when ascending/descending stairs, and is supervision. He was able to gain comfort ascending/descending stairs using BUE support on unilateral railing. Pt is adherent with spinal precautions when performing this technique. Pt performs stairs with improved steadiness and confidence with B railings and would be considered Modif I for stairs if he had B railings at home. When patient fatigues, his gait abnormalities become more pronounced, but he does well with household distances. Pt scored 15/15 on BIMS, and has met all goals with the exception of stairs where he is supervision. Pt continues to be below baseline, but he is safe to return home with RW and reacher. Pt would continue to benefit from skilled therapy at home to increase activity tolerance, strengthen muscles and improve functional mobility to return to independence. Treatment Diagnosis: Generalized muscle weakness, impaired mobility  Prognosis: Good  History: Per Dr. Rosendo Almeida note, \"Patient is a 67 yo M with pmh Afib, CAD, HTN, HLD, and recent lumbar spine surgery who initially presented from SNF on 2020 with fever 105 and altered mental status. Found to have sepsis due to UTI, urinary retention, metabolic encephalopathy, and acute hypoxic respiratory failure. Patient initially admitted to ARU (9/18-10/1). He made gradual progress and demonstrated some self-limiting behavior. On 9/28 he began to refuse therapies and most medical care. He stated a wish to die and planned to achieve this by not eating/drinking. Psychiatry was consulted. Dr. Joe Moss and I agreed that patient demonstrated imminent risk of harm to himself with ongoing suicidal ideation and plan. Therefore patient will be transferred to inpatient psychiatric unit 10/1. Patient reportedly did well with inpatient psychiatric care. Reeves was removed for void trial at inpatient psych. Patient unable to void and Reeves was replaced. Then developed fevers and was readmitted to acute medical floor at [facility] 10/7. Found to proteus UTI with bilateral hydronephrosis. Treated with ceftriaxone. Urology recommending TURP as outpatient. Course was further complicated by TONO, hyponatremia. \"  Exam: see above  Clinical Presentation: evolving  PT Education: Transfer Training;Energy Conservation; Adaptive Device Training;Equipment;Orientation;General Safety;Gait Training;Disease Specific Education  Barriers to Learning: motivation  REQUIRES PT FOLLOW UP: Yes  Activity Tolerance  Activity Tolerance: Patient limited by endurance; Patient limited by fatigue;Patient Tolerated treatment well     Patient Diagnosis(es): The encounter diagnosis was S/P spinal fusion. has a past medical history of Aneurysm, aortic ([address] Atrial fibrillation ([address] Glaucoma, Heart attack ([address] Hyperlipidemia, and Hypertension. has a past surgical history that includes Coronary angioplasty with stent; Colonoscopy; Eye surgery (Right, 7/23/2020); and Intracapsular cataract extraction (Right, 7/23/2020).     Restrictions  Position Activity Restriction  Spinal Precautions: No Bending, No Lifting, No Twisting  Other position/activity restrictions: Reeves catheter     Subjective   General  Chart Reviewed: Yes  Additional Pertinent Hx: Per Dr. Edison Christianson note, \"Patient is a 65 yo M with pmh Afib, CAD, HTN, HLD, and recent lumbar spine surgery who initially presented from SNF on 9/13/2020 with fever 105 and altered mental status. Found to have sepsis due to UTI, urinary retention, metabolic encephalopathy, and acute hypoxic respiratory failure. Patient initially admitted to ARU (9/18-10/1). He made gradual progress and demonstrated some self-limiting behavior. On 9/28 he began to refuse therapies and most medical care. He stated a wish to die and planned to achieve this by not eating/drinking. Psychiatry was consulted. Dr. Linn Barrow and I agreed that patient demonstrated imminent risk of harm to himself with ongoing suicidal ideation and plan. Therefore patient will be transferred to inpatient psychiatric unit 10/1. Patient reportedly did well with inpatient psychiatric care. Reeves was removed for void trial at inpatient psych. Patient unable to void and Reeves was replaced. Then developed fevers and was readmitted to acute medical floor at [facility] 10/7. Found to proteus UTI with bilateral hydronephrosis. Treated with ceftriaxone. Urology recommending TURP as outpatient. Course was further complicated by TONO, hyponatremia. \"  Response To Previous Treatment: Patient with no complaints from previous session. Family / Caregiver Present: No  Referring Practitioner: Dr. Rut Madrid: Pt met in therapy gym sitting in w/c. Pt states that he is feeling good this AM, but has concerns about going home tomorrow. Pt denies pain and states that his feet feel better this AM,  Pt is agreeable to PT. In afternoon pt states that he had a good lunch. Pt was met in therapy gym seated in w/c and was agreeable to PT.      Objective  (AM and PM sessions in together for discharge summary purposes)  Bed mobility  Bridging: Modified independent Ascend/descend stairs with nonreciprocal pattern with increased time, descending improved this date with one instance of slight knee buckling in LLE. Pt was able to recover from knee buckling without LoB. Pt had improved recall to ascend with RLE and descend with LLE with cuing. Therapist hooked catheter bag to patient's pocket as patient navigated stairs. Pt was taught how to ascend and descend stairs using BUE support on unilateral railing today. Pt had difficulty using BUE support and decreased confidence in the AM.  Pt struggled to lead with LLE when descending using modified strategy. Pt was able to complete 12 stairs this afternoon using this strategy again. Pt stated that he felt a lot more stable and when he stands up straight, he can bring LLE up/down easier. Pt performed with adherence to spinal precautions.     Wheelchair Activities  Wheelchair Parts Management: No  Propulsion: No          Other exercises  Other exercises 1: patient performed 12 minutes on NuStep with resistance at 3, seat at 8, bilateral UE at 8, and average SPM at 39     Goals  Short term goals  Time Frame for Short term goals: 5 days from 10/14/2020  Short term goal 1: Pt will perform bed mobility with supervision - Met  Short term goal 2: Pt will ascend/descend curb with SBA using LRAD - met  Short term goal 3: Pt will ascend/descend 8 stairs using R ascending railing with CGA - met  Short term goal 4: Pt will ambulate 150' using LRAD  with CGA - met  Short term goal 5: Pt will perform all transfers using LRAD with SBA - met  Long term goals  Time Frame for Long term goals : 7-10 days from 10/14/2020  Long term goal 1: Pt will perform bed mobillity with Modif I - met  Long term goal 2: Pt will ascend/descend curb using LRAD with Modif I - met  Long term goal 3: Pt will ascend/descend 12 stairs using R ascending railing with Modif I - not met ( pt is supervision for stairs)  Long term goal 4: Pt will ambulate 200' using LRAD with Modif I - met  Long term goal 5: Pt will perform all transfers using [address] Modif I - met  Patient Goals   Patient goals : \"I want to be independent and be able to walk on my own without a walker. I also want to get back to playing the stock market\"    Plan    Plan  Times per week: 5-6x/week  Times per day: Twice a day  Plan weeks: 1.5 weeks  Current Treatment Recommendations: Strengthening, Home Exercise Program, Safety Education & Training, Balance Training, Endurance Training, Functional Mobility Training, Equipment Evaluation, Education, & procurement, Transfer Training, Gait Training, Stair training  Safety Devices  Type of devices: All fall risk precautions in place, Gait belt, Left in chair, Patient at risk for falls  Restraints  Initially in place: No     Therapy Time   Individual Concurrent Group Co-treatment   Time In 0945         Time Out 1030         Minutes 45            Second Session Therapy Time     Individual Co-treatment   Time In 1345     Time Out 1430     Minutes 45        Electronically signed by JUDSON Ordoñez on 10/23/2020 at 3:48 PM   Therapist was present, directed the patient's care, made skilled judgement, and was responsible for assessment and treatment of the patient.         Electronically signed by Jose Ramon Her PT on 10/23/2020 at 4:36 PM

## 2020-10-23 NOTE — CARE COORDINATION
Daughter called back to states she has called Rosalba Zuniga to initiate contest.  Case Number:    [facility]  [id number]  LSW informed her of 12 prescriptions that were written by Dr Nayana Self. LSW offered to fill at [facility] or at their own pharmacy. She wants them filled at [facility].  Education given regarding next procedure for waiting. LSW informed her that once Rosalba Zuniga gives their final verdict, he will need to be discharged by 12 noon the next day.   Waiting to hear from [facility] for acceptance of referral.  [place], [place]     Case Management   [phone]    10/23/2020  2:13 PM

## 2020-10-23 NOTE — PROGRESS NOTES
Department of Physical Medicine & Rehabilitation  Progress Note    Patient Identification:  Ira Fontenot  [id number]  : 1945  Admit date: 10/13/2020    Chief Complaint: Debility    Subjective:   No acute events overnight. Patient seen this afternoon working with OT. Discussed plans for discharge tomorrow. Patient states he feels \"terrible\" about this. When asked about his specific concerns he mentions:  -Negotiating stairs - Currently stand-by assist for stairs. Advised that he have family with him when he enters home. They have already purchased a hospital bed so that he has a first floor set-up once inside.   -Preparing meals - Has practiced simple meal prep (e.g. making sandwich) with OT and done well. Advised that he stick with basics (like microwave meals, sandwiches, etc) initially. Also suggested he ask family and significant other help with meals initially. -Showering - Advised that he shower only with OT/HHA initially.   -Ongoing LLE pain/weakness - Discussed that this is a chronic issue related to his spine pathology. Still some potential for improvement as he continues to recover from recent spine surgery. Recommend continued therapy at home. Do not feel he needs to stay in the hospital for this. I provided reassurance to patient that it is normal to feel anxious about returning home after being in hospital for this long. Informed him of plans for home care, repeat labs, and physician follow-ups to help make sure he transitions back home safely. Based on stable medical issues and current functional level, I believe he will be safe to discharge home tomorrow. SW has informed me that patient's daughter is contesting discharge.      ROS: No f/c, n/v, cp     Objective:  Patient Vitals for the past 24 hrs:   BP Temp Temp src Pulse Resp SpO2 Weight   10/23/20 1119 (!) 101/59 -- -- 79 -- -- --   10/23/20 0936 118/67 -- -- 63 -- -- --   10/23/20 0848 119/73 -- -- 66 -- -- --   10/23/20 4247 (!) 164/78 97.1 °F (36.2 °C) Oral 58 20 95 % 165 lb 5.5 oz (75 kg)   10/22/20 2039 (!) 151/78 97.9 °F (36.6 °C) Oral 56 18 93 % --   10/22/20 2034 (!) 151/78 -- -- 56 -- -- --   10/22/20 1535 (!) 158/67 97.5 °F (36.4 °C) Oral 50 18 95 % --     Const: Alert. No distress, pleasant. HEENT: Normocephalic, atraumatic. Normal sclera/conjunctiva. MMM. CV: Regular rate and rhythm. Resp: No respiratory distress. Lungs CTAB. Abd: Soft, nontender, nondistended, NABS+   Ext: No edema. MSK: decreased spine ROM  Neuro: Alert, oriented, appropriately interactive. Relative LLE weakness (stable). Psych: Cooperative, appropriate mood and affect    Laboratory data: Available via EMR. Last 24 hour lab  Recent Results (from the past 24 hour(s))   Basic Metabolic Panel w/ Reflex to MG    Collection Time: 10/23/20  6:37 AM   Result Value Ref Range    Sodium 134 (L) 136 - 145 mmol/L    Potassium reflex Magnesium 4.1 3.5 - 5.1 mmol/L    Chloride 101 99 - 110 mmol/L    CO2 24 21 - 32 mmol/L    Anion Gap 9 3 - 16    Glucose 83 70 - 99 mg/dL    BUN 27 (H) 7 - 20 mg/dL    CREATININE 1.4 (H) 0.8 - 1.3 mg/dL    GFR Non- 49 (A) >60    GFR  59 (A) >60    Calcium 8.5 8.3 - 10.6 mg/dL       Therapy progress:  PT  Position Activity Restriction  Spinal Precautions: No Bending, No Lifting, No Twisting  Other position/activity restrictions: Reeves catheter  Objective     Sit to Stand: Modified independent  Stand to sit: Modified independent  Bed to Chair: Modified independent  Device: [address]: Modified Independent  Distance: 220' with 3 turns  OT  PT Equipment Recommendations  Equipment Needed: Yes  Mobility Devices: Farrukh Singha: Rolling  Other: will continue to assess pending equipment needs  Toilet - Technique: Ambulating(RW)  Equipment Used: Grab bars  Toilet Transfers Comments: RW><comfort height toilet, R & L grab bars. No LOB or SOB noted.  Pt only modified independent D/T using grab bars. Assessment        SLP          Body mass index is 24.42 kg/m². Assessment and Plan:    Impairments: generalized weakness + LLE weakness, BLE sensory deficit, decreased endurance, balance     Debility  -PT/OT     Proteus UTI  -In setting of urinary retention  -Completed ceftriaxone     Urinary retention  -Maintain Reeves until outpatient f/u with Urology. Anticipate need for TURP      TONO on CKD III  -Addressed urinary retention as above  -Avoid nephrotoxins, renally dose meds  -Nephrology consulted, appreciate input  --continue to encourage po intake  --repeat US with resolved hydronephrosis  --Cr now stable 1.4. Will have home care RN repeat labs in 1 week.      Lumbar stenosis s/p recent L4-S1 ALIF/PSF (8/31 with Dr. Mahesh Bolivra and Dr. Carlos De Los Santos)  -Incisions healing well without evidence of infection  -PT/OT     Acute blood loss anemia  -Due to recent left pelvic hematoma  -Hgb remains stable ~8 after resuming Xarelto  -Iron supplement increased     Afib  -Controlled on Amiodarone, metoprolol  -Xarelto     CAD  -statin, bb, no ARB due to TONO     HTN  -metoprolol, doxazosin added     AAA  -Repeat imaging in 5 years recommended     COPD  -No acute exacerbation  -supplemental O2 prn, currently on RA     Hypothyroidism  -levothyroxine    Hand tremors  -Possible related to debility/fatigue. Possible psychogenic component. -TSH and T4 wnl  -Has been refusing Ritalin. Cymbalta decreased by Dr. Josiah Parish. -Reports some improvement.      Depression  -Recently at inpatient psych due to suicidal ideation. Now improved. -Duloxetine  -Patient refusing methylphenidate     Bladder   -See above     Bowel   -High risk constipation   -senna+colace BID, miralax daily, prn MoM, and bisacodyl supp.     Pain control  -Duloxetine, gabapentin, prn Percocet     PPx  -DVT: Xarelto  -GI: pantoprazole, probiotics       Rehab Progress: Making progress. Working on functional mobility, balance, endurance, strength. Anticipated Dispo: home alone, prn assist from family  Services: [facility] Pt, OT, RN, Aide  DME: rolling walker, shower chair, [facility]  ELOS: 10/24    Rena E Melissa Albert.  Nadeem Ronquillo MD 10/23/2020, 12:00 PM

## 2020-10-23 NOTE — CARE COORDINATION
LSW rec'd call back from dgtr who wants a referral to go to [facility]. REferral sent.   Osman Mike     Case Management   [phone]    10/23/2020  8:34 AM

## 2020-10-23 NOTE — PROGRESS NOTES
Patient emptied mendoza with few cues, reviewed mendoza care. Told nurse could not go home because he cannot walk, doing well with ambulation. Made aware of discharge plan states daughter is still working on placement.

## 2020-10-23 NOTE — CARE COORDINATION
Aster/Gabriel received referral from  for [address]. Orders & PT Notes received. FOR WEEKEND DISCHARGE:  The ordered item(s) can be dispensed from the [address] [place] [address] Gym to the patient on the day of discharge. Dispensing instructions are attached to the equipment.     Thank you for the referral.  Electronically signed by Sheldon Corbett on 10/23/2020 at 2:28 PM  Cell ph# [phone]

## 2020-10-23 NOTE — PROGRESS NOTES
Office: [phone]       Fax: [phone]      Nephrology Progress Note        Patient's Name: Gabrielle Villalobos. Admit Date: 10/13/2020  Date of Visit: 10/23/2020    Reason for Consult:  TONO/CKD      Subjective: Gabrielle Villalobos is a 66 y.o. male with PMHx of hypertension, atrial fibrillation, AAA,  coronary artery disease, CHF, status post LS spine surgery who was hospitalized in ARU on 10/13/2020 with comprehensive rehab. Pt was hospitalized at [facility] in September for sepsis secondary to UTI. Had false urethral passage, was seen by urology. Currently has Reeves in place. Was briefly in ARU before discharged to behavioral unit. Reeves replaced on 10/2. Subsequently had UTI with Proteus. CT abdomen was obtained which showed bilateral hydronephrosis despite having a Reeves in place. initially placed on IV Merrem, changed to Rocephin. Urology recommended TURP as outpatient. Had peak creat of 1.6 that improved to 1.1  No back pain, no obvious hematuria  NSAID use: Denies   IV contrast: None recent   Home meds reviewed     INTERVAL HISTORY    Feels well  Shortness of breath: No   UOP: Fair  Creat: stable  BP high    Medications: Allergies:  Azithromycin; Brimonidine; Clindamycin/lincomycin;  Penicillins; and Simvastatin    Scheduled Meds:   rivaroxaban  15 mg Oral Daily    doxazosin  1 mg Oral Nightly    DULoxetine  30 mg Oral Nightly    atorvastatin  40 mg Oral Nightly    dorzolamide-timolol  1 drop Both Eyes BID    ferrous sulfate  324 mg Oral Daily with breakfast    gabapentin  300 mg Oral TID    latanoprost  1 drop Left Eye Nightly    levothyroxine  50 mcg Oral Daily    metoprolol tartrate  50 mg Oral BID    pantoprazole  40 mg Oral Daily    polyethylene glycol  17 g Oral Daily    sennosides-docusate sodium  1 tablet Oral BID    lactobacillus  2 capsule Oral BID     amiodarone  200 mg Oral Daily     Continuous Infusions:    Labs:  CBC:   Recent Labs     10/22/20  0601   WBC 3.8*   HGB 7.8*        Ca/Mg/Phos:   Recent Labs     10/21/20  0509 10/22/20  0601 10/23/20  0637   CALCIUM 8.4 8.1* 8.5       Objective:     Vitals: BP (!) 164/78   Pulse 58   Temp 97.1 °F (36.2 °C) (Oral)   Resp 20   Ht 5' 9\" (1.753 m)   Wt 165 lb 5.5 oz (75 kg)   SpO2 95%   BMI 24.42 kg/m²    Wt Readings from Last 3 Encounters:   10/23/20 165 lb 5.5 oz (75 kg)   10/13/20 156 lb 3.2 oz (70.9 kg)   09/30/20 187 lb 2.7 oz (84.9 kg)      24HR INTAKE/OUTPUT:      Intake/Output Summary (Last 24 hours) at 10/23/2020 0847  Last data filed at 10/23/2020 1304  Gross per 24 hour   Intake 480 ml   Output 1150 ml   Net -670 ml     Constitutional:  awake, NAD  HEENT:  MMM, No icterus  Neck: no bruits, No JVD  Cardiovascular:  ireg rhythm  Respiratory: CTA, no crackles  Abdomen:  +BS, soft, NT, ND  Ext: no lower extremity edema  CNS: alert, no agitation    IMAGING:  US RENAL COMPLETE   Final Result   The previously seen hydronephrosis has resolved. Bilateral renal cysts including a cyst containing some debris within the left   kidney, unchanged compared to prior. Assessment :     1. TONO  -Non-Oliguric  -Baseline creat: 1-1. 1. previous TONO with peak 1.6. Now 1.4->1.6->1.5--->1.4  -UA dilute, bld mod, RBC 2. FeUrea 39.6% (10/16/2020)  -Renal US (10/9): mild bilateral hydronephrosis, 7 mm intravesicular calculus  -Renal US (10/20): no hydro  -Volume: Euvolemic  -Electrolytes: No Dyskalemia  -Acid-Base: Metabolic alkalosis resolved  -initial TONO due to UTI, obstructive uropathy. Patient had post obstructive diuresis ( about 9 L UOP on 10/11, 10/12). BUN started to rise on 10/13. Ur still dilute. Likely hypoperfusion.  Possibility of AIN in diff, now off antibiotics     Recent Labs     10/21/20  0509 10/22/20  0601 10/23/20  0637   BUN 29* 27* 27* CREATININE 1.4* 1.5* 1.4*     Recent Labs     10/21/20  0509 10/22/20  0601 10/23/20  0637    134* 134*   K 3.8 3.7 4.1   CO2 25 23 24       2. HTN  -Blood pressure high   -Doxazocin added (has history of BPH)    BP Readings from Last 1 Encounters:   10/23/20 (!) 164/78     3. CAD/CHF  -TTE (sep 2020): LVEF 50%, G1DD, AAA 5.6 cm    4. Urinary retention  -recent UTIs  -BPH, has Reeves    5. atrial fibrillation   -on anticoagulation     6. Anemia  -recent blood loss    Plan:      - increase Doxazocin   - no IVF, encouraged PO fluids  - Avoid ACEI/ARB  - monitor BMP    -Monitor I/O, UOP  -Maintain MAP>65  -Avoid nephrotoxin, if able. -Dose meds to current eGFR    Thank you for allowing us to participate in care of Claude Clifton. . We will continue to follow. Feel free to contact me with any questions.       Lazarus Kempf  10/23/2020    [facility] [address]  Office : [phone]  Fax :[phone]

## 2020-10-23 NOTE — PROGRESS NOTES
Handouts given for mendoza care, informed patient plan for TURP later after discharge, \" no one ever told me about that\".

## 2020-10-23 NOTE — PLAN OF CARE
Agree  I saw and evaluated this patient. I discussed this case with the resident. I agree with the Resident's plan.     Problem: Pain:  Goal: Pain level will decrease  Description: Pain level will decrease  10/23/2020 1028 by Alberto Love RN  Outcome: Ongoing  Note: Has denied pain thus far, monitor  10/23/2020 0052 by Yue Rodgers RN  Outcome: Ongoing  Goal: Control of acute pain  Description: Control of acute pain  10/23/2020 0052 by Yue Rodgers RN  Outcome: Ongoing  Goal: Control of chronic pain  Description: Control of chronic pain  10/23/2020 0052 by Yue Rodgers RN  Outcome: Ongoing     Problem: Skin Integrity:  Goal: Will show no infection signs and symptoms  Description: Will show no infection signs and symptoms  10/23/2020 1028 by Alberto Love RN  Note: Incisions healing, heel look good. Continue education for skin care. 10/23/2020 0052 by Yue Rodgers RN  Outcome: Ongoing  Goal: Absence of new skin breakdown  Description: Absence of new skin breakdown  10/23/2020 0052 by Yue Rodgers RN  Outcome: Ongoing     Problem: Falls - Risk of:  Goal: Will remain free from falls  Description: Will remain free from falls  10/23/2020 1028 by Alberto Love RN  Note: Patient told nurse he can't walk, does very well with walker. Monitor safety. 10/23/2020 0052 by Yue Rodgers RN  Outcome: Ongoing  Note: Fall risk band on patient. Orange light on outside of room. Non skid footwear in place. Alarms used appropriately. Patient instructed to call and wait for staff before getting up. Rounding done to anticipate needs. Appropriate safety devices used for transfers. Goal: Absence of physical injury  Description: Absence of physical injury  10/23/2020 0052 by Yue Rodgers RN  Outcome: Ongoing     Problem: Nutrition  Goal: Optimal nutrition therapy  10/23/2020 1028 by Alberto Love RN  Note: Intake good for breakfast, takes supplement. 10/23/2020 0052 by Yue Rodgers RN  Outcome: Ongoing     Problem:  Activity Intolerance:  Goal: Ability to tolerate increased activity will improve  Description: Ability to tolerate increased activity will improve  10/23/2020 1028 by Peter Paiz RN  Note: No breathing issues noted. Monitor. 10/23/2020 0052 by Caroleen Halsted, RN  Outcome: Ongoing     Problem: HEMODYNAMIC STATUS  Goal: Patient has stable vital signs and fluid balance  10/23/2020 1028 by Peter Paiz RN  Outcome: Ongoing  Note: Reviewed fluid overload. 10/23/2020 0052 by Caroleen Halsted, RN  Outcome: Ongoing     Problem: FLUID AND ELECTROLYTE IMBALANCE  Goal: Fluid and electrolyte balance are achieved/maintained  10/23/2020 0052 by Caroleen Halsted, RN  Outcome: Ongoing     Problem: IP BLADDER/VOIDING  Goal: STG - Patient demonstrates ability to take care of indwelling catheter  10/23/2020 1028 by Peter Paiz RN  Outcome: Ongoing  Note: Reviewed mendoza care, patient states wants to stay on Rehab.  10/23/2020 0052 by Caroleen Halsted, RN  Outcome: Ongoing     Problem: Infection:  Goal: Will remain free from infection  Description: Will remain free from infection  10/23/2020 1028 by Peter Paiz RN  Outcome: Ongoing  Note: Continue green wipes and review of mendoza care. 10/23/2020 0052 by Caroleen Halsted, RN  Outcome: Ongoing     Problem: Daily Care:  Goal: Daily care needs are met  Description: Daily care needs are met  10/23/2020 1028 by Peter Paiz RN  Outcome: Ongoing  Note: Leticia Dub well but voices concern that will not be able to take care of self. 10/23/2020 0052 by Caroleen Halsted, RN  Outcome: Ongoing     Problem: Discharge Planning:  Goal: Patients continuum of care needs are met  Description: Patients continuum of care needs are met  10/23/2020 1028 by Peter Paiz RN  Outcome: Ongoing  Note: Request to stay longer on Rehab unit, reviewed discharge plan.   10/23/2020 0052 by Caroleen Halsted, RN  Outcome: Ongoing     Problem: Musculor/Skeletal Functional Status  Goal: Highest potential functional level  10/23/2020 0052 by Caroleen Halsted, RN  Outcome: Ongoing  Goal: Absence of falls  10/23/2020 0052 by Michelle Han Nick Sharpe, RN  Outcome: Ongoing

## 2020-10-23 NOTE — PROGRESS NOTES
Occupational Therapy  Facility/Department: [address] REHAB  Daily Treatment Note    AM and PM Sessions:  NAME: Jose Miguel Dominguez Sr.  : 1945  MRN: [id number]    Date of Service: 10/23/2020    Discharge Recommendations:  S Level 1, Patient would benefit from continued therapy after discharge, 24 hour supervision or assist  OT Equipment Recommendations  Other: If going home, recommend shower chair, handheld shower, reacher, grab bars in shower, TSF. Pt refusing TSF despite typically relies significantly on grab bars/TSF for safe toilet transfer. Assessment   Performance deficits / Impairments: Decreased functional mobility ; Decreased ADL status; Decreased balance;Decreased safe awareness;Decreased endurance;Decreased high-level IADLs;Decreased strength  Assessment: Pt able to set up own clothes & amb ><bathroom using RW no SOB or LOB noted. Pt had LOB in stance when pull pants/depends up @ grab bars. Pt required signifcant cuing for catheter management during LB dressing, transfers, & cleaning. Despite numerous demonstrations & assist to empty catheter, pt could not complete successfully. Pt has been educated & practiced catheter management in previous sessions but did not remember any of this during today's ADL session. Pt appears to have lack of motivation to tasks independently & question whether pt wants to be able to manage catheter on his own. Pt appeared unsteady during LB bathing in shower using grab bars. Pt asks for placement as SNF as he does not have support at home to complete ADL tasks safely. Recommend 24 hour supervision at D/C. Treatment Diagnosis: Impaired: ADL/IADL function, balance, functional mobility/transfers, strength, safety awareness, activity tolerance.   Prognosis: Good  OT Education: OT Role;Plan of Care;Equipment;ADL Adaptive Strategies;Precautions;Transfer Training  Patient Education: Catheter cleaning using green wipes, threading catheter though LB dressing  Barriers to Learning: Some trouble remembering what he was told in previous sessions about catheter. REQUIRES OT FOLLOW UP: Yes  Activity Tolerance  Activity Tolerance: Patient Tolerated treatment well;Patient limited by pain  Activity Tolerance: Pt c/o of pain in LLE during LB dressing  Safety Devices  Safety Devices in place: Yes  Type of devices: Call light within reach; Chair alarm in place; Left in chair;Gait belt;Nurse notified; Patient at risk for falls         Patient Diagnosis(es): The encounter diagnosis was S/P spinal fusion. has a past medical history of Aneurysm, aortic ([address] Atrial fibrillation ([address] Glaucoma, Heart attack ([address] Hyperlipidemia, and Hypertension. has a past surgical history that includes Coronary angioplasty with stent; Colonoscopy; Eye surgery (Right, 7/23/2020); and Intracapsular cataract extraction (Right, 7/23/2020). Restrictions  Position Activity Restriction  Spinal Precautions: No Bending, No Lifting, No Twisting  Other position/activity restrictions: Reeves catheter  Subjective   General  Chart Reviewed: Yes, Progress Notes  Patient assessed for rehabilitation services?: Yes  Additional Pertinent Hx: Per Dr. Dong Heath 10/14/2020 note, \"Patient is a 67 yo M with pmh Afib, CAD, HTN, HLD, and recent lumbar spine surgery who initially presented from SNF on 9/13/2020 with fever 105 and altered mental status. Found to have sepsis due to UTI, urinary retention, metabolic encephalopathy, and acute hypoxic respiratory failure. \"  Response to previous treatment: Patient reporting fatigue but able to participate  Family / Caregiver Present: No  Referring Practitioner: Dr. Marsha Stephens  Diagnosis: Urinary Tract Infection  Subjective  Subjective: Pt met in room, sitting up in bed, pt denied pain but stated his feet feel numb this morning. Pt agreed to shower ADL session.       Orientation  Orientation  Overall Orientation Status: Within Functional Limits  Objective    ADL  Equipment Provided: Long-handled sponge;Sock aid  Grooming: Modified independent (Brushed teeth, combed hair seated at sink.)  UE Bathing: Modified independent (Bathed all parts seated on shower chair.)  LE Bathing: Stand by assistance(Bahted front shelbie area seated, in stance using grab bars for buttocks, Pt appeared unsteady. Pt used long handle sponge for below knees & feet.)  UE Dressing: Modified independent (Performed own setup from RW, donned/doffed long sleeve buttondown shirt seated)  LE Dressing: Contact guard assistance; Increased time to complete(Pt required significant verabl cues for threading catheter through pants/depends, taking increased time. Pt had LOB in stance pulling up depends & pants. Pt doffed socks using reacher, donned socks using sock aid. Donned slip on shoes.)  Toileting: Other (Comment)(Pt attempted BM but no success, able to pull down/up pants.)  Additional Comments: Pt wanted to empty catheter after his shower. OT showed pt how to unclip catheter numerous times but pt continued to fumble with clip and required physical assist to complete. Pt required significant cues for threading catheter through pants & depends despite being taught this numerous times previously. Pt left seated in recliner w/ tray & needs in reach. Balance  Sitting Balance: Modified independent   Standing Balance: Supervision(w/RW)  Standing Balance  Time: ~30 seconds x2 @ grab bars to pull pants up /down, ~1:00 minutes for bathing buttocks @ grab bars in shower. Activity: ADL tasks  Comment: LOB noted during LB dressing to pull pants up at grab bars. Functional Mobility  Functional - Mobility Device: Rolling Walker  Activity: To/from bathroom  Assist Level: Modified independent   Functional Mobility Comments: RW><bathroom, supervision. No LOB or SOB noted. Pt appeared to be in some pain when amb short distance.   Toilet Transfers  Toilet - Technique: Ambulating(RW)  Equipment Used: Grab bars  Toilet Transfer: Modified independent  Toilet Transfers Comments: RW><comfort height toilet, R & L grab bars. No LOB or SOB noted. Pt only modified independent D/T using grab bars. Shower Transfers  Shower - Transfer From: [place] - Transfer Type: To and From  Shower - Transfer To: Shower seat with back  Shower - Technique: Ambulating(RW)  Shower Transfers: Supervision  Shower Transfers Comments: Required cues for sit & swing technique on shower chair. Wheelchair Bed Transfers  Wheelchair/Bed - Technique: Ambulating(RW)  Equipment Used: Bed  Level of Asssistance: Supervision  Wheelchair Transfers Comments: Required cues for managing catheter. Bed mobility  Rolling to Left: Modified independent  Scooting: Modified independent  Comment: HOB elevated, pt already sitting up. Pt used L hand rail to swing legs over & scoot to EOB. Cognition  Cognition Comment: Pt appeared to be intentionally stalling during ADL tasks & required excessive max cues to complete LB tasks regarding catheter. Pt struggling to release catheter clip & question whether pt does not want to be able to manage catheter. Pt appears to lack motivation to management catheter & appears to \"forget\" it exists. Second Session    Pt met in room, nurse present, pt transferred from bed>w/c using RW supervision. Pt was [facility] to [facility] via w/c by S/OT. Pt denied pain. Education:   Dr. Vickey James present to speak with pt about POC after D/C. Pt discussed concerns about going home vs going to SNF. He feels he is not ready & needs SNF but he is functionaing @ supervision level and does not really qualify for SNF. Pt still not confident about going home D/T the amount of stairs he has in his home & managing catheter. Pt insists on staying at [facility] for a little bit longer to get better before going home. Discussed the importance of family helping out during this time.  Pt states he has no problem asking for help, yet is reluctant to ask family for help. Pt expresses concern about managing catheter at home & if something were to happen to the catheter during the night. Pt educated that home therapy and home nurse aide would be coming to help him adjust to the changes he would make at home as pt was concerned about the DME & home changes he needs to make for safety. Specifically discussed home OT/home health aid able to assist pt with showering  should not attempt that himself. Pt expressed concern about falling as he has had falls in the past. Discussed getting life alert & educated that life alert can contact both family and medical personal.     Transfer:   W/c><RW><comfort height toilet, SBA. Pt forgot to move catheter from w/c to RW & required cue to do so. Pt used sink and wall on either side of toilet to simulate what pt would have at home as pt denies the need for a TSF despite relying on grab bars by toilet in pt room. Pt able to pull pants up/down in stance @ RW, no LOB or SOB noted, SBA. Pt amb short distance out of bathroom to w/c. Pt required cue to move catheter from RW to w/c. Assessment:  Pt still not confident in going home after D/C & stated he would like to stay at [facility] rehab for a little bit longer to get stronger. Pt appears anxious about going home despite education about home OT & home nursing aide. Pt educated on DME & the importance of family support to have a safe D/C home. Pt performed toilet transfer using sink & wall as pt refuses to get grab bars & TSF. Pt able to pull pants up/down in stance @ RW, no LOB or SOB. Pt left seated in w/c, in care of student PT, Ginger Rose.       Safety Device - Type of devices:  []  All fall risk precautions in place [] Bed alarm in place  [] Call light within reach [] Chair alarm in place [] Positioning belt [x] Gait belt [] Patient at risk for falls [] Left in bed [] Left in chair [] Telesitter in use [] Sitter present [] Nurse notified []  None                            Plan Plan  Times per week: 5-6x week  Times per day: Twice a day  Plan weeks: 1-1.5 weeks  Specific instructions for Next Treatment: Reinforce cather care/cleaning & management  Current Treatment Recommendations: Strengthening, Safety Education & Training, Balance Training, Self-Care / ADL, Neuromuscular Re-education, Endurance Training, Functional Mobility Training, Equipment Evaluation, Education, & procurement, Home Management Training  Plan Comment: Pt asking to D/C to SNF. Recommend home w/ supervision vs SNF with continued OT. Goals  Short term goals  Time Frame for Short term goals: 1-1.5 weeks  Short term goal 1: Pt will bathe modified independently using AE & shower chair. Short term goal 2: Pt will dress modified independently including mendoza catheter, using AE. NOT MET. Short term goal 3: Pt will toilet modified independently using grab bars/TSF and/or manage catheter care. Short term goal 4: Pt will complete functional transfers/mobility modified independently using RW & grab bars. Short term goal 5: Pt will complete a simple cooking task in the kitchen standing for 10 minutes modified independent. Short term goal 6: Pt will complete BUE HEP in order to strengthen UB/increase activity tolerance to complete IADL cleaning tasks around the house modified independent. Long term goals  Time Frame for Long term goals : LTG=STG  Patient Goals   Patient goals : Pt stated \"I want to get walking so I can go home and do things around the house on my own. \" Above goals will work towards this goal.       Therapy Time   Individual Concurrent Group Co-treatment   Time In 0715         Time Out 0830         Minutes 75         Timed Code Treatment Minutes: 75 Minutes     Therapy Time     Individual Co-treatment   Time In 7325     Time Out 3602 [facility] S/OT  Therapist was present, directed patients care, made skilled judgement, and was responsible for assessment and treatment of the

## 2020-10-23 NOTE — DISCHARGE INSTR - COC
Continuity of Care Form    Patient Name: Servando Mulligan   :  1945  MRN:  [id number]    Admit date:  10/13/2020  Discharge date:  10/25/2020    Code Status Order: DNR-CCA   Advance Directives:   [facility] Documentation       Date/Time Healthcare Directive Type of Healthcare Directive Copy in [facility] [address] Agent's Name Healthcare Agent's Phone Number    10/19/20 8134  --  --  --  --  --  [phone]    10/19/20 0921  Yes, patient has an advance directive for healthcare treatment  Health care treatment directive  Yes, copy in chart  Adult Naom[place] 63  --    10/13/20 [facility]  Yes, patient has an advance directive for healthcare treatment  --  --  --  --  --            Admitting Physician:  Gela Luciano MD  PCP: Eloy Wilkinson    Discharging Nurse: [facility] Unit/Room#: [id number]/2892-54  Discharging Unit Phone Number: [phone]    Emergency Contact:   Extended Emergency Contact Information  Primary Emergency Contact: Gordon Merino Phone: [phone]  Mobile Phone: [phone]  Relation: Child  Secondary Emergency Contact: [facility] David  Mobile Phone: [phone]  Relation: Child    Past Surgical History:  Past Surgical History:   Procedure Laterality Date    COLONOSCOPY      CORONARY ANGIOPLASTY WITH STENT PLACEMENT      X 2    EYE SURGERY Right 2020    GONIOTOMY performed by Ana Rosa Salgado MD at [place] [address] Right 2020    Phacoemulsification with intraocular lens implant performed by Ana Rosa Salgado MD at [address] [place] [address]       Immunization History: There is no immunization history on file for this patient.     Active Problems:  Patient Active Problem List   Diagnosis Code    Acute metabolic encephalopathy B42.14    Pelvic hematoma in male N50.1    Acute renal failure superimposed on chronic kidney disease ([facility] N17.9, N18.9    Elevated LFTs R79.89    Abnormal chest x-ray R93.89    Acute cystitis without hematuria N30.00    Debility R53.81    Suicide attempt ([facility] T14.91XA    Depression F32.9    MDD (major depressive disorder), recurrent episode [facility] F33.9    Severe protein-calorie malnutrition ([facility] E43    PAF (paroxysmal atrial fibrillation) ([facility]) I48.0    Hypothyroidism E03.9    Coronary artery disease involving native heart without angina pectoris I25.10    Urinary retention R33.9    Retroperitoneal hematoma R30.8    Complicated UTI (urinary tract infection) N39.0    Chronic obstructive pulmonary disease ([facility]) J44.9    Constipation K59.00    Near syncope R55       Isolation/Infection:   Isolation            No Isolation          Patient Infection Status       Infection Onset Added Last Indicated Last Indicated By Review Planned Expiration Resolved Resolved By    None active    Resolved    COVID-19 Rule Out 10/12/20 10/12/20 10/12/20 COVID-19 (Ordered)   10/12/20 Rule-Out Test Resulted    COVID-19 Rule Out 09/30/20 09/30/20 09/30/20 COVID-19 (Ordered)   09/30/20 Rule-Out Test Resulted    C-diff Rule Out 09/15/20 09/15/20 09/15/20 Clostridium Difficile Toxin/Antigen (Ordered)   09/18/20 Rhys Márquez RN    COVID-19 Rule Out 09/13/20 09/13/20 09/13/20 COVID-19 (Ordered)   09/15/20 Rule-Out Test Resulted    COVID-19 Rule Out 09/13/20 09/13/20 09/13/20 COVID-19 (Ordered)   09/13/20 Rule-Out Test Resulted            Nurse Assessment:  Last Vital Signs: BP (!) 101/59   Pulse 79   Temp 97.1 °F (36.2 °C) (Oral)   Resp 20   Ht 5' 9\" (1.753 m)   Wt 165 lb 5.5 oz (75 kg)   SpO2 95%   BMI 24.42 kg/m²     Last documented pain score (0-10 scale): Pain Level: 0  Last Weight:   Wt Readings from Last 1 Encounters:   10/23/20 165 lb 5.5 oz (75 kg)     Mental Status:  oriented and alert    IV Access:  - None    Nursing Mobility/ADLs:  Walking   Assisted  Transfer  Assisted  Bathing  Assisted  Dressing  Assisted  Toileting  Assisted  Feeding Independent  Med Admin  Assisted  Med Delivery   whole    Wound Care Documentation and Therapy:        Elimination:  Continence:   · Bowel: Yes  · Bladder: Reeves catheter in place  Urinary Catheter: Insertion 10/2/2020  Colostomy/Ileostomy/Ileal Conduit: No       Date of Last BM:     Intake/Output Summary (Last 24 hours) at 10/23/2020 1205  Last data filed at 10/23/2020 0936  Gross per 24 hour   Intake 930 ml   Output 850 ml   Net 80 ml     I/O last 3 completed shifts: In: 5 [P.O.:720]  Out: 1150 [Urine:1150]    Safety Concerns: At Risk for Falls    Impairments/Disabilities:      None    Nutrition Therapy:  Current Nutrition Therapy:   - Oral Diet:  General    Routes of Feeding: Oral  Liquids: Thin Liquids  Daily Fluid Restriction: no  Last Modified Barium Swallow with Video (Video Swallowing Test): not done    Treatments at the Time of Hospital Discharge:   Respiratory Treatments:   Oxygen Therapy:  is not on home oxygen therapy.   Ventilator:    - No ventilator support    Rehab Therapies: Physical Therapy and Occupational Therapy  Weight Bearing Status/Restrictions: No weight bearing restirctions  Other Medical Equipment (for information only, NOT a DME order):  walker  Other Treatments:     Patient's personal belongings (please select all that are sent with patient):  Glasses    RN SIGNATURE:  Electronically signed by Evonne Herrera RN on 10/25/20 at 8:54 AM EDT    CASE MANAGEMENT/SOCIAL WORK SECTION    Inpatient Status Date: 10-    Readmission Risk Assessment Score:  Readmission Risk              Risk of Unplanned Readmission:        28           Discharging to Facility/ Agency   · Name:       [facility]  · Address:  · Phone:      [phone]  · Fax:          [phone]      / signature: Osman Baker     Case Management   [phone]    10/23/2020  2:24 PM      PHYSICIAN SECTION    Prognosis: Good    Condition at Discharge: Stable    Rehab Potential (if transferring to Rehab): Good    Recommended Labs or Other Treatments After Discharge:   Home care PT, OT, RN, aide  Repeat CBC and BMP in 1 week  Follow-up with PCP in 1 week  Follow-up with Urology in 1-2 weeks  Follow-up with Nephrology in 2 weeks  Follow-up with Spine Surgery in 2 weeks  Establish care with Psychiatry ASAP    Physician Certification: I certify the above information and transfer of Erika Navarro  is necessary for the continuing treatment of the diagnosis listed and that he requires Home Care for less 30 days.      Update Admission H&P: No change in H&P    PHYSICIAN SIGNATURE:  Electronically signed by Fox Amaya MD on 10/23/20 at 12:07 PM EDT

## 2020-10-24 PROCEDURE — 6370000000 HC RX 637 (ALT 250 FOR IP): Performed by: PHYSICAL MEDICINE & REHABILITATION

## 2020-10-24 PROCEDURE — 6370000000 HC RX 637 (ALT 250 FOR IP): Performed by: HOSPITALIST

## 2020-10-24 PROCEDURE — 94760 N-INVAS EAR/PLS OXIMETRY 1: CPT

## 2020-10-24 PROCEDURE — 1280000000 HC REHAB R&B

## 2020-10-24 RX ADMIN — DOCUSATE SODIUM 50 MG AND SENNOSIDES 8.6 MG 1 TABLET: 8.6; 5 TABLET, FILM COATED ORAL at 20:21

## 2020-10-24 RX ADMIN — Medication 2 CAPSULE: at 07:55

## 2020-10-24 RX ADMIN — GABAPENTIN 300 MG: 300 CAPSULE ORAL at 20:21

## 2020-10-24 RX ADMIN — DULOXETINE HYDROCHLORIDE 30 MG: 30 CAPSULE, DELAYED RELEASE ORAL at 20:21

## 2020-10-24 RX ADMIN — POLYETHYLENE GLYCOL 3350 17 G: 17 POWDER, FOR SOLUTION ORAL at 07:55

## 2020-10-24 RX ADMIN — Medication 2 CAPSULE: at 17:03

## 2020-10-24 RX ADMIN — DORZOLAMIDE HYDROCHLORIDE AND TIMOLOL MALEATE 1 DROP: 20; 5 SOLUTION/ DROPS OPHTHALMIC at 07:57

## 2020-10-24 RX ADMIN — DOXAZOSIN 2 MG: 2 TABLET ORAL at 20:21

## 2020-10-24 RX ADMIN — AMIODARONE HYDROCHLORIDE 200 MG: 200 TABLET ORAL at 17:03

## 2020-10-24 RX ADMIN — FERROUS SULFATE TAB EC 324 MG (65 MG FE EQUIVALENT) 324 MG: 324 (65 FE) TABLET DELAYED RESPONSE at 07:55

## 2020-10-24 RX ADMIN — GABAPENTIN 300 MG: 300 CAPSULE ORAL at 07:55

## 2020-10-24 RX ADMIN — GABAPENTIN 300 MG: 300 CAPSULE ORAL at 13:38

## 2020-10-24 RX ADMIN — LEVOTHYROXINE SODIUM 50 MCG: 0.05 TABLET ORAL at 05:39

## 2020-10-24 RX ADMIN — RIVAROXABAN 15 MG: 15 TABLET, FILM COATED ORAL at 17:03

## 2020-10-24 RX ADMIN — LATANOPROST 1 DROP: 50 SOLUTION/ DROPS OPHTHALMIC at 20:22

## 2020-10-24 RX ADMIN — ATORVASTATIN CALCIUM 40 MG: 40 TABLET, FILM COATED ORAL at 20:21

## 2020-10-24 RX ADMIN — METOPROLOL TARTRATE 50 MG: 50 TABLET, FILM COATED ORAL at 07:55

## 2020-10-24 RX ADMIN — METOPROLOL TARTRATE 50 MG: 50 TABLET, FILM COATED ORAL at 20:21

## 2020-10-24 RX ADMIN — DORZOLAMIDE HYDROCHLORIDE AND TIMOLOL MALEATE 1 DROP: 20; 5 SOLUTION/ DROPS OPHTHALMIC at 20:21

## 2020-10-24 RX ADMIN — PANTOPRAZOLE SODIUM 40 MG: 40 TABLET, DELAYED RELEASE ORAL at 05:39

## 2020-10-24 RX ADMIN — DOCUSATE SODIUM 50 MG AND SENNOSIDES 8.6 MG 1 TABLET: 8.6; 5 TABLET, FILM COATED ORAL at 07:55

## 2020-10-24 ASSESSMENT — PAIN SCALES - GENERAL
PAINLEVEL_OUTOF10: 0
PAINLEVEL_OUTOF10: 0

## 2020-10-24 NOTE — CARE COORDINATION
SATHISH researched Lucina Angry appeal number [id number] and it states that medical records were received at 10/23/2020 2:17:32 PM. It is still under review. SATHISH will follow up with the family this morning to notify that once a decision is made they will have until noon the next day to discharge from the hospital.     Respectfully submitted,    ROXANA Johnston  [facility]   [phone]    Electronically signed by ROXANA Brown on 10/24/2020 at 8:43 AM

## 2020-10-24 NOTE — PLAN OF CARE
Problem: Skin Integrity:  Goal: Will show no infection signs and symptoms  Description: Will show no infection signs and symptoms  10/24/2020 5583 by Silviano Pearce RN  Note: Skin assessment completed on admission and every shift. Barrier wipes used in the event of incontinence. Pressure relief techniques used as needed while in chair and in bed. Position changes encouraged at least every two hours while in bed. Problem: Falls - Risk of:  Goal: Will remain free from falls  Description: Will remain free from falls  10/24/2020 5300 by Silviano Pearce RN  Note: Fall risk band on patient. Yellow light on outside of room. Non skid footwear in place. Alarms used appropriately. Patient instructed to call and wait for staff before getting up. Rounding done to anticipate needs. Appropriate safety devices used for transfers. Problem: HEMODYNAMIC STATUS  Goal: Patient has stable vital signs and fluid balance  10/24/2020 0922 by Silviano Pearce RN  Note: Vital signs monitored per unit routine. Monitoring intake and output. Daily weight. Dietary restrictions noted and followed.

## 2020-10-24 NOTE — PROGRESS NOTES
Resting quietly in bed. Pt admitted with debility and falls at home. Tranfers with SBA and a walker. Lungs clear but diminished. HR irregular. Abdomen non tender with active bowel sounds. LBM 10/23. Reports fair appetite but did not eat any dinner. Snack offered and declined. Reeves catheter in place draining yellow clear urine. Pt educated on emptying and caring for catheter at home. Verbalized understanding. C/o leg pain and medicated per PRN orders. All needs assessed with rounding, alarms active. Will continue to monitor.  Trevor White RN

## 2020-10-24 NOTE — PROGRESS NOTES
Occupational Therapy  Discharge Summary     Name:Mac Owusu .  LFP:[id number]  :1945  Treatment Diagnosis: Impaired: ADL/IADL function, balance, functional mobility/transfers, strength, safety awareness, activity tolerance. Position Activity Restriction  Spinal Precautions: No Bending, No Lifting, No Twisting  Other position/activity restrictions: Mendoza catheter     Goals:   Short term goals  Time Frame for Short term goals: 1-1.5 weeks  Short term goal 1: Pt will bathe modified independently using AE & shower chair. NOT MET, supervision for safety  Short term goal 2: Pt will dress modified independently including mendoza catheter, using AE. NOT MET, CGA/SBA  Short term goal 3: Pt will toilet modified independently using grab bars/TSF and/or manage catheter care NOT MET supervision/SBA  Short term goal 4: Pt will complete functional transfers/mobility modified independently using RW & grab bars. NOT MET consistenetly, supervision, intermittent cues to move catheter  Short term goal 5: Pt will complete a simple cooking task in the kitchen standing for 10 minutes modified independent. NOT MET SBA for safety, stood almost 10 minutes(9.5 minutes)  Short term goal 6: Pt will complete BUE HEP in order to strengthen UB/increase activity tolerance to complete IADL cleaning tasks around the house modified independent. NOT MET SBA   Long term goals  Time Frame for Long term goals : LTG=STG    Pt. Met 0/6 short term goals D/T decreased balance/safety & cues required to manage catheter. Current Functional Status:   ADL  Equipment Provided: Long-handled sponge, Sock aid  Grooming: Modified independent (Brushed teeth, combed hair seated at sink.)  UE Bathing: Modified independent (Bathed all parts seated on shower chair.)  LE Bathing: Stand by assistance, Supervision(Bathed front shelbie area seated, in stance using grab bars for buttocks, Pt appeared unsteady.  Pt used long handle sponge for below knees & feet.)  UE Dressing: Modified independent (Performed own setup from RW, donned/doffed long sleeve [place] shirt seated)  LE Dressing: Contact guard assistance, Increased time to complete(Pt required significant verabl cues for threading catheter through pants/depends, taking increased time. Pt had LOB in stance pulling up depends & pants. Pt doffed socks using reacher, donned socks using sock aid. Donned slip on shoes.)  Toileting: Other (Comment)(Pt attempted BM but no success, able to pull down/up pants.)  Additional Comments: Pt wanted to empty catheter after his shower. OT showed pt how to unclip catheter numerous times but pt continued to fumble with clip and required physical assist to complete. Pt required significant cues for threading catheter through pants & depends despite being taught this numerous times previously. Pt left seated in recliner w/ tray & needs in reach. Bed mobility  Bridging: Modified independent   Rolling to Left: Modified independent  Rolling to Right: Modified independent  Supine to Sit: Modified independent  Sit to Supine: Modified independent  Scooting: Modified independent  Comment: Bed mobility performed in ADL bed with hoB flat and two pillows. When exiting bed, pt had difficulty assisting L leg out. Pt was shown how to use R leg to assist LLE in and out of bed. Pt demonstrated understanding and was able to exit bed with good adherence to spinal precautions    Functional Transfers: Toilet Transfers  Toilet - Technique: Ambulating(RW)  Equipment Used: Grab bars  Toilet Transfer: Modified independent, Supervision  Toilet Transfers Comments: RW><comfort height toilet, R & L grab bars. No LOB or SOB noted. Pt only modified independent D/T using grab bars. PM Session: W/c><RW><comfort height toilet, supervision. Pt forgot to move catheter from w/c to RW & required cue to do so.  Pt used sink and wall on either side of toilet to simulate what pt would have at home as pt denies the need for a TSF despite relying on grab bars by toilet in pt room. Pt able to pull pants up/down in stance @ RW, no LOB or SOB noted, supervision. Pt amb short distance out of bathroom to w/c. Pt required cue to move catheter from RW to w/c. Shower Transfers  Shower - Transfer From: [facility] [facility] - Transfer Type: To and From  Shower - Transfer To: Shower seat with back  Shower - Technique: Ambulating(RW)  Shower Transfers: Supervision  Shower Transfers Comments: Required cues for sit & swing technique on shower chair. Car Transfers  Car - Transfer From: Roby Munoz)  Car - Technique: Ambulating  Car Transfers: Stand by assistance  Car Transfers: He completed a car transfer with SBA with good recall for technique and hand placement. Functional Mobility  Functional - Mobility Device: Rolling Walker  Activity: To/from bathroom  Assist Level: Modified independent   Functional Mobility Comments: RW><bathroom, supervision. No LOB or SOB noted. Pt appeared to be in some pain when amb short distance. Instrumental ADL's  Instrumental ADLs: Yes  Meal Prep  Meal Prep Level: Walker(RW)  Meal Prep Level of Assistance: Stand by assistance  Meal Preparation: W/C>Rw, SBA, cue to remember to move catheter bag from w/c to RW. Pt remembered to move walker up next to refrigerator before opening door and reaching for bread and jelly. Pt used walker tray to transport bread and jelly to the counter. Pt retrieved knife and peanut butter from drawers and cabinets to make sandwich. Pt rinsed knife and placed in . Pt educated to turn entire body and not to twist to follow spinal precautions. Pt was shown that walker tray folds down so he can be closer to the counter to follow these precautions. Pt amb short distance w/ RW to sit in chair without arms to simulate the chairs he has in the dining room. Pt pushed up from table on L side & from bottom of chair on R side to stand, RW>w/c SBA.  Pt educated he should push up only from stable surfaces at home. Pt completed task in 9:24 minutes with no LOB or SOB, pt stated pain is 6/10 after completing IADL task. Perception  Overall Perceptual Status: WFL         UE Function: WFL                  Assessment:   Assessment: Pt able to set up own clothes & amb ><bathroom using RW no SOB or LOB noted. Pt had LOB in stance when pull pants/depends up @ grab bars. Pt required signifcant cuing for catheter management during LB dressing, transfers, & cleaning. Despite numerous demonstrations & assist to empty catheter, pt could not complete successfully. Pt has been educated & practiced catheter management in previous sessions but did not remember any of this during today's ADL session. Pt appears to have lack of motivation to tasks independently & question whether pt wants to be able to manage catheter on his own. Pt appeared unsteady during LB bathing in shower using grab bars. Pt asks for placement as SNF as he does not have support at home to complete ADL tasks safely. Recommend 24 hour supervision at D/C. Insurance denied SNF placement. Family contested the D/C which was declined per SW notes. Pt planned to D/C home to sister and brother in [address] [place] [address] initially so pt will have assistance for IADL tasks and catheter care. Prognosis: Good  Barriers to Learning: Some trouble remembering what he was told in previous sessions about catheter. REQUIRES OT FOLLOW UP: Yes  Discharge Recommendations: S Level 1, Patient would benefit from continued therapy after discharge, 24 hour supervision or assist    Equipment Recommendations:  recommend shower chair, handheld shower, reacher, grab bars in shower, TSF(pt declined), walker tray or basket. Has RW & sock aid.        Home Exercise Program  Provided Pt with T-band HEP    Electronically signed by Alisha Patterson OT on 10/24/2020 at 2:56 PM

## 2020-10-24 NOTE — PROGRESS NOTES
Patient admitted to rehab with debility. A/A/O x 4. Transfers with walker x1 assist. On General diet, tolerating well. Medications taken whole. On Xarelto for DVT prophylaxis. Skin w/ scattered bruising, heel protector, zinc on buttock, abd incision/lower back incision x3. .  On room air. Has been continent of bowel and bladder. LBM 10/23. Chair/bed alarms in use and call light in reach. Will monitor for safety.

## 2020-10-24 NOTE — CARE COORDINATION
SW received phone call from [facility] stating that the medical decision to discharge patient has been upheld and they have until 12pm on 10/25 to  patient or they assume financial responsibility. SW spoke to daughter Joe Carver and informed of the decision and also discussed needs. She stated that she received a phone call from [facility] and needed to call them back and give them a temporary address with family where he will be staying. She stated that she also spoke with [employer] and informs that while living with family he won't need anything but she will look into life alert and possibly meals on wheels when he goes back home. SW thanked her for taking this call and encouraged her to call us if we can be of assistance in the future. She stated that she was not happy with their decision but she fully understands. No further needs noted unless indicated by patient, family and/or medical staff. Respectfully submitted,    ROXANA Cruz  [facility]   [phone]    Electronically signed by ROXANA Tovar on 10/24/2020 at 1:14 PM

## 2020-10-24 NOTE — CARE COORDINATION
SW placed phone call to patient's daughter Tiara Burton regarding medicare appeal. SW affirmed that she has the right to appeal and that we checked this morning and the process is still pending. It appears as if [facility] is finished with the physician review but they haven't arrived at a decision just yet. SW informed that if they come to a decision today she would have to make arrangements to get the patient home safely. She reports that the discharge plan was home with home healthcare, however, the patient will not be immediately returning to his own home. She stated that she was hoping that he could go to a skilled nursing facility but it appears as if he's not appropriate at this time. SW informed that we were the weekend team and we would be checking the website periodically. SW and family to follow up as more info comes available. Respectfully submitted,    ROXANA Martinez  [facility]   [phone]    Electronically signed by ROXANA Gamboa on 10/24/2020 at 8:51 AM

## 2020-10-25 VITALS
HEIGHT: 69 IN | DIASTOLIC BLOOD PRESSURE: 78 MMHG | WEIGHT: 163.14 LBS | HEART RATE: 69 BPM | SYSTOLIC BLOOD PRESSURE: 151 MMHG | BODY MASS INDEX: 24.16 KG/M2 | TEMPERATURE: 98.4 F | RESPIRATION RATE: 17 BRPM | OXYGEN SATURATION: 96 %

## 2020-10-25 PROCEDURE — 6370000000 HC RX 637 (ALT 250 FOR IP): Performed by: PHYSICAL MEDICINE & REHABILITATION

## 2020-10-25 PROCEDURE — 94760 N-INVAS EAR/PLS OXIMETRY 1: CPT

## 2020-10-25 RX ADMIN — OXYCODONE HYDROCHLORIDE AND ACETAMINOPHEN 1 TABLET: 7.5; 325 TABLET ORAL at 08:15

## 2020-10-25 RX ADMIN — PANTOPRAZOLE SODIUM 40 MG: 40 TABLET, DELAYED RELEASE ORAL at 05:57

## 2020-10-25 RX ADMIN — FERROUS SULFATE TAB EC 324 MG (65 MG FE EQUIVALENT) 324 MG: 324 (65 FE) TABLET DELAYED RESPONSE at 08:11

## 2020-10-25 RX ADMIN — GABAPENTIN 300 MG: 300 CAPSULE ORAL at 08:11

## 2020-10-25 RX ADMIN — METOPROLOL TARTRATE 50 MG: 50 TABLET, FILM COATED ORAL at 08:11

## 2020-10-25 RX ADMIN — POLYETHYLENE GLYCOL 3350 17 G: 17 POWDER, FOR SOLUTION ORAL at 08:15

## 2020-10-25 RX ADMIN — DOCUSATE SODIUM 50 MG AND SENNOSIDES 8.6 MG 1 TABLET: 8.6; 5 TABLET, FILM COATED ORAL at 08:11

## 2020-10-25 RX ADMIN — DORZOLAMIDE HYDROCHLORIDE AND TIMOLOL MALEATE 1 DROP: 20; 5 SOLUTION/ DROPS OPHTHALMIC at 08:17

## 2020-10-25 RX ADMIN — LEVOTHYROXINE SODIUM 50 MCG: 0.05 TABLET ORAL at 05:57

## 2020-10-25 RX ADMIN — Medication 2 CAPSULE: at 08:11

## 2020-10-25 ASSESSMENT — PAIN DESCRIPTION - ORIENTATION: ORIENTATION: MID;LOWER

## 2020-10-25 ASSESSMENT — PAIN DESCRIPTION - FREQUENCY: FREQUENCY: INTERMITTENT

## 2020-10-25 ASSESSMENT — PAIN DESCRIPTION - DESCRIPTORS: DESCRIPTORS: ACHING;DISCOMFORT

## 2020-10-25 ASSESSMENT — PAIN SCALES - GENERAL
PAINLEVEL_OUTOF10: 9
PAINLEVEL_OUTOF10: 3

## 2020-10-25 ASSESSMENT — PAIN DESCRIPTION - PAIN TYPE: TYPE: ACUTE PAIN

## 2020-10-25 ASSESSMENT — PAIN - FUNCTIONAL ASSESSMENT: PAIN_FUNCTIONAL_ASSESSMENT: ACTIVITIES ARE NOT PREVENTED

## 2020-10-25 ASSESSMENT — PAIN DESCRIPTION - LOCATION: LOCATION: BACK

## 2020-10-25 ASSESSMENT — PAIN DESCRIPTION - ONSET: ONSET: ON-GOING

## 2020-10-25 ASSESSMENT — PAIN DESCRIPTION - PROGRESSION: CLINICAL_PROGRESSION: GRADUALLY IMPROVING

## 2020-10-25 NOTE — CARE COORDINATION
Call to pt room - patient reports DME walker was delivered and family to  at discharge. No further discharge needs identified. Call to [facility] to confirm orders received.      Electronically signed by Tawana Rubinstein, MSW, DC on 10/25/2020 at 9:35 AM

## 2020-10-25 NOTE — PLAN OF CARE
Problem: Pain:  Goal: Pain level will decrease  Description: Pain level will decrease  Outcome: Ongoing  Note: Able to rate pain using a 1-10 scale, medicated per prn orders, see MAR. Able to verbalize a reduction in pain and/or able to fall asleep and remain asleep without any s/s of pain       Problem: Falls - Risk of:  Goal: Will remain free from falls  Description: Will remain free from falls  Outcome: Ongoing  Note: Pt educated on falls precautions and safety. Call light and personal belongings within reach at all times. Non skid socks in use when up. Hourly rounding and alarms active.

## 2020-10-25 NOTE — DISCHARGE SUMMARY
Physical Medicine & Rehabilitation  Discharge Summary     Patient Identification:  Servando Mulligan  : 1945  Admit date: 10/13/2020  Discharge date: 10/25/2020   Attending provider: Gela Luciano MD     Primary care provider: Eloy Wilkinson     Discharge Diagnoses:   Patient Active Problem List   Diagnosis    Acute metabolic encephalopathy    Pelvic hematoma in male    Acute renal failure superimposed on chronic kidney disease ([address]    Elevated LFTs    Abnormal chest x-ray    Acute cystitis without hematuria    Debility    [facility]    Depression    MDD (major depressive disorder), recurrent episode ([address]    Severe protein-calorie malnutrition ([address]    PAF (paroxysmal atrial fibrillation) ([facility]    Hypothyroidism    Coronary artery disease involving native heart without angina pectoris    Urinary retention    Retroperitoneal hematoma    Complicated UTI (urinary tract infection)    Chronic obstructive pulmonary disease (HCC)    Constipation    Near syncope       History of Present Illness/Acute Hospital Course:    Patient is a 67 yo M with pmh Afib, CAD, HTN, HLD, and recent lumbar spine surgery who initially presented from SNF on 2020 with fever 105 and altered mental status. Found to have sepsis due to UTI, urinary retention, metabolic encephalopathy, and acute hypoxic respiratory failure. Reeves placed by Urology due to false urethral passage. Treated with IV antibiotics per ID. Course further complicated by TONO, anemia, and uncontrolled HTN. Of note, patient recently underwent L4-5 and L5-S1 fusion with anterior and posterior approach (2020 with Dr. Lidia Escobar and Dr. Hyacinth Rose). Post-op course during that admission complicated by left retroperitoneal/pelvic hematoma and acute blood loss anemia. He was discharged to [facility] 9/10.     Patient initially admitted to ARU (-10/1). He made gradual progress and demonstrated some self-limiting behavior.  On  he Patient requested referral to SNF but was not accepted due to relatively high functional status. Family contested discharge, but decision was upheld. Discharging home, will be staying with family temporarily. Home care services and DME ordered as below. Patient will follow-up with PCP, Urology, Nephrology, and Spine Surgery. Medical Management:    Proteus UTI  -In setting of urinary retention  -Completed ceftriaxone     Urinary retention  -Maintain Reeves until outpatient f/u with Urology. Anticipate need for TURP      TONO on CKD III  -Addressed urinary retention as above  -Avoid nephrotoxins, renally dose meds  -Nephrology consulted, appreciate input  --continue to encourage po intake  --repeat US with resolved hydronephrosis  --Cr now stable 1.4. Will have home care RN repeat labs in 1 week.      Lumbar stenosis s/p recent L4-S1 ALIF/PSF (8/31 with Dr. Gm Daniel and Dr. Shayy Sherwood)  -Incisions healing well without evidence of infection  -PT/OT     Acute blood loss anemia  -Due to recent left pelvic hematoma  -Hgb remains stable ~8 after resuming Xarelto  -Iron supplement increased     Afib  -Controlled on Amiodarone, metoprolol  -Xarelto     CAD  -statin, bb, no ARB due to TONO     HTN  -metoprolol, doxazosin added     AAA  -Repeat imaging in 5 years recommended     COPD  -No acute exacerbation  -currently stable on RA     Hypothyroidism  -levothyroxine     Hand tremors  -Possible related to debility/fatigue. Possible psychogenic component. -TSH and T4 wnl  -Has been refusing Ritalin. Cymbalta decreased by Dr. Tremaine Garcia. -Reports some improvement.      Depression  -Recently at inpatient psych due to suicidal ideation. Now improved.   -Duloxetine  -Patient refusing methylphenidate     Pain control  -Duloxetine, gabapentin, prn Percocet     PPx  -DVT: Xarelto  -GI: pantoprazole, probiotics       Discharge Exam:  Const: Alert. No distress, pleasant. HEENT: Normocephalic, atraumatic. Normal sclera/conjunctiva. MMM. CV: Regular rate and rhythm. Resp: No respiratory distress. Lungs CTAB. Abd: Soft, nontender, nondistended, NABS+   Ext: No edema. MSK: decreased spine ROM  Neuro: Alert, oriented, appropriately interactive. Relative LLE weakness (stable). Psych: Cooperative, appropriate mood and affect         Discharge Functional Status:    Physical therapy:  Bed Mobility: Scooting: Modified independent  Transfers: Sit to Stand: Modified independent  Stand to sit: Modified independent  Bed to Chair: Modified independent, Ambulation 1  Surface: level tile, uneven  Device: Rolling Walker  Assistance: Modified Independent  Quality of Gait: Pt ambulates with good Constanza with good step-through gait pattern. Pt with reduced LLE adduction and prevented LLE from crossing mid-line. As pt fatigues, hip drop on L LE increases. Requires cuing to pick feet up. No LoBs occurred. Wide turns when using RW. Improved awareness to stand up straight at RW  Gait Deviations: Slow Sapna, Decreased step length, Decreased step height  Distance: 26' with 3 turns, short distances in therapy gym, 20' in outdoor therapy terrace with 3 turns  Comments: Pt required slight standing rest break to recover from UE fatigue secondary to pushing RW, Stairs  # Steps : 12  Stairs Height: 6\"  Rails: Right ascending(pt taught how to ascend/descend stairs using BUE support on unilateral railing. Pt had difficulty initially, but was able to comfortably ascend/descend using this strategy in the afternoon)  Curbs: 6\"(Patient completed with wheeled walker and modif I. Pt brought feet into good proximity with platform before stepping up. No LoBS)  Device: No Device  Assistance: Supervision(pt would be Modif I if pt had B railings to better support and steady self.)  Comment: Ascend/descend stairs with nonreciprocal pattern with increased time, descending improved this date with one instance of slight knee buckling in LLE.   Pt was able to recover from knee buckling without LoB. Pt had improved recall to ascend with RLE and descend with LLE with cuing. Therapist hooked catheter bag to patient's pocket as patient navigated stairs. Pt was taught how to ascend and descend stairs using BUE support on unilateral railing today. Pt had difficulty using BUE support and decreased confidence in the AM.  Pt struggled to lead with LLE when descending using modified strategy. Pt was able to complete 12 stairs this afternoon using this strategy again. Pt stated that he felt a lot more stable and when he stands up straight, he can bring LLE up/down easier. Pt performed with adherence to spinal precautions. Mobility:  , PT Equipment Recommendations  Equipment Needed: Yes  Mobility Devices: Farrukh Zuniga: Rolling  Other: reacher, Assessment: Pt tolerated treatment well this today. He is Modif I for transfers, bed mobility, curb step with RW, and ambulation with RW. Pt has increased difficulty when ascending/descending stairs, and is supervision. He was able to gain comfort ascending/descending stairs using BUE support on unilateral railing. Pt is adherent with spinal precautions when performing this technique. Pt performs stairs with improved steadiness and confidence with B railings and would be considered Modif I for stairs if he had B railings at home. When patient fatigues, his gait abnormalities become more pronounced, but he does well with household distances. Pt scored 15/15 on BIMS, and has met all goals with the exception of stairs where he is supervision. Pt continues to be below baseline, but he is safe to return home with RW and reacher. Pt would continue to benefit from skilled therapy at home to increase activity tolerance, strengthen muscles and improve functional mobility to return to independence. Occupational therapy:  ,  , Assessment: Pt able to set up own clothes & amb ><bathroom using RW no SOB or LOB noted.  Pt had LOB in stance when pull pants/depends up @ grab bars. Pt required signifcant cuing for catheter management during LB dressing, transfers, & cleaning. Despite numerous demonstrations & assist to empty catheter, pt could not complete successfully. Pt has been educated & practiced catheter management in previous sessions but did not remember any of this during today's ADL session. Pt appears to have lack of motivation to tasks independently & question whether pt wants to be able to manage catheter on his own. Pt appeared unsteady during LB bathing in shower using grab bars. Pt asks for placement as SNF as he does not have support at home to complete ADL tasks safely. Recommend 24 hour supervision at D/C.     Speech therapy:         Significant Diagnostics:   Lab Results   Component Value Date    CREATININE 1.4 (H) 10/23/2020    BUN 27 (H) 10/23/2020     (L) 10/23/2020    K 4.1 10/23/2020     10/23/2020    CO2 24 10/23/2020       Lab Results   Component Value Date    WBC 3.8 (L) 10/22/2020    HGB 7.8 (L) 10/22/2020    HCT 23.7 (L) 10/22/2020    MCV 91.2 10/22/2020     10/22/2020       Disposition:  Home, staying with family temporarily  Services: [facility] PT, OT, RN, Aide  DME: rolling walker, shower chair, TSF    Discharge Condition: Stable    Follow-up:  See after visit summary from hospitalization    Josiah Smith  In 1 week  PCP follow-up  [address] [address]  [address] [facility]   Markell Maldonado MD  In 1 week  Urology follow-up  [facility] [address]  [address] [facility]   Benson Damian MD  In 2 weeks  Nephrology follow-up  [facility]  [facility]  [address]   Noe Cartagena MD  In 2 weeks  Spine Surgery follow-up  [address]  [address] [facility] [address] [phone]   [facility]  Schedule an appointment as soon as possible for a visit    [facility]  [address] [facility]  [phone] Discharge Medications:     Medication List      START taking these medications    doxazosin 2 MG tablet  Commonly known as:  CARDURA  Take 1 tablet by mouth nightly  Notes to patient: For your prostate      oxyCODONE-acetaminophen 5-325 MG per tablet  Commonly known as:  PERCOCET  Take 1 tablet by mouth every 8 hours as needed for Pain for up to 7 days. sennosides-docusate sodium 8.6-50 MG tablet  Commonly known as:  SENOKOT-S  Take 1 tablet by mouth 2 times daily  Notes to patient:  Stool Softner and laxative         CHANGE how you take these medications    DULoxetine 30 MG extended release capsule  Commonly known as:  CYMBALTA  Take 1 capsule by mouth daily  What changed:  when to take this  Notes to patient: For your mood      ferrous sulfate 325 (65 Fe) MG tablet  Commonly known as:  IRON 325  Take 1 tablet by mouth daily (with breakfast)  What changed:  when to take this  Notes to patient:  Iron tablet.      gabapentin 300 MG capsule  Commonly known as:  NEURONTIN  Take 1 capsule by mouth 3 times daily for 30 days. What changed:    · medication strength  · how much to take  Notes to patient:  For nerve pain      rivaroxaban 15 MG Tabs tablet  Commonly known as:  XARELTO  Take 1 tablet by mouth daily  What changed:    · medication strength  · how much to take  Notes to patient:  Blood thinner         CONTINUE taking these medications    amiodarone 200 MG tablet  Commonly known as:  CORDARONE  Take 1 tablet by mouth daily Indications: takes in the evening  Notes to patient: For your heart/Afib     atorvastatin 40 MG tablet  Commonly known as:  LIPITOR  Take 1 tablet by mouth daily  Notes to patient:   For your cholesterol      dorzolamide-timolol 22.3-6.8 MG/ML ophthalmic solution  Commonly known as:  COSOPT     ICAPS Tabs     latanoprost 0.005 % ophthalmic solution  Commonly known as:  XALATAN     levothyroxine 50 MCG tablet  Commonly known as:  SYNTHROID  Take 1 tablet by mouth Daily  Notes to patient:  Thyroid Medication      metoprolol tartrate 50 MG tablet  Commonly known as:  LOPRESSOR  Take 1 tablet by mouth 2 times daily  Notes to patient: For you heart rate      nitroGLYCERIN 0.4 MG SL tablet  Commonly known as:  NITROSTAT     pantoprazole 40 MG tablet  Commonly known as:  PROTONIX  Take 1 tablet by mouth daily  Notes to patient: For GERD/acid reflux      polyethylene glycol 17 g packet  Commonly known as:  GLYCOLAX  Take 17 g by mouth daily  Notes to patient: For your bowels      traZODone 50 MG tablet  Commonly known as:  DESYREL  Take 0.5 tablets by mouth nightly as needed for Sleep        STOP taking these medications    docusate 100 MG Caps  Commonly known as:  COLACE, DULCOLAX     lactobacillus capsule     methylphenidate 5 MG tablet  Commonly known as:  RITALIN     senna 8.6 MG tablet  Commonly known as:  SENOKOT           Where to Get Your Medications      These medications were sent to [address] [place] [address] [facility] [phone]  [address] [place] [address], [address] [place] [address]    Phone:  [phone]   · amiodarone 200 MG tablet  · atorvastatin 40 MG tablet  · doxazosin 2 MG tablet  · DULoxetine 30 MG extended release capsule  · ferrous sulfate 325 (65 Fe) MG tablet  · gabapentin 300 MG capsule  · levothyroxine 50 MCG tablet  · metoprolol tartrate 50 MG tablet  · oxyCODONE-acetaminophen 5-325 MG per tablet  · pantoprazole 40 MG tablet  · rivaroxaban 15 MG Tabs tablet  · traZODone 50 MG tablet     You can get these medications from any pharmacy    You don't need a prescription for these medications  · sennosides-docusate sodium 8.6-50 MG tablet           I spent over 35 minutes on this discharge encounter between counseling, coordination of care, and medication reconciliation. To comply with [address] [facility] bylaw R.II.4.1:   Discharge order placed in advance to facilitate patients discharge needs.       Nehemiah Dickey MD

## 2020-10-25 NOTE — PROGRESS NOTES
Resting quietly in bed. Pt admitted with debility s/p falls at home. Transfers with a walker SBA, tolerates well. Lungs CTA, HR regular. Abdomen non tender with active bowel sounds. Denies poor appetite but pt at 0% of his dinner, states he ate his lunch and just wasn't hungry. LBM 10/23. Mendoza catheter in place draining yellow clear urine. Pt able to empty catheter with minimal cuing. Education provided on mendoza care for discharge tomorrow. All questions answered. Denies pain. All needs assessed with Q2H rounding. Alarms active. Will continue to monitor.  Aravind Simms RN

## 2020-10-25 NOTE — PLAN OF CARE
Problem: Pain:  Goal: Pain level will decrease  Description: Pain level will decrease  10/25/2020 0902 by Julianna Bhagat RN  Outcome: Completed  10/25/2020 0453 by Jaclyn Boo RN  Outcome: Ongoing  Note: Able to rate pain using a 1-10 scale, medicated per prn orders, see MAR. Able to verbalize a reduction in pain and/or able to fall asleep and remain asleep without any s/s of pain    Goal: Control of acute pain  Description: Control of acute pain  10/25/2020 0902 by Julianna Bhagat RN  Outcome: Completed  10/25/2020 0453 by Jaclyn Boo RN  Outcome: Ongoing  Goal: Control of chronic pain  Description: Control of chronic pain  10/25/2020 0902 by Julianna Bhagat RN  Outcome: Completed  10/25/2020 0453 by Jaclyn Boo RN  Outcome: Ongoing     Problem: Skin Integrity:  Goal: Will show no infection signs and symptoms  Description: Will show no infection signs and symptoms  10/25/2020 0902 by Julianna Bhagat RN  Outcome: Completed  10/25/2020 0453 by Jaclyn Boo RN  Outcome: Ongoing  Goal: Absence of new skin breakdown  Description: Absence of new skin breakdown  10/25/2020 0902 by Julianna Bhagat RN  Outcome: Completed  10/25/2020 0453 by Jaclyn Boo RN  Outcome: Ongoing     Problem: Falls - Risk of:  Goal: Will remain free from falls  Description: Will remain free from falls  10/25/2020 0902 by Julianna Bhagat RN  Outcome: Completed  10/25/2020 0453 by Jaclyn Boo RN  Outcome: Ongoing  Note: Pt educated on falls precautions and safety. Call light and personal belongings within reach at all times. Non skid socks in use when up. Hourly rounding and alarms active.     Goal: Absence of physical injury  Description: Absence of physical injury  10/25/2020 0902 by Julianna Bhagat RN  Outcome: Completed  10/25/2020 0453 by Jaclyn Boo RN  Outcome: Ongoing     Problem: Nutrition  Goal: Optimal nutrition therapy  10/25/2020 0902 by Julianna Bhagat RN  Outcome: Completed  10/25/2020 0453 by Sidra Negron RN  Outcome: Ongoing     Problem: Musculor/Skeletal Functional Status  Goal: Highest potential functional level  10/25/2020 0902 by Brian Garcia RN  Outcome: Completed  10/25/2020 0453 by Sidra Negron RN  Outcome: Ongoing  Goal: Absence of falls  10/25/2020 0902 by Brian Garcia RN  Outcome: Completed  10/25/2020 0453 by Sidra Negron RN  Outcome: Ongoing     Problem:  Activity Intolerance:  Goal: Ability to tolerate increased activity will improve  Description: Ability to tolerate increased activity will improve  10/25/2020 0902 by Brian Garcia RN  Outcome: Completed  10/25/2020 0453 by Sidra Negron RN  Outcome: Ongoing     Problem: IP BLADDER/VOIDING  Goal: STG - Patient demonstrates ability to take care of indwelling catheter  10/25/2020 0902 by Brian Garcia RN  Outcome: Completed  10/25/2020 0453 by Sidra Negron RN  Outcome: Ongoing     Problem: Infection:  Goal: Will remain free from infection  Description: Will remain free from infection  10/25/2020 0902 by Brian Garcia RN  Outcome: Completed  10/25/2020 0453 by Sidra Negron RN  Outcome: Ongoing     Problem: Daily Care:  Goal: Daily care needs are met  Description: Daily care needs are met  10/25/2020 0902 by Brian Garcia RN  Outcome: Completed  10/25/2020 0453 by Sidra Negron RN  Outcome: Ongoing     Problem: Discharge Planning:  Goal: Patients continuum of care needs are met  Description: Patients continuum of care needs are met  10/25/2020 0902 by Brian Garcia RN  Outcome: Completed  10/25/2020 0453 by Sidra Negron RN  Outcome: Ongoing

## 2020-10-25 NOTE — PROGRESS NOTES
Family here to  patient. Medications were delivered per Pharmacy, discharge instructions given to patient and daughter. Reeves education given once more before discharge. Patient transported to lobby via w/c per PCA assistance.

## 2021-10-29 NOTE — PROGRESS NOTES
[address]  time__1250__________        Surgery time___1420_________    Take the following medications with a sip of water: per md instructions     Do not eat or drink anything after 12:00 midnight prior to your surgery. Clear liquids   This includes water chewing gum, mints and ice chips. You may brush your teeth and gargle the morning of your surgery, but do not swallow the water     Please see your family doctor/pediatrician for a history and physical and/or concerning medications. H&P 10/20/21 Dr. Troy Prasad offices    Bring any test results/reports from your physicians office. If you are under the care of a heart doctor or specialist doctor, please be aware that you may be asked to them for clearance    You may be asked to stop blood thinners such as Coumadin, Plavix, Fragmin, Lovenox, etc., or any anti-inflammatories such as:  Aspirin, Ibuprofen, Advil, Naproxen prior to your surgery. We also ask that you stop any OTC medications such as fish oil, vitamin E, glucosamine, garlic, Multivitamins, COQ 10, etc.    We ask that you do not smoke 24 hours prior to surgery  We ask that you do not  drink any alcoholic beverages 24 hours prior to surgery     You must make arrangements for a responsible adult to take you home after your surgery. For your safety you will not be allowed to leave alone or drive yourself home. Your surgery will be cancelled if you do not have a ride home. Also for your safety, it is strongly suggested that someone stay with you the first 24 hours after your surgery. A parent or legal guardian must accompany a child scheduled for surgery and plan to stay at the hospital until the child is discharged. Please do not bring other children with you. For your comfort, please wear simple loose fitting clothing to the hospital.  Please do not bring valuables. Wash face day of surgery no make up or loction.  Bring eye drops or ointment day of surgery. Wear short sleeve button down shirt or loose fitting shirt. Do not wear any make-up or nail polish on your fingers or toes      For your safety, please do not wear any jewelry or body piercing's on the day of surgery. All jewelry must be removed. If you have dentures, they will be removed before going to operating room. For your convenience, we will provide you with a container. If you wear contact lenses or glasses, they will be removed, please bring a case for them. If you have a living will and a durable power of  for healthcare, please bring in a copy. As part of our patient safety program to minimize surgical site infections, we ask you to do the following:    · Please notify your surgeon if you develop any illness between         now and the  day of your surgery. · This includes a cough, cold, fever, sore throat, nausea,         or vomiting, and diarrhea, etc.  ·  Please notify your surgeon if you experience dizziness, shortness         of breath or blurred vision between now and the time of your surgery. Do not shave your operative site 96 hours prior to surgery. For face and neck surgery, men may use an electric razor 48 hours   prior to surgery. You may shower the night before surgery or the morning of   your surgery with an antibacterial soap. You will need to bring a photo ID and insurance card    [facility] has an onsite pharmacy, would you like to utilize our pharmacy     If you will be staying overnight and use a C-pap machine, please bring   your C-pap to hospital     Our goal is to provide you with excellent care, therefore, visitors will be limited to two(2) in the room at a time so that we may focus on providing this care for you. Please contact pre-admission testing if you have any further questions.                  [facility] phone number:  [phone]  Please note these are generalized instructions for all surgical

## 2021-10-29 NOTE — PROGRESS NOTES
Preoperative Screening for Elective Surgery/Invasive Procedures While COVID-19 present in the community     Have you tested positive or have been told to self-isolate for COVID-19 like symptoms within the past 28 days?  Do you currently have any of the following symptoms? o Fever >100.0 F or 99.9 F in immunocompromised patients? o New onset cough, shortness of breath or difficulty breathing?  o New onset sore throat, myalgia (muscle aches and pains), headache, loss of taste/smell or diarrhea?  Have you had a potential exposure to COVID-19 within the past 14 days by:  o Close contact with a confirmed case? o Close contact with a healthcare worker,  or essential infrastructure worker (grocery store, [employer], gas station, public utilities or transportation)? Yes md office   o Do you reside in a congregate setting such as; skilled nursing facility, adult home, correctional facility, homeless shelter or other institutional setting?  o Have you had recent travel to a known COVID-19 hotspot?     no to rest above     Indicate if the patient has a positive screen by answering yes to one or more of the above questions. Patients who test positive or screen positive prior to surgery or on the day of surgery should be evaluated in conjunction with the surgeon/proceduralist/anesthesiologist to determine the urgency of the procedure.      Vaccines x 2

## 2021-11-02 NOTE — PRE-PROCEDURE INSTRUCTIONS
[address] [facility]  [phone]        Pre-Op Phone Call:     Patient Name: Bryanna Alvarado     Telephone Information:   Mobile [phone]     Home phone:  [phone]    Surgery Time:    2:20 PM     Arrival Time:  1250     Left extended Message:  No     Message left with: patient      Recent change in health status:  no     Advised of transportation/ policy:  Yes     NPO policy reviewed:  Yes   clear liquids only until 8 am     Advised to take morning heart/blood pressure medications with sips of water morning of surgery? Yes     Instructed to bring eye drops, photo identification, and insurance card day of surgery? Yes     Advised to wear short sleeved button down shirt (no T-shirt underneath):  Yes     Advised not to wear jewelry, hairpins, or pantyhose day of surgery? Yes     Advised not to wear make-up and to wash face day of surgery?   Yes    Remarks:        Electronically signed by:  Oz Walter RN at 11/2/2021 12:56 PM

## 2021-11-03 ENCOUNTER — ANESTHESIA EVENT (OUTPATIENT)
Dept: SURGERY | Age: 76
End: 2021-11-03
Payer: MEDICARE

## 2021-11-04 ENCOUNTER — ANESTHESIA (OUTPATIENT)
Dept: SURGERY | Age: 76
End: 2021-11-04
Payer: MEDICARE

## 2021-11-04 ENCOUNTER — HOSPITAL ENCOUNTER (OUTPATIENT)
Age: 76
Setting detail: OUTPATIENT SURGERY
Discharge: HOME | End: 2021-11-04
Attending: OPHTHALMOLOGY | Admitting: OPHTHALMOLOGY
Payer: MEDICARE

## 2021-11-04 VITALS
RESPIRATION RATE: 13 BRPM | SYSTOLIC BLOOD PRESSURE: 161 MMHG | BODY MASS INDEX: 24.73 KG/M2 | TEMPERATURE: 97 F | HEIGHT: 69 IN | OXYGEN SATURATION: 99 % | HEART RATE: 64 BPM | DIASTOLIC BLOOD PRESSURE: 75 MMHG | WEIGHT: 167 LBS

## 2021-11-04 VITALS — DIASTOLIC BLOOD PRESSURE: 87 MMHG | SYSTOLIC BLOOD PRESSURE: 171 MMHG | OXYGEN SATURATION: 100 %

## 2021-11-04 PROCEDURE — V2632 POST CHMBR INTRAOCULAR LENS: HCPCS | Performed by: OPHTHALMOLOGY

## 2021-11-04 PROCEDURE — C1783 OCULAR IMP, AQUEOUS DRAIN DE: HCPCS | Performed by: OPHTHALMOLOGY

## 2021-11-04 PROCEDURE — 2500000003 HC RX 250 WO HCPCS: Performed by: STUDENT IN AN ORGANIZED HEALTH CARE EDUCATION/TRAINING PROGRAM

## 2021-11-04 PROCEDURE — 6370000000 HC RX 637 (ALT 250 FOR IP): Performed by: OPHTHALMOLOGY

## 2021-11-04 PROCEDURE — 6360000002 HC RX W HCPCS: Performed by: NURSE ANESTHETIST, CERTIFIED REGISTERED

## 2021-11-04 PROCEDURE — 3700000000 HC ANESTHESIA ATTENDED CARE: Performed by: OPHTHALMOLOGY

## 2021-11-04 PROCEDURE — 3600000004 HC SURGERY LEVEL 4 BASE: Performed by: OPHTHALMOLOGY

## 2021-11-04 PROCEDURE — 7100000010 HC PHASE II RECOVERY - FIRST 15 MIN: Performed by: OPHTHALMOLOGY

## 2021-11-04 PROCEDURE — 2500000003 HC RX 250 WO HCPCS: Performed by: OPHTHALMOLOGY

## 2021-11-04 PROCEDURE — 2580000003 HC RX 258: Performed by: ANESTHESIOLOGY

## 2021-11-04 PROCEDURE — 6360000002 HC RX W HCPCS: Performed by: STUDENT IN AN ORGANIZED HEALTH CARE EDUCATION/TRAINING PROGRAM

## 2021-11-04 PROCEDURE — 3600000014 HC SURGERY LEVEL 4 ADDTL 15MIN: Performed by: OPHTHALMOLOGY

## 2021-11-04 PROCEDURE — 2580000003 HC RX 258: Performed by: NURSE ANESTHETIST, CERTIFIED REGISTERED

## 2021-11-04 PROCEDURE — 3700000001 HC ADD 15 MINUTES (ANESTHESIA): Performed by: OPHTHALMOLOGY

## 2021-11-04 PROCEDURE — 2709999900 HC NON-CHARGEABLE SUPPLY: Performed by: OPHTHALMOLOGY

## 2021-11-04 DEVICE — LENS INTOCU +20.0 DIOPT L13MM DIA6MM 0DEG HAPTIC ANG A: Type: IMPLANTABLE DEVICE | Site: EYE | Status: FUNCTIONAL

## 2021-11-04 DEVICE — MICROSTENT HYDRUS: Type: IMPLANTABLE DEVICE | Site: EYE | Status: FUNCTIONAL

## 2021-11-04 RX ORDER — SODIUM CHLORIDE 9 MG/ML
25 INJECTION, SOLUTION INTRAVENOUS PRN
Status: DISCONTINUED | OUTPATIENT
Start: 2021-11-04 | End: 2021-11-04 | Stop reason: HOSPADM

## 2021-11-04 RX ORDER — MIDAZOLAM HYDROCHLORIDE 1 MG/ML
INJECTION INTRAMUSCULAR; INTRAVENOUS PRN
Status: DISCONTINUED | OUTPATIENT
Start: 2021-11-04 | End: 2021-11-04 | Stop reason: SDUPTHER

## 2021-11-04 RX ORDER — SODIUM CHLORIDE 9 MG/ML
INJECTION, SOLUTION INTRAVENOUS CONTINUOUS PRN
Status: DISCONTINUED | OUTPATIENT
Start: 2021-11-04 | End: 2021-11-04 | Stop reason: SDUPTHER

## 2021-11-04 RX ORDER — HYDRALAZINE HYDROCHLORIDE 20 MG/ML
INJECTION INTRAMUSCULAR; INTRAVENOUS PRN
Status: DISCONTINUED | OUTPATIENT
Start: 2021-11-04 | End: 2021-11-04 | Stop reason: SDUPTHER

## 2021-11-04 RX ORDER — ONDANSETRON 2 MG/ML
4 INJECTION INTRAMUSCULAR; INTRAVENOUS
Status: DISCONTINUED | OUTPATIENT
Start: 2021-11-04 | End: 2021-11-04 | Stop reason: HOSPADM

## 2021-11-04 RX ORDER — PREDNISOLONE ACETATE 10 MG/ML
SUSPENSION/ DROPS OPHTHALMIC
Status: COMPLETED | OUTPATIENT
Start: 2021-11-04 | End: 2021-11-04

## 2021-11-04 RX ORDER — SODIUM CHLORIDE 9 MG/ML
INJECTION, SOLUTION INTRAVENOUS CONTINUOUS
Status: DISCONTINUED | OUTPATIENT
Start: 2021-11-04 | End: 2021-11-04 | Stop reason: HOSPADM

## 2021-11-04 RX ORDER — KETOROLAC TROMETHAMINE 5 MG/ML
1 SOLUTION OPHTHALMIC SEE ADMIN INSTRUCTIONS
Status: DISCONTINUED | OUTPATIENT
Start: 2021-11-04 | End: 2021-11-04 | Stop reason: HOSPADM

## 2021-11-04 RX ORDER — SODIUM CHLORIDE 0.9 % (FLUSH) 0.9 %
5-40 SYRINGE (ML) INJECTION EVERY 12 HOURS SCHEDULED
Status: DISCONTINUED | OUTPATIENT
Start: 2021-11-04 | End: 2021-11-04 | Stop reason: HOSPADM

## 2021-11-04 RX ORDER — OFLOXACIN 3 MG/ML
SOLUTION/ DROPS OPHTHALMIC
Status: COMPLETED | OUTPATIENT
Start: 2021-11-04 | End: 2021-11-04

## 2021-11-04 RX ORDER — BALANCED SALT SOLUTION ENRICHED WITH BICARBONATE, DEXTROSE, AND GLUTATHIONE
KIT INTRAOCULAR
Status: COMPLETED | OUTPATIENT
Start: 2021-11-04 | End: 2021-11-04

## 2021-11-04 RX ORDER — TROPICAMIDE 10 MG/ML
1 SOLUTION/ DROPS OPHTHALMIC SEE ADMIN INSTRUCTIONS
Status: DISCONTINUED | OUTPATIENT
Start: 2021-11-04 | End: 2021-11-04 | Stop reason: HOSPADM

## 2021-11-04 RX ORDER — METOPROLOL TARTRATE 5 MG/5ML
INJECTION INTRAVENOUS PRN
Status: DISCONTINUED | OUTPATIENT
Start: 2021-11-04 | End: 2021-11-04 | Stop reason: SDUPTHER

## 2021-11-04 RX ORDER — CYCLOPENTOLATE HYDROCHLORIDE 10 MG/ML
1 SOLUTION/ DROPS OPHTHALMIC SEE ADMIN INSTRUCTIONS
Status: DISCONTINUED | OUTPATIENT
Start: 2021-11-04 | End: 2021-11-04 | Stop reason: HOSPADM

## 2021-11-04 RX ORDER — SODIUM CHLORIDE 0.9 % (FLUSH) 0.9 %
5-40 SYRINGE (ML) INJECTION PRN
Status: DISCONTINUED | OUTPATIENT
Start: 2021-11-04 | End: 2021-11-04 | Stop reason: HOSPADM

## 2021-11-04 RX ORDER — TETRACAINE HYDROCHLORIDE 5 MG/ML
1 SOLUTION OPHTHALMIC SEE ADMIN INSTRUCTIONS
Status: DISCONTINUED | OUTPATIENT
Start: 2021-11-04 | End: 2021-11-04 | Stop reason: HOSPADM

## 2021-11-04 RX ORDER — BRIMONIDINE TARTRATE 2 MG/ML
SOLUTION/ DROPS OPHTHALMIC
Status: COMPLETED | OUTPATIENT
Start: 2021-11-04 | End: 2021-11-04

## 2021-11-04 RX ORDER — CIPROFLOXACIN HYDROCHLORIDE 3.5 MG/ML
1 SOLUTION/ DROPS TOPICAL SEE ADMIN INSTRUCTIONS
Status: DISCONTINUED | OUTPATIENT
Start: 2021-11-04 | End: 2021-11-04 | Stop reason: HOSPADM

## 2021-11-04 RX ORDER — PHENYLEPHRINE HCL 2.5 %
1 DROPS OPHTHALMIC (EYE) SEE ADMIN INSTRUCTIONS
Status: DISCONTINUED | OUTPATIENT
Start: 2021-11-04 | End: 2021-11-04 | Stop reason: HOSPADM

## 2021-11-04 RX ADMIN — CIPROFLOXACIN 1 DROP: 3 SOLUTION OPHTHALMIC at 13:03

## 2021-11-04 RX ADMIN — MIDAZOLAM 1 MG: 1 INJECTION INTRAMUSCULAR; INTRAVENOUS at 14:08

## 2021-11-04 RX ADMIN — LIDOCAINE HYDROCHLORIDE: 35 GEL OPHTHALMIC at 13:11

## 2021-11-04 RX ADMIN — SODIUM CHLORIDE: 9 INJECTION, SOLUTION INTRAVENOUS at 14:04

## 2021-11-04 RX ADMIN — CYCLOPENTOLATE HYDROCHLORIDE 1 DROP: 10 SOLUTION/ DROPS OPHTHALMIC at 13:10

## 2021-11-04 RX ADMIN — TETRACAINE HYDROCHLORIDE 1 DROP: 5 SOLUTION OPHTHALMIC at 13:03

## 2021-11-04 RX ADMIN — METOPROLOL TARTRATE 1 MG: 5 INJECTION, SOLUTION INTRAVENOUS at 13:30

## 2021-11-04 RX ADMIN — HYDRALAZINE HYDROCHLORIDE 10 MG: 20 INJECTION INTRAMUSCULAR; INTRAVENOUS at 13:36

## 2021-11-04 RX ADMIN — CYCLOPENTOLATE HYDROCHLORIDE 1 DROP: 10 SOLUTION/ DROPS OPHTHALMIC at 13:03

## 2021-11-04 RX ADMIN — POVIDONE-IODINE: 5 SOLUTION OPHTHALMIC at 13:11

## 2021-11-04 RX ADMIN — KETOROLAC TROMETHAMINE 1 DROP: 0.5 SOLUTION OPHTHALMIC at 13:03

## 2021-11-04 RX ADMIN — TROPICAMIDE 1 DROP: 10 SOLUTION/ DROPS OPHTHALMIC at 13:10

## 2021-11-04 RX ADMIN — SODIUM CHLORIDE: 9 INJECTION, SOLUTION INTRAVENOUS at 13:10

## 2021-11-04 RX ADMIN — HYDRALAZINE HYDROCHLORIDE 10 MG: 20 INJECTION INTRAMUSCULAR; INTRAVENOUS at 13:25

## 2021-11-04 RX ADMIN — MIDAZOLAM 1 MG: 1 INJECTION INTRAMUSCULAR; INTRAVENOUS at 14:04

## 2021-11-04 RX ADMIN — TROPICAMIDE 1 DROP: 10 SOLUTION/ DROPS OPHTHALMIC at 13:03

## 2021-11-04 ASSESSMENT — PAIN SCALES - GENERAL
PAINLEVEL_OUTOF10: 0

## 2021-11-04 ASSESSMENT — LIFESTYLE VARIABLES: SMOKING_STATUS: 1

## 2021-11-04 NOTE — OP NOTE
Operative Note      [facility]  [address] [facility]. [address] [facility] [address] [phone]      Name:  Erum De Santiago  :  1945    Age:   64 y.o. Medical Record Number:  [id number]  Date of Procedure:  2021     Preoperative diagnosis:  1. Visually significant cataract left eye   2. Primary open angle glaucoma left eye     Postoperative diagnosis:   Same    Procedure:   1. Phacoemulsification with intraocular lens left eye   2. Hydrus implant left eye. Surgeon: Janey Foster M.D. Anesthesia: MAC with Topical   Complications:None  Implant: [id number] +20.0  Estimated Blood Loss: < 3cc  Specimens removed: none    Indications for procedure: The patient has a history of glaucoma which is currently uncontrolled. In order to achieve better control of the intraocular pressure, glaucoma surgery was advised to try and prevent further loss of vision. The patient has a visually significant cataract in the right eye that interferes with activities of daily living. Following discussion of all risks, benefits, and alternatives, the patient agreed to have the procedure done. Informed consent was signed. Operative Procedure:  After the risks and benefits of, and alternatives to, the planned procedure were explained to the patient,informed consent was obtained. The patient was ushered into the operating room and placed in the supineposition on the table. After induction of IV sedation, was administered, the operative eye and the surrounding area were prepped and draped in the usual sterile fashion. Under the operating microscope, a temporal paracentesis was created. A small amount of preservative-free 1% lidocaine was instilled into the anterior chamber. The anterior chamber was then deepened with Viscoat. A temporal biplanar clear corneal incision was created with a 2.4mm keratome.  The microscope was tilted and the patient's head also tilted away from the operative side to patient was taken to the recovery area in stable condition.     Electronically signed by: Alexus Simmons MD,11/4/2021,2:19 PM

## 2021-11-04 NOTE — ANESTHESIA PRE PROCEDURE
Department of Anesthesiology  Preprocedure Note       Name:  Janet Beckham   Age:  64 y.o.  :  1945                                          MRN:  [id number]         Date:  2021      Surgeon: Courtney Query):  Juana Queen MD    Procedure: Procedure(s):  GONIOTOMY  Phacoemulsification with intraocular lens implant    Medications prior to admission:   Prior to Admission medications    Medication Sig Start Date End Date Taking? Authorizing Provider   Multiple Vitamins-Minerals (EYE VITAMINS PO) Take 1 tablet by mouth 2 times daily Indications: EYE SHIELD EYE VITAMINES   Yes Historical Provider, MD   amiodarone (CORDARONE) 200 MG tablet Take 1 tablet by mouth daily Indications: takes in the evening 10/23/20  Yes Melinda Finn MD   rivaroxaban (XARELTO) 15 MG TABS tablet Take 1 tablet by mouth daily  Patient taking differently: Take 10 mg by mouth daily  10/23/20  Yes Melinda Finn MD   gabapentin (NEURONTIN) 300 MG capsule Take 1 capsule by mouth 3 times daily for 30 days. Patient taking differently: Take 600 mg by mouth nightly.   10/23/20 10/29/21 Yes Melinda Finn MD   atorvastatin (LIPITOR) 40 MG tablet Take 1 tablet by mouth daily 10/23/20  Yes Melinda Finn MD   metoprolol tartrate (LOPRESSOR) 50 MG tablet Take 1 tablet by mouth 2 times daily  Patient taking differently: Take 25 mg by mouth 2 times daily Indications: takes 0.5 tablet twice a day  10/23/20  Yes Melinda Finn MD   ferrous sulfate (IRON 325) 325 (65 Fe) MG tablet Take 1 tablet by mouth daily (with breakfast) 10/23/20  Yes Melinda Finn MD   sennosides-docusate sodium (SENOKOT-S) 8.6-50 MG tablet Take 1 tablet by mouth 2 times daily 10/23/20  Yes Melinda Finn MD   levothyroxine (SYNTHROID) 50 MCG tablet Take 1 tablet by mouth Daily 10/23/20  Yes Melinda Finn MD   pantoprazole (PROTONIX) 40 MG tablet Take 1 tablet by mouth daily 10/23/20  Yes Melinda Finn MD   dorzolamide-timolol (COSOPT) 22.3-6.8 MG/ML ophthalmic solution Place 1 drop into both eyes 2 times daily   Yes Historical Provider, MD   latanoprost (XALATAN) 0.005 % ophthalmic solution Place 1 drop into the left eye nightly   Yes Historical Provider, MD   nitroGLYCERIN (NITROSTAT) 0.4 MG SL tablet Place 0.4 mg under the tongue every 5 minutes as needed for Chest pain up to max of 3 total doses. If no relief after 1 dose, call 911.    Yes Historical Provider, MD       Current medications:    Current Facility-Administered Medications   Medication Dose Route Frequency Provider Last Rate Last Admin    ciprofloxacin (CILOXAN) 0.3 % ophthalmic solution 1 drop  1 drop Left Eye See Admin Instructions Kandice Rodriguez MD   1 drop at 11/04/21 1303    tetracaine (TETRAVISC) 0.5 % ophthalmic solution 1 drop  1 drop Left Eye See Admin Instructions Kandice Rodriguez MD   1 drop at 11/04/21 1303    lidocaine (AKTEN) 3.5 % ophthalmic gel   Left Eye See [address] Instructions Kandice Rodriguez MD   Given at 11/04/21 1311    0.9 % sodium chloride infusion   IntraVENous Continuous David Polo MD 75 mL/hr at 11/04/21 1310 New Bag at 11/04/21 1310    sodium chloride flush 0.9 % injection 5-40 mL  5-40 mL IntraVENous 2 times per day David Polo MD        sodium chloride flush 0.9 % injection 5-40 mL  5-40 mL IntraVENous PRN David Polo MD        0.9 % sodium chloride infusion  25 mL IntraVENous PRN David Polo MD        cyclopentolate (CYCLOGYL) 1 % ophthalmic solution 1 drop  1 drop Left Eye See Admin Instructions Kandice Rodriguez MD   1 drop at 11/04/21 1310    ketorolac (ACULAR) 0.5 % ophthalmic solution 1 drop  1 drop Left Eye See Admin Instructions Kandice Rodriguez MD   1 drop at 11/04/21 1303    phenylephrine (MYDFRIN) 2.5 % ophthalmic solution 1 drop  1 drop Left Eye See Admin Instructions Kandice Rodriguez MD        tropicamide (MYDRIACYL) 1 % ophthalmic solution 1 drop  1 drop Left Eye See Admin Instructions Kandice Rodriguez MD   1 drop at 11/04/21 1310    ondansetron (ZOFRAN) injection 4 mg  4 mg IntraVENous Once PRN Godwin Mayfield MD           Allergies: Allergies   Allergen Reactions    Azithromycin     Brimonidine Itching    Clindamycin/Lincomycin Diarrhea    Penicillins Hives    Simvastatin        Problem List:    Patient Active Problem List   Diagnosis Code    Acute metabolic encephalopathy H72.83    Pelvic hematoma in male N50.1    Acute renal failure superimposed on chronic kidney disease (HCC) N17.9, N18.9    Elevated LFTs R79.89    Abnormal chest x-ray R93.89    Acute cystitis without hematuria N30.00    Debility R53.81    Suicide attempt ([facility] [address] T14.91XA    Depression F32. A    MDD (major depressive disorder), recurrent episode ([facility]) F33.9    Severe protein-calorie malnutrition ([facility] [address] E43    PAF (paroxysmal atrial fibrillation) ([facility]) I48.0    Hypothyroidism E03.9    Coronary artery disease involving native heart without angina pectoris I25.10    Urinary retention R33.9    Retroperitoneal hematoma V96.7    Complicated UTI (urinary tract infection) N39.0    Chronic obstructive pulmonary disease ([facility]) J44.9    Constipation K59.00    Near syncope R55       Past Medical History:        Diagnosis Date    Aneurysm, aortic ([facility])     Atrial fibrillation ([facility] [address]     COVID-19     Glaucoma     Heart attack ([facility] [address]     Hyperlipidemia     Hypertension     Neuropathy        Past Surgical History:        Procedure Laterality Date    BACK SURGERY      COLONOSCOPY      CORONARY ANGIOPLASTY WITH STENT PLACEMENT      X 2    EYE SURGERY Right 7/23/2020    GONIOTOMY performed by Jonetta Angelucci, MD at [place] [address] Right 7/23/2020    Phacoemulsification with intraocular lens implant performed by Jonetta Angelucci, MD at [address] [place] [address]       Social History:    Social History     Tobacco Use    Smoking status: Former Smoker     Types: Cigarettes    Smokeless tobacco: Never Used    Tobacco comment: was only social smoker    Substance Use Topics    Alcohol use: Not Currently                                Counseling given: Not Answered  Comment: was only social smoker       Vital Signs (Current):   Vitals:    10/29/21 1053 11/04/21 1300   BP:  (!) 192/87   Pulse:  54   Resp:  16   Temp:  97 °F (36.1 °C)   TempSrc:  Temporal   SpO2:  97%   Weight: 167 lb (75.8 kg)    Height: 5' 9\" (1.753 m)                                               BP Readings from Last 3 Encounters:   11/04/21 (!) 192/87   10/25/20 (!) 151/78   10/13/20 (!) 149/63       NPO Status: Time of last liquid consumption: 0800 (black coffee)                        Time of last solid consumption: 2000                        Date of last liquid consumption: 11/04/21                        Date of last solid food consumption: 11/03/21    BMI:   Wt Readings from Last 3 Encounters:   10/29/21 167 lb (75.8 kg)   10/25/20 163 lb 2.3 oz (74 kg)   10/13/20 156 lb 3.2 oz (70.9 kg)     Body mass index is 24.66 kg/m². CBC:   Lab Results   Component Value Date    WBC 3.8 10/22/2020    RBC 2.60 10/22/2020    HGB 7.8 10/22/2020    HCT 23.7 10/22/2020    MCV 91.2 10/22/2020    RDW 17.2 10/22/2020     10/22/2020       CMP:   Lab Results   Component Value Date     10/23/2020    K 4.1 10/23/2020     10/23/2020    CO2 24 10/23/2020    BUN 27 10/23/2020    CREATININE 1.4 10/23/2020    GFRAA 59 10/23/2020    AGRATIO 1.1 10/05/2020    LABGLOM 49 10/23/2020    GLUCOSE 83 10/23/2020    PROT 6.9 10/05/2020    CALCIUM 8.5 10/23/2020    BILITOT 0.7 10/05/2020    ALKPHOS 98 10/05/2020    AST 22 10/05/2020    ALT 14 10/05/2020       POC Tests: No results for input(s): POCGLU, POCNA, POCK, POCCL, POCBUN, POCHEMO, POCHCT in the last 72 hours.     Coags: No results found for: PROTIME, INR, APTT    HCG (If Applicable): No results found for: PREGTESTUR, PREGSERUM, HCG, HCGQUANT     ABGs:   Lab Results   Component Value Date    PHART 7.393 09/13/2020    PO2ART 86.1 09/13/2020    LMW4PSU 28.3 09/13/2020    NDM6DLY 17.2 09/13/2020    BEART -6.8 09/13/2020    L6XKNPTH 98.0 09/13/2020        Type & Screen (If Applicable):  No results found for: LABABO, LABRH    Drug/Infectious Status (If Applicable):  No results found for: HIV, HEPCAB    COVID-19 Screening (If Applicable):   Lab Results   Component Value Date    COVID19 Not Detected 10/12/2020    COVID19 Not Detected 09/13/2020         Anesthesia Evaluation  Patient summary reviewed no history of anesthetic complications:   Airway: Mallampati: III  TM distance: >3 FB   Neck ROM: full  Mouth opening: > = 3 FB Dental:      Comment: No loose teeth    Pulmonary: breath sounds clear to auscultation  (+) current smoker (former)    (-) COPD, asthma, rhonchi, wheezes and rales                           Cardiovascular:  Exercise tolerance: good (>4 METS), per preop H&P  (+) hypertension:, valvular problems/murmurs (moderate AI): AI, past MI:, CABG/stent (s/p PCI):, dysrhythmias: atrial fibrillation, hyperlipidemia    (-) murmur      Rhythm: regular  Rate: normal           Beta Blocker:  Not on Beta Blocker (+ Amiodarone)      ROS comment: Echocardiogram ([phone])  Summary:   The left ventricular wall motion is normal. Severe aortic root dilatation. Moderate aortic regurgitation. Overall left ventricular ejection fraction is estimated to be 50-55%. The diastolic function is impaired and classified as Grade 2 (psuedonormalization). There is trace mitral regurgitation.         Neuro/Psych:   (+) psychiatric history (history of suicide attempt):depression/anxiety    (-) seizures, TIA and CVA            ROS comment: Neuropathy  Chronic back pain, spinal stenosis, s/p ALIF (2020) GI/Hepatic/Renal:   (+) GERD:, liver disease (Elevated LFTs):, renal disease (SCr: 1.4-1.5): CRI,      (-) PUD, hepatitis and bowel prep       Endo/Other:    (+) hypothyroidism (euthyroid), blood dyscrasia (Chronic anemia, last H&H: 10.3/31.5;

## 2021-11-04 NOTE — ANESTHESIA POSTPROCEDURE EVALUATION
Department of Anesthesiology  Postprocedure Note    Patient: Ruben Wilkins MRN: [id number]  YOB: 1945  Date of evaluation: 11/4/2021  Time:  2:20 PM     Procedure Summary     Date: 11/04/21 Room / Location: [address]    Anesthesia Start: 3797 Anesthesia Stop: 5354    Procedures:       PHACOEMULSIFICATION WITH INTRAOCULAR LENS IMPLANT - LEFT EYE (Left Eye)      HYDRUS STENT - LEFT EYE (Left Eye) Diagnosis:       Primary open angle glaucoma of left eye, mild stage      Age-related nuclear cataract of left eye      (Primary open angle glaucoma of left eye, mild stage, Age-related nuclear cataract of left eye)    Surgeons: Annamaria Sterling MD Responsible Provider: Surendra Sanz MD    Anesthesia Type: MAC ASA Status: 3          Anesthesia Type: MAC    Lesli Phase I: Lesli Score: 10    Lesli Phase II:      Last vitals: Reviewed and per EMR flowsheets. Anesthesia Post Evaluation    Patient location during evaluation: PACU  Patient participation: complete - patient participated  Level of consciousness: awake and alert  Airway patency: patent  Nausea & Vomiting: no nausea and no vomiting  Cardiovascular status: blood pressure returned to baseline, hemodynamically stable and hypertensive  Respiratory status: room air, nonlabored ventilation and spontaneous ventilation  Hydration status: stable  Comments: Mr. Saul pelaez comfortably conversing with staff upon arrival to PACU. Instructed to resume regular medication regimen and continue follow-up with PCP.       Surendra Sanz MD

## (undated) DEVICE — SOLUTION IRRIG BSS ST 500ML

## (undated) DEVICE — MVR KNIFE 23 GAUGE CANNULA COMPATIBLE: Brand: ALCON

## (undated) DEVICE — Device: Brand: MEDEX

## (undated) DEVICE — SYRINGE, LUER LOCK, 30ML: Brand: MEDLINE

## (undated) DEVICE — 3 ML SYRINGE LUER-LOCK TIP: Brand: MONOJECT

## (undated) DEVICE — Device

## (undated) DEVICE — KAHOOK DUAL BLADE: Brand: KAHOOK DUAL BLADE

## (undated) DEVICE — SYRINGE TB 1ML NDL 25GA L0.625IN PLAS SLIP TIP CONVENTIONAL

## (undated) DEVICE — MICROSURGICAL INSTRUMENT ANTERIOR CHAMBER CANNULA 30GA: Brand: ALCON

## (undated) DEVICE — OPHTHALMIC KNIFE 15°: Brand: ALCON

## (undated) DEVICE — GLOVE SURG SZ 65 L12IN FNGR THK87MIL WHT LTX FREE

## (undated) DEVICE — CLEARCUT® SLIT KNIFE INTREPID MICRO-COAXIAL SYSTEM 2.4 SB: Brand: CLEARCUT®; INTREPID

## (undated) DEVICE — SOLUTION IV IRRIG WATER 500ML POUR BRL ST 2F7113

## (undated) DEVICE — HANDLE CLIP ON MICROSCOPE

## (undated) DEVICE — SURGICAL PROC PACK SHT WEST V4